# Patient Record
Sex: MALE | Race: WHITE | NOT HISPANIC OR LATINO | Employment: UNEMPLOYED | URBAN - NONMETROPOLITAN AREA
[De-identification: names, ages, dates, MRNs, and addresses within clinical notes are randomized per-mention and may not be internally consistent; named-entity substitution may affect disease eponyms.]

---

## 2018-01-07 ENCOUNTER — APPOINTMENT (OUTPATIENT)
Dept: GENERAL RADIOLOGY | Facility: HOSPITAL | Age: 53
End: 2018-01-07

## 2018-01-07 ENCOUNTER — APPOINTMENT (OUTPATIENT)
Dept: CT IMAGING | Facility: HOSPITAL | Age: 53
End: 2018-01-07

## 2018-01-07 ENCOUNTER — HOSPITAL ENCOUNTER (INPATIENT)
Facility: HOSPITAL | Age: 53
LOS: 7 days | Discharge: ANOTHER HOSPITAL | End: 2018-01-14
Attending: EMERGENCY MEDICINE | Admitting: INTERNAL MEDICINE

## 2018-01-07 DIAGNOSIS — A41.9 SEPSIS DUE TO PNEUMONIA (HCC): Primary | ICD-10-CM

## 2018-01-07 DIAGNOSIS — N17.9 ACUTE RENAL FAILURE, UNSPECIFIED ACUTE RENAL FAILURE TYPE (HCC): ICD-10-CM

## 2018-01-07 DIAGNOSIS — Z74.09 IMPAIRED FUNCTIONAL MOBILITY, BALANCE, GAIT, AND ENDURANCE: ICD-10-CM

## 2018-01-07 DIAGNOSIS — J18.9 SEPSIS DUE TO PNEUMONIA (HCC): Primary | ICD-10-CM

## 2018-01-07 DIAGNOSIS — Z74.09 IMPAIRED MOBILITY AND ADLS: ICD-10-CM

## 2018-01-07 DIAGNOSIS — Z78.9 IMPAIRED MOBILITY AND ADLS: ICD-10-CM

## 2018-01-07 LAB
ALBUMIN SERPL-MCNC: 3.7 G/DL (ref 3.5–5)
ALBUMIN/GLOB SERPL: 1 G/DL (ref 1–2)
ALP SERPL-CCNC: 123 U/L (ref 38–126)
ALT SERPL W P-5'-P-CCNC: 56 U/L (ref 13–69)
AMPHET+METHAMPHET UR QL: NEGATIVE
AMPHETAMINES UR QL: NEGATIVE
ANION GAP SERPL CALCULATED.3IONS-SCNC: 16 MMOL/L
ARTERIAL PATENCY WRIST A: POSITIVE
ARTERIAL PATENCY WRIST A: POSITIVE
AST SERPL-CCNC: 39 U/L (ref 15–46)
BACTERIA UR QL AUTO: ABNORMAL /HPF
BARBITURATES UR QL SCN: NEGATIVE
BASE EXCESS BLDA CALC-SCNC: 1.6 MMOL/L
BASE EXCESS BLDA CALC-SCNC: 2.4 MMOL/L
BDY SITE: ABNORMAL
BDY SITE: ABNORMAL
BENZODIAZ UR QL SCN: NEGATIVE
BILIRUB SERPL-MCNC: 1.1 MG/DL (ref 0.2–1.3)
BILIRUB UR QL STRIP: NEGATIVE
BUN BLD-MCNC: 21 MG/DL (ref 7–20)
BUN/CREAT SERPL: 14 (ref 6.3–21.9)
BUPRENORPHINE SERPL-MCNC: NEGATIVE NG/ML
CALCIUM SPEC-SCNC: 10 MG/DL (ref 8.4–10.2)
CANNABINOIDS SERPL QL: POSITIVE
CHLORIDE SERPL-SCNC: 89 MMOL/L (ref 98–107)
CLARITY UR: ABNORMAL
CO2 SERPL-SCNC: 33 MMOL/L (ref 26–30)
COCAINE UR QL: NEGATIVE
COHGB MFR BLD: 0.8 %
COHGB MFR BLD: 1.2 %
COLOR UR: YELLOW
CREAT BLD-MCNC: 1.5 MG/DL (ref 0.6–1.3)
D-LACTATE SERPL-SCNC: 2.9 MMOL/L (ref 0.5–2)
D-LACTATE SERPL-SCNC: 3.3 MMOL/L (ref 0.5–2)
D-LACTATE SERPL-SCNC: 4.8 MMOL/L (ref 0.5–2)
DEPRECATED RDW RBC AUTO: 42.6 FL (ref 37–54)
ERYTHROCYTE [DISTWIDTH] IN BLOOD BY AUTOMATED COUNT: 11.5 % (ref 11.5–14.5)
FLUAV AG NPH QL: NEGATIVE
FLUBV AG NPH QL IA: NEGATIVE
GFR SERPL CREATININE-BSD FRML MDRD: 49 ML/MIN/1.73
GLOBULIN UR ELPH-MCNC: 3.8 GM/DL
GLUCOSE BLD-MCNC: 125 MG/DL (ref 74–98)
GLUCOSE UR STRIP-MCNC: NEGATIVE MG/DL
HCO3 BLDA-SCNC: 25.8 MMOL/L (ref 22–28)
HCO3 BLDA-SCNC: 26.2 MMOL/L (ref 22–28)
HCT VFR BLD AUTO: 44.9 % (ref 42–52)
HGB BLD-MCNC: 15.8 G/DL (ref 14–18)
HGB BLDA-MCNC: 14.1 G/DL (ref 12–18)
HGB BLDA-MCNC: 15.5 G/DL (ref 12–18)
HGB UR QL STRIP.AUTO: ABNORMAL
HOLD SPECIMEN: NORMAL
HOROWITZ INDEX BLD+IHG-RTO: 32 %
HOROWITZ INDEX BLD+IHG-RTO: 40 %
HYALINE CASTS UR QL AUTO: ABNORMAL /LPF
KETONES UR QL STRIP: ABNORMAL
LEUKOCYTE ESTERASE UR QL STRIP.AUTO: NEGATIVE
LYMPHOCYTES # BLD MANUAL: 1.01 10*3/MM3 (ref 0.6–3.4)
LYMPHOCYTES NFR BLD MANUAL: 4 % (ref 10–50)
MCH RBC QN AUTO: 35 PG (ref 27–31)
MCHC RBC AUTO-ENTMCNC: 35.2 G/DL (ref 30–37)
MCV RBC AUTO: 99.6 FL (ref 80–94)
METAMYELOCYTES NFR BLD MANUAL: 1 % (ref 0–0)
METHADONE UR QL SCN: NEGATIVE
METHGB BLD QL: 0.5 %
METHGB BLD QL: 0.7 %
MODALITY: ABNORMAL
MODALITY: ABNORMAL
NEUTROPHILS # BLD AUTO: 23.99 10*3/MM3 (ref 2–6.9)
NEUTROPHILS NFR BLD MANUAL: 75 % (ref 37–80)
NEUTS BAND NFR BLD MANUAL: 20 % (ref 0–6)
NITRITE UR QL STRIP: NEGATIVE
NT-PROBNP SERPL-MCNC: 2540 PG/ML (ref 0–125)
OPIATES UR QL: NEGATIVE
OXYCODONE UR QL SCN: NEGATIVE
OXYHGB MFR BLDV: 90.5 % (ref 94–99)
OXYHGB MFR BLDV: 93.2 % (ref 94–99)
PCO2 BLDA: 37 MM HG (ref 35–45)
PCO2 BLDA: 38.4 MM HG (ref 35–45)
PCP UR QL SCN: NEGATIVE
PH BLDA: 7.44 PH UNITS (ref 7.3–7.5)
PH BLDA: 7.46 PH UNITS (ref 7.3–7.5)
PH UR STRIP.AUTO: 5.5 [PH] (ref 5–8)
PLAT MORPH BLD: NORMAL
PLATELET # BLD AUTO: 206 10*3/MM3 (ref 130–400)
PMV BLD AUTO: 11.4 FL (ref 6–12)
PO2 BLDA: 59.5 MM HG (ref 75–100)
PO2 BLDA: 68.2 MM HG (ref 75–100)
POTASSIUM BLD-SCNC: 4 MMOL/L (ref 3.5–5.1)
PROPOXYPH UR QL: NEGATIVE
PROT SERPL-MCNC: 7.5 G/DL (ref 6.3–8.2)
PROT UR QL STRIP: ABNORMAL
RBC # BLD AUTO: 4.51 10*6/MM3 (ref 4.7–6.1)
RBC # UR: ABNORMAL /HPF
RBC MORPH BLD: NORMAL
REF LAB TEST METHOD: ABNORMAL
SAO2 % BLDCOA: 92.1 %
SAO2 % BLDCOA: 94.6 %
SCAN SLIDE: NORMAL
SODIUM BLD-SCNC: 134 MMOL/L (ref 137–145)
SP GR UR STRIP: 1.02 (ref 1–1.03)
SQUAMOUS #/AREA URNS HPF: ABNORMAL /HPF
TOXIC GRANULATION: ABNORMAL
TRICYCLICS UR QL SCN: NEGATIVE
TROPONIN I SERPL-MCNC: <0.012 NG/ML (ref 0–0.03)
UROBILINOGEN UR QL STRIP: ABNORMAL
WBC MORPH BLD: NORMAL
WBC NRBC COR # BLD: 25.25 10*3/MM3 (ref 4.8–10.8)
WBC UR QL AUTO: ABNORMAL /HPF
WHOLE BLOOD HOLD SPECIMEN: NORMAL
WHOLE BLOOD HOLD SPECIMEN: NORMAL

## 2018-01-07 PROCEDURE — 71250 CT THORAX DX C-: CPT

## 2018-01-07 PROCEDURE — 25010000002 METHYLPREDNISOLONE PER 125 MG: Performed by: EMERGENCY MEDICINE

## 2018-01-07 PROCEDURE — 94640 AIRWAY INHALATION TREATMENT: CPT

## 2018-01-07 PROCEDURE — 36600 WITHDRAWAL OF ARTERIAL BLOOD: CPT

## 2018-01-07 PROCEDURE — 93005 ELECTROCARDIOGRAM TRACING: CPT | Performed by: EMERGENCY MEDICINE

## 2018-01-07 PROCEDURE — 83605 ASSAY OF LACTIC ACID: CPT | Performed by: INTERNAL MEDICINE

## 2018-01-07 PROCEDURE — 82805 BLOOD GASES W/O2 SATURATION: CPT

## 2018-01-07 PROCEDURE — 94799 UNLISTED PULMONARY SVC/PX: CPT

## 2018-01-07 PROCEDURE — 81001 URINALYSIS AUTO W/SCOPE: CPT | Performed by: INTERNAL MEDICINE

## 2018-01-07 PROCEDURE — 99223 1ST HOSP IP/OBS HIGH 75: CPT | Performed by: INTERNAL MEDICINE

## 2018-01-07 PROCEDURE — 87449 NOS EACH ORGANISM AG IA: CPT | Performed by: INTERNAL MEDICINE

## 2018-01-07 PROCEDURE — 25010000002 METHYLPREDNISOLONE PER 40 MG: Performed by: INTERNAL MEDICINE

## 2018-01-07 PROCEDURE — 71046 X-RAY EXAM CHEST 2 VIEWS: CPT

## 2018-01-07 PROCEDURE — 25010000002 PIPERACILLIN SOD-TAZOBACTAM PER 1 G: Performed by: EMERGENCY MEDICINE

## 2018-01-07 PROCEDURE — 80306 DRUG TEST PRSMV INSTRMNT: CPT | Performed by: INTERNAL MEDICINE

## 2018-01-07 PROCEDURE — 83880 ASSAY OF NATRIURETIC PEPTIDE: CPT | Performed by: EMERGENCY MEDICINE

## 2018-01-07 PROCEDURE — 83050 HGB METHEMOGLOBIN QUAN: CPT

## 2018-01-07 PROCEDURE — 25010000002 LEVOFLOXACIN PER 250 MG: Performed by: EMERGENCY MEDICINE

## 2018-01-07 PROCEDURE — 5A09357 ASSISTANCE WITH RESPIRATORY VENTILATION, LESS THAN 24 CONSECUTIVE HOURS, CONTINUOUS POSITIVE AIRWAY PRESSURE: ICD-10-PCS | Performed by: FAMILY MEDICINE

## 2018-01-07 PROCEDURE — 87804 INFLUENZA ASSAY W/OPTIC: CPT | Performed by: EMERGENCY MEDICINE

## 2018-01-07 PROCEDURE — 87899 AGENT NOS ASSAY W/OPTIC: CPT | Performed by: INTERNAL MEDICINE

## 2018-01-07 PROCEDURE — 85007 BL SMEAR W/DIFF WBC COUNT: CPT | Performed by: EMERGENCY MEDICINE

## 2018-01-07 PROCEDURE — 85025 COMPLETE CBC W/AUTO DIFF WBC: CPT | Performed by: EMERGENCY MEDICINE

## 2018-01-07 PROCEDURE — 80053 COMPREHEN METABOLIC PANEL: CPT | Performed by: EMERGENCY MEDICINE

## 2018-01-07 PROCEDURE — 25010000002 PIPERACILLIN SOD-TAZOBACTAM PER 1 G: Performed by: INTERNAL MEDICINE

## 2018-01-07 PROCEDURE — 99285 EMERGENCY DEPT VISIT HI MDM: CPT

## 2018-01-07 PROCEDURE — 82375 ASSAY CARBOXYHB QUANT: CPT

## 2018-01-07 PROCEDURE — 25010000002 FUROSEMIDE PER 20 MG: Performed by: EMERGENCY MEDICINE

## 2018-01-07 PROCEDURE — 99255 IP/OBS CONSLTJ NEW/EST HI 80: CPT | Performed by: INTERNAL MEDICINE

## 2018-01-07 PROCEDURE — 25010000002 ENOXAPARIN PER 10 MG: Performed by: INTERNAL MEDICINE

## 2018-01-07 PROCEDURE — 71045 X-RAY EXAM CHEST 1 VIEW: CPT

## 2018-01-07 PROCEDURE — 87040 BLOOD CULTURE FOR BACTERIA: CPT | Performed by: EMERGENCY MEDICINE

## 2018-01-07 PROCEDURE — 25010000002 VANCOMYCIN PER 500 MG: Performed by: INTERNAL MEDICINE

## 2018-01-07 PROCEDURE — 94660 CPAP INITIATION&MGMT: CPT

## 2018-01-07 PROCEDURE — 25010000002 MAGNESIUM SULFATE 2 GM/50ML SOLUTION: Performed by: EMERGENCY MEDICINE

## 2018-01-07 PROCEDURE — 84484 ASSAY OF TROPONIN QUANT: CPT | Performed by: EMERGENCY MEDICINE

## 2018-01-07 PROCEDURE — 83605 ASSAY OF LACTIC ACID: CPT | Performed by: EMERGENCY MEDICINE

## 2018-01-07 RX ORDER — BACLOFEN 10 MG/1
10 TABLET ORAL 3 TIMES DAILY
COMMUNITY
End: 2018-01-14 | Stop reason: HOSPADM

## 2018-01-07 RX ORDER — FUROSEMIDE 10 MG/ML
20 INJECTION INTRAMUSCULAR; INTRAVENOUS ONCE
Status: COMPLETED | OUTPATIENT
Start: 2018-01-07 | End: 2018-01-07

## 2018-01-07 RX ORDER — ONDANSETRON 2 MG/ML
4 INJECTION INTRAMUSCULAR; INTRAVENOUS EVERY 6 HOURS PRN
Status: DISCONTINUED | OUTPATIENT
Start: 2018-01-07 | End: 2018-01-14 | Stop reason: HOSPADM

## 2018-01-07 RX ORDER — LORAZEPAM 2 MG/ML
0.5 INJECTION INTRAMUSCULAR EVERY 6 HOURS PRN
Status: DISCONTINUED | OUTPATIENT
Start: 2018-01-07 | End: 2018-01-14 | Stop reason: HOSPADM

## 2018-01-07 RX ORDER — IPRATROPIUM BROMIDE AND ALBUTEROL SULFATE 2.5; .5 MG/3ML; MG/3ML
3 SOLUTION RESPIRATORY (INHALATION) ONCE
Status: COMPLETED | OUTPATIENT
Start: 2018-01-07 | End: 2018-01-07

## 2018-01-07 RX ORDER — CITALOPRAM 40 MG/1
40 TABLET ORAL DAILY
COMMUNITY
End: 2018-01-24 | Stop reason: HOSPADM

## 2018-01-07 RX ORDER — PREDNISONE 20 MG/1
20 TABLET ORAL 2 TIMES DAILY
COMMUNITY
End: 2018-01-14 | Stop reason: HOSPADM

## 2018-01-07 RX ORDER — GUAIFENESIN 600 MG/1
600 TABLET, EXTENDED RELEASE ORAL EVERY 12 HOURS SCHEDULED
Status: DISCONTINUED | OUTPATIENT
Start: 2018-01-07 | End: 2018-01-14 | Stop reason: HOSPADM

## 2018-01-07 RX ORDER — LEVOFLOXACIN 5 MG/ML
750 INJECTION, SOLUTION INTRAVENOUS ONCE
Status: COMPLETED | OUTPATIENT
Start: 2018-01-07 | End: 2018-01-07

## 2018-01-07 RX ORDER — NICOTINE 21 MG/24HR
1 PATCH, TRANSDERMAL 24 HOURS TRANSDERMAL EVERY 24 HOURS
Status: DISCONTINUED | OUTPATIENT
Start: 2018-01-07 | End: 2018-01-14 | Stop reason: HOSPADM

## 2018-01-07 RX ORDER — SODIUM CHLORIDE 0.9 % (FLUSH) 0.9 %
1-10 SYRINGE (ML) INJECTION AS NEEDED
Status: DISCONTINUED | OUTPATIENT
Start: 2018-01-07 | End: 2018-01-14 | Stop reason: HOSPADM

## 2018-01-07 RX ORDER — METHYLPREDNISOLONE SODIUM SUCCINATE 40 MG/ML
40 INJECTION, POWDER, LYOPHILIZED, FOR SOLUTION INTRAMUSCULAR; INTRAVENOUS EVERY 8 HOURS
Status: DISCONTINUED | OUTPATIENT
Start: 2018-01-07 | End: 2018-01-08

## 2018-01-07 RX ORDER — SODIUM CHLORIDE 9 MG/ML
125 INJECTION, SOLUTION INTRAVENOUS CONTINUOUS
Status: DISCONTINUED | OUTPATIENT
Start: 2018-01-07 | End: 2018-01-09

## 2018-01-07 RX ORDER — LEVOFLOXACIN 5 MG/ML
750 INJECTION, SOLUTION INTRAVENOUS EVERY 24 HOURS
Status: DISCONTINUED | OUTPATIENT
Start: 2018-01-08 | End: 2018-01-08

## 2018-01-07 RX ORDER — IPRATROPIUM BROMIDE AND ALBUTEROL SULFATE 2.5; .5 MG/3ML; MG/3ML
3 SOLUTION RESPIRATORY (INHALATION)
Status: DISCONTINUED | OUTPATIENT
Start: 2018-01-07 | End: 2018-01-14 | Stop reason: HOSPADM

## 2018-01-07 RX ORDER — MAGNESIUM SULFATE HEPTAHYDRATE 40 MG/ML
2 INJECTION, SOLUTION INTRAVENOUS ONCE
Status: COMPLETED | OUTPATIENT
Start: 2018-01-07 | End: 2018-01-07

## 2018-01-07 RX ORDER — CITALOPRAM 20 MG/1
40 TABLET ORAL DAILY
Status: DISCONTINUED | OUTPATIENT
Start: 2018-01-08 | End: 2018-01-14 | Stop reason: HOSPADM

## 2018-01-07 RX ORDER — ALBUTEROL SULFATE 2.5 MG/3ML
2.5 SOLUTION RESPIRATORY (INHALATION) EVERY 6 HOURS PRN
Status: DISCONTINUED | OUTPATIENT
Start: 2018-01-07 | End: 2018-01-14 | Stop reason: HOSPADM

## 2018-01-07 RX ORDER — METHYLPREDNISOLONE SODIUM SUCCINATE 125 MG/2ML
125 INJECTION, POWDER, LYOPHILIZED, FOR SOLUTION INTRAMUSCULAR; INTRAVENOUS ONCE
Status: COMPLETED | OUTPATIENT
Start: 2018-01-07 | End: 2018-01-07

## 2018-01-07 RX ORDER — ACETAMINOPHEN 325 MG/1
650 TABLET ORAL EVERY 4 HOURS PRN
Status: DISCONTINUED | OUTPATIENT
Start: 2018-01-07 | End: 2018-01-14 | Stop reason: HOSPADM

## 2018-01-07 RX ORDER — NITROGLYCERIN 0.4 MG/1
0.4 TABLET SUBLINGUAL
Status: DISCONTINUED | OUTPATIENT
Start: 2018-01-07 | End: 2018-01-07

## 2018-01-07 RX ORDER — SODIUM CHLORIDE 0.9 % (FLUSH) 0.9 %
10 SYRINGE (ML) INJECTION AS NEEDED
Status: DISCONTINUED | OUTPATIENT
Start: 2018-01-07 | End: 2018-01-14 | Stop reason: HOSPADM

## 2018-01-07 RX ORDER — ALBUTEROL SULFATE 90 UG/1
2 AEROSOL, METERED RESPIRATORY (INHALATION) EVERY 4 HOURS PRN
COMMUNITY
End: 2018-01-14 | Stop reason: HOSPADM

## 2018-01-07 RX ADMIN — METHYLPREDNISOLONE SODIUM SUCCINATE 40 MG: 40 INJECTION, POWDER, FOR SOLUTION INTRAMUSCULAR; INTRAVENOUS at 18:07

## 2018-01-07 RX ADMIN — SODIUM CHLORIDE 125 ML/HR: 9 INJECTION, SOLUTION INTRAVENOUS at 18:07

## 2018-01-07 RX ADMIN — SODIUM CHLORIDE 1000 ML: 9 INJECTION, SOLUTION INTRAVENOUS at 04:15

## 2018-01-07 RX ADMIN — GUAIFENESIN 600 MG: 600 TABLET, EXTENDED RELEASE ORAL at 21:54

## 2018-01-07 RX ADMIN — TAZOBACTAM SODIUM AND PIPERACILLIN SODIUM 4.5 G: 500; 4 INJECTION, SOLUTION INTRAVENOUS at 18:08

## 2018-01-07 RX ADMIN — IPRATROPIUM BROMIDE AND ALBUTEROL SULFATE 3 ML: .5; 3 SOLUTION RESPIRATORY (INHALATION) at 02:50

## 2018-01-07 RX ADMIN — TAZOBACTAM SODIUM AND PIPERACILLIN SODIUM 4.5 G: 500; 4 INJECTION, SOLUTION INTRAVENOUS at 03:57

## 2018-01-07 RX ADMIN — VANCOMYCIN HYDROCHLORIDE 1500 MG: 500 INJECTION, POWDER, LYOPHILIZED, FOR SOLUTION INTRAVENOUS at 18:14

## 2018-01-07 RX ADMIN — NICOTINE 1 PATCH: 21 PATCH TRANSDERMAL at 21:54

## 2018-01-07 RX ADMIN — METHYLPREDNISOLONE SODIUM SUCCINATE 125 MG: 125 INJECTION, POWDER, FOR SOLUTION INTRAMUSCULAR; INTRAVENOUS at 02:48

## 2018-01-07 RX ADMIN — ENOXAPARIN SODIUM 40 MG: 40 INJECTION SUBCUTANEOUS at 21:53

## 2018-01-07 RX ADMIN — LEVOFLOXACIN 750 MG: 5 INJECTION, SOLUTION INTRAVENOUS at 04:30

## 2018-01-07 RX ADMIN — NITROGLYCERIN 0.4 MG: 0.4 TABLET SUBLINGUAL at 07:22

## 2018-01-07 RX ADMIN — FUROSEMIDE 20 MG: 10 INJECTION, SOLUTION INTRAMUSCULAR; INTRAVENOUS at 06:15

## 2018-01-07 RX ADMIN — SODIUM CHLORIDE 1000 ML: 9 INJECTION, SOLUTION INTRAVENOUS at 18:13

## 2018-01-07 RX ADMIN — SODIUM CHLORIDE 1000 ML: 9 INJECTION, SOLUTION INTRAVENOUS at 02:48

## 2018-01-07 RX ADMIN — SODIUM CHLORIDE 500 ML: 9 INJECTION, SOLUTION INTRAVENOUS at 17:19

## 2018-01-07 RX ADMIN — IPRATROPIUM BROMIDE AND ALBUTEROL SULFATE 3 ML: .5; 3 SOLUTION RESPIRATORY (INHALATION) at 19:03

## 2018-01-07 RX ADMIN — NITROGLYCERIN 1 INCH: 20 OINTMENT TOPICAL at 06:16

## 2018-01-07 RX ADMIN — IPRATROPIUM BROMIDE AND ALBUTEROL SULFATE 3 ML: .5; 3 SOLUTION RESPIRATORY (INHALATION) at 03:45

## 2018-01-07 RX ADMIN — MAGNESIUM SULFATE HEPTAHYDRATE 2 G: 40 INJECTION, SOLUTION INTRAVENOUS at 02:48

## 2018-01-07 RX ADMIN — TAZOBACTAM SODIUM AND PIPERACILLIN SODIUM 4.5 G: 500; 4 INJECTION, SOLUTION INTRAVENOUS at 12:01

## 2018-01-07 NOTE — ED NOTES
Spoke w/ Alexi, Bed Coordinator @ [facility] [place], @ 0805. Still no beds @ this time. Will continue to call and keep us updated on bed placement.      La Quiroz  01/07/18 0806

## 2018-01-07 NOTE — H&P
[facility]   HISTORY AND PHYSICAL      Name:  Niall Murguia   Age:  52 y.o.  Sex:  male  :  1965  MRN:  [id number]   Visit Number:  [id number]  Admission Date:  2018  Date Of Service:  18  Primary Care Physician:  KEAGAN Rowley    History Obtained From:    patient and family    Chief Complaint:     Dyspnea     History Of Presenting Illness:      Patient is a 52-year-old  male with a history of COPD with bullous emphysema who has had shortness of breath and congestion for 1 week.  It started out as a viral infection that considerably worsened.  The past 3 days he has had cough with yellowish sputum which is turned to hemoptysis in the past 24 hours.  His dyspnea has been progressively worse to the point where he is severely short of breath.  Nothing is making it better.  Any movement makes it worse.  He also has had nausea and vomiting.  He was on antibiotics in the form of Augmentin and prednisone in the past 2 days prior to admission.  He came into the emergency room last night as he was not getting any better.  He has never had pneumonia as far as he knows before.  He has had COPD in which she had a spontaneous pneumothorax in  1014 had a chest tube.  He continues to smoke a half pack per day.  Advised him to quit.  He has been given 2 L of fluid in the emergency room.  He was also given Lasix, as they felt that he was fluid overloaded.  He had a cardiac surgery as a child and which a valve was repaired.  He has had no follow-up with cardiology recently.  He denies any chest pressure, or any other pain.  He has had fevers and chills.  He is to be admitted to the ICU with severe sepsis as his lactic acid is 4.8, his WBC count is 25.25, his creatinine is 1.5, and his CT of the chest shows extensive bullous changes and large right-sided pneumonia.    Review Of Systems:     General ROS: positive for  - chills and fever  Psychological ROS:  negative  Ophthalmic ROS: negative  ENT ROS: negative  Allergy and Immunology ROS: negative  Hematological and Lymphatic ROS: negative  Endocrine ROS: negative  Breast ROS: negative  Respiratory ROS: positive for - cough, hemoptysis, shortness of breath and sputum changes  Cardiovascular ROS: negative  Gastrointestinal ROS: positive for - nausea/vomiting  Genito-Urinary ROS: negative  Musculoskeletal ROS: positive for - muscular weakness  Neurological ROS: negative  Dermatological ROS: negative       Past Medical History:   COPD with bullous emphysema    Past Surgical history:   Cardiac surgery as a child.  Chest tube in       Social History:   Half pack per day smoker, smoked since he was a teenager.  Drinks a couple beers every 2 weeks.  Denies drugs.  He has 2 adult children.  He is .  His girlfriend is with him.  He is a full code.    Family History:   Father  at 47 of MI.  Mother is alive and well.    Allergies:      Review of patient's allergies indicates no known allergies.    Home Medications:    Prior to Admission Medications     Prescriptions Last Dose Informant Patient Reported? Taking?    albuterol (PROVENTIL HFA;VENTOLIN HFA) 108 (90 Base) MCG/ACT inhaler   Yes Yes    Inhale 2 puffs Every 4 (Four) Hours As Needed for Wheezing.    baclofen (LIORESAL) 10 MG tablet   Yes Yes    Take 10 mg by mouth 3 (Three) Times a Day.    citalopram (CeleXA) 40 MG tablet   Yes Yes    Take 40 mg by mouth Daily.    mometasone-formoterol (DULERA 200) 200-5 MCG/ACT inhaler   Yes Yes    Inhale 2 puffs 2 (Two) Times a Day.    predniSONE (DELTASONE) 20 MG tablet   Yes Yes    Take 20 mg by mouth 2 (Two) Times a Day.             Hospital Scheduled Meds:               Vital Signs:    Temp:  [98.2 °F (36.8 °C)] 98.2 °F (36.8 °C)  Heart Rate:  [104-133] 122  Resp:  [20-22] 20  BP: ()/(49-84) 122/76    Last 3 weights    18  0212   Weight: 68 kg (150 lb)       Body mass index is 22.81 kg/(m^2).    Physical  Exam:      General Appearance:    Alert, cooperative, in no Moderate distress   Head:    Normocephalic, without obvious abnormality, atraumatic   Eyes:            Lids and lashes normal, conjunctivae and sclerae normal, no   icterus, no pallor, corneas clear, PERRLA   Ears:    Ears appear intact with no abnormalities noted   Throat:   No oral lesions, no thrush, oral mucosa moist   Neck:   No adenopathy, supple, trachea midline, no thyromegaly, no   carotid bruit, no JVD   Back:     No kyphosis present, no scoliosis present, no skin lesions,      erythema or scars, no tenderness to percussion or                   palpation,   range of motion normal   Lungs:     Rhonchi bilaterally, worse on the right.  Some use of accessory muscles respiration.      Heart:    Regular rhythm and Increased rate, normal S1 and S2, no            murmur, no gallop, no rub, no click   Chest Wall:    No abnormalities observed   Abdomen:     Normal bowel sounds, no masses, no organomegaly, soft        non-tender, non-distended, no guarding, no rebound                tenderness   Rectal:     Deferred   Extremities:   4/5 strength in upper/lower extremity is bilaterally.     Pulses:   Pulses palpable and equal bilaterally   Skin:   No bleeding, bruising or rash   Lymph nodes:   No palpable adenopathy   Neurologic:   Cranial nerves 2 - 12 grossly intact, sensation intact, DTR       present and equal bilaterally       EKG:    Sinus tachycardia with no acute ST-T changes.    Telemetry:    Sinus tachycardia    I have personally looked at both the EKG and the telemetry strips.    Labs:      Results from last 7 days  Lab Units 01/07/18  0703 01/07/18  0241 01/07/18  0221   LACTATE mmol/L 4.8* 3.3*  --    WBC 10*3/mm3  --   --  25.25*   HEMOGLOBIN g/dL  --   --  15.8   HEMATOCRIT %  --   --  44.9   MCV fL  --   --  99.6*   MCHC g/dL  --   --  35.2   PLATELETS 10*3/mm3  --   --  206       Results from last 7 days  Lab Units 01/07/18  0349   PH,  ARTERIAL pH units 7.459   PO2 ART mm Hg 59.5*   PCO2, ARTERIAL mm Hg 37.0   HCO3 ART mmol/L 26.2       Results from last 7 days  Lab Units 01/07/18  0221   SODIUM mmol/L 134*   POTASSIUM mmol/L 4.0   CHLORIDE mmol/L 89*   CO2 mmol/L 33.0*   BUN mg/dL 21*   CREATININE mg/dL 1.50*   EGFR IF NONAFRICN AM mL/min/1.73 49*   CALCIUM mg/dL 10.0   GLUCOSE mg/dL 125*   ALBUMIN g/dL 3.70   BILIRUBIN mg/dL 1.1   ALK PHOS U/L 123   AST (SGOT) U/L 39   ALT (SGPT) U/L 56   Estimated Creatinine Clearance: 56 mL/min (by C-G formula based on Cr of 1.5).  No results found for: AMMONIA    Results from last 7 days  Lab Units 01/07/18 0221   TROPONIN I ng/mL <0.012       Results from last 7 days  Lab Units 01/07/18 0221   PROBNP pg/mL 2540.0*     No results found for: HGBA1C  No results found for: TSH, FREET4  No results found for: PREGTESTUR, PREGSERUM, HCG, HCGQUANT  Pain Management Panel     There is no flowsheet data to display.                          Radiology:    Imaging Results (last 7 days)     Procedure Component Value Units Date/Time    XR Chest 2 View [[id number]] Collected:  01/07/18 0813     Updated:  01/07/18 0817    Narrative:       PROCEDURE: XR CHEST 2 VW-     HISTORY: SOA  triage protocol     COMPARISON: None.     FINDINGS: Electrodes overlie the chest. The heart is normal in size. The  mediastinum is unremarkable. There are chronic interstitial changes.  Large right lower lobe pneumonia is noted. There is no pneumothorax.   There are no acute osseous abnormalities. Postoperative changes are seen  to the right upper lobe.       Impression:       Large right lower lobe pneumonia is noted.     Continued followup is recommended.     This report was finalized on 1/7/2018 8:14 AM by Adams Dillon DO.    XR Chest 1 View [[id number]] Collected:  01/07/18 0811     Updated:  01/07/18 0817    Narrative:       PROCEDURE: XR CHEST 1 VW-     HISTORY: SOA; J18.9-Pneumonia, unspecified organism     COMPARISON: January 7, 2018 at  2:03.     FINDINGS: Electrodes overlie the chest. The heart is normal in size. The  mediastinum is unremarkable. Large right lower lobe pneumonia, slightly  more pronounced compared to the prior study. Chronic interstitial  changes are noted. Postoperative changes are seen to the right upper  lobe. There is no pneumothorax.  There are no acute osseous  abnormalities.           Impression:       Large right lower lobe pneumonia, slightly more pronounced  compared to the prior study.     Continued followup is recommended.     This report was finalized on 1/7/2018 8:13 AM by Adams Dillon DO.    CT Chest Without Contrast [[id number]] Collected:  01/07/18 0815     Updated:  01/07/18 0820    Narrative:       PROCEDURE: CT CHEST WO CONTRAST-     HISTORY: soa, cough, hypoxia, tachycardia     COMPARISON: X-ray same date.     PROCEDURE:  Multiple axial CT images were obtained from the thoracic  inlet through the upper abdomen without the use of contrast.      FINDINGS: Lack of intravenous contrast limits evaluation of the solid  organs, the mediastinum, and the vasculature.        Soft tissue windows reveal no axillary, hilar or mediastinal adenopathy.  Heart size is normal. The aorta is normal in caliber. Atherosclerosis is  noted. No pleural or pericardial effusion. There are extensive bullous  changes. Large consolidate in the right lower lobe consistent with  pneumonia. The visualized upper abdomen is unremarkable. Bone windows  reveal no acute osseous abnormalities.       Impression:       There are extensive bullous changes. Large consolidate in  the right lower lobe consistent with pneumonia.     362.24 mGy.cm          This study was performed with techniques to keep radiation doses as low  as reasonably achievable (ALARA). Individualized dose reduction  techniques using automated exposure control or adjustment of mA and/or  kV according to the patient size were employed.      This report was finalized on 1/7/2018 8:17  AM by Adams Dillon DO.          Assessment:  1.  Severe sepsis  2.  Acute hypoxic respiratory failure  3.  Extensive right-sided pneumonia  4.  Bullous emphysema  5.  History of pneumothorax  6.  History of cardiac surgery as a child     Plan:   We will admit this patient.  Broaden antibiotic spectrum to include vancomycin, Zosyn and Levaquin.  Blood cultures have been drawn.  We'll check sputum culture.  Also check urinalysis, urine drug screen.  Give another 1 L of fluid.  Then continue with IV fluids.  Check lactic acid again in 6 hours.  Check echocardiogram.  Admit to the intensive care unit.  Continue with BiPAP.  Consult Dr. Baptiste, spoke to him.  If patient worsens, he will need to be intubated.  Spoke to the patient regarding this, he is agreeable.  Advised him to quit smoking, will give a nicotine patch.  Check lab work in the a.m. as well as ABG.  We will recheck ABG now.  Place on DuoNeb and albuterol as needed.  Check Legionella and strep urine antigen.  Lovenox for DVT prophylaxis.  SCDs.  Continue to monitor patient closely as he is critical.  Further recommendations will depend on clinical course.    Darvin Bay,   01/07/18  12:46 PM

## 2018-01-07 NOTE — ED NOTES
Dr. Leija, Hospitalist at [facility] [place] paged via the [facility] for Dr. Wilcox.     Shannan Oneal  01/07/18 6611

## 2018-01-07 NOTE — ED NOTES
Rajendra Mccauley, assigned ICU 7, but bed was occupied. ICU 7 has now officially been assigned @ 7275.     La Quiroz  01/07/18 1522

## 2018-01-07 NOTE — CONSULTS
"Date of consultation:   January 7, 2018    Requested by:   Hospitalist Service.   PCP: Rebecca Lucas, KEAGAN      Reason:  Acute hypoxic respiratory failure.  Pneumonia.    History of Present Illness:  52 y.o. male  with past medical history significant for right-sided pneumothorax,?  Status post VATS, and diagnosis of COPD who came to the emergency room with worsening shortness of breath for the past few days.  The patient is currently on BiPAP and history is extremely limited from him.  The patient's family member says that he had been sick with \"upper respiratory tract infection\" for at least 4-5 days and although the family members Urging Him to Go to the Emergency Room or to Visit His Primary Care Provider, He Refused to Do so.  About 2 days Ago he did finally go to his Primary Care Provider and was prescribed medications which she was taking regularly but his symptoms continued to worsen therefore he was brought to the emergency room where he was found to be hypoxic and imaging studies confirmed extensive right-sided pneumonia.    The patient smokes three quarters of a pack per day and has been smoking almost all his life.  His family members also mentions pneumothorax in 2010 and it seems that he apparently underwent some sort of surgery for it?.    The patient's family members have been sick with similar symptoms and feels that he may have caught it from one of them.    Review of System: Could not be obtained, as the patient is on BiPAP.     Past Medical History:  Past Medical History:   Diagnosis Date   • COPD (chronic obstructive pulmonary disease)          Past Surgical History:  Past Surgical History:   Procedure Laterality Date   • CARDIAC SURGERY           Family History:  History reviewed. No pertinent family history.      Social History:  Social History     Social History   • Marital status: Single     Spouse name: N/A   • Number of children: N/A   • Years of education: N/A     Social History " "Main Topics   • Smoking status: Current Every Day Smoker     Packs/day: 0.50     Types: Cigarettes   • Smokeless tobacco: Never Used   • Alcohol use Yes      Comment: occasionally   • Drug use: No   • Sexual activity: Not Asked     Other Topics Concern   • None     Social History Narrative   • None         Physical Exam:  /84  Pulse (!) 124  Temp 98.2 °F (36.8 °C) (Oral)   Resp 20  Ht 172.7 cm (68\")  Wt 68 kg (150 lb)  SpO2 95%  BMI 22.81 kg/m2    Constitutional:            Vital signs reviewed            Patient is currently on BiPAP    Head/Face/Eyes:            Pupils appeared equal and reactive to light     ENT:             Patient was on the BiPAP      Neck:             Supple. No JVD noted.     Cardiovascular:              S1 + S2. Tachycardic.     Respiratory:            Transmitted breath sounds bilaterally with somewhat decreased air entry             Percussion could not be performed at this time.            Right basal crackles noted. No wheezing.    Abdomen:            Soft.  Bowel sounds sluggishly positive. No obvious organomegaly noted.    Musculoskeletal/Extremities:             Gait could not be assessed at this time.              No clubbing in the upper extremities             No cyanosis noted in the upper extremities.             No edema noted in the lower extremities bilaterally.    Neurologic/Psychiatric:             Was able to follow simple commands              Exam was limited since the patient was on BiPAP.    Skin:             No obvious rash noted.             Warm and dry.            Labs:   Reviewed. Pertinent labs were noted.     Lab Results   Component Value Date    WBC 25.25 (C) 01/07/2018    HGB 15.8 01/07/2018    HCT 44.9 01/07/2018    MCV 99.6 (H) 01/07/2018     01/07/2018       Lab Results   Component Value Date    GLUCOSE 125 (H) 01/07/2018    CALCIUM 10.0 01/07/2018     (L) 01/07/2018    K 4.0 01/07/2018    CO2 33.0 (H) 01/07/2018    CL 89 (L) " 01/07/2018    BUN 21 (H) 01/07/2018    CREATININE 1.50 (H) 01/07/2018    EGFRIFNONA 49 (L) 01/07/2018    BCR 14.0 01/07/2018    ANIONGAP 16.0 01/07/2018         ABG:  Lab Results   Component Value Date    PHART 7.459 01/07/2018    HME6KTH 37.0 01/07/2018    PO2ART 59.5 (L) 01/07/2018    HGBBG 15.5 01/07/2018    L6MYYTNY 92.1 01/07/2018    CARBOXYHGB 1.2 01/07/2018           Imaging Study: Images reviewed personally and discussed with patient And family    Imaging Results (last 72 hours)     Procedure Component Value Units Date/Time    XR Chest 2 View [[id number]] Collected:  01/07/18 0813     Updated:  01/07/18 0817    Narrative:       PROCEDURE: XR CHEST 2 VW-     HISTORY: SOA  triage protocol     COMPARISON: None.     FINDINGS: Electrodes overlie the chest. The heart is normal in size. The  mediastinum is unremarkable. There are chronic interstitial changes.  Large right lower lobe pneumonia is noted. There is no pneumothorax.   There are no acute osseous abnormalities. Postoperative changes are seen  to the right upper lobe.       Impression:       Large right lower lobe pneumonia is noted.     Continued followup is recommended.     This report was finalized on 1/7/2018 8:14 AM by Adams Dillon DO.    XR Chest 1 View [[id number]] Collected:  01/07/18 0811     Updated:  01/07/18 0817    Narrative:       PROCEDURE: XR CHEST 1 VW-     HISTORY: SOA; J18.9-Pneumonia, unspecified organism     COMPARISON: January 7, 2018 at 2:03.     FINDINGS: Electrodes overlie the chest. The heart is normal in size. The  mediastinum is unremarkable. Large right lower lobe pneumonia, slightly  more pronounced compared to the prior study. Chronic interstitial  changes are noted. Postoperative changes are seen to the right upper  lobe. There is no pneumothorax.  There are no acute osseous  abnormalities.           Impression:       Large right lower lobe pneumonia, slightly more pronounced  compared to the prior study.     Continued  followup is recommended.     This report was finalized on 1/7/2018 8:13 AM by Adams Dillon DO.    CT Chest Without Contrast [[id number]] Collected:  01/07/18 0815     Updated:  01/07/18 0820    Narrative:       PROCEDURE: CT CHEST WO CONTRAST-     HISTORY: soa, cough, hypoxia, tachycardia     COMPARISON: X-ray same date.     PROCEDURE:  Multiple axial CT images were obtained from the thoracic  inlet through the upper abdomen without the use of contrast.      FINDINGS: Lack of intravenous contrast limits evaluation of the solid  organs, the mediastinum, and the vasculature.        Soft tissue windows reveal no axillary, hilar or mediastinal adenopathy.  Heart size is normal. The aorta is normal in caliber. Atherosclerosis is  noted. No pleural or pericardial effusion. There are extensive bullous  changes. Large consolidate in the right lower lobe consistent with  pneumonia. The visualized upper abdomen is unremarkable. Bone windows  reveal no acute osseous abnormalities.       Impression:       There are extensive bullous changes. Large consolidate in  the right lower lobe consistent with pneumonia.     362.24 mGy.cm          This study was performed with techniques to keep radiation doses as low  as reasonably achievable (ALARA). Individualized dose reduction  techniques using automated exposure control or adjustment of mA and/or  kV according to the patient size were employed.      This report was finalized on 1/7/2018 8:17 AM by Adams Dillon DO.              Assessment:  1.  Acute hypoxic respiratory failure  2.  Right lower lobe pneumonia  3.  COPD  4.  Severe sepsis  5.  History of pneumothorax  6.  Smoking    Discussion/Recommendations:   The patient's acute hypoxic respiratory failure is clearly due to extensive right-sided pneumonia.  I will change the BiPAP to 14/8 or 14/10 with 30-80% FiO2.    Due to anxiety, I would suggest using Ativan 0.5 mg IVP Q4 PRN.    Chest x-ray will be repeated in the  morning.    Mycoplasma, legionella and strep antigens will be sent.    He will definitely need to quit smoking.    He'll also benefit from outpatient follow-up as he will need PFTs and other tests.  He will also benefit from long-term medication use and this will be prescribed upon his discharge.    Due to continued tachycardia, I will give NS Bolus    The plan was discussed with the patient & family members.  I have also discussed the case with the nursing staff.    Recommendations were also discussed with the referring provider.     I would like to thank you for the opportunity to participate in the care of this patient.  We will communicate changes and recommendations, if and when necessary.      Chelsea Baptiste MD  01/07/18  3:06 PM    Dictated utilizing Dragon dictation.

## 2018-01-07 NOTE — ED NOTES
Dr Bay with pt. Pt to be admitted to ICU 7 when clean. [facility] notified that pt will be admitted here.     Quentin Gilman RN  01/07/18 6039

## 2018-01-07 NOTE — ED NOTES
Spoke w/ Alexi, Bed Coordinator @ [facility], @ 1006. Still no beds @ this time. Will continue to call and keep us updated.     [place] Qu[place]z  01/07/18 1004

## 2018-01-07 NOTE — ED NOTES
Spoke with Ruby from the [facility] who stated that they should have a bed assignment closer to 0700 for patient. That she would call us back with a update before she left at 0730.     Shannan Oneal  01/07/18 0628

## 2018-01-07 NOTE — ED NOTES
Informed Venessa Truong Coordinator @ [facility] [place], PT will be admitted here @ 1215.     [place] Qu[place]z  01/07/18 9610

## 2018-01-07 NOTE — ED PROVIDER NOTES
TRIAGE CHIEF COMPLAINT:     Nursing and triage notes reviewed    Chief Complaint   Patient presents with   • Shortness of Breath   • Nausea      HPI: Niall Murguia is a 52 y.o. male who presents to the emergency department complaining of Shortness of breath, cough, congestion.  Patient also with generalized body aches, chills, possible fever.  Patient states that symptoms have been going on for approximately the past week.  2 days ago patient went to his doctor where he received antibiotics and steroids.  He states he's been taking them for the past 2 days but is felt worse and worse.  Patient states he is coughing up some greenish colored sputum which today he coughed up a small amount of blood as well.  He states he had one episode of vomiting after a large amount of coughing.  Patient does currently smoke approximately one half pack of cigarettes per day.  He denies having chest pain but states it is feel tight when he coughs.  He denies abdominal discomfort, no diarrhea.     REVIEW OF SYSTEMS: All other systems reviewed and are negative     PAST MEDICAL HISTORY:   Past Medical History:   Diagnosis Date   • COPD (chronic obstructive pulmonary disease)         FAMILY HISTORY:   History reviewed. No pertinent family history.     SOCIAL HISTORY:   Social History     Social History   • Marital status: Single     Spouse name: N/A   • Number of children: N/A   • Years of education: N/A     Occupational History   • Not on file.     Social History Main Topics   • Smoking status: Current Every Day Smoker     Packs/day: 0.50     Types: Cigarettes   • Smokeless tobacco: Never Used   • Alcohol use Yes      Comment: occasionally   • Drug use: No   • Sexual activity: Not on file     Other Topics Concern   • Not on file     Social History Narrative   • No narrative on file        SURGICAL HISTORY:   Past Surgical History:   Procedure Laterality Date   • CARDIAC SURGERY          CURRENT MEDICATIONS:      Medication List       Notice     You have not been prescribed any medications.         ALLERGIES: Review of patient's allergies indicates no known allergies.     PHYSICAL EXAM:   VITAL SIGNS:   Vitals:    01/07/18 0357   BP: 109/75   Pulse: 109   Resp:    Temp:    SpO2: 94%      CONSTITUTIONAL: Awake, oriented, appears Uncomfortable   HENT: Atraumatic, normocephalic, oral mucosa pink and dry, airway patent. Nares patent without drainage. External ears normal.   EYES: Conjunctiva clear   NECK: Trachea midline, non-tender, supple   CARDIOVASCULAR: Tachycardic with a regular rhythm, No murmurs, rubs, gallops   PULMONARY/CHEST: Are some diffuse scattered wheezes, some crackles in the right lower lobe.  ABDOMINAL: Non-distended, soft, non-tender - no rebound or guarding.   NEUROLOGIC: Non-focal, moving all four extremities, no gross sensory or motor deficits.   EXTREMITIES: No clubbing, cyanosis, or edema   SKIN: Warm, Dry, No erythema, No rash     ED COURSE / MEDICAL DECISION MAKING:   Niall Murguia is a 52 y.o. male who presents to the emergency department for evaluation of cough, shortness of breath, chills.  Patient is tachycardic on arrival in the emergency department.  Patient also has an oxygen saturation in the low 90s while on several liters of oxygen.  Patient does not require oxygen at home.  Some crackles appreciated in the right lower lobe.  I have significant concern for influenza or pneumonia or possibly both.  Labs and imaging were obtained for further evaluation.  Patient did meet sepsis screening criteria.  EKG on arrival revealed sinus rhythm with a rate of 122 bpm.  There are some rate related changes but no obvious acute ischemic changes.  Chest x-ray revealed a significant right lower lobe pneumonia.  Patient improved somewhat with IV fluids, breathing treatments, IV steroids.  Influenza screen is negative.  Patient had a critically high white blood cell count of 25.  Lactic acid elevated at 3.3.  Patient given  multiple boluses of IV fluids and started on antibiotic's.  Patient's heart rate and oxygen saturation began to improve.  Patient will need to be admitted to the hospital for further treatment.  There were no telemetry beds at this facility and it was felt patient would require at least a telemetry bed and so will be transferred to [facility] for further treatment and observation.  Patient remained stable in the emergency department.    DECISION TO DISCHARGE/ADMIT: see ED care timeline     FINAL IMPRESSION:   1 -- pneumonia   2 --   3 --     Electronically signed by: Merced Wilcox MD, 1/7/2018 4:05 AM       Merced Wilcox MD  01/07/18 1510      Patient experienced some worsening in respiratory status was still in the emergency department.  Oxygen saturation remained appropriate with some increased oxygen.  Patient had some more crackles on examination.  There are some concerns for fluid overload with the amount of fluid patient received in the emergency department.  Repeat x-ray revealed probable small amount of vascular congestion.  Patient given a small dose of nitroglycerin with some Lasix.  Eventually placed on BiPAP for comfort.  I updated the hospitalist at this facility that should a bed opened up sooner than patient is able to be transferred he will stay at this facility.  Fluids were stopped at this time.  Will be continued should patient's symptoms improve or should his blood pressure began to drop withheld them at that time due to concerns for fluid overload.         Merced Wilcox MD  01/07/18 0893

## 2018-01-07 NOTE — ED NOTES
Rajendra Mccauley, states there will be an ICU bed for this PT so we will admit here. Will call back w/ bed assignment.      [place]  01/07/18 8655

## 2018-01-07 NOTE — PROGRESS NOTES
"Pharmacokinetic Initial Note - Vancomycin    Niall Murguia is a 52 y.o. male  172.7 cm (68\") 68 kg (150 lb)    Indication for use: Sepsis, Pneumonia      Results from last 7 days  Lab Units 01/07/18  0221   WBC 10*3/mm3 25.25*   CREATININE mg/dL 1.50*      Estimated Creatinine Clearance: 56 mL/min (by C-G formula based on Cr of 1.5).  Temp Readings from Last 1 Encounters:   01/07/18 98.2 °F (36.8 °C) (Oral)       Culture results  Microbiology Results (last 10 days)     Procedure Component Value - Date/Time    Blood Culture - Blood, [[id number]]  (Normal) Collected:  01/07/18 0245    Lab Status:  Preliminary result Specimen:  Blood from Hand, Right Updated:  01/07/18 1501     Blood Culture No growth at less than 24 hours    Blood Culture - Blood, [[id number]]  (Normal) Collected:  01/07/18 0241    Lab Status:  Preliminary result Specimen:  Blood from Arm, Right Updated:  01/07/18 1501     Blood Culture No growth at less than 24 hours    Influenza Antigen, Rapid - Swab, Nasopharynx [[id number]]  (Normal) Collected:  01/07/18 0224    Lab Status:  Final result Specimen:  Swab from Nasopharynx Updated:  01/07/18 0243     Influenza A Ag, EIA Negative     Influenza B Ag, EIA Negative          Other Antimicrobials  Zosyn 4.5 gm IV q 6 hr (conventional dosing)  Levofloxacin 750 mg IV q 24 hr    Assessment/Plan  Initiated Vancomycin 1500 mg IVPB once, followed by 1000 mg Q24H. Will order Vancomycin trough when pharmacokinetically appropriate.  Pharmacy will monitor renal function and adjust dose accordingly.    Ana Hu [facility]  01/07/18 6:00 PM  "

## 2018-01-07 NOTE — ED NOTES
Pt assisted to bedside commode. Dr Bose and HO made aware that [facility] still has no bed available. Dr Bay will admit pt here if a bed opens up at this hospital.     Quentin Gilman RN  01/07/18 0880

## 2018-01-08 ENCOUNTER — APPOINTMENT (OUTPATIENT)
Dept: CT IMAGING | Facility: HOSPITAL | Age: 53
End: 2018-01-08

## 2018-01-08 ENCOUNTER — APPOINTMENT (OUTPATIENT)
Dept: CARDIOLOGY | Facility: HOSPITAL | Age: 53
End: 2018-01-08
Attending: INTERNAL MEDICINE

## 2018-01-08 ENCOUNTER — APPOINTMENT (OUTPATIENT)
Dept: GENERAL RADIOLOGY | Facility: HOSPITAL | Age: 53
End: 2018-01-08

## 2018-01-08 LAB
ALBUMIN SERPL-MCNC: 2.6 G/DL (ref 3.5–5)
ALBUMIN/GLOB SERPL: 0.9 G/DL (ref 1–2)
ALP SERPL-CCNC: 39 U/L (ref 38–126)
ALT SERPL W P-5'-P-CCNC: 33 U/L (ref 13–69)
ANION GAP SERPL CALCULATED.3IONS-SCNC: 11.4 MMOL/L
ANISOCYTOSIS BLD QL: ABNORMAL
ARTERIAL PATENCY WRIST A: POSITIVE
AST SERPL-CCNC: 22 U/L (ref 15–46)
ATMOSPHERIC PRESS: 737 MMHG
BASE EXCESS BLDA CALC-SCNC: 2.5 MMOL/L
BDY SITE: ABNORMAL
BILIRUB SERPL-MCNC: 0.7 MG/DL (ref 0.2–1.3)
BUN BLD-MCNC: 29 MG/DL (ref 7–20)
BUN/CREAT SERPL: 36.3 (ref 6.3–21.9)
CALCIUM SPEC-SCNC: 8.3 MG/DL (ref 8.4–10.2)
CHLORIDE SERPL-SCNC: 100 MMOL/L (ref 98–107)
CO2 SERPL-SCNC: 25 MMOL/L (ref 26–30)
CREAT BLD-MCNC: 0.8 MG/DL (ref 0.6–1.3)
DEPRECATED RDW RBC AUTO: 45 FL (ref 37–54)
ERYTHROCYTE [DISTWIDTH] IN BLOOD BY AUTOMATED COUNT: 11.9 % (ref 11.5–14.5)
GFR SERPL CREATININE-BSD FRML MDRD: 102 ML/MIN/1.73
GLOBULIN UR ELPH-MCNC: 2.9 GM/DL
GLUCOSE BLD-MCNC: 126 MG/DL (ref 74–98)
HCO3 BLDA-SCNC: 26.7 MMOL/L (ref 22–28)
HCT VFR BLD AUTO: 33.6 % (ref 42–52)
HGB BLD-MCNC: 11.3 G/DL (ref 14–18)
HGB BLDA-MCNC: 11.8 G/DL (ref 12–18)
HOROWITZ INDEX BLD+IHG-RTO: 50 %
LYMPHOCYTES # BLD MANUAL: 0.91 10*3/MM3 (ref 0.6–3.4)
LYMPHOCYTES NFR BLD MANUAL: 14 % (ref 10–50)
LYMPHOCYTES NFR BLD MANUAL: 3 % (ref 0–12)
MACROCYTES BLD QL SMEAR: ABNORMAL
MAGNESIUM SERPL-MCNC: 2.1 MG/DL (ref 1.6–2.3)
MAXIMAL PREDICTED HEART RATE: 169 BPM
MCH RBC QN AUTO: 34.2 PG (ref 27–31)
MCHC RBC AUTO-ENTMCNC: 33.6 G/DL (ref 30–37)
MCV RBC AUTO: 101.8 FL (ref 80–94)
METAMYELOCYTES NFR BLD MANUAL: 10 % (ref 0–0)
MODALITY: ABNORMAL
MONOCYTES # BLD AUTO: 0.19 10*3/MM3 (ref 0–0.9)
MYELOCYTES NFR BLD MANUAL: 2 % (ref 0–0)
NEUTROPHILS # BLD AUTO: 4.61 10*3/MM3 (ref 2–6.9)
NEUTROPHILS NFR BLD MANUAL: 31 % (ref 37–80)
NEUTS BAND NFR BLD MANUAL: 40 % (ref 0–6)
NEUTS VAC BLD QL SMEAR: ABNORMAL
PCO2 BLDA: 39.7 MM HG (ref 35–45)
PH BLDA: 7.44 PH UNITS (ref 7.3–7.5)
PHOSPHATE SERPL-MCNC: 2.9 MG/DL (ref 2.5–4.5)
PLATELET # BLD AUTO: 128 10*3/MM3 (ref 130–400)
PMV BLD AUTO: 11.2 FL (ref 6–12)
PO2 BLDA: 61.8 MM HG (ref 75–100)
POIKILOCYTOSIS BLD QL SMEAR: ABNORMAL
POTASSIUM BLD-SCNC: 4.4 MMOL/L (ref 3.5–5.1)
PROT SERPL-MCNC: 5.5 G/DL (ref 6.3–8.2)
RBC # BLD AUTO: 3.3 10*6/MM3 (ref 4.7–6.1)
SAO2 % BLDCOA: 92.3 %
SCAN SLIDE: NORMAL
SMALL PLATELETS BLD QL SMEAR: ABNORMAL
SODIUM BLD-SCNC: 132 MMOL/L (ref 137–145)
STRESS TARGET HR: 144 BPM
TOXIC GRANULATION: ABNORMAL
WBC NRBC COR # BLD: 6.49 10*3/MM3 (ref 4.8–10.8)

## 2018-01-08 PROCEDURE — 25010000002 ENOXAPARIN PER 10 MG: Performed by: INTERNAL MEDICINE

## 2018-01-08 PROCEDURE — 0 IOPAMIDOL PER 1 ML: Performed by: INTERNAL MEDICINE

## 2018-01-08 PROCEDURE — 25010000002 LEVOFLOXACIN PER 250 MG: Performed by: INTERNAL MEDICINE

## 2018-01-08 PROCEDURE — 99233 SBSQ HOSP IP/OBS HIGH 50: CPT | Performed by: INTERNAL MEDICINE

## 2018-01-08 PROCEDURE — 25010000002 VANCOMYCIN PER 500 MG: Performed by: INTERNAL MEDICINE

## 2018-01-08 PROCEDURE — 94799 UNLISTED PULMONARY SVC/PX: CPT

## 2018-01-08 PROCEDURE — 71275 CT ANGIOGRAPHY CHEST: CPT

## 2018-01-08 PROCEDURE — 36600 WITHDRAWAL OF ARTERIAL BLOOD: CPT

## 2018-01-08 PROCEDURE — 83735 ASSAY OF MAGNESIUM: CPT | Performed by: INTERNAL MEDICINE

## 2018-01-08 PROCEDURE — 85025 COMPLETE CBC W/AUTO DIFF WBC: CPT | Performed by: INTERNAL MEDICINE

## 2018-01-08 PROCEDURE — 85007 BL SMEAR W/DIFF WBC COUNT: CPT | Performed by: INTERNAL MEDICINE

## 2018-01-08 PROCEDURE — 84100 ASSAY OF PHOSPHORUS: CPT | Performed by: INTERNAL MEDICINE

## 2018-01-08 PROCEDURE — 71045 X-RAY EXAM CHEST 1 VIEW: CPT

## 2018-01-08 PROCEDURE — 93306 TTE W/DOPPLER COMPLETE: CPT

## 2018-01-08 PROCEDURE — 82805 BLOOD GASES W/O2 SATURATION: CPT

## 2018-01-08 PROCEDURE — 25010000002 PIPERACILLIN SOD-TAZOBACTAM PER 1 G: Performed by: INTERNAL MEDICINE

## 2018-01-08 PROCEDURE — 80053 COMPREHEN METABOLIC PANEL: CPT | Performed by: INTERNAL MEDICINE

## 2018-01-08 PROCEDURE — 94660 CPAP INITIATION&MGMT: CPT

## 2018-01-08 PROCEDURE — 25010000002 METHYLPREDNISOLONE PER 40 MG: Performed by: INTERNAL MEDICINE

## 2018-01-08 RX ORDER — LEVOFLOXACIN 5 MG/ML
750 INJECTION, SOLUTION INTRAVENOUS EVERY 24 HOURS
Status: DISCONTINUED | OUTPATIENT
Start: 2018-01-09 | End: 2018-01-10

## 2018-01-08 RX ORDER — METHYLPREDNISOLONE SODIUM SUCCINATE 40 MG/ML
40 INJECTION, POWDER, LYOPHILIZED, FOR SOLUTION INTRAMUSCULAR; INTRAVENOUS EVERY 12 HOURS
Status: DISCONTINUED | OUTPATIENT
Start: 2018-01-09 | End: 2018-01-10

## 2018-01-08 RX ORDER — WHITE PETROLATUM 450 MG/G
1 STICK TOPICAL
Status: DISCONTINUED | OUTPATIENT
Start: 2018-01-08 | End: 2018-01-14 | Stop reason: HOSPADM

## 2018-01-08 RX ORDER — ACETYLCYSTEINE 200 MG/ML
600 SOLUTION ORAL; RESPIRATORY (INHALATION)
Status: DISCONTINUED | OUTPATIENT
Start: 2018-01-08 | End: 2018-01-08

## 2018-01-08 RX ADMIN — GUAIFENESIN 600 MG: 600 TABLET, EXTENDED RELEASE ORAL at 08:14

## 2018-01-08 RX ADMIN — GUAIFENESIN 600 MG: 600 TABLET, EXTENDED RELEASE ORAL at 20:47

## 2018-01-08 RX ADMIN — IPRATROPIUM BROMIDE AND ALBUTEROL SULFATE 3 ML: .5; 3 SOLUTION RESPIRATORY (INHALATION) at 07:50

## 2018-01-08 RX ADMIN — IPRATROPIUM BROMIDE AND ALBUTEROL SULFATE 3 ML: .5; 3 SOLUTION RESPIRATORY (INHALATION) at 20:03

## 2018-01-08 RX ADMIN — CITALOPRAM HYDROBROMIDE 40 MG: 20 TABLET, FILM COATED ORAL at 08:14

## 2018-01-08 RX ADMIN — METHYLPREDNISOLONE SODIUM SUCCINATE 40 MG: 40 INJECTION, POWDER, FOR SOLUTION INTRAMUSCULAR; INTRAVENOUS at 01:23

## 2018-01-08 RX ADMIN — ENOXAPARIN SODIUM 40 MG: 40 INJECTION SUBCUTANEOUS at 20:47

## 2018-01-08 RX ADMIN — LEVOFLOXACIN 750 MG: 5 INJECTION, SOLUTION INTRAVENOUS at 05:00

## 2018-01-08 RX ADMIN — IPRATROPIUM BROMIDE AND ALBUTEROL SULFATE 3 ML: .5; 3 SOLUTION RESPIRATORY (INHALATION) at 12:16

## 2018-01-08 RX ADMIN — TAZOBACTAM SODIUM AND PIPERACILLIN SODIUM 4.5 G: 500; 4 INJECTION, SOLUTION INTRAVENOUS at 06:19

## 2018-01-08 RX ADMIN — IPRATROPIUM BROMIDE AND ALBUTEROL SULFATE 3 ML: .5; 3 SOLUTION RESPIRATORY (INHALATION) at 00:26

## 2018-01-08 RX ADMIN — TAZOBACTAM SODIUM AND PIPERACILLIN SODIUM 4.5 G: 500; 4 INJECTION, SOLUTION INTRAVENOUS at 00:55

## 2018-01-08 RX ADMIN — VANCOMYCIN HYDROCHLORIDE 1000 MG: 1 INJECTION, POWDER, LYOPHILIZED, FOR SOLUTION INTRAVENOUS at 10:15

## 2018-01-08 RX ADMIN — IOPAMIDOL 200 ML: 510 INJECTION, SOLUTION INTRAVASCULAR at 12:30

## 2018-01-08 RX ADMIN — TAZOBACTAM SODIUM AND PIPERACILLIN SODIUM 4.5 G: 500; 4 INJECTION, SOLUTION INTRAVENOUS at 14:31

## 2018-01-08 RX ADMIN — METHYLPREDNISOLONE SODIUM SUCCINATE 40 MG: 40 INJECTION, POWDER, FOR SOLUTION INTRAMUSCULAR; INTRAVENOUS at 10:15

## 2018-01-08 RX ADMIN — NICOTINE 1 PATCH: 21 PATCH TRANSDERMAL at 20:52

## 2018-01-08 RX ADMIN — ACETAMINOPHEN 650 MG: 325 TABLET, FILM COATED ORAL at 08:47

## 2018-01-08 RX ADMIN — SODIUM CHLORIDE 125 ML/HR: 9 INJECTION, SOLUTION INTRAVENOUS at 20:47

## 2018-01-08 RX ADMIN — METHYLPREDNISOLONE SODIUM SUCCINATE 40 MG: 40 INJECTION, POWDER, FOR SOLUTION INTRAMUSCULAR; INTRAVENOUS at 17:40

## 2018-01-08 NOTE — PLAN OF CARE
Problem: Patient Care Overview (Adult)  Goal: Plan of Care Review   01/08/18 1238   Coping/Psychosocial Response Interventions   Plan Of Care Reviewed With patient   Patient Care Overview   Progress no change       Problem: Sepsis (Adult)  Goal: Signs and Symptoms of Listed Potential Problems Will be Absent or Manageable (Sepsis)  Outcome: Ongoing (interventions implemented as appropriate)   01/08/18 1238   Sepsis   Problems Assessed (Sepsis) all   Problems Present (Sepsis) hypoxia/hypoxemia;progression of infection  (pt still requiring high dose oxygen)

## 2018-01-08 NOTE — PROGRESS NOTES
"  CC: Hypoxic respiratory failure.  Pneumonia.  COPD    S: Currently off the BiPAP.  Continues to be short of breath although feels somewhat improved overall.    O:Vital signs reviewed. O2Sat: 95 % on 7-8 L/m.   /76  Pulse 80  Temp 99 °F (37.2 °C) (Oral)   Resp 23  Ht 172.7 cm (68\")  Wt 68 kg (150 lb)  SpO2 95%  BMI 22.81 kg/m2      I & Os reviewed.   Intake/Output       01/07/18 0700 - 01/08/18 0659    Intake (ml) 3034    Output (ml) 1350    Net (ml) 1684          General: Mild acute distress noted.  Eyes: PERRL. EOM intact  Neck: Supple without JVD  Cardiovascular: S1 + S2. Regular.   Respiratory: Mild respiratory distress noted. No wheezing heard. Right basal crackles noted  Abdomen: Soft. Bowel sounds positive   Extremities: No edema noted.  Neurologic: AAOx3. Was able to follow commands.   Skin: Appeared without any overt rashes    Labs: Reviewed.     Results from last 7 days  Lab Units 01/08/18 0531 01/07/18 0221   SODIUM mmol/L 132* 134*   POTASSIUM mmol/L 4.4 4.0   CHLORIDE mmol/L 100 89*   CO2 mmol/L 25.0* 33.0*   BUN mg/dL 29* 21*   CREATININE mg/dL 0.80 1.50*   CALCIUM mg/dL 8.3* 10.0   BILIRUBIN mg/dL 0.7 1.1   ALK PHOS U/L 39 123   ALT (SGPT) U/L 33 56   AST (SGOT) U/L 22 39   GLUCOSE mg/dL 126* 125*         Results from last 7 days  Lab Units 01/08/18  0531   MAGNESIUM mg/dL 2.1   PHOSPHORUS mg/dL 2.9           Results from last 7 days  Lab Units 01/08/18 0531 01/07/18 0221   WBC 10*3/mm3 6.49 25.25*   HEMOGLOBIN g/dL 11.3* 15.8   PLATELETS 10*3/mm3 128* 206                 Pharmacy to dose vancomycin     sodium chloride 125 mL/hr Last Rate: 125 mL/hr (01/07/18 1807)         ABG:   Lab Results   Component Value Date    PHART 7.436 01/08/2018    TRX3BQJ 39.7 01/08/2018    PO2ART 61.8 (L) 01/08/2018    HGBBG 11.8 (L) 01/08/2018    W1SFENKH 92.3 01/08/2018    CARBOXYHGB 0.8 01/07/2018         CXRay: Reviewed personally.   Imaging Results (last 24 hours)     Procedure Component Value Units " Date/Time    XR Chest 1 View [[id number]] Collected:  01/08/18 0806     Updated:  01/08/18 0810    Narrative:       PROCEDURE: XR CHEST 1 VW-     HISTORY: PNA.; J18.9-Pneumonia, unspecified organism; A41.9-Sepsis,  unspecified organism; N17.9-Acute kidney failure, unspecified     COMPARISON: January 7, 2018.     FINDINGS: The heart is normal in size. The mediastinum is unremarkable.  There are emphysematous changes. An opacity in the right mid and lower  lung field is unchanged. There is a new retrocardiac opacity in the  medial left lung base. No significant effusions are evident. There is no  pneumothorax.  There are no acute osseous abnormalities.           Impression:       No change in the patient's right lung airspace disease with  a new retrocardiac opacity in the left lung base consistent with a  worsening pneumonia.     Continued followup is recommended.     This report was finalized on 1/8/2018 8:08 AM by Larry Grewal M.D..    XR Chest 1 View [[id number]] Collected:  01/08/18 0808     Updated:  01/08/18 0811    Narrative:       PROCEDURE: XR CHEST 1 VW-     HISTORY: dyspnea; J18.9-Pneumonia, unspecified organism; A41.9-Sepsis,  unspecified organism; N17.9-Acute kidney failure, unspecified     COMPARISON: 1/7/2018.     FINDINGS: The heart is normal in size. The mediastinum is unremarkable.  There are emphysematous changes to the lungs. There is a persistent  right mid and lower lung field opacity with worsening airspace disease  in the left lung base. There is no pneumothorax.  There are no acute  osseous abnormalities.           Impression:       No significant change in the patients right lung airspace  disease with a worsening left basilar opacity consistent with a  pneumonia.     Continued followup is recommended.     This report was finalized on 1/8/2018 8:09 AM by Larry Grewal M.D..            Assessment & Recommendations/Plan:   1.  Acute hypoxic respiratory failure  Currently off  BiPAP.  Continue oxygen supplementation  Continue BiPAP when necessary    2.  Right lower lobe pneumonia  Continue antibiotics  Cultures and antigens pending    3.  COPD  Continue nebulized treatments.    4.  Severe sepsis  Seems to have responded well to fluids.  Continue IV fluids for now    5.  History of pneumothorax  No evidence of recurrence.    6.  Smoking  We'll definitely need to quit    Although his condition remains critical, he is definitely more stable than yesterday.  If he continues to be hemodynamically stable, he can be potentially transferred out of the ICU to telemetry unit later on.    We have reviewed patient's current orders and changes, if any, have been suggested to primary care team. Plan was also discussed with nursing staff, as necessary.         Chelsea Baptiste MD  01/08/18  9:13 AM    Dictated utilizing Dragon dictation.    Addendum:  The patient does not have any risk factors for MRSA pneumonia therefore I will discontinue vancomycin.  I will however continue Zosyn and Levaquin for now.    I also reviewed the CT of the chest with PE protocol which was ordered today?  Which did not show any pulmonary embolism.    I will discontinue Mucomyst as there is no benefit to adding Mucomyst in patients with pneumonia.    Chelsea Baptiste MD  01/08/18  6:12 PM

## 2018-01-08 NOTE — PAYOR COMM NOTE
"TO:[facility] SOURCE  FROM:HUBERT SMITH, RN PHONE [phone]  INPT NOTIFICATION AND CLINICALS    Alyssa Corea (52 y.o. Male)     Date of Birth Social Security Number Address Home Phone MRN    1965  [address] [facility] [address] [phone] [id number]    [employer] Marital Status          None Single       Admission Date Admission Type Admitting Provider Attending Provider Department, Room/Bed    18 Emergency Darvin Bay DO Hinsberg, Francis J, DO [facility] INTENSIVE CARE, I07    Discharge Date Discharge Disposition Discharge Destination                      Attending Provider: Darvin Bay DO     Allergies:  No Known Allergies    Isolation:  None   Infection:  None   Code Status:  FULL    Ht:  172.7 cm (68\")   Wt:  68 kg (150 lb)    Admission Cmt:  None   Principal Problem:  None                Active Insurance as of 2018     Primary Coverage     Payor Plan Insurance Group Employer/Plan Group    [place] [id number]     Payor Plan Address Payor Plan Phone Number Effective From Effective To    PO  [phone] 2018     [place] [address]       Subscriber Name Subscriber Birth Date Member ID       ALYSSA COREA 1965 [id number]                 Emergency Contacts      (Rel.) Home Phone Work Phone Mobile Phone    Lakeisha Rehman (Significant Other) [phone] -- --               History & Physical      Darvin Bay DO at 2018 12:45 PM              [facility] HOSPITALIST   HISTORY AND PHYSICAL      Name:  Alyssa Corea   Age:  52 y.o.  Sex:  male  :  1965  MRN:  [id number]   Visit Number:  [id number]  Admission Date:  2018  Date Of Service:  18  Primary Care Physician:  KEAGAN Rowley    History Obtained From:    patient and family    Chief Complaint:     Dyspnea     History Of Presenting Illness:      Patient is a 52-year-old "  male with a history of COPD with bullous emphysema who has had shortness of breath and congestion for 1 week.  It started out as a viral infection that considerably worsened.  The past 3 days he has had cough with yellowish sputum which is turned to hemoptysis in the past 24 hours.  His dyspnea has been progressively worse to the point where he is severely short of breath.  Nothing is making it better.  Any movement makes it worse.  He also has had nausea and vomiting.  He was on antibiotics in the form of Augmentin and prednisone in the past 2 days prior to admission.  He came into the emergency room last night as he was not getting any better.  He has never had pneumonia as far as he knows before.  He has had COPD in which she had a spontaneous pneumothorax in 2000 1014 had a chest tube.  He continues to smoke a half pack per day.  Advised him to quit.  He has been given 2 L of fluid in the emergency room.  He was also given Lasix, as they felt that he was fluid overloaded.  He had a cardiac surgery as a child and which a valve was repaired.  He has had no follow-up with cardiology recently.  He denies any chest pressure, or any other pain.  He has had fevers and chills.  He is to be admitted to the ICU with severe sepsis as his lactic acid is 4.8, his WBC count is 25.25, his creatinine is 1.5, and his CT of the chest shows extensive bullous changes and large right-sided pneumonia.    Review Of Systems:     General ROS: positive for  - chills and fever  Psychological ROS: negative  Ophthalmic ROS: negative  ENT ROS: negative  Allergy and Immunology ROS: negative  Hematological and Lymphatic ROS: negative  Endocrine ROS: negative  Breast ROS: negative  Respiratory ROS: positive for - cough, hemoptysis, shortness of breath and sputum changes  Cardiovascular ROS: negative  Gastrointestinal ROS: positive for - nausea/vomiting  Genito-Urinary ROS: negative  Musculoskeletal ROS: positive for - muscular  weakness  Neurological ROS: negative  Dermatological ROS: negative       Past Medical History:   COPD with bullous emphysema    Past Surgical history:   Cardiac surgery as a child.  Chest tube in 2010      Social History:   Half pack per day smoker, smoked since he was a teenager.  Drinks a couple beers every 2 weeks.  Denies drugs.  He has 2 adult children.  He is .  His girlfriend is with him.  He is a full code.    Family History:   Father  at 47 of MI.  Mother is alive and well.    Allergies:      Review of patient's allergies indicates no known allergies.    Home Medications:    Prior to Admission Medications     Prescriptions Last Dose Informant Patient Reported? Taking?    albuterol (PROVENTIL HFA;VENTOLIN HFA) 108 (90 Base) MCG/ACT inhaler   Yes Yes    Inhale 2 puffs Every 4 (Four) Hours As Needed for Wheezing.    baclofen (LIORESAL) 10 MG tablet   Yes Yes    Take 10 mg by mouth 3 (Three) Times a Day.    citalopram (CeleXA) 40 MG tablet   Yes Yes    Take 40 mg by mouth Daily.    mometasone-formoterol (DULERA 200) 200-5 MCG/ACT inhaler   Yes Yes    Inhale 2 puffs 2 (Two) Times a Day.    predniSONE (DELTASONE) 20 MG tablet   Yes Yes    Take 20 mg by mouth 2 (Two) Times a Day.             Hospital Scheduled Meds:               Vital Signs:    Temp:  [98.2 °F (36.8 °C)] 98.2 °F (36.8 °C)  Heart Rate:  [104-133] 122  Resp:  [20-22] 20  BP: ()/(49-84) 122/76    Last 3 weights    18  0212   Weight: 68 kg (150 lb)       Body mass index is 22.81 kg/(m^2).    Physical Exam:      General Appearance:    Alert, cooperative, in no Moderate distress   Head:    Normocephalic, without obvious abnormality, atraumatic   Eyes:            Lids and lashes normal, conjunctivae and sclerae normal, no   icterus, no pallor, corneas clear, PERRLA   Ears:    Ears appear intact with no abnormalities noted   Throat:   No oral lesions, no thrush, oral mucosa moist   Neck:   No adenopathy, supple, trachea midline,  no thyromegaly, no   carotid bruit, no JVD   Back:     No kyphosis present, no scoliosis present, no skin lesions,      erythema or scars, no tenderness to percussion or                   palpation,   range of motion normal   Lungs:     Rhonchi bilaterally, worse on the right.  Some use of accessory muscles respiration.      Heart:    Regular rhythm and Increased rate, normal S1 and S2, no            murmur, no gallop, no rub, no click   Chest Wall:    No abnormalities observed   Abdomen:     Normal bowel sounds, no masses, no organomegaly, soft        non-tender, non-distended, no guarding, no rebound                tenderness   Rectal:     Deferred   Extremities:   4/5 strength in upper/lower extremity is bilaterally.     Pulses:   Pulses palpable and equal bilaterally   Skin:   No bleeding, bruising or rash   Lymph nodes:   No palpable adenopathy   Neurologic:   Cranial nerves 2 - 12 grossly intact, sensation intact, DTR       present and equal bilaterally       EKG:    Sinus tachycardia with no acute ST-T changes.    Telemetry:    Sinus tachycardia    I have personally looked at both the EKG and the telemetry strips.    Labs:      Results from last 7 days  Lab Units 01/07/18  0703 01/07/18  0241 01/07/18  0221   LACTATE mmol/L 4.8* 3.3*  --    WBC 10*3/mm3  --   --  25.25*   HEMOGLOBIN g/dL  --   --  15.8   HEMATOCRIT %  --   --  44.9   MCV fL  --   --  99.6*   MCHC g/dL  --   --  35.2   PLATELETS 10*3/mm3  --   --  206       Results from last 7 days  Lab Units 01/07/18  0349   PH, ARTERIAL pH units 7.459   PO2 ART mm Hg 59.5*   PCO2, ARTERIAL mm Hg 37.0   HCO3 ART mmol/L 26.2       Results from last 7 days  Lab Units 01/07/18  0221   SODIUM mmol/L 134*   POTASSIUM mmol/L 4.0   CHLORIDE mmol/L 89*   CO2 mmol/L 33.0*   BUN mg/dL 21*   CREATININE mg/dL 1.50*   EGFR IF NONAFRICN AM mL/min/1.73 49*   CALCIUM mg/dL 10.0   GLUCOSE mg/dL 125*   ALBUMIN g/dL 3.70   BILIRUBIN mg/dL 1.1   ALK PHOS U/L 123   AST (SGOT) U/L  39   ALT (SGPT) U/L 56   Estimated Creatinine Clearance: 56 mL/min (by C-G formula based on Cr of 1.5).  No results found for: AMMONIA    Results from last 7 days  Lab Units 01/07/18 0221   TROPONIN I ng/mL <0.012       Results from last 7 days  Lab Units 01/07/18 0221   PROBNP pg/mL 2540.0*     No results found for: HGBA1C  No results found for: TSH, FREET4  No results found for: PREGTESTUR, PREGSERUM, HCG, HCGQUANT  Pain Management Panel     There is no flowsheet data to display.                          Radiology:    Imaging Results (last 7 days)     Procedure Component Value Units Date/Time    XR Chest 2 View [[id number]] Collected:  01/07/18 0813     Updated:  01/07/18 0817    Narrative:       PROCEDURE: XR CHEST 2 VW-     HISTORY: SOA  triage protocol     COMPARISON: None.     FINDINGS: Electrodes overlie the chest. The heart is normal in size. The  mediastinum is unremarkable. There are chronic interstitial changes.  Large right lower lobe pneumonia is noted. There is no pneumothorax.   There are no acute osseous abnormalities. Postoperative changes are seen  to the right upper lobe.       Impression:       Large right lower lobe pneumonia is noted.     Continued followup is recommended.     This report was finalized on 1/7/2018 8:14 AM by Adams Dillon DO.    XR Chest 1 View [[id number]] Collected:  01/07/18 0811     Updated:  01/07/18 0817    Narrative:       PROCEDURE: XR CHEST 1 VW-     HISTORY: SOA; J18.9-Pneumonia, unspecified organism     COMPARISON: January 7, 2018 at 2:03.     FINDINGS: Electrodes overlie the chest. The heart is normal in size. The  mediastinum is unremarkable. Large right lower lobe pneumonia, slightly  more pronounced compared to the prior study. Chronic interstitial  changes are noted. Postoperative changes are seen to the right upper  lobe. There is no pneumothorax.  There are no acute osseous  abnormalities.           Impression:       Large right lower lobe pneumonia, slightly  more pronounced  compared to the prior study.     Continued followup is recommended.     This report was finalized on 1/7/2018 8:13 AM by Adams Dillon DO.    CT Chest Without Contrast [[id number]] Collected:  01/07/18 0815     Updated:  01/07/18 0820    Narrative:       PROCEDURE: CT CHEST WO CONTRAST-     HISTORY: soa, cough, hypoxia, tachycardia     COMPARISON: X-ray same date.     PROCEDURE:  Multiple axial CT images were obtained from the thoracic  inlet through the upper abdomen without the use of contrast.      FINDINGS: Lack of intravenous contrast limits evaluation of the solid  organs, the mediastinum, and the vasculature.        Soft tissue windows reveal no axillary, hilar or mediastinal adenopathy.  Heart size is normal. The aorta is normal in caliber. Atherosclerosis is  noted. No pleural or pericardial effusion. There are extensive bullous  changes. Large consolidate in the right lower lobe consistent with  pneumonia. The visualized upper abdomen is unremarkable. Bone windows  reveal no acute osseous abnormalities.       Impression:       There are extensive bullous changes. Large consolidate in  the right lower lobe consistent with pneumonia.     362.24 mGy.cm          This study was performed with techniques to keep radiation doses as low  as reasonably achievable (ALARA). Individualized dose reduction  techniques using automated exposure control or adjustment of mA and/or  kV according to the patient size were employed.      This report was finalized on 1/7/2018 8:17 AM by Adams Dillon DO.          Assessment:  1.  Severe sepsis  2.  Acute hypoxic respiratory failure  3.  Extensive right-sided pneumonia  4.  Bullous emphysema  5.  History of pneumothorax  6.  History of cardiac surgery as a child     Plan:   We will admit this patient.  Broaden antibiotic spectrum to include vancomycin, Zosyn and Levaquin.  Blood cultures have been drawn.  We'll check sputum culture.  Also check urinalysis, urine  drug screen.  Give another 1 L of fluid.  Then continue with IV fluids.  Check lactic acid again in 6 hours.  Check echocardiogram.  Admit to the intensive care unit.  Continue with BiPAP.  Consult Dr. Baptiste, spoke to him.  If patient worsens, he will need to be intubated.  Spoke to the patient regarding this, he is agreeable.  Advised him to quit smoking, will give a nicotine patch.  Check lab work in the a.m. as well as ABG.  We will recheck ABG now.  Place on DuoNeb and albuterol as needed.  Check Legionella and strep urine antigen.  Lovenox for DVT prophylaxis.  SCDs.  Continue to monitor patient closely as he is critical.  Further recommendations will depend on clinical course.    Darvin Bay DO  01/07/18  12:46 PM     Electronically signed by Darvin Bay DO at 1/7/2018 12:56 PM           Emergency Department Notes      Merced Wilcox MD at 1/7/2018  4:04 AM          TRIAGE CHIEF COMPLAINT:     Nursing and triage notes reviewed    Chief Complaint   Patient presents with   • Shortness of Breath   • Nausea      HPI: Niall Murguia is a 52 y.o. male who presents to the emergency department complaining of Shortness of breath, cough, congestion.  Patient also with generalized body aches, chills, possible fever.  Patient states that symptoms have been going on for approximately the past week.  2 days ago patient went to his doctor where he received antibiotics and steroids.  He states he's been taking them for the past 2 days but is felt worse and worse.  Patient states he is coughing up some greenish colored sputum which today he coughed up a small amount of blood as well.  He states he had one episode of vomiting after a large amount of coughing.  Patient does currently smoke approximately one half pack of cigarettes per day.  He denies having chest pain but states it is feel tight when he coughs.  He denies abdominal discomfort, no diarrhea.     REVIEW OF SYSTEMS: All other systems  reviewed and are negative     PAST MEDICAL HISTORY:   Past Medical History:   Diagnosis Date   • COPD (chronic obstructive pulmonary disease)         FAMILY HISTORY:   History reviewed. No pertinent family history.     SOCIAL HISTORY:   Social History     Social History   • Marital status: Single     Spouse name: N/A   • Number of children: N/A   • Years of education: N/A     Occupational History   • Not on file.     Social History Main Topics   • Smoking status: Current Every Day Smoker     Packs/day: 0.50     Types: Cigarettes   • Smokeless tobacco: Never Used   • Alcohol use Yes      Comment: occasionally   • Drug use: No   • Sexual activity: Not on file     Other Topics Concern   • Not on file     Social History Narrative   • No narrative on file        SURGICAL HISTORY:   Past Surgical History:   Procedure Laterality Date   • CARDIAC SURGERY          CURRENT MEDICATIONS:      Medication List      Notice     You have not been prescribed any medications.         ALLERGIES: Review of patient's allergies indicates no known allergies.     PHYSICAL EXAM:   VITAL SIGNS:   Vitals:    01/07/18 0357   BP: 109/75   Pulse: 109   Resp:    Temp:    SpO2: 94%      CONSTITUTIONAL: Awake, oriented, appears Uncomfortable   HENT: Atraumatic, normocephalic, oral mucosa pink and dry, airway patent. Nares patent without drainage. External ears normal.   EYES: Conjunctiva clear   NECK: Trachea midline, non-tender, supple   CARDIOVASCULAR: Tachycardic with a regular rhythm, No murmurs, rubs, gallops   PULMONARY/CHEST: Are some diffuse scattered wheezes, some crackles in the right lower lobe.  ABDOMINAL: Non-distended, soft, non-tender - no rebound or guarding.   NEUROLOGIC: Non-focal, moving all four extremities, no gross sensory or motor deficits.   EXTREMITIES: No clubbing, cyanosis, or edema   SKIN: Warm, Dry, No erythema, No rash     ED COURSE / MEDICAL DECISION MAKING:   Niall Murguia is a 52 y.o. male who presents to the  emergency department for evaluation of cough, shortness of breath, chills.  Patient is tachycardic on arrival in the emergency department.  Patient also has an oxygen saturation in the low 90s while on several liters of oxygen.  Patient does not require oxygen at home.  Some crackles appreciated in the right lower lobe.  I have significant concern for influenza or pneumonia or possibly both.  Labs and imaging were obtained for further evaluation.  Patient did meet sepsis screening criteria.  EKG on arrival revealed sinus rhythm with a rate of 122 bpm.  There are some rate related changes but no obvious acute ischemic changes.  Chest x-ray revealed a significant right lower lobe pneumonia.  Patient improved somewhat with IV fluids, breathing treatments, IV steroids.  Influenza screen is negative.  Patient had a critically high white blood cell count of 25.  Lactic acid elevated at 3.3.  Patient given multiple boluses of IV fluids and started on antibiotic's.  Patient's heart rate and oxygen saturation began to improve.  Patient will need to be admitted to the hospital for further treatment.  There were no telemetry beds at this facility and it was felt patient would require at least a telemetry bed and so will be transferred to [facility] for further treatment and observation.  Patient remained stable in the emergency department.    DECISION TO DISCHARGE/ADMIT: see ED care timeline     FINAL IMPRESSION:   1 -- pneumonia   2 --   3 --     Electronically signed by: Merced Wilcox MD, 1/7/2018 4:05 AM       Merced Wilcox MD  01/07/18 0510      Patient experienced some worsening in respiratory status was still in the emergency department.  Oxygen saturation remained appropriate with some increased oxygen.  Patient had some more crackles on examination.  There are some concerns for fluid overload with the amount of fluid patient received in the emergency department.  Repeat x-ray revealed probable  small amount of vascular congestion.  Patient given a small dose of nitroglycerin with some Lasix.  Eventually placed on BiPAP for comfort.  I updated the hospitalist at this facility that should a bed opened up sooner than patient is able to be transferred he will stay at this facility.  Fluids were stopped at this time.  Will be continued should patient's symptoms improve or should his blood pressure began to drop withheld them at that time due to concerns for fluid overload.         Merced Wilcox MD  01/07/18 0802       Electronically signed by Merced Wilcox MD at 1/7/2018  8:02 AM      Shannan Oneal at 1/7/2018  4:30 AM          Dr. Leija, Hospitalist at [facility] [place]aged via the [facility] for Dr. Wilcox.     Shannan Oneal  01/07/18 0431       Electronically signed by Shannan Oneal at 1/7/2018  4:31 AM      Shannan Oneal at 1/7/2018  4:54 AM          Dr. Leija returned call.     Shannan Oneal  01/07/18 0455       Electronically signed by Shannan Oneal at 1/7/2018  4:55 AM      Shannan Oneal at 1/7/2018  6:25 AM          Spoke with Ruby from the [facility] who stated that they should have a bed assignment closer to 0700 for patient. That she would call us back with a update before she left at 0730.     Shannan Oneal  01/07/18 0628       Electronically signed by Shannan Oneal at 1/7/2018  6:28 AM      [place] at 1/7/2018  8:04 AM          Spoke karl/ Alexi, Bed Coordinator @ [facility], @ 0805. Still no beds @ this time. Will continue to call and keep us updated on bed placement.      [place]  01/07/18 0806       Electronically signed by [place] at 1/7/2018  8:06 AM      Quentin Gilman RN at 1/7/2018  8:15 AM          Pt assisted to bedside commode. Dr Bose and  made aware that [facility] still has no bed available. Dr Bay will admit pt here if a bed opens up at this hospital.     Quentin Gilman RN  01/07/18  0835       Electronically signed by Quentin Gilman RN at 1/7/2018  8:35 AM      [place] at 1/7/2018 10:05 AM          Spoke w/ Alexi Bed Coordinator @ [facility] @ 1006. Still no beds @ this time. Will continue to call and keep us updated.     [place]  01/07/18 1006       Electronically signed by aL Quiroz at 1/7/2018 10:06 AM      [place] at 1/7/2018 11:50 AM          Rajendra Mccauley, states there will be an ICU bed for this PT so we will admit here. Will call back w/ bed assignment.      [place]  01/07/18 1151       Electronically signed by [place] at 1/7/2018 11:51 AM      [place] at 1/7/2018 12:18 PM          Informed Alexi Bed Coordinator @ [facility], [place] will be admitted here @ 1219.     [place]  01/07/18 1219       Electronically signed by [place] at 1/7/2018 12:19 PM      Quentin Gilman RN at 1/7/2018 12:24 PM          Dr Bay with pt. Pt to be admitted to ICU 7 when clean. [facility] notified that pt will be admitted here.     Quentin Gilman RN  01/07/18 1226       Electronically signed by Quentin Gilman RN at 1/7/2018 12:26 PM      [place] at 1/7/2018  3:19 PM          Rajendra Mccauley, assigned ICU 7, but bed was occupied. ICU 7 has now officially been assigned @ 1515.     [place]  01/07/18 1521       Electronically signed by [place] at 1/7/2018  3:21 PM      Quentin Gilman RN at 1/7/2018  3:29 PM          Report to [facility] in ICU     Quentin Gilman RN  01/07/18 1530       Electronically signed by Quentin Gilman RN at 1/7/2018  3:30 PM        Vital Signs (last 24 hours)       01/07 0700  -  01/08 0659 01/08 0700  -  01/08 1343   Most Recent    Temp (°F) 98.2 -  99.9      98.3     98.3 (36.8)    Heart Rate 86 -  (!)133    69 -  92     73    Resp 22 -  (!)33    22 -  (!)29     22    /72 -  147/87    107/72 -  123/73     107/76    SpO2 (%) 91 -  95    93 -  97     94          Hospital Medications (active)       Dose Frequency Start End    acetaminophen (TYLENOL) tablet 650 mg  "650 mg Every 4 Hours PRN 1/7/2018     Sig - Route: Take 2 tablets by mouth Every 4 (Four) Hours As Needed for Mild Pain . - Oral    acetylcysteine (MUCOMYST) 20 % solution 600 mg 600 mg 3 Times Daily - RT 1/8/2018     Sig - Route: Inhale 3 mL 3 (Three) Times a Day. - Inhalation    albuterol (PROVENTIL) nebulizer solution 0.083% 2.5 mg/3mL 2.5 mg Every 6 Hours PRN 1/7/2018     Sig - Route: Take 2.5 mg by nebulization Every 6 (Six) Hours As Needed for Shortness of Air. - Nebulization    CHAPSTICK 1 each 1 each 5 Times Daily PRN 1/8/2018     Sig - Route: Apply 1 each topically 5 (Five) Times a Day As Needed (chapped lips). - Apply externally    citalopram (CeleXA) tablet 40 mg 40 mg Daily 1/8/2018     Sig - Route: Take 2 tablets by mouth Daily. - Oral    enoxaparin (LOVENOX) syringe 40 mg 40 mg Every 24 Hours 1/7/2018     Sig - Route: Inject 0.4 mL under the skin Daily. - Subcutaneous    guaiFENesin (MUCINEX) 12 hr tablet 600 mg 600 mg Every 12 Hours Scheduled 1/7/2018     Sig - Route: Take 1 tablet by mouth Every 12 (Twelve) Hours. - Oral    iopamidol (ISOVUE-250) 51 % injection 200 mL 200 mL Once in Imaging 1/8/2018 1/8/2018    Sig - Route: Infuse 200 mL into a venous catheter Once. - Intravenous    ipratropium-albuterol (DUO-NEB) nebulizer solution 3 mL 3 mL Every 6 Hours - RT 1/7/2018     Sig - Route: Take 3 mL by nebulization Every 6 (Six) Hours. - Nebulization    levoFLOXacin (LEVAQUIN) 750 mg/150 mL D5W (premix) (LEVAQUIN) 750 mg 750 mg Every 24 Hours 1/9/2018 1/15/2018    Sig - Route: Infuse 150 mL into a venous catheter Daily. - Intravenous    Linked Group 1:  \"And\" Linked Group Details        LORazepam (ATIVAN) injection 0.5 mg 0.5 mg Every 6 Hours PRN 1/7/2018 1/17/2018    Sig - Route: Infuse 0.25 mL into a venous catheter Every 6 (Six) Hours As Needed for Anxiety or Agitation. - Intravenous    methylPREDNISolone sodium succinate (SOLU-Medrol) injection 40 mg 40 mg Every 8 Hours 1/7/2018     Sig - Route: " "Infuse 1 mL into a venous catheter Every 8 (Eight) Hours. - Intravenous    nicotine (NICODERM CQ) 21 MG/24HR patch 1 patch 1 patch Every 24 Hours 1/7/2018     Sig - Route: Place 1 patch on the skin Daily. - Transdermal    ondansetron (ZOFRAN) injection 4 mg 4 mg Every 6 Hours PRN 1/7/2018     Sig - Route: Infuse 2 mL into a venous catheter Every 6 (Six) Hours As Needed for Nausea or Vomiting. - Intravenous    Pharmacy to dose vancomycin  Continuous PRN 1/7/2018 1/14/2018    Sig - Route: Continuous As Needed for Consult. - Does not apply    piperacillin-tazobactam (ZOSYN) 4.5 g in iso-osmotic dextrose 100 mL IVPB (premix) 4.5 g Every 8 Hours Scheduled 1/8/2018 1/14/2018    Sig - Route: Infuse 100 mL into a venous catheter Every 8 (Eight) Hours. - Intravenous    Notes to Pharmacy: NOT COMPATIBLE WITH LEVOFLOXACIN    Linked Group 1:  \"And\" Linked Group Details        pneumococcal polysaccharide 23-valent (PNEUMOVAX-23) vaccine 0.5 mL 0.5 mL During Hospitalization 1/7/2018     Sig - Route: Inject 0.5 mL into the shoulder, thigh, or buttocks During Hospitalization for Immunization. - Intramuscular    Cosign for Ordering: Accepted by Darvin Bay DO on 1/7/2018  5:32 PM    sodium chloride 0.9 % bolus 1,000 mL 1,000 mL Once 1/7/2018 1/7/2018    Sig - Route: Infuse 1,000 mL into a venous catheter 1 (One) Time. - Intravenous    sodium chloride 0.9 % bolus 500 mL 500 mL Once 1/7/2018 1/7/2018    Sig - Route: Infuse 500 mL into a venous catheter 1 (One) Time. - Intravenous    sodium chloride 0.9 % flush 1-10 mL 1-10 mL As Needed 1/7/2018     Sig - Route: Infuse 1-10 mL into a venous catheter As Needed for Line Care. - Intravenous    sodium chloride 0.9 % flush 10 mL 10 mL As Needed 1/7/2018     Sig - Route: Infuse 10 mL into a venous catheter As Needed for Line Care. - Intravenous    Cosign for Ordering: Accepted by Merced Wilcox MD on 1/7/2018  2:30 AM    sodium chloride 0.9 % infusion 125 mL/hr Continuous " "1/7/2018     Sig - Route: Infuse 125 mL/hr into a venous catheter Continuous. - Intravenous    vancomycin (VANCOCIN) 1,500 mg in sodium chloride 0.9 % 500 mL IVPB 1,500 mg Once 1/7/2018 1/7/2018    Sig - Route: Infuse 1,500 mg into a venous catheter 1 (One) Time. - Intravenous    vancomycin 1000 mg in sodium chloride 0.9% 250 mL IVPB 1,000 mg Every 12 Hours 1/8/2018 1/14/2018    Sig - Route: Infuse 1,000 mg into a venous catheter Every 12 (Twelve) Hours. - Intravenous    Vancomycin level scheduled for 1/10 at 0930. Hold next dose if level > 20.  Once 1/10/2018     Sig - Route: 1 (One) Time. - Does not apply    Linked Group 2:  \"And\" Linked Group Details        levoFLOXacin (LEVAQUIN) 750 mg/150 mL D5W (premix) (LEVAQUIN) 750 mg (Discontinued) 750 mg Every 24 Hours 1/8/2018 1/8/2018    Sig - Route: Infuse 150 mL into a venous catheter Daily. - Intravenous    Linked Group 1:  \"And\" Linked Group Details        piperacillin-tazobactam (ZOSYN) 4.5 g in iso-osmotic dextrose 100 mL IVPB (premix) (Discontinued) 4.5 g Every 6 Hours 1/7/2018 1/8/2018    Sig - Route: Infuse 100 mL into a venous catheter Every 6 (Six) Hours. - Intravenous    Notes to Pharmacy: NOT COMPATIBLE WITH LEVOFLOXACIN    Linked Group 1:  \"And\" Linked Group Details        vancomycin 1000 mg in sodium chloride 0.9% 250 mL IVPB (Discontinued) 1,000 mg Every 24 Hours 1/8/2018 1/8/2018    Sig - Route: Infuse 1,000 mg into a venous catheter Daily. - Intravenous            Lab Results (last 24 hours)     Procedure Component Value Units Date/Time    Mycoplasma Pneumoniae Antibody, IgM [[id number]] Collected:  01/07/18 0221    Specimen:  Blood Updated:  01/07/18 1612    Blood Gas, Arterial With Co-Ox [[id number]]  (Abnormal) Collected:  01/07/18 1756    Specimen:  Arterial Blood Updated:  01/07/18 1757     Site Arterial: right brachial     Wilmer's Test Positive     pH, Arterial 7.437 pH units      pCO2, Arterial 38.4 mm Hg      pO2, Arterial 68.2 (L) mm Hg      " HCO3, Arterial 25.8 mmol/L      Base Excess, Arterial 1.6 mmol/L      O2 Saturation, Arterial 94.6 %      Hemoglobin, Blood Gas 14.1 g/dL      Oxyhemoglobin 93.2 (L) %      Methemoglobin 0.70 %      Carboxyhemoglobin 0.8 %      Modality BiPap     FIO2 40 %     Legionella Antigen, Urine - Urine, Clean Catch [[id number]] Collected:  01/07/18 1742    Specimen:  Urine from Urine, Clean Catch Updated:  01/07/18 1911    S. Pneumo Ag Urine or CSF - Urine, Urine, Clean Catch [[id number]] Collected:  01/07/18 1742    Specimen:  Urine from Urine, Clean Catch Updated:  01/07/18 1911    Lactic Acid, Plasma [[id number]]  (Abnormal) Collected:  01/07/18 1858    Specimen:  Blood Updated:  01/07/18 1945     Lactate 2.9 (C) mmol/L     Urinalysis With / Culture If Indicated - Urine, Clean Catch [[id number]]  (Abnormal) Collected:  01/07/18 1748    Specimen:  Urine from Urine, Clean Catch Updated:  01/07/18 1959     Color, UA Yellow     Appearance, UA Slightly Cloudy (A)     pH, UA 5.5     Specific Gravity, UA 1.025     Glucose, UA Negative     Ketones, UA Trace (A)     Bilirubin, UA Negative     Blood, UA Small (1+) (A)     Protein,  mg/dL (2+) (A)     Leuk Esterase, UA Negative     Nitrite, UA Negative     Urobilinogen, UA 0.2 E.U./dL    Urinalysis, Microscopic Only - Urine, Clean Catch [[id number]]  (Abnormal) Collected:  01/07/18 1748    Specimen:  Urine from Urine, Clean Catch Updated:  01/07/18 1959     RBC, UA 0-2 (A) /HPF      WBC, UA 0-2 (A) /HPF      Bacteria, UA Trace (A) /HPF      Squamous Epithelial Cells, UA 3-6 (A) /HPF      Hyaline Casts, UA None Seen /LPF      Methodology Manual Light Microscopy    Urine Drug Screen - Urine, Clean Catch [[id number]]  (Abnormal) Collected:  01/07/18 1748    Specimen:  Urine from Urine, Clean Catch Updated:  01/07/18 2014     THC, Screen, Urine Positive (A)     Phencyclidine (PCP), Urine Negative     Cocaine Screen, Urine Negative     Methamphetamine, Urine Negative     Opiate Screen  Negative     Amphetamine Screen, Urine Negative     Benzodiazepine Screen, Urine Negative     Tricyclic Antidepressants Screen Negative     Methadone Screen, Urine Negative     Barbiturates Screen, Urine Negative     Oxycodone Screen, Urine Negative     Propoxyphene Screen Negative     Buprenorphine, Screen, Urine Negative    Narrative:       Limitations of this procedure include the possibility of false positives due to interfering substances in the urine sample. Clinical data should be correlated with any questionable result. Positive results should be considered Presumptive Positive until results are confirmed with another methodology such as HPLC or GCMS.    Blood Culture - Blood, [[id number]]  (Normal) Collected:  01/07/18 0245    Specimen:  Blood from Hand, Right Updated:  01/08/18 0303     Blood Culture No growth at 24 hours    Blood Culture - Blood, [[id number]]  (Normal) Collected:  01/07/18 0241    Specimen:  Blood from Arm, Right Updated:  01/08/18 0303     Blood Culture No growth at 24 hours    Magnesium [[id number]]  (Normal) Collected:  01/08/18 0531    Specimen:  Blood Updated:  01/08/18 0602     Magnesium 2.1 mg/dL     Phosphorus [[id number]]  (Normal) Collected:  01/08/18 0531    Specimen:  Blood Updated:  01/08/18 0602     Phosphorus 2.9 mg/dL     Comprehensive Metabolic Panel [[id number]]  (Abnormal) Collected:  01/08/18 0531    Specimen:  Blood Updated:  01/08/18 0615     Glucose 126 (H) mg/dL      BUN 29 (H) mg/dL      Creatinine 0.80 mg/dL      Sodium 132 (L) mmol/L      Potassium 4.4 mmol/L      Chloride 100 mmol/L      CO2 25.0 (L) mmol/L      Calcium 8.3 (L) mg/dL      Total Protein 5.5 (L) g/dL      Albumin 2.60 (L) g/dL      ALT (SGPT) 33 U/L      AST (SGOT) 22 U/L      Alkaline Phosphatase 39 U/L      Total Bilirubin 0.7 mg/dL      eGFR Non African Amer 102 mL/min/1.73      Globulin 2.9 gm/dL      A/G Ratio 0.9 (L) g/dL      BUN/Creatinine Ratio 36.3 (H)     Anion Gap 11.4 mmol/L      Narrative:       Abnormal estimated GFR should be followed by more specific studies to confirm end stage chronic renal disease. The equation used for calculation may not be accurate for patients less than 19 years old, greater than 70 years old, patients at extremes of weight, malnutrition, or with acute renal dysfunction.    CBC Auto Differential [[id number]]  (Abnormal) Collected:  01/08/18 0531    Specimen:  Blood Updated:  01/08/18 0650     WBC 6.49 10*3/mm3      RBC 3.30 (L) 10*6/mm3      Hemoglobin 11.3 (L) g/dL      Hematocrit 33.6 (L) %      .8 (H) fL      MCH 34.2 (H) pg      MCHC 33.6 g/dL      RDW 11.9 %      RDW-SD 45.0 fl      MPV 11.2 fL      Platelets 128 (L) 10*3/mm3     Scan Slide [[id number]] Collected:  01/08/18 0531    Specimen:  Blood Updated:  01/08/18 0650     Scan Slide --      See Manual Differential Results       Manual Differential [[id number]]  (Abnormal) Collected:  01/08/18 0531    Specimen:  Blood Updated:  01/08/18 0650     Neutrophil % 31.0 (L) %      Lymphocyte % 14.0 %      Monocyte % 3.0 %      Bands %  40.0 (H) %      Metamyelocyte % 10.0 (H) %      Myelocyte % 2.0 (H) %      Neutrophils Absolute 4.61 10*3/mm3      Lymphocytes Absolute 0.91 10*3/mm3      Monocytes Absolute 0.19 10*3/mm3      Anisocytosis Slight/1+     Macrocytes Slight/1+     Poikilocytes Slight/1+     Toxic Granulation Slight/1+     Vacuolated Neutrophils Slight/1+     Platelet Estimate Decreased    Blood Gas, Arterial [[id number]]  (Abnormal) Collected:  01/08/18 0837    Specimen:  Arterial Blood Updated:  01/08/18 0837     Site Arterial: right brachial     Wilmer's Test Positive     pH, Arterial 7.436 pH units      pCO2, Arterial 39.7 mm Hg      pO2, Arterial 61.8 (L) mm Hg      HCO3, Arterial 26.7 mmol/L      Base Excess, Arterial 2.5 mmol/L      O2 Saturation, Arterial 92.3 %      Hemoglobin, Blood Gas 11.8 (L) g/dL      Barometric Pressure for Blood Gas 737 mmHg      Modality Cannula - High Flow     FIO2  50 %         Imaging Results (last 24 hours)     Procedure Component Value Units Date/Time    XR Chest 1 View [[id number]] Collected:  01/08/18 0806     Updated:  01/08/18 0810    Narrative:       PROCEDURE: XR CHEST 1 VW-     HISTORY: PNA.; J18.9-Pneumonia, unspecified organism; A41.9-Sepsis,  unspecified organism; N17.9-Acute kidney failure, unspecified     COMPARISON: January 7, 2018.     FINDINGS: The heart is normal in size. The mediastinum is unremarkable.  There are emphysematous changes. An opacity in the right mid and lower  lung field is unchanged. There is a new retrocardiac opacity in the  medial left lung base. No significant effusions are evident. There is no  pneumothorax.  There are no acute osseous abnormalities.           Impression:       No change in the patient's right lung airspace disease with  a new retrocardiac opacity in the left lung base consistent with a  worsening pneumonia.     Continued followup is recommended.     This report was finalized on 1/8/2018 8:08 AM by Larry Grewal M.D..    XR Chest 1 View [[id number]] Collected:  01/08/18 0808     Updated:  01/08/18 0811    Narrative:       PROCEDURE: XR CHEST 1 VW-     HISTORY: dyspnea; J18.9-Pneumonia, unspecified organism; A41.9-Sepsis,  unspecified organism; N17.9-Acute kidney failure, unspecified     COMPARISON: 1/7/2018.     FINDINGS: The heart is normal in size. The mediastinum is unremarkable.  There are emphysematous changes to the lungs. There is a persistent  right mid and lower lung field opacity with worsening airspace disease  in the left lung base. There is no pneumothorax.  There are no acute  osseous abnormalities.           Impression:       No significant change in the patients right lung airspace  disease with a worsening left basilar opacity consistent with a  pneumonia.     Continued followup is recommended.     This report was finalized on 1/8/2018 8:09 AM by Larry Grewal M.D..    CT Chest Pulmonary  Embolism With Contrast [[id number]] Collected:  01/08/18 1209     Updated:  01/08/18 1213    Narrative:       PROCEDURE: CT CHEST PULMONARY EMBOLISM W CONTRAST-     HISTORY: dyspnea, enlarged RV; J18.9-Pneumonia, unspecified organism;  A41.9-Sepsis, unspecified organism; N17.9-Acute kidney failure,  unspecified     COMPARISON: None .     TECHNIQUE: Multiple axial CT images were obtained from the thoracic  inlet through the upper abdomen following the administration of Isovue  300 per the CT PE protocol. Coronal and oblique 3D MIP images were  reconstructed from the original axial data set.      FINDINGS: There are no filling defects within the pulmonary arteries to  suggest an embolus. The thoracic aorta is normal in caliber with no  evidence of dissection. Incidental note is made of direct origin of the  left vertebral artery from the aortic arch. The heart is normal in size.  There are no pleural or pericardial effusions. There is no adenopathy.  Lung windows reveal emphysematous changes. There is airspace disease in  the right lower lobe, right middle lobe and left lower lobe consistent  with a pneumonia. The visualized upper abdomen is unremarkable. Bone  windows reveal no acute osseous abnormalities.       Impression:       1. No evidence of pulmonary embolus or dissection.   2. Bilateral airspace disease consistent with a pneumonia.             417.27 mGy.cm          This study was performed with techniques to keep radiation doses as low  as reasonably achievable (ALARA). Individualized dose reduction  techniques using automated exposure control or adjustment of mA and/or  kV according to the patient size were employed.      This report was finalized on 1/8/2018 12:11 PM by Larry Grewal M.D..           Physician Progress Notes (last 24 hours) (Notes from 1/7/2018  1:43 PM through 1/8/2018  1:43 PM)      Darvin Bay, DO at 1/8/2018 12:07 PM  Version 1 of 1               [facility]  HOSPITALIST    PROGRESS NOTE    Name:  Niall Murguia   Age:  52 y.o.  Sex:  male  :  1965  MRN:  [id number]   Visit Number:  [id number]  Admission Date:  2018  Date Of Service:  18  Primary Care Physician:  KEAGAN Rowley     LOS: 1 day :  Patient Care Team:  KEAGAN Rowley as PCP - General (Family Medicine):    History taken from:     patient    Chief Complaint:      Dyspnea    Subjective     Interval History:     Patient seen today.  Patient is admitted with extensive right-sided pneumonia.  He failed outpatient treatment with Augmentin and prednisone.  He also has severe COPD.  He was admitted, placed on vancomycin, Zosyn and Levaquin.  He is also on Solu-Medrol.  He was on BiPAP all night long.  He feels mildly better.  He still is severely short of breath, and is very congested.  He denies any chest pressure, nausea, vomiting, or diarrhea.  He does have some chest pain with cough and deep inspiration.  Patient is also very weak.  Dr. Baptiste is following.  Echocardiogram was ordered, this shows acute on chronic cor pulmonale.  CTA of the chest will be ordered.    Review of Systems:     All systems were reviewed and negative except for:  Respiratory: positive for  cough, productive, hemoptysis and shortness of air    Objective     Vital Signs:    Temp:  [98.2 °F (36.8 °C)-99.9 °F (37.7 °C)] 98.3 °F (36.8 °C)  Heart Rate:  [] 69  Resp:  [22-33] 24  BP: (107-147)/(67-96) 111/73    Physical Exam:      General Appearance:    Alert, cooperative, in no acute distress   Head:    Normocephalic, without obvious abnormality, atraumatic   Eyes:            Lids and lashes normal, conjunctivae and sclerae normal, no   icterus, no pallor, corneas clear, PERRLA   Ears:    Ears appear intact with no abnormalities noted   Throat:   No oral lesions, no thrush, oral mucosa moist   Neck:   No adenopathy, supple, trachea midline, no thyromegaly, no   carotid bruit, no JVD   Back:      No kyphosis present, no scoliosis present, no skin lesions,      erythema or scars, no tenderness to percussion or                   palpation,   range of motion normal   Lungs:     Rhonchi on the right with some use of accessory muscles respiration.      Heart:    Regular rhythm and normal rate, normal S1 and S2, no            murmur, no gallop, no rub, no click   Chest Wall:    No abnormalities observed   Abdomen:     Normal bowel sounds, no masses, no organomegaly, soft        non-tender, non-distended, no guarding, no rebound                tenderness   Rectal:     Deferred   Extremities:   4/5 strength in upper/lower extremities bilaterally.     Pulses:   Pulses palpable and equal bilaterally   Skin:   No bleeding, bruising or rash   Lymph nodes:   No palpable adenopathy   Neurologic:   Cranial nerves 2 - 12 grossly intact, sensation intact, DTR       present and equal bilaterally        Results Review:      I reviewed the patient's new clinical results.    Labs:    Lab Results (last 24 hours)     Procedure Component Value Units Date/Time    Mycoplasma Pneumoniae Antibody, IgM [[id number]] Collected:  01/07/18 0221    Specimen:  Blood Updated:  01/07/18 1612    Blood Gas, Arterial With Co-Ox [[id number]]  (Abnormal) Collected:  01/07/18 1756    Specimen:  Arterial Blood Updated:  01/07/18 1757     Site Arterial: right brachial     Wilmer's Test Positive     pH, Arterial 7.437 pH units      pCO2, Arterial 38.4 mm Hg      pO2, Arterial 68.2 (L) mm Hg      HCO3, Arterial 25.8 mmol/L      Base Excess, Arterial 1.6 mmol/L      O2 Saturation, Arterial 94.6 %      Hemoglobin, Blood Gas 14.1 g/dL      Oxyhemoglobin 93.2 (L) %      Methemoglobin 0.70 %      Carboxyhemoglobin 0.8 %      Modality BiPap     FIO2 40 %     Legionella Antigen, Urine - Urine, Clean Catch [[id number]] Collected:  01/07/18 1742    Specimen:  Urine from Urine, Clean Catch Updated:  01/07/18 1911    S. Pneumo Ag Urine or CSF - Urine, Urine, Clean  Catch [[id number]] Collected:  01/07/18 1742    Specimen:  Urine from Urine, Clean Catch Updated:  01/07/18 1911    Lactic Acid, Plasma [[id number]]  (Abnormal) Collected:  01/07/18 1858    Specimen:  Blood Updated:  01/07/18 1945     Lactate 2.9 (C) mmol/L     Urinalysis With / Culture If Indicated - Urine, Clean Catch [[id number]]  (Abnormal) Collected:  01/07/18 1748    Specimen:  Urine from Urine, Clean Catch Updated:  01/07/18 1959     Color, UA Yellow     Appearance, UA Slightly Cloudy (A)     pH, UA 5.5     Specific Gravity, UA 1.025     Glucose, UA Negative     Ketones, UA Trace (A)     Bilirubin, UA Negative     Blood, UA Small (1+) (A)     Protein,  mg/dL (2+) (A)     Leuk Esterase, UA Negative     Nitrite, UA Negative     Urobilinogen, UA 0.2 E.U./dL    Urinalysis, Microscopic Only - Urine, Clean Catch [[id number]]  (Abnormal) Collected:  01/07/18 1748    Specimen:  Urine from Urine, Clean Catch Updated:  01/07/18 1959     RBC, UA 0-2 (A) /HPF      WBC, UA 0-2 (A) /HPF      Bacteria, UA Trace (A) /HPF      Squamous Epithelial Cells, UA 3-6 (A) /HPF      Hyaline Casts, UA None Seen /LPF      Methodology Manual Light Microscopy    Urine Drug Screen - Urine, Clean Catch [[id number]]  (Abnormal) Collected:  01/07/18 1748    Specimen:  Urine from Urine, Clean Catch Updated:  01/07/18 2014     THC, Screen, Urine Positive (A)     Phencyclidine (PCP), Urine Negative     Cocaine Screen, Urine Negative     Methamphetamine, Urine Negative     Opiate Screen Negative     Amphetamine Screen, Urine Negative     Benzodiazepine Screen, Urine Negative     Tricyclic Antidepressants Screen Negative     Methadone Screen, Urine Negative     Barbiturates Screen, Urine Negative     Oxycodone Screen, Urine Negative     Propoxyphene Screen Negative     Buprenorphine, Screen, Urine Negative    Narrative:       Limitations of this procedure include the possibility of false positives due to interfering substances in the urine  sample. Clinical data should be correlated with any questionable result. Positive results should be considered Presumptive Positive until results are confirmed with another methodology such as HPLC or GCMS.    Blood Culture - Blood, [[id number]]  (Normal) Collected:  01/07/18 0245    Specimen:  Blood from Hand, Right Updated:  01/08/18 0303     Blood Culture No growth at 24 hours    Blood Culture - Blood, [[id number]]  (Normal) Collected:  01/07/18 0241    Specimen:  Blood from Arm, Right Updated:  01/08/18 0303     Blood Culture No growth at 24 hours    Magnesium [[id number]]  (Normal) Collected:  01/08/18 0531    Specimen:  Blood Updated:  01/08/18 0602     Magnesium 2.1 mg/dL     Phosphorus [[id number]]  (Normal) Collected:  01/08/18 0531    Specimen:  Blood Updated:  01/08/18 0602     Phosphorus 2.9 mg/dL     Comprehensive Metabolic Panel [[id number]]  (Abnormal) Collected:  01/08/18 0531    Specimen:  Blood Updated:  01/08/18 0615     Glucose 126 (H) mg/dL      BUN 29 (H) mg/dL      Creatinine 0.80 mg/dL      Sodium 132 (L) mmol/L      Potassium 4.4 mmol/L      Chloride 100 mmol/L      CO2 25.0 (L) mmol/L      Calcium 8.3 (L) mg/dL      Total Protein 5.5 (L) g/dL      Albumin 2.60 (L) g/dL      ALT (SGPT) 33 U/L      AST (SGOT) 22 U/L      Alkaline Phosphatase 39 U/L      Total Bilirubin 0.7 mg/dL      eGFR Non African Amer 102 mL/min/1.73      Globulin 2.9 gm/dL      A/G Ratio 0.9 (L) g/dL      BUN/Creatinine Ratio 36.3 (H)     Anion Gap 11.4 mmol/L     Narrative:       Abnormal estimated GFR should be followed by more specific studies to confirm end stage chronic renal disease. The equation used for calculation may not be accurate for patients less than 19 years old, greater than 70 years old, patients at extremes of weight, malnutrition, or with acute renal dysfunction.    CBC Auto Differential [[id number]]  (Abnormal) Collected:  01/08/18 0531    Specimen:  Blood Updated:  01/08/18 0650     WBC 6.49 10*3/mm3       RBC 3.30 (L) 10*6/mm3      Hemoglobin 11.3 (L) g/dL      Hematocrit 33.6 (L) %      .8 (H) fL      MCH 34.2 (H) pg      MCHC 33.6 g/dL      RDW 11.9 %      RDW-SD 45.0 fl      MPV 11.2 fL      Platelets 128 (L) 10*3/mm3     Scan Slide [[id number]] Collected:  01/08/18 0531    Specimen:  Blood Updated:  01/08/18 0650     Scan Slide --      See Manual Differential Results       Manual Differential [[id number]]  (Abnormal) Collected:  01/08/18 0531    Specimen:  Blood Updated:  01/08/18 0650     Neutrophil % 31.0 (L) %      Lymphocyte % 14.0 %      Monocyte % 3.0 %      Bands %  40.0 (H) %      Metamyelocyte % 10.0 (H) %      Myelocyte % 2.0 (H) %      Neutrophils Absolute 4.61 10*3/mm3      Lymphocytes Absolute 0.91 10*3/mm3      Monocytes Absolute 0.19 10*3/mm3      Anisocytosis Slight/1+     Macrocytes Slight/1+     Poikilocytes Slight/1+     Toxic Granulation Slight/1+     Vacuolated Neutrophils Slight/1+     Platelet Estimate Decreased    Blood Gas, Arterial [[id number]]  (Abnormal) Collected:  01/08/18 0837    Specimen:  Arterial Blood Updated:  01/08/18 0837     Site Arterial: right brachial     Wilmer's Test Positive     pH, Arterial 7.436 pH units      pCO2, Arterial 39.7 mm Hg      pO2, Arterial 61.8 (L) mm Hg      HCO3, Arterial 26.7 mmol/L      Base Excess, Arterial 2.5 mmol/L      O2 Saturation, Arterial 92.3 %      Hemoglobin, Blood Gas 11.8 (L) g/dL      Barometric Pressure for Blood Gas 737 mmHg      Modality Cannula - High Flow     FIO2 50 %            Radiology:    Imaging Results (last 24 hours)     Procedure Component Value Units Date/Time    XR Chest 1 View [[id number]] Collected:  01/08/18 0806     Updated:  01/08/18 0810    Narrative:       PROCEDURE: XR CHEST 1 VW-     HISTORY: PNA.; J18.9-Pneumonia, unspecified organism; A41.9-Sepsis,  unspecified organism; N17.9-Acute kidney failure, unspecified     COMPARISON: January 7, 2018.     FINDINGS: The heart is normal in size. The mediastinum is  unremarkable.  There are emphysematous changes. An opacity in the right mid and lower  lung field is unchanged. There is a new retrocardiac opacity in the  medial left lung base. No significant effusions are evident. There is no  pneumothorax.  There are no acute osseous abnormalities.           Impression:       No change in the patient's right lung airspace disease with  a new retrocardiac opacity in the left lung base consistent with a  worsening pneumonia.     Continued followup is recommended.     This report was finalized on 1/8/2018 8:08 AM by Larry Grewal M.D..    XR Chest 1 View [[id number]] Collected:  01/08/18 0808     Updated:  01/08/18 0811    Narrative:       PROCEDURE: XR CHEST 1 VW-     HISTORY: dyspnea; J18.9-Pneumonia, unspecified organism; A41.9-Sepsis,  unspecified organism; N17.9-Acute kidney failure, unspecified     COMPARISON: 1/7/2018.     FINDINGS: The heart is normal in size. The mediastinum is unremarkable.  There are emphysematous changes to the lungs. There is a persistent  right mid and lower lung field opacity with worsening airspace disease  in the left lung base. There is no pneumothorax.  There are no acute  osseous abnormalities.           Impression:       No significant change in the patients right lung airspace  disease with a worsening left basilar opacity consistent with a  pneumonia.     Continued followup is recommended.     This report was finalized on 1/8/2018 8:09 AM by Larry Grewal M.D..    CT Chest Pulmonary Embolism With Contrast [[id number]] Collected:  01/08/18 1209     Updated:  01/08/18 1213    Narrative:       PROCEDURE: CT CHEST PULMONARY EMBOLISM W CONTRAST-     HISTORY: dyspnea, enlarged RV; J18.9-Pneumonia, unspecified organism;  A41.9-Sepsis, unspecified organism; N17.9-Acute kidney failure,  unspecified     COMPARISON: None .     TECHNIQUE: Multiple axial CT images were obtained from the thoracic  inlet through the upper abdomen following the  administration of Isovue  300 per the CT PE protocol. Coronal and oblique 3D MIP images were  reconstructed from the original axial data set.      FINDINGS: There are no filling defects within the pulmonary arteries to  suggest an embolus. The thoracic aorta is normal in caliber with no  evidence of dissection. Incidental note is made of direct origin of the  left vertebral artery from the aortic arch. The heart is normal in size.  There are no pleural or pericardial effusions. There is no adenopathy.  Lung windows reveal emphysematous changes. There is airspace disease in  the right lower lobe, right middle lobe and left lower lobe consistent  with a pneumonia. The visualized upper abdomen is unremarkable. Bone  windows reveal no acute osseous abnormalities.       Impression:       1. No evidence of pulmonary embolus or dissection.   2. Bilateral airspace disease consistent with a pneumonia.             417.27 mGy.cm          This study was performed with techniques to keep radiation doses as low  as reasonably achievable (ALARA). Individualized dose reduction  techniques using automated exposure control or adjustment of mA and/or  kV according to the patient size were employed.      This report was finalized on 1/8/2018 12:11 PM by Larry Grewal M.D..          Medication Review:       acetylcysteine 600 mg Inhalation TID - RT   citalopram 40 mg Oral Daily   enoxaparin 40 mg Subcutaneous Q24H   guaiFENesin 600 mg Oral Q12H   [COMPLETED] iopamidol 200 mL Intravenous Once in imaging   ipratropium-albuterol 3 mL Nebulization Q6H - RT   piperacillin-tazobactam 4.5 g Intravenous Q8H   And      [START ON 1/9/2018] levoFLOXacin 750 mg Intravenous Q24H   methylPREDNISolone sodium succinate 40 mg Intravenous Q8H   nicotine 1 patch Transdermal Q24H   vancomycin 1,000 mg Intravenous Q12H         Pharmacy to dose vancomycin     sodium chloride 125 mL/hr Last Rate: 125 mL/hr (01/07/18 1807)       Assessment/Plan      Problem List Items Addressed This Visit     Sepsis due to pneumonia - Primary    Relevant Medications    mometasone-formoterol (DULERA 200) 200-5 MCG/ACT inhaler    albuterol (PROVENTIL HFA;VENTOLIN HFA) 108 (90 Base) MCG/ACT inhaler      Other Visit Diagnoses     Acute renal failure, unspecified acute renal failure type              1.  Severe sepsis  2.  Acute hypoxic respiratory failure  3.  Extensive right-sided pneumonia  4.  Bullous emphysema  5.  History of pneumothorax  6.  History of cardiac surgery as a child   7.  Acute on chronic cor pulmonale    Plan:     We will check a CTA of the chest to rule out PE.  This was done, and does show no PE.  Continue with Lovenox subcutaneous for prophylaxis.  Continue with vancomycin, Zosyn, Levaquin.  Continue Solu-Medrol, and breathing treatments.  Continue with guaifenesin, add Mucomyst.  Continue with IV fluids.  Check lab work in the a.m.  Discussed the case with Dr. Baptiste.  Reviewed echocardiogram with Dr. Morrow.  We'll check lab work in the a.m.  Continue to monitor closely in the ICU.    Darvin Bay DO  01/08/18  12:16 PM             Electronically signed by Darvin Bay DO at 1/8/2018 12:16 PM        Consult Notes (last 24 hours) (Notes from 1/7/2018  1:43 PM through 1/8/2018  1:43 PM)     No notes of this type exist for this encounter.

## 2018-01-08 NOTE — PROGRESS NOTES
[facility]    Name:  Niall Murguia   Age:  52 y.o.  Sex:  male  :  1965  MRN:  [id number]   Visit Number:  [id number]  Admission Date:  2018  Date Of Service:  18  Primary Care Physician:  KEAGAN Rowley     LOS: 1 day :  Patient Care Team:  KEAGAN Rowley as PCP - General (Family Medicine):    History taken from:     patient    Chief Complaint:      Dyspnea    Subjective     Interval History:     Patient seen today.  Patient is admitted with extensive right-sided pneumonia.  He failed outpatient treatment with Augmentin and prednisone.  He also has severe COPD.  He was admitted, placed on vancomycin, Zosyn and Levaquin.  He is also on Solu-Medrol.  He was on BiPAP all night long.  He feels mildly better.  He still is severely short of breath, and is very congested.  He denies any chest pressure, nausea, vomiting, or diarrhea.  He does have some chest pain with cough and deep inspiration.  Patient is also very weak.  Dr. Baptiste is following.  Echocardiogram was ordered, this shows acute on chronic cor pulmonale.  CTA of the chest will be ordered.    Review of Systems:     All systems were reviewed and negative except for:  Respiratory: positive for  cough, productive, hemoptysis and shortness of air    Objective     Vital Signs:    Temp:  [98.2 °F (36.8 °C)-99.9 °F (37.7 °C)] 98.3 °F (36.8 °C)  Heart Rate:  [] 69  Resp:  [22-33] 24  BP: (107-147)/(67-96) 111/73    Physical Exam:      General Appearance:    Alert, cooperative, in no acute distress   Head:    Normocephalic, without obvious abnormality, atraumatic   Eyes:            Lids and lashes normal, conjunctivae and sclerae normal, no   icterus, no pallor, corneas clear, PERRLA   Ears:    Ears appear intact with no abnormalities noted   Throat:   No oral lesions, no thrush, oral mucosa moist   Neck:   No adenopathy, supple, trachea midline, no thyromegaly, no    carotid bruit, no JVD   Back:     No kyphosis present, no scoliosis present, no skin lesions,      erythema or scars, no tenderness to percussion or                   palpation,   range of motion normal   Lungs:     Rhonchi on the right with some use of accessory muscles respiration.      Heart:    Regular rhythm and normal rate, normal S1 and S2, no            murmur, no gallop, no rub, no click   Chest Wall:    No abnormalities observed   Abdomen:     Normal bowel sounds, no masses, no organomegaly, soft        non-tender, non-distended, no guarding, no rebound                tenderness   Rectal:     Deferred   Extremities:   4/5 strength in upper/lower extremities bilaterally.     Pulses:   Pulses palpable and equal bilaterally   Skin:   No bleeding, bruising or rash   Lymph nodes:   No palpable adenopathy   Neurologic:   Cranial nerves 2 - 12 grossly intact, sensation intact, DTR       present and equal bilaterally        Results Review:      I reviewed the patient's new clinical results.    Labs:    Lab Results (last 24 hours)     Procedure Component Value Units Date/Time    Mycoplasma Pneumoniae Antibody, IgM [[id number]] Collected:  01/07/18 0221    Specimen:  Blood Updated:  01/07/18 1612    Blood Gas, Arterial With Co-Ox [[id number]]  (Abnormal) Collected:  01/07/18 1756    Specimen:  Arterial Blood Updated:  01/07/18 1757     Site Arterial: right brachial     Wilmer's Test Positive     pH, Arterial 7.437 pH units      pCO2, Arterial 38.4 mm Hg      pO2, Arterial 68.2 (L) mm Hg      HCO3, Arterial 25.8 mmol/L      Base Excess, Arterial 1.6 mmol/L      O2 Saturation, Arterial 94.6 %      Hemoglobin, Blood Gas 14.1 g/dL      Oxyhemoglobin 93.2 (L) %      Methemoglobin 0.70 %      Carboxyhemoglobin 0.8 %      Modality BiPap     FIO2 40 %     Legionella Antigen, Urine - Urine, Clean Catch [[id number]] Collected:  01/07/18 1742    Specimen:  Urine from Urine, Clean Catch Updated:  01/07/18 1911    S. Pneumo  Urine  or CSF - Urine, Urine, Clean Catch [[id number]] Collected:  01/07/18 1742    Specimen:  Urine from Urine, Clean Catch Updated:  01/07/18 1911    Lactic Acid, Plasma [[id number]]  (Abnormal) Collected:  01/07/18 1858    Specimen:  Blood Updated:  01/07/18 1945     Lactate 2.9 (C) mmol/L     Urinalysis With / Culture If Indicated - Urine, Clean Catch [[id number]]  (Abnormal) Collected:  01/07/18 1748    Specimen:  Urine from Urine, Clean Catch Updated:  01/07/18 1959     Color, UA Yellow     Appearance, UA Slightly Cloudy (A)     pH, UA 5.5     Specific Gravity, UA 1.025     Glucose, UA Negative     Ketones, UA Trace (A)     Bilirubin, UA Negative     Blood, UA Small (1+) (A)     Protein,  mg/dL (2+) (A)     Leuk Esterase, UA Negative     Nitrite, UA Negative     Urobilinogen, UA 0.2 E.U./dL    Urinalysis, Microscopic Only - Urine, Clean Catch [[id number]]  (Abnormal) Collected:  01/07/18 1748    Specimen:  Urine from Urine, Clean Catch Updated:  01/07/18 1959     RBC, UA 0-2 (A) /HPF      WBC, UA 0-2 (A) /HPF      Bacteria, UA Trace (A) /HPF      Squamous Epithelial Cells, UA 3-6 (A) /HPF      Hyaline Casts, UA None Seen /LPF      Methodology Manual Light Microscopy    Urine Drug Screen - Urine, Clean Catch [[id number]]  (Abnormal) Collected:  01/07/18 1748    Specimen:  Urine from Urine, Clean Catch Updated:  01/07/18 2014     THC, Screen, Urine Positive (A)     Phencyclidine (PCP), Urine Negative     Cocaine Screen, Urine Negative     Methamphetamine, Urine Negative     Opiate Screen Negative     Amphetamine Screen, Urine Negative     Benzodiazepine Screen, Urine Negative     Tricyclic Antidepressants Screen Negative     Methadone Screen, Urine Negative     Barbiturates Screen, Urine Negative     Oxycodone Screen, Urine Negative     Propoxyphene Screen Negative     Buprenorphine, Screen, Urine Negative    Narrative:       Limitations of this procedure include the possibility of false positives due to interfering  substances in the urine sample. Clinical data should be correlated with any questionable result. Positive results should be considered Presumptive Positive until results are confirmed with another methodology such as HPLC or GCMS.    Blood Culture - Blood, [[id number]]  (Normal) Collected:  01/07/18 0245    Specimen:  Blood from Hand, Right Updated:  01/08/18 0303     Blood Culture No growth at 24 hours    Blood Culture - Blood, [[id number]]  (Normal) Collected:  01/07/18 0241    Specimen:  Blood from Arm, Right Updated:  01/08/18 0303     Blood Culture No growth at 24 hours    Magnesium [[id number]]  (Normal) Collected:  01/08/18 0531    Specimen:  Blood Updated:  01/08/18 0602     Magnesium 2.1 mg/dL     Phosphorus [[id number]]  (Normal) Collected:  01/08/18 0531    Specimen:  Blood Updated:  01/08/18 0602     Phosphorus 2.9 mg/dL     Comprehensive Metabolic Panel [[id number]]  (Abnormal) Collected:  01/08/18 0531    Specimen:  Blood Updated:  01/08/18 0615     Glucose 126 (H) mg/dL      BUN 29 (H) mg/dL      Creatinine 0.80 mg/dL      Sodium 132 (L) mmol/L      Potassium 4.4 mmol/L      Chloride 100 mmol/L      CO2 25.0 (L) mmol/L      Calcium 8.3 (L) mg/dL      Total Protein 5.5 (L) g/dL      Albumin 2.60 (L) g/dL      ALT (SGPT) 33 U/L      AST (SGOT) 22 U/L      Alkaline Phosphatase 39 U/L      Total Bilirubin 0.7 mg/dL      eGFR Non African Amer 102 mL/min/1.73      Globulin 2.9 gm/dL      A/G Ratio 0.9 (L) g/dL      BUN/Creatinine Ratio 36.3 (H)     Anion Gap 11.4 mmol/L     Narrative:       Abnormal estimated GFR should be followed by more specific studies to confirm end stage chronic renal disease. The equation used for calculation may not be accurate for patients less than 19 years old, greater than 70 years old, patients at extremes of weight, malnutrition, or with acute renal dysfunction.    CBC Auto Differential [[id number]]  (Abnormal) Collected:  01/08/18 0531    Specimen:  Blood Updated:  01/08/18 0650      WBC 6.49 10*3/mm3      RBC 3.30 (L) 10*6/mm3      Hemoglobin 11.3 (L) g/dL      Hematocrit 33.6 (L) %      .8 (H) fL      MCH 34.2 (H) pg      MCHC 33.6 g/dL      RDW 11.9 %      RDW-SD 45.0 fl      MPV 11.2 fL      Platelets 128 (L) 10*3/mm3     Scan Slide [[id number]] Collected:  01/08/18 0531    Specimen:  Blood Updated:  01/08/18 0650     Scan Slide --      See Manual Differential Results       Manual Differential [[id number]]  (Abnormal) Collected:  01/08/18 0531    Specimen:  Blood Updated:  01/08/18 0650     Neutrophil % 31.0 (L) %      Lymphocyte % 14.0 %      Monocyte % 3.0 %      Bands %  40.0 (H) %      Metamyelocyte % 10.0 (H) %      Myelocyte % 2.0 (H) %      Neutrophils Absolute 4.61 10*3/mm3      Lymphocytes Absolute 0.91 10*3/mm3      Monocytes Absolute 0.19 10*3/mm3      Anisocytosis Slight/1+     Macrocytes Slight/1+     Poikilocytes Slight/1+     Toxic Granulation Slight/1+     Vacuolated Neutrophils Slight/1+     Platelet Estimate Decreased    Blood Gas, Arterial [[id number]]  (Abnormal) Collected:  01/08/18 0837    Specimen:  Arterial Blood Updated:  01/08/18 0837     Site Arterial: right brachial     Wilmer's Test Positive     pH, Arterial 7.436 pH units      pCO2, Arterial 39.7 mm Hg      pO2, Arterial 61.8 (L) mm Hg      HCO3, Arterial 26.7 mmol/L      Base Excess, Arterial 2.5 mmol/L      O2 Saturation, Arterial 92.3 %      Hemoglobin, Blood Gas 11.8 (L) g/dL      Barometric Pressure for Blood Gas 737 mmHg      Modality Cannula - High Flow     FIO2 50 %            Radiology:    Imaging Results (last 24 hours)     Procedure Component Value Units Date/Time    XR Chest 1 View [[id number]] Collected:  01/08/18 0806     Updated:  01/08/18 0810    Narrative:       PROCEDURE: XR CHEST 1 VW-     HISTORY: PNA.; J18.9-Pneumonia, unspecified organism; A41.9-Sepsis,  unspecified organism; N17.9-Acute kidney failure, unspecified     COMPARISON: January 7, 2018.     FINDINGS: The heart is normal in  size. The mediastinum is unremarkable.  There are emphysematous changes. An opacity in the right mid and lower  lung field is unchanged. There is a new retrocardiac opacity in the  medial left lung base. No significant effusions are evident. There is no  pneumothorax.  There are no acute osseous abnormalities.           Impression:       No change in the patient's right lung airspace disease with  a new retrocardiac opacity in the left lung base consistent with a  worsening pneumonia.     Continued followup is recommended.     This report was finalized on 1/8/2018 8:08 AM by Larry Grewal M.D..    XR Chest 1 View [[id number]] Collected:  01/08/18 0808     Updated:  01/08/18 0811    Narrative:       PROCEDURE: XR CHEST 1 VW-     HISTORY: dyspnea; J18.9-Pneumonia, unspecified organism; A41.9-Sepsis,  unspecified organism; N17.9-Acute kidney failure, unspecified     COMPARISON: 1/7/2018.     FINDINGS: The heart is normal in size. The mediastinum is unremarkable.  There are emphysematous changes to the lungs. There is a persistent  right mid and lower lung field opacity with worsening airspace disease  in the left lung base. There is no pneumothorax.  There are no acute  osseous abnormalities.           Impression:       No significant change in the patients right lung airspace  disease with a worsening left basilar opacity consistent with a  pneumonia.     Continued followup is recommended.     This report was finalized on 1/8/2018 8:09 AM by Larry Grewal M.D..    CT Chest Pulmonary Embolism With Contrast [[id number]] Collected:  01/08/18 1209     Updated:  01/08/18 1213    Narrative:       PROCEDURE: CT CHEST PULMONARY EMBOLISM W CONTRAST-     HISTORY: dyspnea, enlarged RV; J18.9-Pneumonia, unspecified organism;  A41.9-Sepsis, unspecified organism; N17.9-Acute kidney failure,  unspecified     COMPARISON: None .     TECHNIQUE: Multiple axial CT images were obtained from the thoracic  inlet through the upper  abdomen following the administration of Isovue  300 per the CT PE protocol. Coronal and oblique 3D MIP images were  reconstructed from the original axial data set.      FINDINGS: There are no filling defects within the pulmonary arteries to  suggest an embolus. The thoracic aorta is normal in caliber with no  evidence of dissection. Incidental note is made of direct origin of the  left vertebral artery from the aortic arch. The heart is normal in size.  There are no pleural or pericardial effusions. There is no adenopathy.  Lung windows reveal emphysematous changes. There is airspace disease in  the right lower lobe, right middle lobe and left lower lobe consistent  with a pneumonia. The visualized upper abdomen is unremarkable. Bone  windows reveal no acute osseous abnormalities.       Impression:       1. No evidence of pulmonary embolus or dissection.   2. Bilateral airspace disease consistent with a pneumonia.             417.27 mGy.cm          This study was performed with techniques to keep radiation doses as low  as reasonably achievable (ALARA). Individualized dose reduction  techniques using automated exposure control or adjustment of mA and/or  kV according to the patient size were employed.      This report was finalized on 1/8/2018 12:11 PM by Larry Grewal M.D..          Medication Review:       acetylcysteine 600 mg Inhalation TID - RT   citalopram 40 mg Oral Daily   enoxaparin 40 mg Subcutaneous Q24H   guaiFENesin 600 mg Oral Q12H   [COMPLETED] iopamidol 200 mL Intravenous Once in imaging   ipratropium-albuterol 3 mL Nebulization Q6H - RT   piperacillin-tazobactam 4.5 g Intravenous Q8H   And      [START ON 1/9/2018] levoFLOXacin 750 mg Intravenous Q24H   methylPREDNISolone sodium succinate 40 mg Intravenous Q8H   nicotine 1 patch Transdermal Q24H   vancomycin 1,000 mg Intravenous Q12H         Pharmacy to dose vancomycin     sodium chloride 125 mL/hr Last Rate: 125 mL/hr (01/07/18 1807)        Assessment/Plan     Problem List Items Addressed This Visit     Sepsis due to pneumonia - Primary    Relevant Medications    mometasone-formoterol (DULERA 200) 200-5 MCG/ACT inhaler    albuterol (PROVENTIL HFA;VENTOLIN HFA) 108 (90 Base) MCG/ACT inhaler      Other Visit Diagnoses     Acute renal failure, unspecified acute renal failure type              1.  Severe sepsis  2.  Acute hypoxic respiratory failure  3.  Extensive right-sided pneumonia  4.  Bullous emphysema  5.  History of pneumothorax  6.  History of cardiac surgery as a child   7.  Acute on chronic cor pulmonale    Plan:     We will check a CTA of the chest to rule out PE.  This was done, and does show no PE.  Continue with Lovenox subcutaneous for prophylaxis.  Continue with vancomycin, Zosyn, Levaquin.  Continue Solu-Medrol, and breathing treatments.  Continue with guaifenesin, add Mucomyst.  Continue with IV fluids.  Check lab work in the a.m.  Discussed the case with Dr. Baptiste.  Reviewed echocardiogram with Dr. Morrow.  We'll check lab work in the a.m.  Continue to monitor closely in the ICU.    Darvin Bay,   01/08/18  12:16 PM

## 2018-01-08 NOTE — PLAN OF CARE
Problem: Patient Care Overview (Adult)  Goal: Plan of Care Review  Outcome: Ongoing (interventions implemented as appropriate)   01/08/18 0438   Coping/Psychosocial Response Interventions   Plan Of Care Reviewed With patient   Patient Care Overview   Progress no change       Problem: Sepsis (Adult)  Goal: Signs and Symptoms of Listed Potential Problems Will be Absent or Manageable (Sepsis)  Outcome: Ongoing (interventions implemented as appropriate)

## 2018-01-08 NOTE — PROGRESS NOTES
Discharge Planning Assessment   [place]     Patient Name: Niall Murguia  MRN: [id number]  Today's Date: 1/8/2018    Admit Date: 1/7/2018          Discharge Needs Assessment       01/08/18 1402    Living Environment    Lives With child(clemencia), adult    Living Arrangements mobile home    Home Accessibility no concerns    Stair Railings at Home none    Type of Financial/Environmental Concern none    Transportation Available car    Living Environment    Provides Primary Care For no one    Quality Of Family Relationships unable to assess    Able to Return to Prior Living Arrangements yes    Discharge Needs Assessment    Concerns To Be Addressed no discharge needs identified;denies needs/concerns at this time    Readmission Within The Last 30 Days no previous admission in last 30 days    Equipment Currently Used at Home none    Discharge Disposition still a patient            Discharge Plan       01/08/18 1403    Case Management/Social Work Plan    Plan Discharge Planning    Additional Comments SW met with pt in the ICU for discharge planning. Pt states he lives with his son in a double wide trailer and is independent with ADL's. Pt does not use any medical equipment, he has a PCP, does not have an advance directive and info was offered. Pt denies any needs at ths time. CM will continue to follow for dcp.         Discharge Placement     No information found                Demographic Summary     None            Functional Status       01/08/18 1401    Functional Status Prior    Ambulation 0-->independent    Transferring 0-->independent    Toileting 0-->independent    Bathing 0-->independent    Dressing 0-->independent    Eating 0-->independent    Communication 0-->understands/communicates without difficulty    Swallowing 0-->swallows foods/liquids without difficulty    IADL    Medications independent    Meal Preparation independent    Housekeeping independent    Laundry independent    Shopping independent    Oral Care  independent    Activity Tolerance    Usual Activity Tolerance good    Current Activity Tolerance fair    Cognitive/Perceptual/Developmental    Current Mental Status/Cognitive Functioning no deficits noted    Recent Changes in Mental Status/Cognitive Functioning no changes;unable to assess            Psychosocial     None            Abuse/Neglect     None            Legal     None            Substance Abuse     None            Patient Forms     None          CHAVEZ Estrella, MARTHAW  01/08/18  2:06 PM

## 2018-01-08 NOTE — NURSING NOTE
Pt admitted to floor from ED via stretcher.  Pt alert and oriented.  Pt oriented to nurse call light, tv remote, and pain scale.  BSR up x3.  VSS.

## 2018-01-09 LAB
ALBUMIN SERPL-MCNC: 2.6 G/DL (ref 3.5–5)
ANION GAP SERPL CALCULATED.3IONS-SCNC: 9 MMOL/L
ANISOCYTOSIS BLD QL: ABNORMAL
BUN BLD-MCNC: 33 MG/DL (ref 7–20)
BUN/CREAT SERPL: 41.3 (ref 6.3–21.9)
C DIFF GDH STL QL: NEGATIVE
CALCIUM SPEC-SCNC: 8.7 MG/DL (ref 8.4–10.2)
CHLORIDE SERPL-SCNC: 101 MMOL/L (ref 98–107)
CO2 SERPL-SCNC: 29 MMOL/L (ref 26–30)
CREAT BLD-MCNC: 0.8 MG/DL (ref 0.6–1.3)
DEPRECATED RDW RBC AUTO: 45.5 FL (ref 37–54)
ERYTHROCYTE [DISTWIDTH] IN BLOOD BY AUTOMATED COUNT: 12 % (ref 11.5–14.5)
GFR SERPL CREATININE-BSD FRML MDRD: 102 ML/MIN/1.73
GLUCOSE BLD-MCNC: 126 MG/DL (ref 74–98)
HCT VFR BLD AUTO: 30.8 % (ref 42–52)
HGB BLD-MCNC: 10.5 G/DL (ref 14–18)
LYMPHOCYTES # BLD MANUAL: 0.29 10*3/MM3 (ref 0.6–3.4)
LYMPHOCYTES NFR BLD MANUAL: 3 % (ref 10–50)
LYMPHOCYTES NFR BLD MANUAL: 6 % (ref 0–12)
M PNEUMONIAE IGM ABS: <770 U/ML (ref 0–769)
MACROCYTES BLD QL SMEAR: ABNORMAL
MAGNESIUM SERPL-MCNC: 2.3 MG/DL (ref 1.6–2.3)
MCH RBC QN AUTO: 34.5 PG (ref 27–31)
MCHC RBC AUTO-ENTMCNC: 34.1 G/DL (ref 30–37)
MCV RBC AUTO: 101.3 FL (ref 80–94)
MONOCYTES # BLD AUTO: 0.58 10*3/MM3 (ref 0–0.9)
NEUTROPHILS # BLD AUTO: 8.74 10*3/MM3 (ref 2–6.9)
NEUTROPHILS NFR BLD MANUAL: 86 % (ref 37–80)
NEUTS BAND NFR BLD MANUAL: 5 % (ref 0–6)
PHOSPHATE SERPL-MCNC: 2.1 MG/DL (ref 2.5–4.5)
PLATELET # BLD AUTO: 149 10*3/MM3 (ref 130–400)
PMV BLD AUTO: 10.9 FL (ref 6–12)
POIKILOCYTOSIS BLD QL SMEAR: ABNORMAL
POTASSIUM BLD-SCNC: 4 MMOL/L (ref 3.5–5.1)
RBC # BLD AUTO: 3.04 10*6/MM3 (ref 4.7–6.1)
SCAN SLIDE: NORMAL
SMALL PLATELETS BLD QL SMEAR: ADEQUATE
SODIUM BLD-SCNC: 135 MMOL/L (ref 137–145)
TOXIC GRANULATION: ABNORMAL
WBC NRBC COR # BLD: 9.6 10*3/MM3 (ref 4.8–10.8)

## 2018-01-09 PROCEDURE — 87070 CULTURE OTHR SPECIMN AEROBIC: CPT | Performed by: INTERNAL MEDICINE

## 2018-01-09 PROCEDURE — 25010000002 METHYLPREDNISOLONE PER 40 MG: Performed by: INTERNAL MEDICINE

## 2018-01-09 PROCEDURE — 87147 CULTURE TYPE IMMUNOLOGIC: CPT | Performed by: INTERNAL MEDICINE

## 2018-01-09 PROCEDURE — 25010000002 LEVOFLOXACIN PER 250 MG: Performed by: INTERNAL MEDICINE

## 2018-01-09 PROCEDURE — 94799 UNLISTED PULMONARY SVC/PX: CPT

## 2018-01-09 PROCEDURE — 85007 BL SMEAR W/DIFF WBC COUNT: CPT | Performed by: INTERNAL MEDICINE

## 2018-01-09 PROCEDURE — 99233 SBSQ HOSP IP/OBS HIGH 50: CPT | Performed by: INTERNAL MEDICINE

## 2018-01-09 PROCEDURE — 25010000002 PIPERACILLIN SOD-TAZOBACTAM PER 1 G: Performed by: INTERNAL MEDICINE

## 2018-01-09 PROCEDURE — 85025 COMPLETE CBC W/AUTO DIFF WBC: CPT | Performed by: INTERNAL MEDICINE

## 2018-01-09 PROCEDURE — 83735 ASSAY OF MAGNESIUM: CPT | Performed by: INTERNAL MEDICINE

## 2018-01-09 PROCEDURE — 87077 CULTURE AEROBIC IDENTIFY: CPT | Performed by: INTERNAL MEDICINE

## 2018-01-09 PROCEDURE — 87186 SC STD MICRODIL/AGAR DIL: CPT | Performed by: INTERNAL MEDICINE

## 2018-01-09 PROCEDURE — 87449 NOS EACH ORGANISM AG IA: CPT | Performed by: INTERNAL MEDICINE

## 2018-01-09 PROCEDURE — 94660 CPAP INITIATION&MGMT: CPT

## 2018-01-09 PROCEDURE — 87205 SMEAR GRAM STAIN: CPT | Performed by: INTERNAL MEDICINE

## 2018-01-09 PROCEDURE — 80069 RENAL FUNCTION PANEL: CPT | Performed by: INTERNAL MEDICINE

## 2018-01-09 PROCEDURE — 25010000002 ENOXAPARIN PER 10 MG: Performed by: INTERNAL MEDICINE

## 2018-01-09 PROCEDURE — 87324 CLOSTRIDIUM AG IA: CPT | Performed by: INTERNAL MEDICINE

## 2018-01-09 RX ORDER — BUDESONIDE AND FORMOTEROL FUMARATE DIHYDRATE 160; 4.5 UG/1; UG/1
2 AEROSOL RESPIRATORY (INHALATION)
Status: DISCONTINUED | OUTPATIENT
Start: 2018-01-09 | End: 2018-01-14 | Stop reason: HOSPADM

## 2018-01-09 RX ADMIN — TAZOBACTAM SODIUM AND PIPERACILLIN SODIUM 4.5 G: 500; 4 INJECTION, SOLUTION INTRAVENOUS at 06:26

## 2018-01-09 RX ADMIN — ENOXAPARIN SODIUM 40 MG: 40 INJECTION SUBCUTANEOUS at 20:22

## 2018-01-09 RX ADMIN — METHYLPREDNISOLONE SODIUM SUCCINATE 40 MG: 40 INJECTION, POWDER, FOR SOLUTION INTRAMUSCULAR; INTRAVENOUS at 06:26

## 2018-01-09 RX ADMIN — METHYLPREDNISOLONE SODIUM SUCCINATE 40 MG: 40 INJECTION, POWDER, FOR SOLUTION INTRAMUSCULAR; INTRAVENOUS at 17:59

## 2018-01-09 RX ADMIN — GUAIFENESIN 600 MG: 600 TABLET, EXTENDED RELEASE ORAL at 20:22

## 2018-01-09 RX ADMIN — IPRATROPIUM BROMIDE AND ALBUTEROL SULFATE 3 ML: .5; 3 SOLUTION RESPIRATORY (INHALATION) at 12:44

## 2018-01-09 RX ADMIN — PIPERACILLIN AND TAZOBACTAM 4.5 G: 4; .5 INJECTION, POWDER, LYOPHILIZED, FOR SOLUTION INTRAVENOUS; PARENTERAL at 15:10

## 2018-01-09 RX ADMIN — IPRATROPIUM BROMIDE AND ALBUTEROL SULFATE 3 ML: .5; 3 SOLUTION RESPIRATORY (INHALATION) at 00:29

## 2018-01-09 RX ADMIN — PIPERACILLIN AND TAZOBACTAM 4.5 G: 4; .5 INJECTION, POWDER, LYOPHILIZED, FOR SOLUTION INTRAVENOUS; PARENTERAL at 21:29

## 2018-01-09 RX ADMIN — ACETAMINOPHEN 650 MG: 325 TABLET, FILM COATED ORAL at 08:54

## 2018-01-09 RX ADMIN — GUAIFENESIN 600 MG: 600 TABLET, EXTENDED RELEASE ORAL at 08:54

## 2018-01-09 RX ADMIN — NICOTINE 1 PATCH: 21 PATCH TRANSDERMAL at 20:22

## 2018-01-09 RX ADMIN — CITALOPRAM HYDROBROMIDE 40 MG: 20 TABLET, FILM COATED ORAL at 08:54

## 2018-01-09 RX ADMIN — IPRATROPIUM BROMIDE AND ALBUTEROL SULFATE 3 ML: .5; 3 SOLUTION RESPIRATORY (INHALATION) at 18:57

## 2018-01-09 RX ADMIN — TAZOBACTAM SODIUM AND PIPERACILLIN SODIUM 4.5 G: 500; 4 INJECTION, SOLUTION INTRAVENOUS at 00:00

## 2018-01-09 RX ADMIN — LEVOFLOXACIN 750 MG: 5 INJECTION, SOLUTION INTRAVENOUS at 04:21

## 2018-01-09 RX ADMIN — IPRATROPIUM BROMIDE AND ALBUTEROL SULFATE 3 ML: .5; 3 SOLUTION RESPIRATORY (INHALATION) at 07:23

## 2018-01-09 NOTE — PROGRESS NOTES
"  CC: Hypoxic respiratory failure.  Pneumonia.  COPD    S: Currently on the BiPAP.  Continues to be short of breath although feels somewhat improved overall.    O:Vital signs reviewed. O2Sat: 95 % on 4-5 L/m.   /84 (BP Location: Right arm, Patient Position: Sitting)  Pulse 60  Temp 97.5 °F (36.4 °C) (Oral)   Resp 25  Ht 172.7 cm (68\")  Wt 68.5 kg (151 lb 1.6 oz)  SpO2 95%  BMI 22.97 kg/m2      I & Os reviewed.   Intake/Output       01/08/18 0700 - 01/09/18 0659    Intake (ml) 3144    Output (ml) 1000    Net (ml) 2144    Last Weight 68.5 kg (151 lb 1.6 oz)          General: Mild acute distress noted. On BiPAP.  Eyes: PERRL. EOM intact  Neck: Supple without JVD  Cardiovascular: S1 + S2. Regular.   Respiratory: Mild respiratory distress noted. No wheezing heard. Right basal crackles noted  Abdomen: Soft. Bowel sounds positive   Extremities: No edema noted.  Neurologic: AAOx3. Was able to follow commands.   Skin: Appeared without any overt rashes    Labs: Reviewed.     Results from last 7 days  Lab Units 01/09/18 0446 01/08/18 0531 01/07/18 0221   SODIUM mmol/L 135* 132* 134*   POTASSIUM mmol/L 4.0 4.4 4.0   CHLORIDE mmol/L 101 100 89*   CO2 mmol/L 29.0 25.0* 33.0*   BUN mg/dL 33* 29* 21*   CREATININE mg/dL 0.80 0.80 1.50*   CALCIUM mg/dL 8.7 8.3* 10.0   BILIRUBIN mg/dL  --  0.7 1.1   ALK PHOS U/L  --  39 123   ALT (SGPT) U/L  --  33 56   AST (SGOT) U/L  --  22 39   GLUCOSE mg/dL 126* 126* 125*         Results from last 7 days  Lab Units 01/09/18 0446 01/08/18 0531   MAGNESIUM mg/dL 2.3 2.1   PHOSPHORUS mg/dL 2.1* 2.9           Results from last 7 days  Lab Units 01/09/18 0446 01/08/18 0531 01/07/18 0221   WBC 10*3/mm3 9.60 6.49 25.25*   HEMOGLOBIN g/dL 10.5* 11.3* 15.8   PLATELETS 10*3/mm3 149 128* 206         Assessment & Recommendations/Plan:   1.  Acute hypoxic respiratory failure  Continue oxygen supplementation    2.  Right lower lobe pneumonia  Continue antibiotics  Cultures and antigens " pending    3.  COPD  Continue nebulized treatments.  Will start Symbicort.    4.  Severe sepsis  Resolved.     5.  History of pneumothorax  No evidence of recurrence.    6.  Smoking  We'll definitely need to quit    BiPAP 2 hours ON and 2 hours OFF and Overnight.     We have reviewed patient's current orders and changes, if any, have been suggested to primary care team. Plan was also discussed with nursing staff, as necessary.     Will also do CXRay in AM.       Chelsea Baptiste MD  01/09/18  4:24 PM

## 2018-01-09 NOTE — PROGRESS NOTES
[facility]    Name:  Niall Murguia   Age:  52 y.o.  Sex:  male  :  1965  MRN:  [id number]   Visit Number:  [id number]  Admission Date:  2018  Date Of Service:  18  Primary Care Physician:  KEAGAN Rowley     LOS: 2 days :  Patient Care Team:  KEAGAN Rowley as PCP - General (Family Medicine):    History taken from:     patient    Chief Complaint:      Dyspnea    Subjective     Interval History:     Patient seen today.  Patient was admitted with right-sided pneumonia for which he failed outpatient treatment.  He also has severe COPD with exacerbation.  He was initially placed on vancomycin, Zosyn and Levaquin.  Also he was on Solu-Medrol.  Has been discontinued due to negative cultures.  He has been requiring BiPAP while here.  Echocardiogram showed acute on chronic cor pulmonale.  CTA of the chest was noted to be negative for pulmonary embolism.  Patient is slowly getting better, however he is severely short of breath still and congested.  Dr. Baptiste from pulmonology is following as well.  He will continue to need care in the ICU.  He has productive sputum along with some hemoptysis.    Review of Systems:     All systems were reviewed and negative except for:  Respiratory: positive for  cough, productive, hemoptysis and shortness of air    Objective     Vital Signs:    Temp:  [97.5 °F (36.4 °C)-98.2 °F (36.8 °C)] 97.5 °F (36.4 °C)  Heart Rate:  [55-83] 60  Resp:  [19-30] 20  BP: (115-138)/(61-87) 135/84    Physical Exam:      General Appearance:    Alert, cooperative, in no acute distress   Head:    Normocephalic, without obvious abnormality, atraumatic   Eyes:            Lids and lashes normal, conjunctivae and sclerae normal, no   icterus, no pallor, corneas clear, PERRLA   Ears:    Ears appear intact with no abnormalities noted   Throat:   No oral lesions, no thrush, oral mucosa moist   Neck:   No adenopathy, supple,  trachea midline, no thyromegaly, no   carotid bruit, no JVD   Back:     No kyphosis present, no scoliosis present, no skin lesions,      erythema or scars, no tenderness to percussion or                   palpation,   range of motion normal   Lungs:     Rhonchi bilaterally without uses accessory muscles respiration.      Heart:    Regular rhythm and normal rate, normal S1 and S2, no            murmur, no gallop, no rub, no click   Chest Wall:    No abnormalities observed   Abdomen:     Normal bowel sounds, no masses, no organomegaly, soft        non-tender, non-distended, no guarding, no rebound                tenderness   Rectal:     Deferred   Extremities:   Moves all extremities well, no edema, no cyanosis, no             redness   Pulses:   Pulses palpable and equal bilaterally   Skin:   No bleeding, bruising or rash   Lymph nodes:   No palpable adenopathy   Neurologic:   Cranial nerves 2 - 12 grossly intact, sensation intact, DTR       present and equal bilaterally        Results Review:      I reviewed the patient's new clinical results.    Labs:    Lab Results (last 24 hours)     Procedure Component Value Units Date/Time    Respiratory Culture - Sputum, Cough [[id number]] Collected:  01/09/18 0010    Specimen:  Sputum from Cough Updated:  01/09/18 0152     Gram Stain Result Rare (1+) Gram positive cocci in pairs      Greater than 20 WBCs per low power field      Less than 10 Epithelial cells per low power field    Blood Culture - Blood, [[id number]]  (Normal) Collected:  01/07/18 0245    Specimen:  Blood from Hand, Right Updated:  01/09/18 0315     Blood Culture No growth at 2 days    Blood Culture - Blood, [[id number]]  (Normal) Collected:  01/07/18 0241    Specimen:  Blood from Arm, Right Updated:  01/09/18 0315     Blood Culture No growth at 2 days    Magnesium [[id number]]  (Normal) Collected:  01/09/18 0446    Specimen:  Blood Updated:  01/09/18 0542     Magnesium 2.3 mg/dL     Renal Function Panel  [[id number]]  (Abnormal) Collected:  01/09/18 0446    Specimen:  Blood Updated:  01/09/18 0542     Glucose 126 (H) mg/dL      BUN 33 (H) mg/dL      Creatinine 0.80 mg/dL      Sodium 135 (L) mmol/L      Potassium 4.0 mmol/L      Chloride 101 mmol/L      CO2 29.0 mmol/L      Calcium 8.7 mg/dL      Albumin 2.60 (L) g/dL      Phosphorus 2.1 (L) mg/dL      Anion Gap 9.0 mmol/L      BUN/Creatinine Ratio 41.3 (H)     eGFR Non African Amer 102 mL/min/1.73     Narrative:       Abnormal estimated GFR should be followed by more specific studies to confirm end stage chronic renal disease. The equation used for calculation may not be accurate for patients less than 19 years old, greater than 70 years old, patients at extremes of weight, malnutrition, or with acute renal dysfunction.    CBC Auto Differential [[id number]]  (Abnormal) Collected:  01/09/18 0446    Specimen:  Blood Updated:  01/09/18 0558     WBC 9.60 10*3/mm3      RBC 3.04 (L) 10*6/mm3      Hemoglobin 10.5 (L) g/dL      Hematocrit 30.8 (L) %      .3 (H) fL      MCH 34.5 (H) pg      MCHC 34.1 g/dL      RDW 12.0 %      RDW-SD 45.5 fl      MPV 10.9 fL      Platelets 149 10*3/mm3     Scan Slide [[id number]] Collected:  01/09/18 0446    Specimen:  Blood Updated:  01/09/18 0558     Scan Slide --      See Manual Differential Results       CBC & Differential [[id number]] Collected:  01/09/18 0446    Specimen:  Blood Updated:  01/09/18 0558    Narrative:       The following orders were created for panel order CBC & Differential.  Procedure                               Abnormality         Status                     ---------                               -----------         ------                     Manual Differential[[id number]]          Abnormal            Final result               Scan Slide[[id number]]                                       Final result               CBC Auto Differential[[id number]]        Abnormal            Final result                 Please view  results for these tests on the individual orders.    Manual Differential [[id number]]  (Abnormal) Collected:  01/09/18 0446    Specimen:  Blood Updated:  01/09/18 0558     Neutrophil % 86.0 (H) %      Lymphocyte % 3.0 (L) %      Monocyte % 6.0 %      Bands %  5.0 %      Neutrophils Absolute 8.74 (H) 10*3/mm3      Lymphocytes Absolute 0.29 (L) 10*3/mm3      Monocytes Absolute 0.58 10*3/mm3      Anisocytosis Slight/1+     Macrocytes Slight/1+     Poikilocytes Slight/1+     Toxic Granulation Slight/1+     Platelet Estimate Adequate           Radiology:    Imaging Results (last 24 hours)     ** No results found for the last 24 hours. **          Medication Review:       citalopram 40 mg Oral Daily   enoxaparin 40 mg Subcutaneous Q24H   guaiFENesin 600 mg Oral Q12H   ipratropium-albuterol 3 mL Nebulization Q6H - RT   levoFLOXacin 750 mg Intravenous Q24H   methylPREDNISolone sodium succinate 40 mg Intravenous Q12H   nicotine 1 patch Transdermal Q24H   piperacillin-tazobactam (ZOSYN) 4.5 g in sodium chloride 0.9 % 100 mL 4.5 g Intravenous Q8H            Assessment/Plan     Problem List Items Addressed This Visit     Sepsis due to pneumonia - Primary    Relevant Medications    mometasone-formoterol (DULERA 200) 200-5 MCG/ACT inhaler    albuterol (PROVENTIL HFA;VENTOLIN HFA) 108 (90 Base) MCG/ACT inhaler      Other Visit Diagnoses     Acute renal failure, unspecified acute renal failure type              1.  Severe sepsis  2.  Acute hypoxic respiratory failure  3.  Extensive right-sided pneumonia  4.  Bullous emphysema with exacerbation  5.  History of pneumothorax  6.  History of cardiac surgery as a child   7.  Acute on chronic cor pulmonale    Plan:    Continue with  Zosyn, Levaquin.  Cultures have been negative thus far.  Continue Solu-Medrol, DuoNeb, Mucinex.  Dr. Baptiste is following.  Neck lab work in the a.m.  Continue to try to wean off BiPAP.  Further recommendations will depend on course.    Darvin Bay,  DO  01/09/18  2:00 PM

## 2018-01-09 NOTE — PLAN OF CARE
Problem: Patient Care Overview (Adult)  Goal: Plan of Care Review  Outcome: Ongoing (interventions implemented as appropriate)   01/09/18 1643   Coping/Psychosocial Response Interventions   Plan Of Care Reviewed With patient   Patient Care Overview   Progress no change       Problem: NPPV/CPAP (Adult)  Intervention: Assess/Manage Patient Tolerance of Noninvasive Ventilation   01/09/18 1643   Respiratory Interventions   Airway/Ventilation Management airway patency maintained       Goal: Signs and Symptoms of Listed Potential Problems Will be Absent or Manageable (NPPV/CPAP)  Outcome: Ongoing (interventions implemented as appropriate)   01/09/18 1643   NPPV/CPAP   Problems Assessed (NPPV/CPAP) all   Problems Present (NPPV/CPAP) hypoxia/hypoxemia

## 2018-01-09 NOTE — PLAN OF CARE
Problem: NPPV/CPAP (Adult)  Intervention: Prevent Aspiration During Therapy   01/09/18 0100   Positioning   Head Of Bed (HOB) Position HOB elevated     Intervention: Assess/Manage Patient Tolerance of Noninvasive Ventilation   01/09/18 0326   Respiratory Interventions   Airway/Ventilation Management airway patency maintained;pulmonary hygiene promoted     Intervention: Prevent/Minimize Device Friction/Shearing and Pressure Points   01/09/18 0326   Respiratory Interventions   NPPV/CPAP Maintenance mask adjusted;other (see comments)  (comfort gel applied to nasal bridge)       Goal: Signs and Symptoms of Listed Potential Problems Will be Absent or Manageable (NPPV/CPAP)  Outcome: Ongoing (interventions implemented as appropriate)   01/09/18 0326   NPPV/CPAP   Problems Assessed (NPPV/CPAP) hypoxia/hypoxemia;situational response;skin breakdown;dry mucous membranes   Problems Present (NPPV/CPAP) hypoxia/hypoxemia

## 2018-01-09 NOTE — PLAN OF CARE
Problem: Patient Care Overview (Adult)  Goal: Plan of Care Review  Outcome: Ongoing (interventions implemented as appropriate)   01/09/18 1717   Coping/Psychosocial Response Interventions   Plan Of Care Reviewed With patient   Patient Care Overview   Progress no change       Problem: Sepsis (Adult)  Goal: Signs and Symptoms of Listed Potential Problems Will be Absent or Manageable (Sepsis)  Outcome: Ongoing (interventions implemented as appropriate)   01/09/18 1717   Sepsis   Problems Assessed (Sepsis) all   Problems Present (Sepsis) hypoxia/hypoxemia;progression of infection  (showing some improvement)       Problem: NPPV/CPAP (Adult)  Goal: Signs and Symptoms of Listed Potential Problems Will be Absent or Manageable (NPPV/CPAP)  Outcome: Ongoing (interventions implemented as appropriate)   01/09/18 1717   NPPV/CPAP   Problems Assessed (NPPV/CPAP) all   Problems Present (NPPV/CPAP) dry mucous membranes  (breaks given, developes increase work of breathing s bipap r)

## 2018-01-09 NOTE — PLAN OF CARE
Problem: Patient Care Overview (Adult)  Goal: Plan of Care Review  Outcome: Ongoing (interventions implemented as appropriate)   01/09/18 0528   Coping/Psychosocial Response Interventions   Plan Of Care Reviewed With patient   Patient Care Overview   Progress no change       Problem: Sepsis (Adult)  Goal: Signs and Symptoms of Listed Potential Problems Will be Absent or Manageable (Sepsis)  Outcome: Ongoing (interventions implemented as appropriate)

## 2018-01-10 ENCOUNTER — APPOINTMENT (OUTPATIENT)
Dept: GENERAL RADIOLOGY | Facility: HOSPITAL | Age: 53
End: 2018-01-10

## 2018-01-10 PROBLEM — J96.01 ACUTE RESPIRATORY FAILURE WITH HYPOXIA: Status: ACTIVE | Noted: 2018-01-10

## 2018-01-10 PROBLEM — J43.9 CHRONIC BULLOUS EMPHYSEMA: Chronic | Status: ACTIVE | Noted: 2018-01-10

## 2018-01-10 PROBLEM — J18.9 PNEUMONIA OF BOTH LOWER LOBES: Status: ACTIVE | Noted: 2018-01-10

## 2018-01-10 LAB
ALBUMIN SERPL-MCNC: 2.8 G/DL (ref 3.5–5)
ANION GAP SERPL CALCULATED.3IONS-SCNC: 8.4 MMOL/L
BACTERIA FLD CULT: NORMAL
BASOPHILS # BLD AUTO: 0.05 10*3/MM3 (ref 0–0.2)
BASOPHILS NFR BLD AUTO: 0.4 % (ref 0–2.5)
BUN BLD-MCNC: 38 MG/DL (ref 7–20)
BUN/CREAT SERPL: 47.5 (ref 6.3–21.9)
CALCIUM SPEC-SCNC: 8.6 MG/DL (ref 8.4–10.2)
CHLORIDE SERPL-SCNC: 103 MMOL/L (ref 98–107)
CO2 SERPL-SCNC: 29 MMOL/L (ref 26–30)
CREAT BLD-MCNC: 0.8 MG/DL (ref 0.6–1.3)
DEPRECATED RDW RBC AUTO: 46.5 FL (ref 37–54)
EOSINOPHIL # BLD AUTO: 0 10*3/MM3 (ref 0–0.7)
EOSINOPHIL NFR BLD AUTO: 0 % (ref 0–7)
ERYTHROCYTE [DISTWIDTH] IN BLOOD BY AUTOMATED COUNT: 12.3 % (ref 11.5–14.5)
GFR SERPL CREATININE-BSD FRML MDRD: 102 ML/MIN/1.73
GLUCOSE BLD-MCNC: 113 MG/DL (ref 74–98)
HCT VFR BLD AUTO: 32.7 % (ref 42–52)
HGB BLD-MCNC: 10.9 G/DL (ref 14–18)
IMM GRANULOCYTES # BLD: 0.14 10*3/MM3 (ref 0–0.06)
IMM GRANULOCYTES NFR BLD: 1.1 % (ref 0–0.6)
L PNEUMO1 AG UR QL IA: NEGATIVE
LYMPHOCYTES # BLD AUTO: 0.54 10*3/MM3 (ref 0.6–3.4)
LYMPHOCYTES NFR BLD AUTO: 4.3 % (ref 10–50)
Lab: NORMAL
MAGNESIUM SERPL-MCNC: 2.5 MG/DL (ref 1.6–2.3)
MCH RBC QN AUTO: 34.5 PG (ref 27–31)
MCHC RBC AUTO-ENTMCNC: 33.3 G/DL (ref 30–37)
MCV RBC AUTO: 103.5 FL (ref 80–94)
MONOCYTES # BLD AUTO: 0.53 10*3/MM3 (ref 0–0.9)
MONOCYTES NFR BLD AUTO: 4.2 % (ref 0–12)
NEUTROPHILS # BLD AUTO: 11.44 10*3/MM3 (ref 2–6.9)
NEUTROPHILS NFR BLD AUTO: 90 % (ref 37–80)
NRBC BLD MANUAL-RTO: 0 /100 WBC (ref 0–0)
ORGANISM ID: NORMAL
PHOSPHATE SERPL-MCNC: 3 MG/DL (ref 2.5–4.5)
PLATELET # BLD AUTO: 154 10*3/MM3 (ref 130–400)
PMV BLD AUTO: 10.8 FL (ref 6–12)
POTASSIUM BLD-SCNC: 4.4 MMOL/L (ref 3.5–5.1)
RBC # BLD AUTO: 3.16 10*6/MM3 (ref 4.7–6.1)
S PNEUM AG SPEC QL LA: NEGATIVE
SODIUM BLD-SCNC: 136 MMOL/L (ref 137–145)
SPECIMEN SOURCE: NORMAL
WBC NRBC COR # BLD: 12.7 10*3/MM3 (ref 4.8–10.8)

## 2018-01-10 PROCEDURE — 25010000002 ACETAZOLAMIDE PER 500 MG: Performed by: INTERNAL MEDICINE

## 2018-01-10 PROCEDURE — 25010000002 FUROSEMIDE PER 20 MG: Performed by: INTERNAL MEDICINE

## 2018-01-10 PROCEDURE — 71045 X-RAY EXAM CHEST 1 VIEW: CPT

## 2018-01-10 PROCEDURE — 94660 CPAP INITIATION&MGMT: CPT

## 2018-01-10 PROCEDURE — 99233 SBSQ HOSP IP/OBS HIGH 50: CPT | Performed by: FAMILY MEDICINE

## 2018-01-10 PROCEDURE — 25010000002 ENOXAPARIN PER 10 MG: Performed by: INTERNAL MEDICINE

## 2018-01-10 PROCEDURE — 85025 COMPLETE CBC W/AUTO DIFF WBC: CPT | Performed by: INTERNAL MEDICINE

## 2018-01-10 PROCEDURE — 94799 UNLISTED PULMONARY SVC/PX: CPT

## 2018-01-10 PROCEDURE — 25010000002 METHYLPREDNISOLONE PER 40 MG: Performed by: FAMILY MEDICINE

## 2018-01-10 PROCEDURE — 25010000002 LEVOFLOXACIN PER 250 MG: Performed by: INTERNAL MEDICINE

## 2018-01-10 PROCEDURE — 80069 RENAL FUNCTION PANEL: CPT | Performed by: INTERNAL MEDICINE

## 2018-01-10 PROCEDURE — 25010000002 PIPERACILLIN SOD-TAZOBACTAM PER 1 G: Performed by: INTERNAL MEDICINE

## 2018-01-10 PROCEDURE — 25010000002 METHYLPREDNISOLONE PER 40 MG: Performed by: INTERNAL MEDICINE

## 2018-01-10 PROCEDURE — 83735 ASSAY OF MAGNESIUM: CPT | Performed by: INTERNAL MEDICINE

## 2018-01-10 PROCEDURE — 99233 SBSQ HOSP IP/OBS HIGH 50: CPT | Performed by: INTERNAL MEDICINE

## 2018-01-10 RX ORDER — SACCHAROMYCES BOULARDII 250 MG
250 CAPSULE ORAL 2 TIMES DAILY
Status: DISCONTINUED | OUTPATIENT
Start: 2018-01-10 | End: 2018-01-14 | Stop reason: HOSPADM

## 2018-01-10 RX ORDER — ACETAZOLAMIDE 500 MG/5ML
250 INJECTION, POWDER, LYOPHILIZED, FOR SOLUTION INTRAVENOUS ONCE
Status: COMPLETED | OUTPATIENT
Start: 2018-01-10 | End: 2018-01-10

## 2018-01-10 RX ORDER — FUROSEMIDE 10 MG/ML
20 INJECTION INTRAMUSCULAR; INTRAVENOUS DAILY
Status: DISCONTINUED | OUTPATIENT
Start: 2018-01-10 | End: 2018-01-14 | Stop reason: HOSPADM

## 2018-01-10 RX ORDER — LEVOFLOXACIN 500 MG/1
500 TABLET, FILM COATED ORAL EVERY 24 HOURS
Status: DISCONTINUED | OUTPATIENT
Start: 2018-01-11 | End: 2018-01-11

## 2018-01-10 RX ORDER — METHYLPREDNISOLONE SODIUM SUCCINATE 40 MG/ML
40 INJECTION, POWDER, LYOPHILIZED, FOR SOLUTION INTRAMUSCULAR; INTRAVENOUS EVERY 8 HOURS
Status: DISCONTINUED | OUTPATIENT
Start: 2018-01-10 | End: 2018-01-14 | Stop reason: HOSPADM

## 2018-01-10 RX ADMIN — GUAIFENESIN 600 MG: 600 TABLET, EXTENDED RELEASE ORAL at 20:00

## 2018-01-10 RX ADMIN — CITALOPRAM HYDROBROMIDE 40 MG: 20 TABLET, FILM COATED ORAL at 08:37

## 2018-01-10 RX ADMIN — IPRATROPIUM BROMIDE AND ALBUTEROL SULFATE 3 ML: .5; 3 SOLUTION RESPIRATORY (INHALATION) at 20:06

## 2018-01-10 RX ADMIN — PIPERACILLIN AND TAZOBACTAM 4.5 G: 4; .5 INJECTION, POWDER, LYOPHILIZED, FOR SOLUTION INTRAVENOUS; PARENTERAL at 06:22

## 2018-01-10 RX ADMIN — BUDESONIDE AND FORMOTEROL FUMARATE DIHYDRATE 2 PUFF: 160; 4.5 AEROSOL RESPIRATORY (INHALATION) at 20:19

## 2018-01-10 RX ADMIN — METHYLPREDNISOLONE SODIUM SUCCINATE 40 MG: 40 INJECTION, POWDER, FOR SOLUTION INTRAMUSCULAR; INTRAVENOUS at 04:56

## 2018-01-10 RX ADMIN — NICOTINE 1 PATCH: 21 PATCH TRANSDERMAL at 20:00

## 2018-01-10 RX ADMIN — IPRATROPIUM BROMIDE AND ALBUTEROL SULFATE 3 ML: .5; 3 SOLUTION RESPIRATORY (INHALATION) at 14:03

## 2018-01-10 RX ADMIN — METHYLPREDNISOLONE SODIUM SUCCINATE 40 MG: 40 INJECTION, POWDER, FOR SOLUTION INTRAMUSCULAR; INTRAVENOUS at 18:01

## 2018-01-10 RX ADMIN — BUDESONIDE AND FORMOTEROL FUMARATE DIHYDRATE 2 PUFF: 160; 4.5 AEROSOL RESPIRATORY (INHALATION) at 08:26

## 2018-01-10 RX ADMIN — IPRATROPIUM BROMIDE AND ALBUTEROL SULFATE 3 ML: .5; 3 SOLUTION RESPIRATORY (INHALATION) at 08:26

## 2018-01-10 RX ADMIN — LEVOFLOXACIN 750 MG: 5 INJECTION, SOLUTION INTRAVENOUS at 04:56

## 2018-01-10 RX ADMIN — FUROSEMIDE 20 MG: 10 INJECTION, SOLUTION INTRAMUSCULAR; INTRAVENOUS at 18:01

## 2018-01-10 RX ADMIN — GUAIFENESIN 600 MG: 600 TABLET, EXTENDED RELEASE ORAL at 08:37

## 2018-01-10 RX ADMIN — Medication 250 MG: at 20:01

## 2018-01-10 RX ADMIN — IPRATROPIUM BROMIDE AND ALBUTEROL SULFATE 3 ML: .5; 3 SOLUTION RESPIRATORY (INHALATION) at 00:21

## 2018-01-10 RX ADMIN — ACETAMINOPHEN 650 MG: 325 TABLET, FILM COATED ORAL at 19:54

## 2018-01-10 RX ADMIN — ENOXAPARIN SODIUM 40 MG: 40 INJECTION SUBCUTANEOUS at 20:00

## 2018-01-10 RX ADMIN — PIPERACILLIN AND TAZOBACTAM 4.5 G: 4; .5 INJECTION, POWDER, LYOPHILIZED, FOR SOLUTION INTRAVENOUS; PARENTERAL at 13:58

## 2018-01-10 RX ADMIN — ACETAZOLAMIDE 250 MG: 500 INJECTION, POWDER, LYOPHILIZED, FOR SOLUTION INTRAVENOUS at 18:01

## 2018-01-10 NOTE — PROGRESS NOTES
"  CC: Hypoxic respiratory failure.  Pneumonia.  COPD    S: Currently on the BiPAP.  Continues to feel somewhat improved overall although feels that his breathing is slightly worse than yesterday.    O:Vital signs reviewed. O2Sat: 93 % on BiPAP.   /66  Pulse 76  Temp 98.4 °F (36.9 °C) (Oral)   Resp 24  Ht 172.7 cm (68\")  Wt 71 kg (156 lb 8 oz)  SpO2 91%  BMI 23.8 kg/m2      I & Os reviewed.   Intake/Output       01/09/18 0700 - 01/10/18 0659 01/10/18 0700 - 01/11/18 0659    Intake (ml) 2068 400    Output (ml) 800 525    Net (ml) 1268 -125    Last Weight 71 kg (156 lb 8 oz) --          General: Mild acute distress noted. On BiPAP.  Eyes: PERRL. EOM intact  Neck: Supple without JVD  Cardiovascular: S1 + S2. Regular.   Respiratory: Mild respiratory distress noted. No wheezing heard. Right basal crackles noted  Abdomen: Soft. Bowel sounds positive   Extremities: No edema noted.  Neurologic: AAOx3. Was able to follow commands.   Skin: Appeared without any overt rashes    Labs: Reviewed.     Results from last 7 days  Lab Units 01/10/18  0428 01/09/18  0446 01/08/18  0531 01/07/18  0221   SODIUM mmol/L 136* 135* 132* 134*   POTASSIUM mmol/L 4.4 4.0 4.4 4.0   CHLORIDE mmol/L 103 101 100 89*   CO2 mmol/L 29.0 29.0 25.0* 33.0*   BUN mg/dL 38* 33* 29* 21*   CREATININE mg/dL 0.80 0.80 0.80 1.50*   CALCIUM mg/dL 8.6 8.7 8.3* 10.0   BILIRUBIN mg/dL  --   --  0.7 1.1   ALK PHOS U/L  --   --  39 123   ALT (SGPT) U/L  --   --  33 56   AST (SGOT) U/L  --   --  22 39   GLUCOSE mg/dL 113* 126* 126* 125*       Results from last 7 days  Lab Units 01/10/18  0428 01/09/18  0446 01/08/18  0531   MAGNESIUM mg/dL 2.5* 2.3 2.1   PHOSPHORUS mg/dL 3.0 2.1* 2.9       Results from last 7 days  Lab Units 01/10/18  0428 01/09/18  0446 01/08/18  0531 01/07/18  0221   WBC 10*3/mm3 12.70* 9.60 6.49 25.25*   HEMOGLOBIN g/dL 10.9* 10.5* 11.3* 15.8   PLATELETS 10*3/mm3 154 149 128* 206     CXR:   Imaging Results (last 24 hours)     Procedure " Component Value Units Date/Time    XR Chest 1 View [[id number]] Collected:  01/10/18 0849     Updated:  01/10/18 0851    Narrative:       PROCEDURE: XR CHEST 1 VW-     HISTORY: PNA; J18.9-Pneumonia, unspecified organism; A41.9-Sepsis,  unspecified organism; N17.9-Acute kidney failure, unspecified     COMPARISON: January 8, 2018.     FINDINGS: The heart is normal in size. The mediastinum is unremarkable.  There are bilateral pulmonary opacities consistent with a pneumonia  which is unchanged. There is no pneumothorax.  There are no acute  osseous abnormalities.           Impression:       No change in the patients bilateral pulmonary opacities.     Continued followup is recommended.     This report was finalized on 1/10/2018 8:49 AM by Larry Grewal M.D..            Assessment & Recommendations/Plan:   1.  Acute hypoxic respiratory failure  Continue oxygen supplementation    2.  Right lower lobe pneumonia  Continue antibiotics  Cultures and antigens pending  Staph Aureus identified in the sputum. Full panel pending.  Will continue Zosyn till Staph is completely identified.    3.  COPD  Continue nebulized treatments.  On Symbicort.    4.  Severe sepsis  Resolved.     5.  History of pneumothorax  No evidence of recurrence.    6.  Smoking  Will definitely need to quit    BiPAP 2 hours ON and 2 hours OFF and Overnight for today. May be able to use less BiPAP tomorrow.     OOBTC.    Will give 1 dose of Diamox and Lasix due to possible fluid overload, after he was given significant IV Fluids for severe sepsis.    We have reviewed patient's current orders and changes, if any, have been suggested to primary care team. Plan was also discussed with nursing staff, as necessary.       Chelsea Baptiste MD  01/10/18  1:54 PM

## 2018-01-10 NOTE — PLAN OF CARE
Problem: NPPV/CPAP (Adult)  Intervention: Assess/Manage Patient Tolerance of Noninvasive Ventilation   01/09/18 7418   Respiratory Interventions   Airway/Ventilation Management airway patency maintained       Goal: Signs and Symptoms of Listed Potential Problems Will be Absent or Manageable (NPPV/CPAP)  Outcome: Ongoing (interventions implemented as appropriate)

## 2018-01-10 NOTE — PROGRESS NOTES
[facility]    Name:  Niall Murguia   Age:  52 y.o.  Sex:  male  :  1965  MRN:  [id number]   Visit Number:  [id number]  Admission Date:  2018  Date Of Service:  01/10/18  Primary Care Physician:  KEAGAN Rowley     LOS: 3 days :  Patient Care Team:  KEAGAN Rowley as PCP - General (Family Medicine):    Chief Complaint:          Subjective / Interval History:     I have reviewed labs/imaging/records from this hospitalization, including ER staff and admitting/attending physicians H/P's and progress notes to establish a comprehensive understanding of this patient's clinical hospital course, as well as to establish a transition of care appropriately.    Patient is a 52-year-old male who was admitted secondary to acute respiratory failure with hypoxia as a result of extensive right-sided pneumonia with severe sepsis.  He has responded well to aggressive fluid hydration as well as continued antibiotic coverage.  Pulmonology is continue to see patient and his helped participate in management of his need for noninvasive positive pressure ventilatory support as well.  He did demonstrate findings of cor pulmonale on echocardiogram, likely acute on chronic.    Patient states he has had mild improvement from the previous day with respect to overall strength and decreasing respiratory symptoms.  No reports of fever or chills to the course of the evening.  No hemoptysis reported.  Tolerating by mouth intake.  No reports of hematuria or dysuria, without bright red blood or black tarry stools.    Review of Systems:     General ROS: Patient denies any fevers, chills or loss of consciousness.  Respiratory ROS: Phlegm producing cough, without continuous shortness of breath.  Cardiovascular ROS: Denies chest pain or palpitations. No history of exertional chest pain.  Gastrointestinal ROS: Denies nausea and vomiting. Denies any abdominal pain. No  diarrhea.  Neurological ROS: Generalized weakness. No loss of consciousness. Denies any numbness. Denies neck pain.  Dermatological ROS: Denies any redness or pruritis.    Vital Signs:    Temp:  [97.6 °F (36.4 °C)-98.4 °F (36.9 °C)] 97.6 °F (36.4 °C)  Heart Rate:  [46-76] 61  Resp:  [16-28] 28  BP: (107-141)/(64-89) 123/74    Intake and output:    I/O last 3 completed shifts:  In: 3113 [P.O.:950; I.V.:2163]  Out: 1300 [Urine:1300]  I/O this shift:  In: 400 [P.O.:400]  Out: 525 [Urine:525]    Physical Examination:    General Appearance:  Alert and cooperative, not in any acute distress.  Chronically ill-appearing male.     Head:  Atraumatic and normocephalic, without obvious abnormality.   Eyes:          PERRLA, conjunctivae and sclerae normal, no Icterus. No pallor. Extra-occular movements are within normal limits.   Neck: Supple, trachea midline, no thyromegaly, no carotid bruit.   Lungs:   Chest shape is normal.  Audible air exchange noted all lung fields, mildly reduced to bilateral bases with rhonchus referred upper airway congestion.     Heart:  Normal S1 and S2, no murmur, no gallop, no rub. No JVD   Abdomen:   Normal bowel sounds, no masses, no organomegaly. Soft       non-tender, non-distended, no guarding, no rebound tenderness.   Extremities: Moves all extremities well, no edema, no cyanosis, no            clubbing.   Skin: No bleeding, bruising or rash.   Neurologic: Awake, alert and oriented times 3. Moves all 4 extremities equally.   Laboratory results:      Results from last 7 days  Lab Units 01/10/18  0428 01/09/18  0446 01/08/18  0531 01/07/18  0221   SODIUM mmol/L 136* 135* 132* 134*   POTASSIUM mmol/L 4.4 4.0 4.4 4.0   CHLORIDE mmol/L 103 101 100 89*   CO2 mmol/L 29.0 29.0 25.0* 33.0*   BUN mg/dL 38* 33* 29* 21*   CREATININE mg/dL 0.80 0.80 0.80 1.50*   CALCIUM mg/dL 8.6 8.7 8.3* 10.0   BILIRUBIN mg/dL  --   --  0.7 1.1   ALK PHOS U/L  --   --  39 123   ALT (SGPT) U/L  --   --  33 56   AST (SGOT)  U/L  --   --  22 39   GLUCOSE mg/dL 113* 126* 126* 125*       Results from last 7 days  Lab Units 01/10/18  0428 01/09/18  0446 01/08/18  0531   WBC 10*3/mm3 12.70* 9.60 6.49   HEMOGLOBIN g/dL 10.9* 10.5* 11.3*   HEMATOCRIT % 32.7* 30.8* 33.6*   PLATELETS 10*3/mm3 154 149 128*           Results from last 7 days  Lab Units 01/07/18  0221   TROPONIN I ng/mL <0.012       Results from last 7 days  Lab Units 01/07/18  0245 01/07/18  0241   BLOODCX  No growth at 3 days No growth at 3 days           I have reviewed the patient's laboratory results.    Radiology results:    Imaging Results (last 24 hours)     Procedure Component Value Units Date/Time    XR Chest 1 View [[id number]] Collected:  01/10/18 0849     Updated:  01/10/18 0851    Narrative:       PROCEDURE: XR CHEST 1 VW-     HISTORY: PNA; J18.9-Pneumonia, unspecified organism; A41.9-Sepsis,  unspecified organism; N17.9-Acute kidney failure, unspecified     COMPARISON: January 8, 2018.     FINDINGS: The heart is normal in size. The mediastinum is unremarkable.  There are bilateral pulmonary opacities consistent with a pneumonia  which is unchanged. There is no pneumothorax.  There are no acute  osseous abnormalities.           Impression:       No change in the patients bilateral pulmonary opacities.     Continued followup is recommended.     This report was finalized on 1/10/2018 8:49 AM by Larry Grewal M.D..          I have reviewed the patient's radiology reports.    Medication Review:     I have reviewed the patients active and prn medications.         Assessment:  Principal Problem:    Acute respiratory failure with hypoxia  Active Problems:    Sepsis due to pneumonia    Pneumonia of both lower lobes    Chronic bullous emphysema        Plan:  Imaging of chest does demonstrate a right middle lobe/right lower lobe and left lower lobe pneumonia.  Normal white blood cell count.  I will discontinue Zosyn at this time.  Will follow clinically, as well as  increased frequency of Solu-Medrol to every 8 hours.  Will extend antibiotic coverage if needed based on cultures and clinical response to de-escalation.  Continue with aggressive respiratory care including Mucomyst and guaifenesin.  I've discussed the case in detail with his pulmonologist Dr. Baptiste.  Patient verbalized understanding of the plan of care and agrees.  Adding Florastor tonight.  Hopeful for improved ambulation, we'll consult physical therapy and occupational therapy worked with patient.  I've encouraged patient to spend some increased time out of bed to the bedside chair as able today.    I spent a total of 35 minutes in direct critical care time today.    Gilberto Mahmood,   01/10/18  5:30 PM

## 2018-01-11 LAB
ALBUMIN SERPL-MCNC: 2.7 G/DL (ref 3.5–5)
ALBUMIN/GLOB SERPL: 0.8 G/DL (ref 1–2)
ALP SERPL-CCNC: 62 U/L (ref 38–126)
ALT SERPL W P-5'-P-CCNC: 114 U/L (ref 13–69)
ANION GAP SERPL CALCULATED.3IONS-SCNC: 9 MMOL/L
AST SERPL-CCNC: 49 U/L (ref 15–46)
BACTERIA SPEC RESP CULT: ABNORMAL
BACTERIA SPEC RESP CULT: ABNORMAL
BASOPHILS # BLD AUTO: 0.03 10*3/MM3 (ref 0–0.2)
BASOPHILS NFR BLD AUTO: 0.2 % (ref 0–2.5)
BILIRUB SERPL-MCNC: 0.5 MG/DL (ref 0.2–1.3)
BUN BLD-MCNC: 25 MG/DL (ref 7–20)
BUN/CREAT SERPL: 41.7 (ref 6.3–21.9)
CALCIUM SPEC-SCNC: 8.3 MG/DL (ref 8.4–10.2)
CHLORIDE SERPL-SCNC: 102 MMOL/L (ref 98–107)
CO2 SERPL-SCNC: 28 MMOL/L (ref 26–30)
CREAT BLD-MCNC: 0.6 MG/DL (ref 0.6–1.3)
DEPRECATED RDW RBC AUTO: 47.8 FL (ref 37–54)
EOSINOPHIL # BLD AUTO: 0 10*3/MM3 (ref 0–0.7)
EOSINOPHIL NFR BLD AUTO: 0 % (ref 0–7)
ERYTHROCYTE [DISTWIDTH] IN BLOOD BY AUTOMATED COUNT: 12.3 % (ref 11.5–14.5)
GFR SERPL CREATININE-BSD FRML MDRD: 142 ML/MIN/1.73
GLOBULIN UR ELPH-MCNC: 3.3 GM/DL
GLUCOSE BLD-MCNC: 139 MG/DL (ref 74–98)
GRAM STN SPEC: ABNORMAL
HCT VFR BLD AUTO: 34.5 % (ref 42–52)
HGB BLD-MCNC: 11.6 G/DL (ref 14–18)
IMM GRANULOCYTES # BLD: 0.27 10*3/MM3 (ref 0–0.06)
IMM GRANULOCYTES NFR BLD: 2.1 % (ref 0–0.6)
LYMPHOCYTES # BLD AUTO: 0.44 10*3/MM3 (ref 0.6–3.4)
LYMPHOCYTES NFR BLD AUTO: 3.4 % (ref 10–50)
MCH RBC QN AUTO: 35.3 PG (ref 27–31)
MCHC RBC AUTO-ENTMCNC: 33.6 G/DL (ref 30–37)
MCV RBC AUTO: 104.9 FL (ref 80–94)
MONOCYTES # BLD AUTO: 0.64 10*3/MM3 (ref 0–0.9)
MONOCYTES NFR BLD AUTO: 4.9 % (ref 0–12)
NEUTROPHILS # BLD AUTO: 11.71 10*3/MM3 (ref 2–6.9)
NEUTROPHILS NFR BLD AUTO: 89.4 % (ref 37–80)
NRBC BLD MANUAL-RTO: 0 /100 WBC (ref 0–0)
PLATELET # BLD AUTO: 136 10*3/MM3 (ref 130–400)
PMV BLD AUTO: 10.4 FL (ref 6–12)
POTASSIUM BLD-SCNC: 4 MMOL/L (ref 3.5–5.1)
PROT SERPL-MCNC: 6 G/DL (ref 6.3–8.2)
RBC # BLD AUTO: 3.29 10*6/MM3 (ref 4.7–6.1)
SODIUM BLD-SCNC: 135 MMOL/L (ref 137–145)
WBC NRBC COR # BLD: 13.09 10*3/MM3 (ref 4.8–10.8)

## 2018-01-11 PROCEDURE — 99233 SBSQ HOSP IP/OBS HIGH 50: CPT | Performed by: FAMILY MEDICINE

## 2018-01-11 PROCEDURE — 25010000002 FUROSEMIDE PER 20 MG: Performed by: INTERNAL MEDICINE

## 2018-01-11 PROCEDURE — 25010000002 ENOXAPARIN PER 10 MG: Performed by: INTERNAL MEDICINE

## 2018-01-11 PROCEDURE — 94799 UNLISTED PULMONARY SVC/PX: CPT

## 2018-01-11 PROCEDURE — 80053 COMPREHEN METABOLIC PANEL: CPT | Performed by: FAMILY MEDICINE

## 2018-01-11 PROCEDURE — 97161 PT EVAL LOW COMPLEX 20 MIN: CPT

## 2018-01-11 PROCEDURE — 25010000002 METHYLPREDNISOLONE PER 40 MG: Performed by: FAMILY MEDICINE

## 2018-01-11 PROCEDURE — 99232 SBSQ HOSP IP/OBS MODERATE 35: CPT | Performed by: INTERNAL MEDICINE

## 2018-01-11 PROCEDURE — 94660 CPAP INITIATION&MGMT: CPT

## 2018-01-11 PROCEDURE — 97165 OT EVAL LOW COMPLEX 30 MIN: CPT

## 2018-01-11 PROCEDURE — 85025 COMPLETE CBC W/AUTO DIFF WBC: CPT | Performed by: FAMILY MEDICINE

## 2018-01-11 RX ORDER — LEVOFLOXACIN 500 MG/1
500 TABLET, FILM COATED ORAL EVERY 24 HOURS
Status: DISCONTINUED | OUTPATIENT
Start: 2018-01-12 | End: 2018-01-14 | Stop reason: HOSPADM

## 2018-01-11 RX ADMIN — IPRATROPIUM BROMIDE AND ALBUTEROL SULFATE 3 ML: .5; 3 SOLUTION RESPIRATORY (INHALATION) at 07:15

## 2018-01-11 RX ADMIN — GUAIFENESIN 600 MG: 600 TABLET, EXTENDED RELEASE ORAL at 20:08

## 2018-01-11 RX ADMIN — METHYLPREDNISOLONE SODIUM SUCCINATE 40 MG: 40 INJECTION, POWDER, FOR SOLUTION INTRAMUSCULAR; INTRAVENOUS at 18:05

## 2018-01-11 RX ADMIN — IPRATROPIUM BROMIDE AND ALBUTEROL SULFATE 3 ML: .5; 3 SOLUTION RESPIRATORY (INHALATION) at 01:01

## 2018-01-11 RX ADMIN — BUDESONIDE AND FORMOTEROL FUMARATE DIHYDRATE 2 PUFF: 160; 4.5 AEROSOL RESPIRATORY (INHALATION) at 08:06

## 2018-01-11 RX ADMIN — ENOXAPARIN SODIUM 40 MG: 40 INJECTION SUBCUTANEOUS at 20:08

## 2018-01-11 RX ADMIN — Medication 250 MG: at 20:08

## 2018-01-11 RX ADMIN — FUROSEMIDE 20 MG: 10 INJECTION, SOLUTION INTRAMUSCULAR; INTRAVENOUS at 11:34

## 2018-01-11 RX ADMIN — METHYLPREDNISOLONE SODIUM SUCCINATE 40 MG: 40 INJECTION, POWDER, FOR SOLUTION INTRAMUSCULAR; INTRAVENOUS at 02:07

## 2018-01-11 RX ADMIN — Medication 250 MG: at 11:33

## 2018-01-11 RX ADMIN — IPRATROPIUM BROMIDE AND ALBUTEROL SULFATE 3 ML: .5; 3 SOLUTION RESPIRATORY (INHALATION) at 12:59

## 2018-01-11 RX ADMIN — CITALOPRAM HYDROBROMIDE 40 MG: 20 TABLET, FILM COATED ORAL at 11:33

## 2018-01-11 RX ADMIN — NICOTINE 1 PATCH: 21 PATCH TRANSDERMAL at 20:09

## 2018-01-11 RX ADMIN — ACETAMINOPHEN 650 MG: 325 TABLET, FILM COATED ORAL at 11:33

## 2018-01-11 RX ADMIN — LEVOFLOXACIN 500 MG: 500 TABLET, FILM COATED ORAL at 06:39

## 2018-01-11 RX ADMIN — IPRATROPIUM BROMIDE AND ALBUTEROL SULFATE 3 ML: .5; 3 SOLUTION RESPIRATORY (INHALATION) at 19:31

## 2018-01-11 RX ADMIN — BUDESONIDE AND FORMOTEROL FUMARATE DIHYDRATE 2 PUFF: 160; 4.5 AEROSOL RESPIRATORY (INHALATION) at 19:37

## 2018-01-11 RX ADMIN — METHYLPREDNISOLONE SODIUM SUCCINATE 40 MG: 40 INJECTION, POWDER, FOR SOLUTION INTRAMUSCULAR; INTRAVENOUS at 11:34

## 2018-01-11 RX ADMIN — GUAIFENESIN 600 MG: 600 TABLET, EXTENDED RELEASE ORAL at 11:33

## 2018-01-11 NOTE — NURSING NOTE
Pt moved out of ICU via wheel chair. Alert and oriented and stable at this time.  Family at bedside.  Educated patient on call light and room.  Will continue to monitor.

## 2018-01-11 NOTE — PLAN OF CARE
Problem: NPPV/CPAP (Adult)  Intervention: Prevent Aspiration During Therapy   01/11/18 0200   Positioning   Head Of Bed (HOB) Position HOB elevated     Intervention: Assess/Manage Patient Tolerance of Noninvasive Ventilation   01/11/18 0239   Respiratory Interventions   Airway/Ventilation Management airway patency maintained;pulmonary hygiene promoted     Intervention: Prevent/Minimize Device Friction/Shearing and Pressure Points   01/09/18 0326   Respiratory Interventions   NPPV/CPAP Maintenance mask adjusted;other (see comments)  (comfort gel applied to nasal bridge)       Goal: Signs and Symptoms of Listed Potential Problems Will be Absent or Manageable (NPPV/CPAP)  Outcome: Ongoing (interventions implemented as appropriate)   01/11/18 0239   NPPV/CPAP   Problems Assessed (NPPV/CPAP) hypoxia/hypoxemia;situational response;skin breakdown;dry mucous membranes   Problems Present (NPPV/CPAP) hypoxia/hypoxemia

## 2018-01-11 NOTE — PLAN OF CARE
Problem: Patient Care Overview (Adult)  Goal: Plan of Care Review  Outcome: Ongoing (interventions implemented as appropriate)   01/11/18 1321   Coping/Psychosocial Response Interventions   Plan Of Care Reviewed With patient   Patient Care Overview   Progress no change   Outcome Evaluation   Outcome Summary/Follow up Plan Pt seen for OT evaluation today. Pt presents with weakness and declined ADL independence. Pt is expected to benefit from skilled OT to improve his strength and independence with self care and functional mobility tasks.       Problem: Inpatient Occupational Therapy  Goal: Strength Goal LTG- OT  Outcome: Ongoing (interventions implemented as appropriate)   01/11/18 1321   Strength OT LTG   Strength Goal OT LTG, Date Established 01/11/18   Strength Goal OT LTG, Time to Achieve by discharge   Strength Goal OT LTG, Functional Goal Pt will perform 15 reps BUE strengthening ex using theraband for resistance.   Strength Goal OT LTG, Outcome goal ongoing     Goal: LB Dressing Goal LTG- OT  Outcome: Ongoing (interventions implemented as appropriate)   01/11/18 1321   LB Dressing OT LTG   LB Dressing Goal OT LTG, Date Established 01/11/18   LB Dressing Goal OT LTG, Time to Achieve by discharge   LB Dressing Goal OT LTG, [place] Level set up required   LB Dressing Goal OT LTG, Outcome goal ongoing     Goal: Functional Mobility Goal LTG- OT  Outcome: Ongoing (interventions implemented as appropriate)   01/11/18 1321   Functional Mobility OT LTG   Functional Mobility Goal OT LTG, Date Established 01/11/18   Functional Mobility Goal OT LTG, Time to Achieve by discharge   Functional Mobility Goal OT LTG, [place] contact guard   Functional Mobility Goal OT LTG, Assist Device rolling walker   Functional Mobility Goal OT LTG, Distance to Achieve in hallway   Functional Mobility Goal OT LTG, Additional Goal Pt will walk 250' with cga using RW   Functional Mobility Goal OT LTG, Outcome goal ongoing

## 2018-01-11 NOTE — PROGRESS NOTES
"  CC: Hypoxic respiratory failure.  Pneumonia.  COPD    S: Currently off the BiPAP.  Continues to feel improved overall.    O:Vital signs reviewed. O2Sat: 90% on [place].   /75  Pulse 56  Temp 98.4 °F (36.9 °C) (Oral)   Resp 18  Ht 172.7 cm (68\")  Wt 68.9 kg (152 lb)  SpO2 96%  BMI 23.11 kg/m2      I & Os reviewed.   Intake/Output       01/10/18 0700 - 01/11/18 0659    Intake (ml) 1255    Output (ml) 2125    Net (ml) -870    Last Weight 68.9 kg (152 lb)          General: Mild acute distress noted. On BiPAP.  Eyes: PERRL. EOM intact  Neck: Supple without JVD  Cardiovascular: S1 + S2. Regular.   Respiratory: Mild respiratory distress noted. No wheezing heard. Right basal crackles noted  Abdomen: Soft. Bowel sounds positive   Extremities: No edema noted.  Neurologic: AAOx3. Was able to follow commands.   Skin: Appeared without any overt rashes    Labs: Reviewed.     Results from last 7 days  Lab Units 01/10/18  0428 01/09/18  0446 01/08/18  0531 01/07/18  0221   SODIUM mmol/L 136* 135* 132* 134*   POTASSIUM mmol/L 4.4 4.0 4.4 4.0   CHLORIDE mmol/L 103 101 100 89*   CO2 mmol/L 29.0 29.0 25.0* 33.0*   BUN mg/dL 38* 33* 29* 21*   CREATININE mg/dL 0.80 0.80 0.80 1.50*   CALCIUM mg/dL 8.6 8.7 8.3* 10.0   BILIRUBIN mg/dL  --   --  0.7 1.1   ALK PHOS U/L  --   --  39 123   ALT (SGPT) U/L  --   --  33 56   AST (SGOT) U/L  --   --  22 39   GLUCOSE mg/dL 113* 126* 126* 125*       Results from last 7 days  Lab Units 01/10/18  0428 01/09/18  0446 01/08/18  0531   MAGNESIUM mg/dL 2.5* 2.3 2.1   PHOSPHORUS mg/dL 3.0 2.1* 2.9       Results from last 7 days  Lab Units 01/10/18  0428 01/09/18  0446 01/08/18  0531 01/07/18  0221   WBC 10*3/mm3 12.70* 9.60 6.49 25.25*   HEMOGLOBIN g/dL 10.9* 10.5* 11.3* 15.8   PLATELETS 10*3/mm3 154 149 128* 206       Assessment & Recommendations/Plan:   1.  Acute hypoxic respiratory failure  Continue oxygen supplementation    2.  Right lower lobe pneumonia  Continue antibiotics  Cultures and " antigens pending  Staph Aureus identified in the sputum.   Will continue Zosyn till Staph is completely identified.    3.  COPD  Continue nebulized treatments.  On Symbicort.    4.  Severe sepsis  Resolved.     5.  History of pneumothorax  No evidence of recurrence.    6.  Smoking  Will definitely need to quit    BiPAP PRN and Overnight for today.     I recommended the patient to attempt to get out of bed to chair.    He had significant diuresis after the diuretics yesterday    Chest x-ray will be repeated in the morning.    We have reviewed patient's current orders and changes, if any, have been suggested to primary care team. Plan was also discussed with nursing staff, as necessary.       Chelsea Baptiste MD  01/11/18  8:55 AM    Addendum.  His sputum cultures were finalized and the laboratory called to inform us that it is indeed MRSA.    I will discontinue Levaquin and Zosyn and start the patient on Vancomycin. He is on Celexa and Zyvox may not be appropriate to use.    Chelsea Baptiste MD  01/11/18  9:24 AM

## 2018-01-11 NOTE — PROGRESS NOTES
[facility]    Name:  Niall Murguia   Age:  52 y.o.  Sex:  male  :  1965  MRN:  [id number]   Visit Number:  [id number]  Admission Date:  2018  Date Of Service:  18  Primary Care Physician:  KEAGAN Rowley     LOS: 4 days :  Patient Care Team:  KEAGAN Rowley as PCP - General (Family Medicine):    Chief Complaint:          Subjective / Interval History:     I have reviewed labs/imaging/records from this hospitalization, including ER staff and admitting/attending physicians H/P's and progress notes to establish a comprehensive understanding of this patient's clinical hospital course, as well as to establish a transition of care appropriately.    Patient is a 52-year-old male who was admitted secondary to acute respiratory failure with hypoxia as a result of extensive right-sided pneumonia with severe sepsis.  He has responded well to aggressive fluid hydration as well as continued antibiotic coverage.  Pulmonology is continue to see patient and his helped participate in management of his need for noninvasive positive pressure ventilatory support as well.  He did demonstrate findings of cor pulmonale on echocardiogram, likely acute on chronic.    Patient reports mild improvement from the previous day with respect to overall strength and decreasing respiratory symptoms.  No reports of fever or chills to the course of the evening.  No hemoptysis reported.  Tolerating by mouth intake.  No reports of hematuria or dysuria, without bright red blood or black tarry stools.    Sputum cultures resulted MRSA, with noted sensitivities and resistance patterns.  Patient has done well with BiPAP use, requiring no increased length of time for utilization and has maintained good oxygen saturations with approximately 9 L nasal cannula support.    Review of Systems:     General ROS: Patient denies any fevers, chills or loss of  consciousness.  Respiratory ROS: Phlegm producing cough, without continuous shortness of breath.  Cardiovascular ROS: Denies chest pain or palpitations. No history of exertional chest pain.  Gastrointestinal ROS: Denies nausea and vomiting. Denies any abdominal pain. No diarrhea.  Neurological ROS: Generalized weakness. No loss of consciousness. Denies any numbness. Denies neck pain.  Dermatological ROS: Denies any redness or pruritis.    Vital Signs:    Temp:  [97.6 °F (36.4 °C)-98.4 °F (36.9 °C)] 98.4 °F (36.9 °C)  Heart Rate:  [48-76] 56  Resp:  [18-28] 18  BP: (114-141)/(64-90) 127/75    Intake and output:    I/O last 3 completed shifts:  In: 1926 [P.O.:1040; I.V.:886]  Out: 2675 [Urine:2675]       Physical Examination:    General Appearance:  Alert and cooperative, not in any acute distress.  Chronically ill-appearing male.     Head:  Atraumatic and normocephalic, without obvious abnormality.   Eyes:          PERRLA, conjunctivae and sclerae normal, no Icterus. No pallor. Extra-occular movements are within normal limits.   Neck: Supple, trachea midline, no thyromegaly, no carotid bruit.   Lungs:   Chest shape is normal.  Audible air exchange noted all lung fields, mildly reduced to bilateral bases with rhonchus referred upper airway congestion.     Heart:  Normal S1 and S2, no murmur, no gallop, no rub. No JVD   Abdomen:   Normal bowel sounds, no masses, no organomegaly. Soft       non-tender, non-distended, no guarding, no rebound tenderness.   Extremities: Moves all extremities well, no edema, no cyanosis, no            clubbing.   Skin: No bleeding, bruising or rash.   Neurologic: Awake, alert and oriented times 3. Moves all 4 extremities equally.   Laboratory results:      Results from last 7 days  Lab Units 01/10/18  0428 01/09/18  0446 01/08/18  0531 01/07/18  0221   SODIUM mmol/L 136* 135* 132* 134*   POTASSIUM mmol/L 4.4 4.0 4.4 4.0   CHLORIDE mmol/L 103 101 100 89*   CO2 mmol/L 29.0 29.0 25.0* 33.0*    BUN mg/dL 38* 33* 29* 21*   CREATININE mg/dL 0.80 0.80 0.80 1.50*   CALCIUM mg/dL 8.6 8.7 8.3* 10.0   BILIRUBIN mg/dL  --   --  0.7 1.1   ALK PHOS U/L  --   --  39 123   ALT (SGPT) U/L  --   --  33 56   AST (SGOT) U/L  --   --  22 39   GLUCOSE mg/dL 113* 126* 126* 125*       Results from last 7 days  Lab Units 01/10/18  0428 01/09/18  0446 01/08/18  0531   WBC 10*3/mm3 12.70* 9.60 6.49   HEMOGLOBIN g/dL 10.9* 10.5* 11.3*   HEMATOCRIT % 32.7* 30.8* 33.6*   PLATELETS 10*3/mm3 154 149 128*           Results from last 7 days  Lab Units 01/07/18  0221   TROPONIN I ng/mL <0.012       Results from last 7 days  Lab Units 01/07/18  0245 01/07/18  0241   BLOODCX  No growth at 4 days No growth at 4 days           I have reviewed the patient's laboratory results.    Radiology results:    Imaging Results (last 24 hours)     ** No results found for the last 24 hours. **          I have reviewed the patient's radiology reports.    Medication Review:     I have reviewed the patients active and prn medications.         Assessment:  Principal Problem:    Acute respiratory failure with hypoxia  Active Problems:    Sepsis due to pneumonia    Pneumonia of both lower lobes    Chronic bullous emphysema        Plan:  CBC and CMP are currently pending.  I will discontinue vancomycin due to MRSA sputum demonstrating appropriate sensitivity to his current Levaquin antibiotic.  Continue Solu-Medrol at every 8 hour dosing.  Continue with aggressive respiratory care including Mucomyst and guaifenesin.  I've discussed the case in detail with his pulmonologist Dr. Baptiste again this morning, planned repeat chest x-ray tomorrow morning.  Patient verbalized understanding of the plan of care and agrees.  Adding Florastor tonight.  Hopeful for improved ambulation, physical therapy and occupational therapy are working with patient today.  I've encouraged patient to spend some increased time out of bed to the bedside chair as able today.  Transition to  a telemetry bed today as well.    I spent a total of 35 minutes in direct care time today.    Gilberto Mahmood DO  01/11/18  10:04 AM

## 2018-01-11 NOTE — PROGRESS NOTES
"Pharmacokinetic Initial Note - Vancomycin    Niall Murguia is a 52 y.o. male  172.7 cm (68\") 68.9 kg (152 lb)    Indication for use: Pneumonia      Results from last 7 days     Lab Units 01/10/18  0428 01/09/18  0446 01/08/18  0531   WBC 10*3/mm3 12.70* 9.60 6.49   CREATININE mg/dL 0.80 0.80 0.80      Estimated Creatinine Clearance: 106.5 mL/min (by C-G formula based on Cr of 0.8).  Temp Readings from Last 1 Encounters:   01/11/18 98.4 °F (36.9 °C) (Oral)       Culture results  Microbiology Results (last 10 days)       Procedure Component Value - Date/Time      Clostridium Difficile Toxin - Stool, Per Rectum [[id number]] Collected:  01/09/18 1535    Lab Status:  Final result Specimen:  Stool from Per Rectum Updated:  01/09/18 2056    Narrative:       The following orders were created for panel order Clostridium Difficile Toxin - Stool, Per Rectum.  Procedure                               Abnormality         Status                     ---------                               -----------         ------                     Clostridium Difficile EI...[[id number]]  Normal              Final result                 Please view results for these tests on the individual orders.    Clostridium Difficile EIA - Stool, Per Rectum [[id number]]  (Normal) Collected:  01/09/18 1535    Lab Status:  Final result Specimen:  Stool from Per Rectum Updated:  01/09/18 2056     C Diff GDH / Toxin Negative    Respiratory Culture - Sputum, Cough [[id number]]  (Abnormal)  (Susceptibility) Collected:  01/09/18 0010    Lab Status:  Final result Specimen:  Sputum from Cough Updated:  01/11/18 0924     Respiratory Culture --      Scant growth (1+) Staphylococcus aureus, MRSA (A)      Sensitivity to follow.     Methicillin resistant Staphylococcus aureus, Patient may be an isolation risk.        Gram Stain Result Rare (1+) Gram positive cocci in pairs      Greater than 20 WBCs per low power field      Less than 10 Epithelial cells per low power field "    Susceptibility        Staphylococcus aureus, MRSA     JASON     Amoxicillin + Clavulanate >4/2 ug/ml Resistant     Ampicillin >8 ug/ml Resistant     Ampicillin + Sulbactam 16/8 ug/ml Resistant     Cefazolin 8 ug/ml Resistant     Ciprofloxacin <=1 ug/ml Susceptible     Clindamycin <=0.5 ug/ml Susceptible     Erythromycin >4 ug/ml Resistant     Gentamicin <=4 ug/ml Susceptible     Levofloxacin <=1 ug/ml Susceptible  [1]      Linezolid 2 ug/ml Susceptible     Moxifloxacin <=0.5 ug/ml Susceptible     Oxacillin >2 ug/ml Resistant     Penicillin G >8 ug/ml Resistant     Quinupristin + Dalfopristin <=1 ug/ml Susceptible     Rifampin <=1 ug/ml Susceptible     Tetracycline <=4 ug/ml Susceptible     Trimethoprim + Sulfamethoxazole <=0.5/9.5 ug/ml Susceptible     Vancomycin 2 ug/ml Susceptible              [1]   Staphylococcus species may develop resistance during prolonged therapy with quinolones.  Isolates that are initially susceptible may become resistant within three to four days after initiation of therapy. Testing of repeat isolates may be warranted.                   S. Pneumo Ag Urine or CSF - Urine, Urine, Clean Catch [[id number]] Collected:  01/07/18 9561    Lab Status:  Final result Specimen:  Urine from Urine, Clean Catch Updated:  01/10/18 1517     Specimen Source Urine     STREP PNEUMONIAE ANTIGEN Negative     Body Fluid Culture, Sterile Not Indicated     Organism ID Not indicated.     Please note Comment      [employer] standards require a culture to be  performed on CSF specimens submitted for bacterial antigen testing.  (CAP [id number]) Urine specimens will not be cultured.       Narrative:       Performed at:  [address]  [id number]  : Aristides Win MD, Phone:  [phone]    Blood Culture - Blood, [[id number]]  (Normal) Collected:  01/07/18 0875    Lab Status:  Preliminary result Specimen:  Blood from Hand, Right Updated:   01/11/18 0315     Blood Culture No growth at 4 days    Blood Culture - Blood, [[id number]]  (Normal) Collected:  01/07/18 0241    Lab Status:  Preliminary result Specimen:  Blood from Arm, Right Updated:  01/11/18 0315     Blood Culture No growth at 4 days    Influenza Antigen, Rapid - Swab, Nasopharynx [[id number]]  (Normal) Collected:  01/07/18 0224    Lab Status:  Final result Specimen:  Swab from Nasopharynx Updated:  01/07/18 0243     Influenza A Ag, EIA Negative     Influenza B Ag, EIA Negative            Other Antimicrobials      Assessment/Plan  Initiated Vancomycin 1500 mg IVPB once, followed by 1250 mg Q12H. Will order Vancomycin trough prior to 4th dose on 01/12/2017 before 4th dose.  Pharmacy will monitor renal function and adjust dose accordingly.    Deanna Olivas RPH  01/11/18 9:40 AM

## 2018-01-11 NOTE — PLAN OF CARE
Problem: Patient Care Overview (Adult)  Goal: Plan of Care Review  Outcome: Ongoing (interventions implemented as appropriate)      Problem: Sepsis (Adult)  Goal: Signs and Symptoms of Listed Potential Problems Will be Absent or Manageable (Sepsis)  Outcome: Ongoing (interventions implemented as appropriate)   01/11/18 0630   Sepsis   Problems Assessed (Sepsis) all   Problems Present (Sepsis) progression of infection  (slow improvement noted.)       Problem: NPPV/CPAP (Adult)  Goal: Signs and Symptoms of Listed Potential Problems Will be Absent or Manageable (NPPV/CPAP)  Outcome: Ongoing (interventions implemented as appropriate)   01/11/18 0630   NPPV/CPAP   Problems Assessed (NPPV/CPAP) all   Problems Present (NPPV/CPAP) dry mucous membranes  (wears bipap well.)

## 2018-01-11 NOTE — THERAPY EVALUATION
Acute Care - Physical Therapy Initial Evaluation   [facility]     Patient Name: Niall Murguia  : 1965  MRN: [id number]  Today's Date: 2018   Onset of Illness/Injury or Date of Surgery Date: 18  Date of Referral to PT: 01/10/18  Referring Physician: Timoteo      Admit Date: 2018     Visit Dx:    ICD-10-CM ICD-9-CM   1. Sepsis due to pneumonia J18.9 486    A41.9 995.91   2. Acute renal failure, unspecified acute renal failure type N17.9 584.9   3. Impaired functional mobility, balance, gait, and endurance Z74.09 V49.89     Patient Active Problem List   Diagnosis   • Sepsis due to pneumonia   • Acute respiratory failure with hypoxia   • Pneumonia of both lower lobes   • Chronic bullous emphysema     Past Medical History:   Diagnosis Date   • COPD (chronic obstructive pulmonary disease)      Past Surgical History:   Procedure Laterality Date   • CARDIAC SURGERY            PT ASSESSMENT (last 72 hours)      PT Evaluation       18 0950 18 1402    Rehab Evaluation    Document Type evaluation  -LM     Subjective Information agree to therapy;complains of;weakness  -LM     Patient Effort, Rehab Treatment good  -LM     Symptoms Noted During/After Treatment fatigue  -LM     General Information    Patient Profile Review yes  -LM     Onset of Illness/Injury or Date of Surgery Date 18  -LM     Referring Physician Timoteo  -LM     General Observations Pt received supine in bed. 7 LPM O2 per n/c.  -LM     Pertinent History Of Current Problem sepsis d/t extensive R lung pneumonia;COPD,hypoxic resp failure  -LM     Precautions/Limitations fall precautions;oxygen therapy device and L/min  -LM     Prior Level of Function independent:;community mobility  -LM     Equipment Currently Used at Home none  -LM none  -AS    Plans/Goals Discussed With patient;agreed upon  -LM     Risks Reviewed patient:;increased discomfort  -LM     Benefits Reviewed patient:;improve function;increase independence  -LM      Living Environment    Lives With child(clemencia), adult  -LM child(clemencia), adult  -AS    Living Arrangements mobile home  -LM mobile home  -AS    Home Accessibility bed and bath on same level;stairs to enter home  -LM no concerns  -AS    Number of Stairs to Enter Home 2  -LM     Stair Railings at Home  none  -AS    Type of Financial/Environmental Concern  none  -AS    Transportation Available  car  -AS    Clinical Impression    Date of Referral to PT 01/10/18  -LM     PT Diagnosis Generalized weakness  -LM     Patient/Family Goals Statement Return home.  -LM     Criteria for Skilled Therapeutic Interventions Met yes;treatment indicated  -LM     Pathology/Pathophysiology Noted (Describe Specifically for Each System) musculoskeletal;pulmonary  -LM     Impairments Found (describe specific impairments) aerobic capacity/endurance;gait, locomotion, and balance  -LM     Rehab Potential good, to achieve stated therapy goals  -LM     Vital Signs    Pre SpO2 (%) 94  -LM     O2 Delivery Pre Treatment supplemental O2   8 LPM  -LM     Intra SpO2 (%) 96  -LM     O2 Delivery Intra Treatment supplemental O2   10 LPM  -LM     Post SpO2 (%) 94  -LM     O2 Delivery Post Treatment supplemental O2   8 LPM  -LM     Pain Assessment    Pain Assessment 0-10  -LM     Pain Score 6  -LM     Post Pain Score 6  -LM     Pain Type Acute pain  -LM     Pain Location Chest  -LM     Pain Intervention(s) Repositioned;Ambulation/increased activity  -LM     Response to Interventions Tolerated  -LM     Cognitive Assessment/Intervention    Current Cognitive/Communication Assessment functional  -LM     Orientation Status oriented x 4  -LM     Follows Commands/Answers Questions 100% of the time;needs cueing  -LM     Personal Safety decreased awareness, need for assist;decreased awareness, need for safety  -LM     Personal Safety Interventions fall prevention program maintained;gait belt;nonskid shoes/slippers when out of bed  -LM     ROM (Range of Motion)     General ROM no range of motion deficits identified  -LM     MMT (Manual Muscle Testing)    General MMT Assessment upper extremity strength deficits identified;lower extremity strength deficits identified  -LM     General MMT Assessment Detail Grossly 4/5.  -LM     Bed Mobility, Assessment/Treatment    Bed Mobility, Assistive Device bed rails;head of bed elevated  -LM     Bed Mob, Supine to Sit, Wallace conditional independence  -LM     Transfer Assessment/Treatment    Transfers, Bed-Chair [place] contact guard assist  -LM     Transfers, Bed-Chair-Bed, Assist Device rolling walker  -LM     Transfers, Sit-Stand [place] contact guard assist  -LM     Transfers, Stand-Sit [place] contact guard assist  -LM     Transfers, Sit-Stand-Sit, Assist Device rolling walker  -LM     Transfer, Safety Issues balance decreased during turns;step length decreased  -LM     Transfer, Impairments strength decreased;impaired balance  -LM     Gait Assessment/Treatment    Gait, [place] Level contact guard assist  -LM     Gait, Assistive Device rolling walker  -LM     Gait, Distance (Feet) 111  -LM     Gait, Gait Pattern Analysis swing-through gait  -LM     Gait, Gait Deviations johnny decreased;step length decreased  -LM     Gait, Safety Issues balance decreased during turns;step length decreased;supplemental O2  -LM     Gait, Impairments strength decreased;impaired balance  -LM     Positioning and Restraints    Pre-Treatment Position in bed  -LM     Post Treatment Position chair  -LM     In Chair sitting;call light within reach;encouraged to call for assist  -LM       User Key  (r) = Recorded By, (t) = Taken By, (c) = Cosigned By    Initials Name Provider Type    LM Destini Padgett, PT Physical Therapist    AS Ann Marie Rose           Physical Therapy Education     Title: PT OT SLP Therapies (Done)     Topic: Physical Therapy (Done)     Point: Mobility training (Done)    Learning Progress Summary     Learner Readiness Method Response Comment Documented by Status   Patient Acceptance E VU Purpose of PT/POC; PLB/pacing to alleviate SOA.  01/11/18 1144 Done               Point: Home exercise program (Done)    Learning Progress Summary    Learner Readiness Method Response Comment Documented by Status   Patient Acceptance E VU Purpose of PT/POC; PLB/pacing to alleviate SOA.  01/11/18 1144 Done               Point: Precautions (Done)    Learning Progress Summary    Learner Readiness Method Response Comment Documented by Status   Patient Acceptance E VU Purpose of PT/POC; PLB/pacing to alleviate SOA.  01/11/18 1144 Done                      User Key     Initials Effective Dates Name Provider Type Discipline     10/26/16 -  Destini Padgett, PT Physical Therapist PT                PT Recommendation and Plan  Anticipated Discharge Disposition: home with assist, home with home health  Planned Therapy Interventions: balance training, gait training, home exercise program, patient/family education, strengthening, transfer training  PT Frequency: daily  Plan of Care Review  Plan Of Care Reviewed With: patient  Outcome Summary/Follow up Plan: PT eval completed.  Patient presents with decreased strength, balance, aerobic capacity and independence with mobility.  He is expected to benefit from continued skilled PT intervention to improve his overall functional mobiltiy status prior to D/C.          IP PT Goals       01/11/18 1145          Transfer Training PT LTG    Transfer Training PT LTG, Date Established 01/11/18  -LM      Transfer Training PT LTG, Time to Achieve 2 wks  -LM      Transfer Training PT LTG, Activity Type sit to stand/stand to sit;bed to chair /chair to bed  -LM      Transfer Training PT LTG, [place] Level supervision required  -LM      Transfer Training PT LTG, Outcome goal ongoing  -LM      Gait Training PT LTG    Gait Training Goal PT LTG, Date Established 01/11/18  -LM      Gait Training Goal PT  LTG, Time to Achieve 2 wks  -LM      Gait Training Goal PT LTG, [place] Level supervision required  -LM      Gait Training Goal PT LTG, Distance to Achieve 400  -LM      Gait Training Goal PT LTG, Additional Goal Maintain O2 sats above 90%.  -LM      Gait Training Goal PT LTG, Outcome goal ongoing  -LM      Strength Goal PT LTG    Strength Goal PT LTG, Date Established 01/11/18  -LM      Strength Goal PT LTG, Time to Achieve 2 wks  -LM      Strength Goal PT LTG, Measure to Achieve Patient will perform B LE ther ex x 15 reps to improve functional strength for mobility.  -LM      Strength Goal PT LTG, Outcome goal ongoing  -LM        User Key  (r) = Recorded By, (t) = Taken By, (c) = Cosigned By    Initials Name Provider Type    FIDELIA Padgett PT Physical Therapist                Outcome Measures       01/11/18 0950          How much help from another person do you currently need...    Turning from your back to your side while in flat bed without using bedrails? 4  -LM      Moving from lying on back to sitting on the side of a flat bed without bedrails? 4  -LM      Moving to and from a bed to a chair (including a wheelchair)? 3  -LM      Standing up from a chair using your arms (e.g., wheelchair, bedside chair)? 3  -LM      Climbing 3-5 steps with a railing? 2  -LM      To walk in hospital room? 3  -LM      AM-PAC 6 Clicks Score 19  -LM      Functional Assessment    Outcome Measure Options AM-PAC 6 Clicks Basic Mobility (PT)  -LM        User Key  (r) = Recorded By, (t) = Taken By, (c) = Cosigned By    Initials Name Provider Type    FIDELIA Padgett PT Physical Therapist           Time Calculation:         PT Charges       01/11/18 1148          Time Calculation    Start Time 0950  -LM      PT Received On 01/11/18  -LM      PT Goal Re-Cert Due Date 01/21/18  -LM        User Key  (r) = Recorded By, (t) = Taken By, (c) = Cosigned By    Initials Name Provider Type    FIDELIA Padgett PT Physical Therapist           Therapy Charges for Today     Code Description Service Date Service Provider Modifiers Qty    39274870633 HC PT EVAL LOW COMPLEXITY 4 1/11/2018 Destini Padgett, PT GP 1          PT G-Codes  Outcome Measure Options: AM-PAC 6 Clicks Basic Mobility (PT)      Destini Padgett, PT  1/11/2018

## 2018-01-11 NOTE — PROGRESS NOTES
Continued Stay Note  [facility]     Patient Name: Niall Murguia  MRN: [id number]  Today's Date: 1/11/2018    Admit Date: 1/7/2018          Discharge Plan       01/11/18 1414    Case Management/Social Work Plan    Plan Followed up with pt today in ICU for dcp. Pt still states that he is unsure if he will need anything at this time and or at discharge. Advised that CM will f/u for discharge planning.              Discharge Codes     None            April  CHAVEZ Rose, YULIYA  01/11/18  2:16 PM

## 2018-01-11 NOTE — PLAN OF CARE
Problem: Patient Care Overview (Adult)  Goal: Plan of Care Review  Outcome: Ongoing (interventions implemented as appropriate)   01/11/18 1145   Coping/Psychosocial Response Interventions   Plan Of Care Reviewed With patient   Outcome Evaluation   Outcome Summary/Follow up Plan PT shreyasal completed. Patient presents with decreased strength, balance, aerobic capacity and independence with mobility. He is expected to benefit from continued skilled PT intervention to improve his overall functional mobiltiy status prior to D/C.       Problem: Inpatient Physical Therapy  Goal: Transfer Training Goal 1 LTG- PT  Outcome: Ongoing (interventions implemented as appropriate)   01/11/18 1145   Transfer Training PT LTG   Transfer Training PT LTG, Date Established 01/11/18   Transfer Training PT LTG, Time to Achieve 2 wks   Transfer Training PT LTG, Activity Type sit to stand/stand to sit;bed to chair /chair to bed   Transfer Training PT LTG, [place] Level supervision required   Transfer Training PT LTG, Outcome goal ongoing     Goal: Gait Training Goal LTG- PT  Outcome: Ongoing (interventions implemented as appropriate)   01/11/18 1145   Gait Training PT LTG   Gait Training Goal PT LTG, Date Established 01/11/18   Gait Training Goal PT LTG, Time to Achieve 2 wks   Gait Training Goal PT LTG, [place] Level supervision required   Gait Training Goal PT LTG, Distance to Achieve 400   Gait Training Goal PT LTG, Additional Goal Maintain O2 sats above 90%.   Gait Training Goal PT LTG, Outcome goal ongoing     Goal: Strength Goal LTG- PT  Outcome: Ongoing (interventions implemented as appropriate)   01/11/18 1145   Strength Goal PT LTG   Strength Goal PT LTG, Date Established 01/11/18   Strength Goal PT LTG, Time to Achieve 2 wks   Strength Goal PT LTG, Measure to Achieve Patient will perform B LE ther ex x 15 reps to improve functional strength for mobility.   Strength Goal PT LTG, Outcome goal ongoing

## 2018-01-11 NOTE — THERAPY EVALUATION
Acute Care - Occupational Therapy Initial Evaluation  [facility]     Patient Name: Niall Murguia  : 1965  MRN: [id number]  Today's Date: 2018  Onset of Illness/Injury or Date of Surgery Date: 18  Date of Referral to OT: 01/10/18  Referring Physician: Dr. Mahmood    Admit Date: 2018       ICD-10-CM ICD-9-CM   1. Sepsis due to pneumonia J18.9 486    A41.9 995.91   2. Acute renal failure, unspecified acute renal failure type N17.9 584.9   3. Impaired functional mobility, balance, gait, and endurance Z74.09 V49.89   4. Impaired mobility and ADLs Z74.09 799.89     Patient Active Problem List   Diagnosis   • Sepsis due to pneumonia   • Acute respiratory failure with hypoxia   • Pneumonia of both lower lobes   • Chronic bullous emphysema     Past Medical History:   Diagnosis Date   • COPD (chronic obstructive pulmonary disease)      Past Surgical History:   Procedure Laterality Date   • CARDIAC SURGERY            OT ASSESSMENT FLOWSHEET (last 72 hours)      OT Evaluation       18 0952 18 0950 18 1402 18 1401       Rehab Evaluation    Document Type evaluation  - evaluation  -       Subjective Information agree to therapy;complains of;weakness;pain  - agree to therapy;complains of;weakness  -       Patient Effort, Rehab Treatment good  - good  -       Symptoms Noted During/After Treatment fatigue  - fatigue  -       General Information    Patient Profile Review yes  - yes  -       Onset of Illness/Injury or Date of Surgery Date 18  - 18  -       Referring Physician Dr. Mahmood  [facility] [place]  -       General Observations Pt received supine in bed on 7L O2 via nc.  - Pt received supine in bed. 7 LPM O2 per n/c.  -       Pertinent History Of Current Problem sepsis d/t extensive RLL pneumonia, COPD with bullous emphysema  - sepsis d/t extensive R lung pneumonia;COPD,hypoxic resp failure  -       Precautions/Limitations fall  precautions;oxygen therapy device and L/min  -AH fall precautions;oxygen therapy device and L/min  -LM       Prior Level of Function independent:;community mobility;ADL's  - independent:;community mobility  -       Equipment Currently Used at Home none  - none  -LM none  -AS      Plans/Goals Discussed With patient;agreed upon  - patient;agreed upon  -       Risks Reviewed patient:;increased discomfort  - patient:;increased discomfort  -       Benefits Reviewed patient:;improve function;increase independence;increase strength  - patient:;improve function;increase independence  -       Living Environment    Lives With child(clemencia), adult  -AH child(clemencia), adult  -LM child(clemencia), adult  -AS      Living Arrangements mobile home  - mobile home  - mobile home  -AS      Home Accessibility bed and bath on same level;stairs to enter home  - bed and bath on same level;stairs to enter home  - no concerns  -AS      Number of Stairs to Enter Home 2  - 2  -LM       Stair Railings at Home   none  -AS      Type of Financial/Environmental Concern   none  -AS      Transportation Available   car  -AS      Clinical Impression    Date of Referral to OT 01/10/18  -        OT Diagnosis ADL decline  -        Patient/Family Goals Statement Pt wants to return home  -        Criteria for Skilled Therapeutic Interventions Met yes;treatment indicated  -        Rehab Potential good, to achieve stated therapy goals  -        Therapy Frequency 3-5 times/wk  [facility]        Anticipated Discharge Disposition home  -        Functional Level Prior    Ambulation 0-->independent  -AH   0-->independent  -AS     Transferring 0-->independent  -AH   0-->independent  -AS     Toileting 0-->independent  -AH   0-->independent  -AS     Bathing 0-->independent  -AH   0-->independent  -AS     Dressing 0-->independent  -AH   0-->independent  -AS     Eating 0-->independent  -AH   0-->independent  -AS     Communication  0-->understands/communicates without difficulty  -   0-->understands/communicates without difficulty  -AS     Swallowing 0-->swallows foods/liquids without difficulty  -   0-->swallows foods/liquids without difficulty  -AS     Vital Signs    Pre SpO2 (%) 94  -AH 94  -LM       O2 Delivery Pre Treatment supplemental O2   7L  -AH supplemental O2   8 LPM  -LM       Intra SpO2 (%) 90  -AH 96  -LM       O2 Delivery Intra Treatment supplemental O2   10L  -AH supplemental O2   10 LPM  -LM       Post SpO2 (%) 94  -AH 94  -LM       O2 Delivery Post Treatment supplemental O2   7L  -AH supplemental O2   8 LPM  -LM       Pre Patient Position Supine  -AH        Intra Patient Position Standing  -AH        Post Patient Position Sitting  -AH        Pain Assessment    Pain Assessment 0-10  -AH 0-10  -LM       Pain Score 6  -AH 6  -LM       Post Pain Score 6  -AH 6  -LM       Pain Type Acute pain  - Acute pain  -LM       Pain Location Chest   from coughing  - Chest  -LM       Pain Intervention(s) Repositioned;Ambulation/increased activity  - Repositioned;Ambulation/increased activity  -       Response to Interventions tolerated  - Tolerated  -LM       Vision Assessment/Intervention    Visual Impairment WNL  -        Cognitive Assessment/Intervention    Current Cognitive/Communication Assessment functional  - functional  -LM       Orientation Status oriented x 4  -AH oriented x 4  -LM       Follows Commands/Answers Questions able to follow multi-step instructions  - 100% of the time;needs cueing  -       Personal Safety decreased awareness, need for assist  - decreased awareness, need for assist;decreased awareness, need for safety  -       Personal Safety Interventions fall prevention program maintained;gait belt;nonskid shoes/slippers when out of bed  - fall prevention program maintained;gait belt;nonskid shoes/slippers when out of bed  -       ROM (Range of Motion)    General ROM no range of motion  deficits identified  - no range of motion deficits identified  -LM       MMT (Manual Muscle Testing)    General MMT Assessment upper extremity strength deficits identified  - upper extremity strength deficits identified;lower extremity strength deficits identified  -       General MMT Assessment Detail grossly 4/5  - Grossly 4/5.  -LM       Bed Mobility, Assessment/Treatment    Bed Mobility, Assistive Device bed rails;head of bed elevated  - bed rails;head of bed elevated  -LM       Bed Mob, Supine to Sit, [place] conditional independence  - conditional independence  -LM       Transfer Assessment/Treatment    Transfers, Bed-Chair [place]  contact guard assist  -LM       Transfers, Bed-Chair-Bed, Assist Device  rolling walker  -LM       Transfers, Sit-Stand [place] contact guard assist  - contact guard assist  -LM       Transfers, Stand-Sit [place] contact guard assist  - contact guard assist  -LM       Transfers, Sit-Stand-Sit, Assist Device rolling walker  - rolling walker  -LM       Transfer, Safety Issues  balance decreased during turns;step length decreased  -LM       Transfer, Impairments  strength decreased;impaired balance  -LM       Functional Mobility    Functional Mobility- Ind. Level contact guard assist  -        Functional Mobility- Device rolling walker  -        Functional Mobility-Distance (Feet) 111  -        Functional Mobility- Safety Issues supplemental O2  -        Upper Body Bathing Assessment/Training    UB Bathing Assess/Train, [place] Level set up required  -        Lower Body Bathing Assessment/Training    LB Bathing Assess/Train, [place] Level set up required  -        Upper Body Dressing Assessment/Training    UB Dressing Assess/Train, [place] independent  -        Lower Body Dressing Assessment/Training    LB Dressing Assess/Train, [place] contact guard assist  -        Toileting Assessment/Training    Toileting  Assess/Train, [place] Level conditional independence  -        Grooming Assessment/Training    Grooming Assess/Train, [place] Level set up required  -        General Therapy Interventions    Planned Therapy Interventions ADL retraining;strengthening;transfer training  -        Positioning and Restraints    Pre-Treatment Position in bed  -AH in bed  -LM       Post Treatment Position chair  - chair  -LM       In Chair sitting;call light within reach;encouraged to call for assist  -AH sitting;call light within reach;encouraged to call for assist  -LM         User Key  (r) = Recorded By, (t) = Taken By, (c) = Cosigned By    Initials Name Effective Dates     [place] 10/26/16 -     LM Destinilyric Padgett, PT 10/26/16 -     AS Ann Marie Rose 10/26/16 -            Occupational Therapy Education     Title: PT OT SLP Therapies (Active)     Topic: Occupational Therapy (Active)     Point: ADL training (Done)    Description: Instruct learner(s) on proper safety adaptation and remediation techniques during self care or transfers.   Instruct in proper use of assistive devices.    Learning Progress Summary    Learner Readiness Method Response Comment Documented by Status   Patient Acceptance E VU Role of OT/POC  01/11/18 1317 Done                      User Key     Initials Effective Dates Name Provider Type Discipline     10/26/16 -  Marli Recioss Occupational Therapist OT                  OT Recommendation and Plan  Anticipated Discharge Disposition: home  Planned Therapy Interventions: ADL retraining, strengthening, transfer training  Therapy Frequency: 3-5 times/wk  Plan of Care Review  Plan Of Care Reviewed With: patient  Progress: no change  Outcome Summary/Follow up Plan: Pt seen for OT evaluation today.  Pt presents with weakness and declined ADL independence.  Pt is expected to benefit from skilled OT to improve his strength and independence with self care and functional mobility tasks.          OT Goals        01/11/18 1321          Strength OT LTG    Strength Goal OT LTG, Date Established 01/11/18  -      Strength Goal OT LTG, Time to Achieve by discharge  -      Strength Goal OT LTG, Functional Goal Pt will perform 15 reps BUE strengthening ex using theraband for resistance.  -      Strength Goal OT LTG, Outcome goal ongoing  -      LB Dressing OT LTG    LB Dressing Goal OT LTG, Date Established 01/11/18  -      LB Dressing Goal OT LTG, Time to Achieve by discharge  -      LB Dressing Goal OT LTG, [place] Level set up required  -      LB Dressing Goal OT LTG, Outcome goal ongoing  -      Functional Mobility OT LTG    Functional Mobility Goal OT LTG, Date Established 01/11/18  -      Functional Mobility Goal OT LTG, Time to Achieve by discharge  -      Functional Mobility Goal OT LTG, [place] Level contact guard  -      Functional Mobility Goal OT LTG, Assist Device rolling walker  -      Functional Mobility Goal OT LTG, Distance to Achieve in hallway  -      Functional Mobility Goal OT LTG, Additional Goal Pt will walk 250' with cga using RW  -      Functional Mobility Goal OT LTG, Outcome goal ongoing  -        User Key  (r) = Recorded By, (t) = Taken By, (c) = Cosigned By    Initials Name Provider Type     Marli Galdamez Occupational Therapist                Outcome Measures       01/11/18 0952 01/11/18 0950       How much help from another person do you currently need...    Turning from your back to your side while in flat bed without using bedrails?  4  -LM     Moving from lying on back to sitting on the side of a flat bed without bedrails?  4  -LM     Moving to and from a bed to a chair (including a wheelchair)?  3  -LM     Standing up from a chair using your arms (e.g., wheelchair, bedside chair)?  3  -LM     Climbing 3-5 steps with a railing?  2  -LM     To walk in hospital room?  3  -LM     AM-PAC 6 Clicks Score  19  -LM     How much help from another is currently  needed...    Putting on and taking off regular lower body clothing? 3  -AH      Bathing (including washing, rinsing, and drying) 3  -AH      Toileting (which includes using toilet bed pan or urinal) 4  -AH      Putting on and taking off regular upper body clothing 4  -AH      Taking care of personal grooming (such as brushing teeth) 4  -AH      Eating meals 4  -      Score 22  -AH      Functional Assessment    Outcome Measure Options AM-PAC 6 Clicks Daily Activity (OT)  - AM-PAC 6 Clicks Basic Mobility (PT)  -       User Key  (r) = Recorded By, (t) = Taken By, (c) = Cosigned By    Initials Name Provider Type     Marli Galdamez Occupational Therapist    FIDELIA Padgett, PT Physical Therapist          Time Calculation:   OT Start Time: 0952    Therapy Charges for Today     Code Description Service Date Service Provider Modifiers Qty    [id number]  OT EVAL LOW COMPLEXITY 4 1/11/2018 Marli Galdamez GO 1               Marli Galdamez  1/11/2018

## 2018-01-12 ENCOUNTER — APPOINTMENT (OUTPATIENT)
Dept: GENERAL RADIOLOGY | Facility: HOSPITAL | Age: 53
End: 2018-01-12

## 2018-01-12 LAB
BACTERIA SPEC AEROBE CULT: NORMAL
BACTERIA SPEC AEROBE CULT: NORMAL

## 2018-01-12 PROCEDURE — 94799 UNLISTED PULMONARY SVC/PX: CPT

## 2018-01-12 PROCEDURE — 25010000002 ENOXAPARIN PER 10 MG: Performed by: INTERNAL MEDICINE

## 2018-01-12 PROCEDURE — 71046 X-RAY EXAM CHEST 2 VIEWS: CPT

## 2018-01-12 PROCEDURE — 99232 SBSQ HOSP IP/OBS MODERATE 35: CPT | Performed by: INTERNAL MEDICINE

## 2018-01-12 PROCEDURE — 94660 CPAP INITIATION&MGMT: CPT

## 2018-01-12 PROCEDURE — 25010000002 FUROSEMIDE PER 20 MG: Performed by: INTERNAL MEDICINE

## 2018-01-12 PROCEDURE — 25010000002 METHYLPREDNISOLONE PER 40 MG: Performed by: FAMILY MEDICINE

## 2018-01-12 PROCEDURE — 99233 SBSQ HOSP IP/OBS HIGH 50: CPT | Performed by: FAMILY MEDICINE

## 2018-01-12 PROCEDURE — 97530 THERAPEUTIC ACTIVITIES: CPT

## 2018-01-12 PROCEDURE — 97110 THERAPEUTIC EXERCISES: CPT

## 2018-01-12 RX ADMIN — METHYLPREDNISOLONE SODIUM SUCCINATE 40 MG: 40 INJECTION, POWDER, FOR SOLUTION INTRAMUSCULAR; INTRAVENOUS at 01:43

## 2018-01-12 RX ADMIN — METHYLPREDNISOLONE SODIUM SUCCINATE 40 MG: 40 INJECTION, POWDER, FOR SOLUTION INTRAMUSCULAR; INTRAVENOUS at 09:46

## 2018-01-12 RX ADMIN — ENOXAPARIN SODIUM 40 MG: 40 INJECTION SUBCUTANEOUS at 21:31

## 2018-01-12 RX ADMIN — NICOTINE 1 PATCH: 21 PATCH TRANSDERMAL at 21:31

## 2018-01-12 RX ADMIN — IPRATROPIUM BROMIDE AND ALBUTEROL SULFATE 3 ML: .5; 3 SOLUTION RESPIRATORY (INHALATION) at 12:50

## 2018-01-12 RX ADMIN — IPRATROPIUM BROMIDE AND ALBUTEROL SULFATE 3 ML: .5; 3 SOLUTION RESPIRATORY (INHALATION) at 00:42

## 2018-01-12 RX ADMIN — Medication 250 MG: at 21:31

## 2018-01-12 RX ADMIN — METHYLPREDNISOLONE SODIUM SUCCINATE 40 MG: 40 INJECTION, POWDER, FOR SOLUTION INTRAMUSCULAR; INTRAVENOUS at 17:59

## 2018-01-12 RX ADMIN — IPRATROPIUM BROMIDE AND ALBUTEROL SULFATE 3 ML: .5; 3 SOLUTION RESPIRATORY (INHALATION) at 07:17

## 2018-01-12 RX ADMIN — CITALOPRAM HYDROBROMIDE 40 MG: 20 TABLET, FILM COATED ORAL at 08:34

## 2018-01-12 RX ADMIN — GUAIFENESIN 600 MG: 600 TABLET, EXTENDED RELEASE ORAL at 21:31

## 2018-01-12 RX ADMIN — FUROSEMIDE 20 MG: 10 INJECTION, SOLUTION INTRAMUSCULAR; INTRAVENOUS at 08:33

## 2018-01-12 RX ADMIN — Medication 250 MG: at 08:34

## 2018-01-12 RX ADMIN — BUDESONIDE AND FORMOTEROL FUMARATE DIHYDRATE 2 PUFF: 160; 4.5 AEROSOL RESPIRATORY (INHALATION) at 07:18

## 2018-01-12 RX ADMIN — GUAIFENESIN 600 MG: 600 TABLET, EXTENDED RELEASE ORAL at 08:34

## 2018-01-12 RX ADMIN — LEVOFLOXACIN 500 MG: 500 TABLET, FILM COATED ORAL at 08:33

## 2018-01-12 RX ADMIN — BUDESONIDE AND FORMOTEROL FUMARATE DIHYDRATE 2 PUFF: 160; 4.5 AEROSOL RESPIRATORY (INHALATION) at 18:51

## 2018-01-12 RX ADMIN — IPRATROPIUM BROMIDE AND ALBUTEROL SULFATE 3 ML: .5; 3 SOLUTION RESPIRATORY (INHALATION) at 18:51

## 2018-01-12 NOTE — PROGRESS NOTES
[facility]    Name:  Niall Murguia   Age:  52 y.o.  Sex:  male  :  1965  MRN:  [id number]   Visit Number:  [id number]  Admission Date:  2018  Date Of Service:  18  Primary Care Physician:  KEAGAN Rowley     LOS: 5 days :  Patient Care Team:  KEAGAN Rowley as PCP - General (Family Medicine):    Chief Complaint:          Subjective / Interval History:     I have reviewed labs/imaging/records from this hospitalization, including ER staff and admitting/attending physicians H/P's and progress notes to establish a comprehensive understanding of this patient's clinical hospital course, as well as to establish a transition of care appropriately.    Patient is a 52-year-old male who was admitted secondary to acute respiratory failure with hypoxia as a result of extensive right-sided pneumonia with severe sepsis.  He has responded well to aggressive fluid hydration as well as continued antibiotic coverage.  Pulmonology is continue to see patient and his helped participate in management of his need for noninvasive positive pressure ventilatory support as well.  He did demonstrate findings of cor pulmonale on echocardiogram, likely acute on chronic.    Patient reports mild improvement from the previous day with respect to overall strength and continued decreasing respiratory symptoms.  No reports of fever or chills to the course of the evening.  No hemoptysis reported.  Tolerating by mouth intake.  No reports of hematuria or dysuria, without bright red blood or black tarry stools.    Patient has done well with BiPAP use, requiring no increased length of time for utilization and has maintained good oxygen saturations with approximately now 5 L nasal cannula support.    Patient is going down for repeat chest x-ray this morning.    Review of Systems:     General ROS: Patient denies any fevers, chills or loss of  consciousness.  Respiratory ROS: Phlegm producing cough, without continuous shortness of breath.  Cardiovascular ROS: Denies chest pain or palpitations. No history of exertional chest pain.  Gastrointestinal ROS: Denies nausea and vomiting. Denies any abdominal pain. No diarrhea.  Neurological ROS: Generalized weakness. No loss of consciousness. Denies any numbness. Denies neck pain.  Dermatological ROS: Denies any redness or pruritis.    Vital Signs:    Temp:  [97.5 °F (36.4 °C)-98.3 °F (36.8 °C)] 98.1 °F (36.7 °C)  Heart Rate:  [54-90] 90  Resp:  [16-26] 17  BP: (117-131)/(69-82) 117/69    Intake and output:    I/O last 3 completed shifts:  In: 809 [P.O.:760; I.V.:49]  Out: 3450 [Urine:3450]  I/O this shift:  In: -   Out: 500 [Urine:500]    Physical Examination:    General Appearance:  Alert and cooperative, not in any acute distress.  Chronically ill-appearing male.     Head:  Atraumatic and normocephalic, without obvious abnormality.   Eyes:          PERRLA, conjunctivae and sclerae normal, no Icterus. No pallor. Extra-occular movements are within normal limits.   Neck: Supple, trachea midline, no thyromegaly, no carotid bruit.   Lungs:   Chest shape is normal.  Audible air exchange noted all lung fields, mildly reduced to bilateral bases with rhonchus referred upper airway congestion.     Heart:  Normal S1 and S2, no murmur, no gallop, no rub. No JVD   Abdomen:   Normal bowel sounds, no masses, no organomegaly. Soft       non-tender, non-distended, no guarding, no rebound tenderness.   Extremities: Moves all extremities well, no edema, no cyanosis, no            clubbing.   Skin: No bleeding, bruising or rash.   Neurologic: Awake, alert and oriented times 3. Moves all 4 extremities equally.   Laboratory results:      Results from last 7 days  Lab Units 01/11/18  1000 01/10/18  0428 01/09/18  0446 01/08/18  0531 01/07/18  0221   SODIUM mmol/L 135* 136* 135* 132* 134*   POTASSIUM mmol/L 4.0 4.4 4.0 4.4 4.0    CHLORIDE mmol/L 102 103 101 100 89*   CO2 mmol/L 28.0 29.0 29.0 25.0* 33.0*   BUN mg/dL 25* 38* 33* 29* 21*   CREATININE mg/dL 0.60 0.80 0.80 0.80 1.50*   CALCIUM mg/dL 8.3* 8.6 8.7 8.3* 10.0   BILIRUBIN mg/dL 0.5  --   --  0.7 1.1   ALK PHOS U/L 62  --   --  39 123   ALT (SGPT) U/L 114*  --   --  33 56   AST (SGOT) U/L 49*  --   --  22 39   GLUCOSE mg/dL 139* 113* 126* 126* 125*       Results from last 7 days  Lab Units 01/11/18  1000 01/10/18  0428 01/09/18  0446   WBC 10*3/mm3 13.09* 12.70* 9.60   HEMOGLOBIN g/dL 11.6* 10.9* 10.5*   HEMATOCRIT % 34.5* 32.7* 30.8*   PLATELETS 10*3/mm3 136 154 149           Results from last 7 days  Lab Units 01/07/18  0221   TROPONIN I ng/mL <0.012       Results from last 7 days  Lab Units 01/07/18  0245 01/07/18  0241   BLOODCX  No growth at 5 days No growth at 5 days           I have reviewed the patient's laboratory results.    Radiology results:    Imaging Results (last 24 hours)     Procedure Component Value Units Date/Time    XR Chest 2 View [[id number]] Collected:  01/12/18 1058     Updated:  01/12/18 1104    Narrative:       PROCEDURE: XR CHEST 2 VW-        HISTORY: PNA; J18.9-Pneumonia, unspecified organism; A41.9-Sepsis,  unspecified organism; N17.9-Acute kidney failure, unspecified;  Z74.09-Other reduced mobility; Z74.09-Other reduced mobility     COMPARISON: January 10, 2018.     FINDINGS: The heart is normal in size. The mediastinum is unremarkable.  There has been interval development of a small right hydropneumothorax.  There is diffuse right lung airspace disease and patchy left basilar  airspace disease. There is a suture line noted within the right lung  apex. There are no acute osseous abnormalities.           Impression:       1. Development of a small right hydropneumothorax.  2. Airspace disease fairly diffusely in the right lung and in the left  lung base.     Communication: Dr. Mahmood was notified of these findings at 11:00 AM on  1/12/2018.           This  report was finalized on 1/12/2018 11:02 AM by Larry Grewal M.D..          I have reviewed the patient's radiology reports.    Medication Review:     I have reviewed the patients active and prn medications.         Assessment:  Principal Problem:    Acute respiratory failure with hypoxia  Active Problems:    Sepsis due to pneumonia    Pneumonia of both lower lobes    Chronic bullous emphysema        Plan:  Plan to restart patient on IV vancomycin at this time.  Continue Solu-Medrol at every 8 hour dosing.  I've discussed the case in detail with his pulmonologist Dr. Baptiste again this morning, planned repeat chest x-ray is afternoon at 2 PM.  Patient verbalized understanding of the plan of care and agrees. Hopeful for improved ambulation, physical therapy and occupational therapy are working with patient.  Findings of small right pneumothorax and possibly one to the apex as well.  We'll hold BiPAP therapy at this time.  We'll follow the result of the chest x-ray along with pulmonology as well.    I spent a total of 35 minutes in direct care time today.    Gilberto Mahmood DO  01/12/18  11:22 AM

## 2018-01-12 NOTE — PLAN OF CARE
Problem: Patient Care Overview (Adult)  Goal: Plan of Care Review  Outcome: Ongoing (interventions implemented as appropriate)   01/12/18 7980   Coping/Psychosocial Response Interventions   Plan Of Care Reviewed With patient   Patient Care Overview   Progress progress toward functional goals as expected   Outcome Evaluation   Outcome Summary/Follow up Plan vital signs stable; patient worked with OT today; remains on 8 L high flow nasal cannula

## 2018-01-12 NOTE — PLAN OF CARE
Problem: Patient Care Overview (Adult)  Goal: Plan of Care Review  Outcome: Ongoing (interventions implemented as appropriate)   01/12/18 0358   Coping/Psychosocial Response Interventions   Plan Of Care Reviewed With patient   Patient Care Overview   Progress improving   Outcome Evaluation   Outcome Summary/Follow up Plan Patient transferred from ICU today. The patient is on 7L hi flow NC with oxygen staying above 90%. The patient has worn the bipap on and off throughout the night. Will conitnue to monitor.

## 2018-01-12 NOTE — SIGNIFICANT NOTE
01/12/18 1305   Rehab Treatment   Discipline physical therapist   Treatment Not Performed patient/family declined treatment  (Pt declines PT stating he is too tired and having increased dyspnea at this time.  )

## 2018-01-12 NOTE — PLAN OF CARE
Problem: Patient Care Overview (Adult)  Goal: Plan of Care Review  Outcome: Ongoing (interventions implemented as appropriate)   01/12/18 1043   Coping/Psychosocial Response Interventions   Plan Of Care Reviewed With patient   Patient Care Overview   Progress improving   Outcome Evaluation   Outcome Summary/Follow up Plan Pt received supine in bed on 8L O2 via nc. Pt able to sit eob independently and donned his socks independently. Pt supervision to stand and cga to walk 152' using RW and 10L O2 via nc. Pt completed ther ex 15 reps each using red theraband for resistance. Pt verbalized and demonstrated independent understanding of HEP. Pt did c/o localized pain to the right of his sternum with horiz ab/ad ex. Pt did not rate pain. Pt educated if pain occurs with ex to decrease reps and/or resistance until pain subsides or to completely stop doing ex if pain persists or worsens. Pt was left sitting up in his chair with RN present in his room. Pts vital signs were all WNL during and after activity.        Problem: Inpatient Occupational Therapy  Goal: Strength Goal LTG- OT  Outcome: Outcome(s) achieved Date Met: 01/12/18 01/11/18 1321 01/12/18 1043   Strength OT LTG   Strength Goal OT LTG, Date Established 01/11/18 --    Strength Goal OT LTG, Time to Achieve by discharge --    Strength Goal OT LTG, Functional Goal Pt will perform 15 reps BUE strengthening ex using theraband for resistance. --    Strength Goal OT LTG, Date Goal Reviewed --  01/12/18   Strength Goal OT LTG, Outcome --  goal ongoing     Goal: LB Dressing Goal LTG- OT  Outcome: Ongoing (interventions implemented as appropriate)   01/11/18 1321 01/12/18 1043   LB Dressing OT LTG   LB Dressing Goal OT LTG, Date Established 01/11/18 --    LB Dressing Goal OT LTG, Time to Achieve by discharge --    LB Dressing Goal OT LTG, [place] Level set up required --    LB Dressing Goal OT LTG, Date Goal Reviewed --  01/12/18   LB Dressing Goal OT LTG, Outcome --   goal ongoing     Goal: Functional Mobility Goal LTG- OT  Outcome: Ongoing (interventions implemented as appropriate)   01/11/18 1321 01/12/18 1043   Functional Mobility OT LTG   Functional Mobility Goal OT LTG, Date Established 01/11/18 --    Functional Mobility Goal OT LTG, Time to Achieve by discharge --    Functional Mobility Goal OT LTG, [place] Level contact guard --    Functional Mobility Goal OT LTG, Assist Device rolling walker --    Functional Mobility Goal OT LTG, Distance to Achieve in hallway --    Functional Mobility Goal OT LTG, Additional Goal Pt will walk 250' with cga using RW --    Functional Mobility Goal OT LTG, Date Goal Reviewed --  01/12/18   Functional Mobility Goal OT LTG, Outcome --  goal ongoing

## 2018-01-12 NOTE — THERAPY TREATMENT NOTE
Acute Care - Occupational Therapy Treatment Note  [facility]     Patient Name: Niall Murguia  : 1965  MRN: [id number]  Today's Date: 2018  Onset of Illness/Injury or Date of Surgery Date: 18  Date of Referral to OT: 01/10/18  Referring Physician: Dr. Mahmood      Admit Date: 2018    Visit Dx:     ICD-10-CM ICD-9-CM   1. Sepsis due to pneumonia J18.9 486    A41.9 995.91   2. Acute renal failure, unspecified acute renal failure type N17.9 584.9   3. Impaired functional mobility, balance, gait, and endurance Z74.09 V49.89   4. Impaired mobility and ADLs Z74.09 799.89     Patient Active Problem List   Diagnosis   • Sepsis due to pneumonia   • Acute respiratory failure with hypoxia   • Pneumonia of both lower lobes   • Chronic bullous emphysema             Adult Rehabilitation Note       18 0923          Rehab Assessment/Intervention    Discipline occupational therapist  -      Document Type therapy note (daily note)  -      Subjective Information agree to therapy;no complaints  -      Patient Effort, Rehab Treatment good  -      Symptoms Noted During/After Treatment fatigue  -      Precautions/Limitations fall precautions;oxygen therapy device and L/min  -AH      Recorded by [] Marli Galdamez      Vital Signs    Pre SpO2 (%) 92  -AH      O2 Delivery Pre Treatment supplemental O2   8L  -AH      Intra SpO2 (%) 91  -AH      O2 Delivery Intra Treatment supplemental O2   10L  -AH      Post SpO2 (%) 92  -AH      O2 Delivery Post Treatment supplemental O2   8L  -AH      Pre Patient Position Supine  -AH      Intra Patient Position Standing  -      Post Patient Position Sitting  -AH      Recorded by [] Marli Galdamez      Pain Assessment    Pain Assessment No/denies pain  -      Recorded by [] Marli Galdamez      Bed Mobility, Assessment/Treatment    Bed Mobility, Assistive Device bed rails;head of bed elevated  -      Bed Mob, Supine to Sit, Blanch conditional  independence  -      Recorded by [] Marli Galdamez      Transfer Assessment/Treatment    Transfers, Sit-Stand [place] supervision required  -      Transfers, Stand-Sit [place] supervision required  -      Transfers, Sit-Stand-Sit, Assist Device rolling walker  -      Recorded by [] Marli Galdamez      Functional Mobility    Functional Mobility- Ind. Level contact guard assist  -      Functional Mobility- Device rolling walker  -      Functional Mobility-Distance (Feet) 152  -      Functional Mobility- Safety Issues supplemental O2  -      Recorded by [] Marli Galdamez      Lower Body Dressing Assessment/Training    LB Dressing Assess/Train, Clothing Type donning:;socks  -      LB Dressing Assess/Train, Position sitting;edge of bed  -      LB Dressing Assess/Train, [place] independent  -      Recorded by [] Marli Galdamez      Therapy Exercises    Bilateral Upper Extremity AROM:;15 reps;sitting;elbow flexion/extension;shoulder extension/flexion;shoulder ER/IR;shoulder horizontal abd/add  -      BUE Resistance theraband   red  -      Recorded by [] Marli Galdamez      Positioning and Restraints    Pre-Treatment Position in bed  -      Post Treatment Position chair  -      In Chair sitting;call light within reach;encouraged to call for assist;with nsg  -      Recorded by [] Marli Galdamez        User Key  (r) = Recorded By, (t) = Taken By, (c) = Cosigned By    Initials Name Effective Dates     Marli Galdamez 10/26/16 -                 OT Goals       01/12/18 1043 01/11/18 1321       Strength OT LTG    Strength Goal OT LTG, Date Established  01/11/18  -     Strength Goal OT LTG, Time to Achieve  by discharge  -     Strength Goal OT LTG, Functional Goal  Pt will perform 15 reps BUE strengthening ex using theraband for resistance.  -     Strength Goal OT LTG, Date Goal Reviewed 01/12/18  -      Strength Goal OT LTG, Outcome goal ongoing  - goal ongoing  -      LB Dressing OT LTG    LB Dressing Goal OT LTG, Date Established  01/11/18  -     LB Dressing Goal OT LTG, Time to Achieve  by discharge  -     LB Dressing Goal OT LTG, Hurt Level  set up required  -     LB Dressing Goal OT LTG, Date Goal Reviewed 01/12/18  -      LB Dressing Goal OT LTG, Outcome goal ongoing  - goal ongoing  -     Functional Mobility OT LTG    Functional Mobility Goal OT LTG, Date Established  01/11/18  -     Functional Mobility Goal OT LTG, Time to Achieve  by discharge  -     Functional Mobility Goal OT LTG, [place]urt Level  contact guard  -     Functional Mobility Goal OT LTG, Assist Device  rolling walker  -     Functional Mobility Goal OT LTG, Distance to Achieve  in hallway  -     Functional Mobility Goal OT LTG, Additional Goal  Pt will walk 250' with cga using RW  -     Functional Mobility Goal OT LTG, Date Goal Reviewed 01/12/18  -      Functional Mobility Goal OT LTG, Outcome goal ongoing  - goal ongoing  -       User Key  (r) = Recorded By, (t) = Taken By, (c) = Cosigned By    Initials Name Provider Type     Marli Galdamez Occupational Therapist          Occupational Therapy Education     Title: PT OT SLP Therapies (Active)     Topic: Occupational Therapy (Active)     Point: ADL training (Done)    Description: Instruct learner(s) on proper safety adaptation and remediation techniques during self care or transfers.   Instruct in proper use of assistive devices.    Learning Progress Summary    Learner Readiness Method Response Comment Documented by Status   Patient Acceptance E,D,H SUSAN,RACHEL Pt educated on pursed lip breathing during functional activity.  Also educated on UB ex using theraband for resistance.  Pt given handout of ex.  01/12/18 1043 Done    Acceptance E SUSAN Role of OT/POC  01/11/18 1317 Done               Point: Home exercise program (Done)    Description: Instruct learner(s) on appropriate technique for monitoring, assisting  and/or progressing therapeutic exercises/activities.    Learning Progress Summary    Learner Readiness Method Response Comment Documented by Status   Patient Acceptance E,MARTHA,RACHEL LEON Pt educated on pursed lip breathing during functional activity.  Also educated on UB ex using theraband for resistance.  Pt given handout of ex.  01/12/18 1043 Done               Point: Precautions (Done)    Description: Instruct learner(s) on prescribed precautions during self-care and functional transfers.    Learning Progress Summary    Learner Readiness Method Response Comment Documented by Status   Patient Acceptance E,MARTHA,RACHEL LEON Pt educated on pursed lip breathing during functional activity.  Also educated on UB ex using theraband for resistance.  Pt given handout of ex.  01/12/18 1043 Done                      User Key     Initials Effective Dates Name Provider Type Discipline     10/26/16 -  Marli Galdamez Occupational Therapist OT                  OT Recommendation and Plan  Anticipated Discharge Disposition: home  Planned Therapy Interventions: ADL retraining, strengthening, transfer training  Therapy Frequency: 3-5 times/wk  Plan of Care Review  Plan Of Care Reviewed With: patient  Progress: improving  Outcome Summary/Follow up Plan: Pt received supine in bed on 8L O2 via nc.  Pt able to sit eob independently and donned his socks independently.  Pt supervision to stand and cga to walk 152' using RW and 10L O2 via nc.  Pt completed ther ex 15 reps each using red theraband for resistance.  Pt verbalized and demonstrated independent understanding of HEP.  Pt did c/o localized pain to the right of his sternum with horiz ab/ad ex.  Pt did not rate pain.  Pt educated if pain occurs with ex to decrease reps and/or resistance until pain subsides or to completely stop doing ex if pain persists or worsens.  Pt was left sitting up in his chair with RN present in his room.  Pts vital signs were all WNL during and after activity.             Outcome Measures       01/12/18 0923 01/11/18 0952 01/11/18 0950    How much help from another person do you currently need...    Turning from your back to your side while in flat bed without using bedrails?   4  -LM    Moving from lying on back to sitting on the side of a flat bed without bedrails?   4  -LM    Moving to and from a bed to a chair (including a wheelchair)?   3  -LM    Standing up from a chair using your arms (e.g., wheelchair, bedside chair)?   3  -LM    Climbing 3-5 steps with a railing?   2  -LM    To walk in hospital room?   3  -LM    AM-PAC 6 Clicks Score   19  -LM    How much help from another is currently needed...    Putting on and taking off regular lower body clothing? 3  -AH 3  -AH     Bathing (including washing, rinsing, and drying) 3  -AH 3  -AH     Toileting (which includes using toilet bed pan or urinal) 4  -AH 4  -AH     Putting on and taking off regular upper body clothing 4  - 4  -AH     Taking care of personal grooming (such as brushing teeth) 4  -AH 4  -AH     Eating meals 4  -AH 4  -AH     Score 22  -AH 22  -AH     Functional Assessment    Outcome Measure Options AM-PAC 6 Clicks Daily Activity (OT)  - AM-PAC 6 Clicks Daily Activity (OT)  - AM-PAC 6 Clicks Basic Mobility (PT)  -      User Key  (r) = Recorded By, (t) = Taken By, (c) = Cosigned By    Initials Name Provider Type     Marli Galdamez Occupational Therapist    LM Destini Padgett, PT Physical Therapist           Time Calculation:         Time Calculation- OT       01/12/18 1050          Time Calculation- OT    OT Start Time 0923  -      Total Timed Code Minutes- OT 23 minute(s)  -      OT Received On 01/12/18  -      OT Goal Re-Cert Due Date 01/21/18  -        User Key  (r) = Recorded By, (t) = Taken By, (c) = Cosigned By    Initials Name Provider Type     Marli Galdamez Occupational Therapist           Therapy Charges for Today     Code Description Service Date Service Provider Modifiers Qty     01212345110 HC OT EVAL LOW COMPLEXITY 4 1/11/2018 Marli Galdamez GO 1    95410022887 HC OT THER PROC EA 15 MIN 1/12/2018 Marli Galdamez GO 1    55605351806 HC OT THERAPEUTIC ACT EA 15 MIN 1/12/2018 Marli Galdamez GO 1               Marli Galdamez  1/12/2018

## 2018-01-12 NOTE — PROGRESS NOTES
Continued Stay Note  [facility]     Patient Name: Niall Murguia  MRN: [id number]  Today's Date: 1/12/2018    Admit Date: 1/7/2018          Discharge Plan       01/12/18 1602    Case Management/Social Work Plan    Plan Home    Patient/Family In Agreement With Plan yes    Additional Comments Followed up with pt regarding DCP; he is alone.  Pt reports that he is not sure what he will need and it will depend on how he feels at discharge.  Prior to admission pt was independent.  His adult son lives with him.  He hopes he will be able to go and not require any additional services.  CM informed they will continue to follow and assist with discharge as needed.              Discharge Codes     None            Irlanda Logan

## 2018-01-12 NOTE — SIGNIFICANT NOTE
01/12/18 1040   Rehab Treatment   Discipline physical therapist   Treatment Not Performed patient/family declined treatment  (Pt states he just returned from x -ray and also ambulated with OT a short while ago.  He reports that he is too fatigued to work with PT at this time.  PT will follow up with patient later and progress activity as tolerated and appropriate.)

## 2018-01-12 NOTE — PLAN OF CARE
Problem: NPPV/CPAP (Adult)  Intervention: Monitor/Manage Anxiety Related to NPPV/CPAP   01/12/18 0130   Coping Strategies   Trust Relationship/Rapport care explained;choices provided     Intervention: Prevent Aspiration During Therapy   01/12/18 0130   Positioning   Head Of Bed (HOB) Position HOB elevated       Goal: Signs and Symptoms of Listed Potential Problems Will be Absent or Manageable (NPPV/CPAP)  Outcome: Ongoing (interventions implemented as appropriate)  Patient would not wear duration of night   01/12/18 0130   NPPV/CPAP   Problems Assessed (NPPV/CPAP) hypoxia/hypoxemia;situational response;dry mucous membranes;skin breakdown   Problems Present (NPPV/CPAP) situational response

## 2018-01-12 NOTE — PROGRESS NOTES
"  CC: Hypoxic respiratory failure.  Pneumonia.  COPD    S: Currently off the BiPAP.  Continues to feel improved overall.  Still short of breath on minimal exertion.    O:Vital signs reviewed. O2Sat: 93% on 6-7 LPM [place].   /69 (BP Location: Right arm, Patient Position: Lying)  Pulse 90  Temp 98.1 °F (36.7 °C) (Oral)   Resp 17  Ht 172.7 cm (68\")  Wt 66.5 kg (146 lb 9.6 oz)  SpO2 90%  BMI 22.29 kg/m2      I & Os reviewed.   Intake/Output       01/11/18 0700 - 01/12/18 0659 01/12/18 0700 - 01/13/18 0659    Intake (ml) 350 --    Output (ml) 2150 500    Net (ml) -1800 -500    Last Weight 66.5 kg (146 lb 9.6 oz) --          General: Mild acute distress noted. On BiPAP.  Eyes: PERRL. EOM intact  Neck: Supple without JVD  Cardiovascular: S1 + S2. Regular.   Respiratory: Mild respiratory distress noted. No wheezing heard. Right basal crackles noted  Extremities: No edema noted.  Neurologic: AAOx3. Was able to follow commands.   Skin: Appeared without any overt rashes    Labs: Reviewed.     Results from last 7 days  Lab Units 01/11/18  1000 01/10/18  0428 01/09/18  0446 01/08/18  0531 01/07/18  0221   SODIUM mmol/L 135* 136* 135* 132* 134*   POTASSIUM mmol/L 4.0 4.4 4.0 4.4 4.0   CHLORIDE mmol/L 102 103 101 100 89*   CO2 mmol/L 28.0 29.0 29.0 25.0* 33.0*   BUN mg/dL 25* 38* 33* 29* 21*   CREATININE mg/dL 0.60 0.80 0.80 0.80 1.50*   CALCIUM mg/dL 8.3* 8.6 8.7 8.3* 10.0   BILIRUBIN mg/dL 0.5  --   --  0.7 1.1   ALK PHOS U/L 62  --   --  39 123   ALT (SGPT) U/L 114*  --   --  33 56   AST (SGOT) U/L 49*  --   --  22 39   GLUCOSE mg/dL 139* 113* 126* 126* 125*       Results from last 7 days  Lab Units 01/10/18  0428 01/09/18  0446 01/08/18  0531   MAGNESIUM mg/dL 2.5* 2.3 2.1   PHOSPHORUS mg/dL 3.0 2.1* 2.9       Results from last 7 days  Lab Units 01/11/18  1000 01/10/18  0428 01/09/18  0446 01/08/18  0531 01/07/18  0221   WBC 10*3/mm3 13.09* 12.70* 9.60 6.49 25.25*   HEMOGLOBIN g/dL 11.6* 10.9* 10.5* 11.3* 15.8 "   PLATELETS 10*3/mm3 136 154 149 128* 206       Assessment & Recommendations/Plan:   1.  Acute hypoxic respiratory failure  Continue oxygen supplementation    2.  Right lower lobe pneumonia  Continue Antibiotics. Currently on Levaquin.   MRSA identified in the sputum.  Sensitive to Levaquin.    3.  COPD  Continue nebulized treatments.  On Symbicort.    4.  Severe sepsis  Resolved.     5.  History of pneumothorax  No evidence of recurrence.    6.  Smoking  Will definitely need to quit    Continue BiPAP PRN and Overnight.     Chest x-ray pending from morning.    We have reviewed patient's current orders and changes, if any, have been suggested to primary care team. Plan was also discussed with nursing staff, as necessary.     Chelsea Baptiste MD  01/12/18  10:32 AM    Addendum:  Reviewed the chest x-ray with Dr. Mahmood.  This seems to be a small right hydropneumothorax likely secondary to his pneumonia.  We will advise discontinuation of BiPAP for now.  The patient is not symptomatic and actually is requiring less oxygen at this time than yesterday.    I discussed the need for repeat chest x-ray in about 4-5 hours.  If there is no further worsening, then we can stay conservative.    We have updated the admitting attending and nursing staff, as appropriate, on the patient's current status and plan. I will be going off shift tonight and will be unavailable.     Chelsea Baptiste MD  01/12/18  11:34 AM

## 2018-01-13 ENCOUNTER — APPOINTMENT (OUTPATIENT)
Dept: GENERAL RADIOLOGY | Facility: HOSPITAL | Age: 53
End: 2018-01-13

## 2018-01-13 PROBLEM — A41.9 SEPSIS DUE TO PNEUMONIA: Status: RESOLVED | Noted: 2018-01-07 | Resolved: 2018-01-13

## 2018-01-13 PROBLEM — J94.2 HEMOPNEUMOTHORAX ON RIGHT: Status: ACTIVE | Noted: 2018-01-13

## 2018-01-13 PROBLEM — J18.9 SEPSIS DUE TO PNEUMONIA (HCC): Status: RESOLVED | Noted: 2018-01-07 | Resolved: 2018-01-13

## 2018-01-13 PROCEDURE — 94799 UNLISTED PULMONARY SVC/PX: CPT

## 2018-01-13 PROCEDURE — 25010000002 ENOXAPARIN PER 10 MG: Performed by: INTERNAL MEDICINE

## 2018-01-13 PROCEDURE — 94660 CPAP INITIATION&MGMT: CPT

## 2018-01-13 PROCEDURE — 25010000002 FUROSEMIDE PER 20 MG: Performed by: INTERNAL MEDICINE

## 2018-01-13 PROCEDURE — 99233 SBSQ HOSP IP/OBS HIGH 50: CPT | Performed by: FAMILY MEDICINE

## 2018-01-13 PROCEDURE — 25010000002 METHYLPREDNISOLONE PER 40 MG: Performed by: FAMILY MEDICINE

## 2018-01-13 PROCEDURE — 71046 X-RAY EXAM CHEST 2 VIEWS: CPT

## 2018-01-13 RX ADMIN — IPRATROPIUM BROMIDE AND ALBUTEROL SULFATE 3 ML: .5; 3 SOLUTION RESPIRATORY (INHALATION) at 00:50

## 2018-01-13 RX ADMIN — BUDESONIDE AND FORMOTEROL FUMARATE DIHYDRATE 2 PUFF: 160; 4.5 AEROSOL RESPIRATORY (INHALATION) at 07:17

## 2018-01-13 RX ADMIN — Medication 250 MG: at 20:59

## 2018-01-13 RX ADMIN — LEVOFLOXACIN 500 MG: 500 TABLET, FILM COATED ORAL at 08:23

## 2018-01-13 RX ADMIN — CITALOPRAM HYDROBROMIDE 40 MG: 20 TABLET, FILM COATED ORAL at 08:23

## 2018-01-13 RX ADMIN — NICOTINE 1 PATCH: 21 PATCH TRANSDERMAL at 21:00

## 2018-01-13 RX ADMIN — FUROSEMIDE 20 MG: 10 INJECTION, SOLUTION INTRAMUSCULAR; INTRAVENOUS at 08:25

## 2018-01-13 RX ADMIN — ENOXAPARIN SODIUM 40 MG: 40 INJECTION SUBCUTANEOUS at 20:59

## 2018-01-13 RX ADMIN — BUDESONIDE AND FORMOTEROL FUMARATE DIHYDRATE 2 PUFF: 160; 4.5 AEROSOL RESPIRATORY (INHALATION) at 18:27

## 2018-01-13 RX ADMIN — Medication 250 MG: at 08:22

## 2018-01-13 RX ADMIN — METHYLPREDNISOLONE SODIUM SUCCINATE 40 MG: 40 INJECTION, POWDER, FOR SOLUTION INTRAMUSCULAR; INTRAVENOUS at 10:23

## 2018-01-13 RX ADMIN — METHYLPREDNISOLONE SODIUM SUCCINATE 40 MG: 40 INJECTION, POWDER, FOR SOLUTION INTRAMUSCULAR; INTRAVENOUS at 02:45

## 2018-01-13 RX ADMIN — IPRATROPIUM BROMIDE AND ALBUTEROL SULFATE 3 ML: .5; 3 SOLUTION RESPIRATORY (INHALATION) at 18:27

## 2018-01-13 RX ADMIN — GUAIFENESIN 600 MG: 600 TABLET, EXTENDED RELEASE ORAL at 20:59

## 2018-01-13 RX ADMIN — GUAIFENESIN 600 MG: 600 TABLET, EXTENDED RELEASE ORAL at 08:23

## 2018-01-13 RX ADMIN — METHYLPREDNISOLONE SODIUM SUCCINATE 40 MG: 40 INJECTION, POWDER, FOR SOLUTION INTRAMUSCULAR; INTRAVENOUS at 17:38

## 2018-01-13 RX ADMIN — IPRATROPIUM BROMIDE AND ALBUTEROL SULFATE 3 ML: .5; 3 SOLUTION RESPIRATORY (INHALATION) at 07:17

## 2018-01-13 NOTE — PROGRESS NOTES
[facility]    [facility]    Name:  Niall Murguia   Age:  52 y.o.  Sex:  male  :  1965  MRN:  [id number]   Visit Number:  [id number]  Admission Date:  2018  Date Of Service:  18  Primary Care Physician:  KEAGAN Rowley     LOS: 6 days :  Patient Care Team:  KEAGAN Rowley as PCP - General (Family Medicine):    Chief Complaint:          Subjective / Interval History:     I have reviewed labs/imaging/records from this hospitalization, including ER staff and admitting/attending physicians H/P's and progress notes to establish a comprehensive understanding of this patient's clinical hospital course, as well as to establish a transition of care appropriately.    Patient is a 52-year-old male who was admitted secondary to acute respiratory failure with hypoxia as a result of extensive right-sided pneumonia with severe sepsis.  He has responded well to aggressive fluid hydration as well as continued antibiotic coverage.  Pulmonology will be seeing patient again tomorrow.    Patient reports mild improvement from the previous day with respect to overall strength and continued decreasing respiratory symptoms.  No reports of fever or chills to the course of the evening.  No hemoptysis reported.  Tolerating by mouth intake.  No reports of hematuria or dysuria, without bright red blood or black tarry stools.    Patient has maintained good oxygen saturations with approximately now 5 L nasal cannula support.  Has not used BiPAP any further since development of hydropneumothorax.  His repeat chest x-ray yesterday afternoon demonstrated stability.  Repeat chest x-ray again this morning was consistent with previous day's size.      Review of Systems:     General ROS: Patient denies any fevers, chills or loss of consciousness.  Respiratory ROS: Phlegm producing cough, without continuous shortness of breath.  Cardiovascular ROS: Denies chest pain or  palpitations. No history of exertional chest pain.  Gastrointestinal ROS: Denies nausea and vomiting. Denies any abdominal pain. No diarrhea.  Neurological ROS: Generalized weakness. No loss of consciousness. Denies any numbness. Denies neck pain.  Dermatological ROS: Denies any redness or pruritis.    Vital Signs:    Temp:  [97.9 °F (36.6 °C)-98.4 °F (36.9 °C)] 97.9 °F (36.6 °C)  Heart Rate:  [66-94] 94  Resp:  [16-20] 18  BP: (118-137)/(68-84) 118/75    Intake and output:    I/O last 3 completed shifts:  In: -   Out: 3100 [Urine:3100]  I/O this shift:  In: -   Out: 900 [Urine:900]    Physical Examination:    General Appearance:  Alert and cooperative, not in any acute distress.  Chronically ill-appearing male.     Head:  Atraumatic and normocephalic, without obvious abnormality.   Eyes:          PERRLA, conjunctivae and sclerae normal, no Icterus. No pallor. Extra-occular movements are within normal limits.   Neck: Supple, trachea midline, no thyromegaly, no carotid bruit.   Lungs:   Chest shape is normal.  Audible air exchange noted all lung fields, mildly reduced to bilateral bases with rhonchus referred upper airway congestion.     Heart:  Normal S1 and S2, no murmur, no gallop, no rub. No JVD   Abdomen:   Normal bowel sounds, no masses, no organomegaly. Soft       non-tender, non-distended, no guarding, no rebound tenderness.   Extremities: Moves all extremities well, no edema, no cyanosis, no            clubbing.   Skin: No bleeding, bruising or rash.   Neurologic: Awake, alert and oriented times 3. Moves all 4 extremities equally.   Laboratory results:      Results from last 7 days  Lab Units 01/11/18  1000 01/10/18  0428 01/09/18  0446 01/08/18  0531 01/07/18  0221   SODIUM mmol/L 135* 136* 135* 132* 134*   POTASSIUM mmol/L 4.0 4.4 4.0 4.4 4.0   CHLORIDE mmol/L 102 103 101 100 89*   CO2 mmol/L 28.0 29.0 29.0 25.0* 33.0*   BUN mg/dL 25* 38* 33* 29* 21*   CREATININE mg/dL 0.60 0.80 0.80 0.80 1.50*   CALCIUM  mg/dL 8.3* 8.6 8.7 8.3* 10.0   BILIRUBIN mg/dL 0.5  --   --  0.7 1.1   ALK PHOS U/L 62  --   --  39 123   ALT (SGPT) U/L 114*  --   --  33 56   AST (SGOT) U/L 49*  --   --  22 39   GLUCOSE mg/dL 139* 113* 126* 126* 125*       Results from last 7 days  Lab Units 01/11/18  1000 01/10/18  0428 01/09/18  0446   WBC 10*3/mm3 13.09* 12.70* 9.60   HEMOGLOBIN g/dL 11.6* 10.9* 10.5*   HEMATOCRIT % 34.5* 32.7* 30.8*   PLATELETS 10*3/mm3 136 154 149           Results from last 7 days  Lab Units 01/07/18  0221   TROPONIN I ng/mL <0.012       Results from last 7 days  Lab Units 01/07/18  0245 01/07/18  0241   BLOODCX  No growth at 5 days No growth at 5 days           I have reviewed the patient's laboratory results.    Radiology results:    Imaging Results (last 24 hours)     Procedure Component Value Units Date/Time    XR Chest PA & Lateral [[id number]] Collected:  01/12/18 1415     Updated:  01/12/18 1419    Narrative:       PROCEDURE: XR CHEST PA AND LATERAL-        HISTORY: R pneumothorax/RLL pneumonia/bullous emphysema;  J18.9-Pneumonia, unspecified organism; A41.9-Sepsis, unspecified  organism; N17.9-Acute kidney failure, unspecified; Z74.09-Other reduced  mobility; Z74.09-Other reduced mobility     COMPARISON: 1/12/2018.     FINDINGS: The heart is normal in size. The mediastinum is unremarkable.  There has been no significant change in the patient's small right  hydropneumothorax. There are emphysematous changes. Airspace disease is  noted in the right middle and lower lobes and there is patchy airspace  disease in the left lung base which is unchanged. There are no acute  osseous abnormalities.           Impression:       No significant change in the patient's small right  hydropneumothorax.           This report was finalized on 1/12/2018 2:17 PM by Larry Grewal M.D..    XR Chest PA & Lateral [[id number]] Collected:  01/13/18 0901     Updated:  01/13/18 0902    Narrative:       FINAL REPORT    CLINICAL  HISTORY:  Bullous emphysema/RLL pneumonia, R hydropneumothorax    FINDINGS:  Two views of the chest were obtained and compared to prior exam  from January 7, 2018.  The heart size and mediastinum are  normal.  There is a moderate sized (10%) right  hydropneumothorax.  Right perihilar opacity is stable.      Impression:       10% right hydropneumothorax.    Stable right perihilar opacity.    Authenticated by Alan Ramos MD on 01/13/2018 09:01:18 AM          I have reviewed the patient's radiology reports.    Medication Review:     I have reviewed the patients active and prn medications.         Assessment:  Principal Problem:    Acute respiratory failure with hypoxia  Active Problems:    Hemopneumothorax on right    Pneumonia of both lower lobes    Chronic bullous emphysema        Plan:  Continue IV antibiotics at this time.  Plan to continue Solu-Medrol at every 8 hour dosing.  I've discussed the case in detail with his pulmonologist Dr. Baptiste again this morning by phone and updated him on his stable hydropneumothorax, planned repeat chest x-ray tomorrow morning.  Patient verbalized understanding of the plan of care and agrees. Hopeful for improved ambulation, physical therapy and occupational therapy are working with patient.  We'll continue to hold BiPAP therapy at this time.      I spent a total of 35 minutes in direct care time today.    Gilberto Mahmood,   01/13/18  11:17 AM

## 2018-01-13 NOTE — PLAN OF CARE
Problem: Sepsis (Adult)  Goal: Signs and Symptoms of Listed Potential Problems Will be Absent or Manageable (Sepsis)  Outcome: Ongoing (interventions implemented as appropriate)      Problem: NPPV/CPAP (Adult)  Goal: Signs and Symptoms of Listed Potential Problems Will be Absent or Manageable (NPPV/CPAP)  Outcome: Ongoing (interventions implemented as appropriate)

## 2018-01-13 NOTE — PLAN OF CARE
Problem: Respiratory Insufficiency (Adult)  Goal: Identify Related Risk Factors and Signs and Symptoms  Outcome: Ongoing (interventions implemented as appropriate)

## 2018-01-13 NOTE — PLAN OF CARE
Problem: Respiratory Insufficiency (Adult)  Goal: Effective Ventilation  Outcome: Ongoing (interventions implemented as appropriate)

## 2018-01-14 ENCOUNTER — APPOINTMENT (OUTPATIENT)
Dept: GENERAL RADIOLOGY | Facility: HOSPITAL | Age: 53
End: 2018-01-14

## 2018-01-14 ENCOUNTER — HOSPITAL ENCOUNTER (INPATIENT)
Facility: HOSPITAL | Age: 53
LOS: 10 days | Discharge: HOME | End: 2018-01-24
Attending: INTERNAL MEDICINE | Admitting: INTERNAL MEDICINE

## 2018-01-14 VITALS
WEIGHT: 137.5 LBS | OXYGEN SATURATION: 94 % | SYSTOLIC BLOOD PRESSURE: 129 MMHG | TEMPERATURE: 98.5 F | HEART RATE: 97 BPM | DIASTOLIC BLOOD PRESSURE: 79 MMHG | RESPIRATION RATE: 18 BRPM | HEIGHT: 68 IN | BODY MASS INDEX: 20.84 KG/M2

## 2018-01-14 DIAGNOSIS — Z74.09 IMPAIRED FUNCTIONAL MOBILITY, BALANCE, GAIT, AND ENDURANCE: Primary | ICD-10-CM

## 2018-01-14 DIAGNOSIS — J96.21 ACUTE ON CHRONIC RESPIRATORY FAILURE WITH HYPOXIA (HCC): ICD-10-CM

## 2018-01-14 DIAGNOSIS — J43.9 CHRONIC BULLOUS EMPHYSEMA (HCC): Chronic | ICD-10-CM

## 2018-01-14 PROBLEM — I10 HYPERTENSION: Status: ACTIVE | Noted: 2018-01-14

## 2018-01-14 PROBLEM — Z72.0 TOBACCO USE: Status: ACTIVE | Noted: 2018-01-14

## 2018-01-14 PROBLEM — J94.8 HYDROPNEUMOTHORAX: Status: ACTIVE | Noted: 2018-01-13

## 2018-01-14 LAB
ALBUMIN SERPL-MCNC: 3 G/DL (ref 3.5–5)
ALBUMIN/GLOB SERPL: 0.9 G/DL (ref 1–2)
ALP SERPL-CCNC: 72 U/L (ref 38–126)
ALT SERPL W P-5'-P-CCNC: 222 U/L (ref 13–69)
ANION GAP SERPL CALCULATED.3IONS-SCNC: 7.9 MMOL/L
APTT PPP: 27.8 SECONDS (ref 24–31)
AST SERPL-CCNC: 70 U/L (ref 15–46)
BASOPHILS # BLD AUTO: 0.05 10*3/MM3 (ref 0–0.2)
BASOPHILS NFR BLD AUTO: 0.3 % (ref 0–2.5)
BILIRUB SERPL-MCNC: 0.7 MG/DL (ref 0.2–1.3)
BUN BLD-MCNC: 15 MG/DL (ref 7–20)
BUN/CREAT SERPL: 25 (ref 6.3–21.9)
CALCIUM SPEC-SCNC: 8.6 MG/DL (ref 8.4–10.2)
CHLORIDE SERPL-SCNC: 98 MMOL/L (ref 98–107)
CO2 SERPL-SCNC: 35 MMOL/L (ref 26–30)
CREAT BLD-MCNC: 0.6 MG/DL (ref 0.6–1.3)
DEPRECATED RDW RBC AUTO: 46.2 FL (ref 37–54)
EOSINOPHIL # BLD AUTO: 0 10*3/MM3 (ref 0–0.7)
EOSINOPHIL NFR BLD AUTO: 0 % (ref 0–7)
ERYTHROCYTE [DISTWIDTH] IN BLOOD BY AUTOMATED COUNT: 12.3 % (ref 11.5–14.5)
GFR SERPL CREATININE-BSD FRML MDRD: 142 ML/MIN/1.73
GLOBULIN UR ELPH-MCNC: 3.5 GM/DL
GLUCOSE BLD-MCNC: 142 MG/DL (ref 74–98)
HCT VFR BLD AUTO: 38.1 % (ref 42–52)
HGB BLD-MCNC: 12.9 G/DL (ref 14–18)
IMM GRANULOCYTES # BLD: 1.01 10*3/MM3 (ref 0–0.06)
IMM GRANULOCYTES NFR BLD: 5.6 % (ref 0–0.6)
LYMPHOCYTES # BLD AUTO: 0.48 10*3/MM3 (ref 0.6–3.4)
LYMPHOCYTES NFR BLD AUTO: 2.6 % (ref 10–50)
MCH RBC QN AUTO: 35 PG (ref 27–31)
MCHC RBC AUTO-ENTMCNC: 33.9 G/DL (ref 30–37)
MCV RBC AUTO: 103.3 FL (ref 80–94)
MONOCYTES # BLD AUTO: 0.56 10*3/MM3 (ref 0–0.9)
MONOCYTES NFR BLD AUTO: 3.1 % (ref 0–12)
NEUTROPHILS # BLD AUTO: 16.02 10*3/MM3 (ref 2–6.9)
NEUTROPHILS NFR BLD AUTO: 88.4 % (ref 37–80)
NRBC BLD MANUAL-RTO: 0 /100 WBC (ref 0–0)
PLAT MORPH BLD: NORMAL
PLATELET # BLD AUTO: 208 10*3/MM3 (ref 130–400)
PMV BLD AUTO: 9.6 FL (ref 6–12)
POTASSIUM BLD-SCNC: 3.9 MMOL/L (ref 3.5–5.1)
PROT SERPL-MCNC: 6.5 G/DL (ref 6.3–8.2)
RBC # BLD AUTO: 3.69 10*6/MM3 (ref 4.7–6.1)
RBC MORPH BLD: NORMAL
SODIUM BLD-SCNC: 137 MMOL/L (ref 137–145)
WBC MORPH BLD: NORMAL
WBC NRBC COR # BLD: 18.12 10*3/MM3 (ref 4.8–10.8)

## 2018-01-14 PROCEDURE — 94640 AIRWAY INHALATION TREATMENT: CPT

## 2018-01-14 PROCEDURE — 94660 CPAP INITIATION&MGMT: CPT

## 2018-01-14 PROCEDURE — 85007 BL SMEAR W/DIFF WBC COUNT: CPT | Performed by: FAMILY MEDICINE

## 2018-01-14 PROCEDURE — 94799 UNLISTED PULMONARY SVC/PX: CPT

## 2018-01-14 PROCEDURE — 99223 1ST HOSP IP/OBS HIGH 75: CPT | Performed by: INTERNAL MEDICINE

## 2018-01-14 PROCEDURE — 25010000002 VANCOMYCIN: Performed by: INTERNAL MEDICINE

## 2018-01-14 PROCEDURE — 97110 THERAPEUTIC EXERCISES: CPT

## 2018-01-14 PROCEDURE — 85730 THROMBOPLASTIN TIME PARTIAL: CPT | Performed by: NURSE PRACTITIONER

## 2018-01-14 PROCEDURE — 99406 BEHAV CHNG SMOKING 3-10 MIN: CPT | Performed by: NURSE PRACTITIONER

## 2018-01-14 PROCEDURE — 99239 HOSP IP/OBS DSCHRG MGMT >30: CPT | Performed by: FAMILY MEDICINE

## 2018-01-14 PROCEDURE — 85025 COMPLETE CBC W/AUTO DIFF WBC: CPT | Performed by: FAMILY MEDICINE

## 2018-01-14 PROCEDURE — 99232 SBSQ HOSP IP/OBS MODERATE 35: CPT | Performed by: INTERNAL MEDICINE

## 2018-01-14 PROCEDURE — 25010000002 METHYLPREDNISOLONE PER 40 MG: Performed by: FAMILY MEDICINE

## 2018-01-14 PROCEDURE — 25010000002 CEFTRIAXONE PER 250 MG: Performed by: NURSE PRACTITIONER

## 2018-01-14 PROCEDURE — 71046 X-RAY EXAM CHEST 2 VIEWS: CPT

## 2018-01-14 PROCEDURE — 80053 COMPREHEN METABOLIC PANEL: CPT | Performed by: FAMILY MEDICINE

## 2018-01-14 PROCEDURE — 25010000002 FUROSEMIDE PER 20 MG: Performed by: INTERNAL MEDICINE

## 2018-01-14 PROCEDURE — 97116 GAIT TRAINING THERAPY: CPT

## 2018-01-14 RX ORDER — NICOTINE 21 MG/24HR
1 PATCH, TRANSDERMAL 24 HOURS TRANSDERMAL EVERY 24 HOURS
Status: DISCONTINUED | OUTPATIENT
Start: 2018-01-14 | End: 2018-01-24 | Stop reason: HOSPADM

## 2018-01-14 RX ORDER — NICOTINE 21 MG/24HR
1 PATCH, TRANSDERMAL 24 HOURS TRANSDERMAL EVERY 24 HOURS
Start: 2018-01-14 | End: 2018-04-05

## 2018-01-14 RX ORDER — ONDANSETRON 2 MG/ML
4 INJECTION INTRAMUSCULAR; INTRAVENOUS EVERY 6 HOURS PRN
Status: DISCONTINUED | OUTPATIENT
Start: 2018-01-14 | End: 2018-01-24 | Stop reason: HOSPADM

## 2018-01-14 RX ORDER — PREDNISONE 10 MG/1
10 TABLET ORAL DAILY
Status: DISCONTINUED | OUTPATIENT
Start: 2018-01-21 | End: 2018-01-14

## 2018-01-14 RX ORDER — PREDNISONE 10 MG/1
5 TABLET ORAL DAILY
Status: DISCONTINUED | OUTPATIENT
Start: 2018-01-24 | End: 2018-01-14

## 2018-01-14 RX ORDER — SACCHAROMYCES BOULARDII 250 MG
250 CAPSULE ORAL 2 TIMES DAILY
Status: DISCONTINUED | OUTPATIENT
Start: 2018-01-14 | End: 2018-01-24 | Stop reason: HOSPADM

## 2018-01-14 RX ORDER — CEFTRIAXONE SODIUM 1 G/50ML
1 INJECTION, SOLUTION INTRAVENOUS EVERY 24 HOURS
Status: DISCONTINUED | OUTPATIENT
Start: 2018-01-14 | End: 2018-01-15

## 2018-01-14 RX ORDER — BUDESONIDE AND FORMOTEROL FUMARATE DIHYDRATE 160; 4.5 UG/1; UG/1
2 AEROSOL RESPIRATORY (INHALATION)
Status: DISCONTINUED | OUTPATIENT
Start: 2018-01-14 | End: 2018-01-24 | Stop reason: HOSPADM

## 2018-01-14 RX ORDER — SACCHAROMYCES BOULARDII 250 MG
250 CAPSULE ORAL 2 TIMES DAILY
Start: 2018-01-14 | End: 2018-04-05

## 2018-01-14 RX ORDER — BUDESONIDE AND FORMOTEROL FUMARATE DIHYDRATE 160; 4.5 UG/1; UG/1
2 AEROSOL RESPIRATORY (INHALATION)
Refills: 12
Start: 2018-01-14 | End: 2018-04-05

## 2018-01-14 RX ORDER — PREDNISONE 20 MG/1
20 TABLET ORAL DAILY
Status: DISCONTINUED | OUTPATIENT
Start: 2018-01-15 | End: 2018-01-15

## 2018-01-14 RX ORDER — ACETAMINOPHEN 325 MG/1
650 TABLET ORAL EVERY 4 HOURS PRN
Status: DISCONTINUED | OUTPATIENT
Start: 2018-01-14 | End: 2018-01-24 | Stop reason: HOSPADM

## 2018-01-14 RX ORDER — ACETAMINOPHEN 325 MG/1
650 TABLET ORAL EVERY 4 HOURS PRN
Status: ON HOLD
Start: 2018-01-14 | End: 2020-06-26

## 2018-01-14 RX ORDER — SODIUM CHLORIDE 0.9 % (FLUSH) 0.9 %
1-10 SYRINGE (ML) INJECTION AS NEEDED
Start: 2018-01-14 | End: 2018-01-24 | Stop reason: HOSPADM

## 2018-01-14 RX ORDER — PREDNISONE 10 MG/1
15 TABLET ORAL DAILY
Status: DISCONTINUED | OUTPATIENT
Start: 2018-01-18 | End: 2018-01-14

## 2018-01-14 RX ORDER — CITALOPRAM 20 MG/1
40 TABLET ORAL DAILY
Status: DISCONTINUED | OUTPATIENT
Start: 2018-01-15 | End: 2018-01-18

## 2018-01-14 RX ORDER — VANCOMYCIN HYDROCHLORIDE 1 G/200ML
15 INJECTION, SOLUTION INTRAVENOUS EVERY 12 HOURS
Status: DISCONTINUED | OUTPATIENT
Start: 2018-01-15 | End: 2018-01-16

## 2018-01-14 RX ORDER — SODIUM CHLORIDE 0.9 % (FLUSH) 0.9 %
1-10 SYRINGE (ML) INJECTION AS NEEDED
Status: DISCONTINUED | OUTPATIENT
Start: 2018-01-14 | End: 2018-01-24 | Stop reason: HOSPADM

## 2018-01-14 RX ORDER — ONDANSETRON 4 MG/1
4 TABLET, FILM COATED ORAL EVERY 6 HOURS PRN
Status: DISCONTINUED | OUTPATIENT
Start: 2018-01-14 | End: 2018-01-24 | Stop reason: HOSPADM

## 2018-01-14 RX ORDER — LEVOFLOXACIN 500 MG/1
500 TABLET, FILM COATED ORAL EVERY 24 HOURS
Qty: 3 TABLET | Refills: 0
Start: 2018-01-15 | End: 2018-01-24 | Stop reason: HOSPADM

## 2018-01-14 RX ORDER — SODIUM CHLORIDE 0.9 % (FLUSH) 0.9 %
10 SYRINGE (ML) INJECTION AS NEEDED
Start: 2018-01-14 | End: 2018-01-24 | Stop reason: HOSPADM

## 2018-01-14 RX ORDER — ONDANSETRON 2 MG/ML
4 INJECTION INTRAMUSCULAR; INTRAVENOUS EVERY 6 HOURS PRN
Start: 2018-01-14 | End: 2018-04-05

## 2018-01-14 RX ORDER — METHYLPREDNISOLONE SODIUM SUCCINATE 40 MG/ML
40 INJECTION, POWDER, LYOPHILIZED, FOR SOLUTION INTRAMUSCULAR; INTRAVENOUS EVERY 8 HOURS
Start: 2018-01-14 | End: 2018-01-24 | Stop reason: HOSPADM

## 2018-01-14 RX ORDER — WHITE PETROLATUM 450 MG/G
1 STICK TOPICAL
Start: 2018-01-14 | End: 2018-01-24 | Stop reason: HOSPADM

## 2018-01-14 RX ORDER — IPRATROPIUM BROMIDE AND ALBUTEROL SULFATE 2.5; .5 MG/3ML; MG/3ML
3 SOLUTION RESPIRATORY (INHALATION) EVERY 4 HOURS PRN
Status: DISCONTINUED | OUTPATIENT
Start: 2018-01-14 | End: 2018-01-24 | Stop reason: HOSPADM

## 2018-01-14 RX ORDER — DOXYCYCLINE HYCLATE 100 MG/1
100 CAPSULE ORAL EVERY 12 HOURS SCHEDULED
Status: DISCONTINUED | OUTPATIENT
Start: 2018-01-14 | End: 2018-01-15

## 2018-01-14 RX ORDER — IPRATROPIUM BROMIDE AND ALBUTEROL SULFATE 2.5; .5 MG/3ML; MG/3ML
3 SOLUTION RESPIRATORY (INHALATION) EVERY 4 HOURS PRN
Qty: 360 ML
Start: 2018-01-14 | End: 2018-04-05

## 2018-01-14 RX ADMIN — CEFTRIAXONE SODIUM 1 G: 1 INJECTION, SOLUTION INTRAVENOUS at 21:53

## 2018-01-14 RX ADMIN — LEVOFLOXACIN 500 MG: 500 TABLET, FILM COATED ORAL at 08:44

## 2018-01-14 RX ADMIN — Medication 250 MG: at 08:44

## 2018-01-14 RX ADMIN — GUAIFENESIN 600 MG: 600 TABLET, EXTENDED RELEASE ORAL at 08:44

## 2018-01-14 RX ADMIN — METHYLPREDNISOLONE SODIUM SUCCINATE 40 MG: 40 INJECTION, POWDER, FOR SOLUTION INTRAMUSCULAR; INTRAVENOUS at 01:26

## 2018-01-14 RX ADMIN — Medication 250 MG: at 21:53

## 2018-01-14 RX ADMIN — METHYLPREDNISOLONE SODIUM SUCCINATE 40 MG: 40 INJECTION, POWDER, FOR SOLUTION INTRAMUSCULAR; INTRAVENOUS at 09:00

## 2018-01-14 RX ADMIN — METHYLPREDNISOLONE SODIUM SUCCINATE 40 MG: 40 INJECTION, POWDER, FOR SOLUTION INTRAMUSCULAR; INTRAVENOUS at 17:07

## 2018-01-14 RX ADMIN — FUROSEMIDE 20 MG: 10 INJECTION, SOLUTION INTRAMUSCULAR; INTRAVENOUS at 08:45

## 2018-01-14 RX ADMIN — VANCOMYCIN HYDROCHLORIDE 1250 MG: 10 INJECTION, POWDER, LYOPHILIZED, FOR SOLUTION INTRAVENOUS at 23:11

## 2018-01-14 RX ADMIN — IPRATROPIUM BROMIDE AND ALBUTEROL SULFATE 3 ML: .5; 3 SOLUTION RESPIRATORY (INHALATION) at 08:27

## 2018-01-14 RX ADMIN — BUDESONIDE AND FORMOTEROL FUMARATE DIHYDRATE 2 PUFF: 160; 4.5 AEROSOL RESPIRATORY (INHALATION) at 21:24

## 2018-01-14 RX ADMIN — NICOTINE 1 PATCH: 21 PATCH, EXTENDED RELEASE TRANSDERMAL at 21:52

## 2018-01-14 RX ADMIN — CITALOPRAM HYDROBROMIDE 40 MG: 20 TABLET, FILM COATED ORAL at 08:44

## 2018-01-14 RX ADMIN — IPRATROPIUM BROMIDE AND ALBUTEROL SULFATE 3 ML: .5; 3 SOLUTION RESPIRATORY (INHALATION) at 01:08

## 2018-01-14 RX ADMIN — DOXYCYCLINE HYCLATE 100 MG: 100 CAPSULE ORAL at 21:53

## 2018-01-14 NOTE — PLAN OF CARE
Problem: Patient Care Overview (Adult)  Goal: Plan of Care Review  Outcome: Ongoing (interventions implemented as appropriate)   01/14/18 1989   Coping/Psychosocial Response Interventions   Plan Of Care Reviewed With patient   Patient Care Overview   Progress improving   Outcome Evaluation   Outcome Summary/Follow up Plan Patient's O2 was decreased from 7L to 5L on dayshift wth his oxygen staying above 90%. Will continue to monitor.

## 2018-01-14 NOTE — PLAN OF CARE
Problem: Patient Care Overview (Adult)  Goal: Plan of Care Review  Outcome: Ongoing (interventions implemented as appropriate)   01/14/18 1109   Coping/Psychosocial Response Interventions   Plan Of Care Reviewed With patient   Patient Care Overview   Progress improving   Outcome Evaluation   Outcome Summary/Follow up Plan Pt increased distance amb with slight dcrease in O2 SATS with cont O2 portable at 6 L/min. Con't with PT POC       Problem: Inpatient Physical Therapy  Goal: Transfer Training Goal 1 LTG- PT  Outcome: Ongoing (interventions implemented as appropriate)   01/11/18 1145 01/14/18 1109   Transfer Training PT LTG   Transfer Training PT LTG, Date Established 01/11/18 --    Transfer Training PT LTG, Time to Achieve 2 wks --    Transfer Training PT LTG, Activity Type sit to stand/stand to sit;bed to chair /chair to bed --    Transfer Training PT LTG, [place] Level supervision required --    Transfer Training PT LTG, Outcome --  goal ongoing     Goal: Gait Training Goal LTG- PT  Outcome: Ongoing (interventions implemented as appropriate)   01/11/18 1145 01/14/18 1109   Gait Training PT LTG   Gait Training Goal PT LTG, Date Established 01/11/18 --    Gait Training Goal PT LTG, Time to Achieve 2 wks --    Gait Training Goal PT LTG, [place] Level supervision required --    Gait Training Goal PT LTG, Distance to Achieve 400 --    Gait Training Goal PT LTG, Additional Goal Maintain O2 sats above 90%. --    Gait Training Goal PT LTG, Outcome --  goal ongoing     Goal: Strength Goal LTG- PT  Outcome: Ongoing (interventions implemented as appropriate)   01/11/18 1145 01/14/18 1109   Strength Goal PT LTG   Strength Goal PT LTG, Date Established 01/11/18 --    Strength Goal PT LTG, Time to Achieve 2 wks --    Strength Goal PT LTG, Measure to Achieve Patient will perform B LE ther ex x 15 reps to improve functional strength for mobility. --    Strength Goal PT LTG, Outcome --  goal partially met

## 2018-01-14 NOTE — NURSING NOTE
ACC REVIEW REPORT: [facility]        PATIENT NAME: Niall Murguia    PATIENT ID: [id number]    BED:  4H / s466    BED TYPE:  TELEMETRY    BED GIVEN TO:   CHRISTIAN RANDHAWA RN    TIME BED GIVEN:  16:51    YOB: 1965    AGE:   52    GENDER:  MALE    PREVIOUS ADMIT TO [facility]:  NO    PREVIOUS ADMISSION DATE:  NONE    PATIENT CLASS:  INPATIENT    TODAY'S DATE: 1/14/2018    TRANSFER DATE:   01/14/2018    ETA:  18:00 - 18:30    TRANSFERRING FACILITY:  [facility]        TRANSFERRING FACILITY PHONE # :  [facility] # [phone]    TRANSFERRING MD:  DR. GORDON SIBLEY, DO    DATE/TIME REQUEST RECEIVED:  01/14/2018 @ 14:45    [facility] RN:   TRUE MACK RN    REPORT FROM:  CHRISTIAN RANDHAWA RN    TIME REPORT TAKEN:  16:51    DIAGNOSIS:  PNEUMONIA    REASON FOR TRANSFER TO [facility]:   There is no CT Surgeon available at [facility], Patient has had an association with Dr. Roland Ge in the past, specifics of date, time, and purpose are unknown at the time of report.     TRANSPORTATION:  [facility]    CLINICAL REASON FOR TRANSFER TO [facility]:  Pt clinical history is Bullous emphysema/RLL Pneumonia, Right Hydropneumothorax.       CLINICAL INFORMATION    HEIGHT:  137 lb    WEIGHT:  68 inches    ALLERGIES:  PATRICIA GODDARD:  None    INFECTIOUS DISEASE:  MRSA IN SPUTUM 1/09/2018    ISOLATION:  CONTACT    1ST VITAL SIGNS:   TIME:  16:00  TEMP:  98.5  PULSE: 102   B/P:  129/79  RESP:  20, O2 Sat 96% on 5L/NC    LAB INFORMATION:  01/14/2018 @ 13:00   WBC 18.12, H/H 12.9/38.1,  PLTS.  208,  Glucose 142, AST 49,        ,  K+ 3.9, BUN 15, Creatinine 0.60,       CULTURE INFORMATION:  SPUTUM CULTURE POSITIVE FOR MRSA ON 01/09/18    MEDS/IV FLUIDS:  IV ACCESS # 22 GA LEFT FOREARM PLACED 01/14/18 TO SALINE LOCK.  MEDICATIONS;  PO LEVAQUIN FOR MRSA.       CARDIAC SYSTEM:    RHYTHM:   NORMAL SINUS RHYTHM, SINUS TACHYCARDIA    Is patient taking or has patient been given any drugs that could increase bleeding?   NONE OF  THESE.  (Plavix, Brilinta, Effient, Eliquis, Xarelto, Warfarin, Integrilin, Angiomax)      CARDIAC NOTES:  The patient has had no cardiac procedures at [facility].  He has been in Sinus Rhythm, Sinus Tachycardia without ectopy since admission.       RESPIRATORY SYSTEM:    LUNG SOUNDS:    DIMINISHED:  YES    ABG DATE:  01/08/2018        ABG TIME:   UNKNOWN    ABG RESULTS:    PH:  7.4  PO2:  61.8  PCO2: 39.7   HCO3:  26.7  O2 SAT:  92-94% ON 5L/NC, Pt was on BI PAP x 2 nights and then changed to nasal cannula per patients desire.    RADIOLOGY RESULTS:  CHEST X-RAY 01/14/2018 @ 14:03                                               Two views of the chest were performed.  The heart is normal in size. The mediastinum is unremarkable.  The lung fields demonstrate NO CHANGE IN THE RIGHT HYDROPNEUMOTHORAX and PERIHILAR OPACITIES.  There is no pneumothorax.  The osseous structures are unremarkable.     RESPIRATORY STATUS:  Pt is ambulatory but desats and becomes short of air if off his oxygen.  On his oxygen he is short of air but is able to stand at bedside without difficulty and with stand by assist.       CNS/MUSCULOSKELETAL    ALERT AND ORIENTED:    PERSON:  YES  PLACE:  YES  TIME:  YES    DERICK COMA SCALE:    E:   4  M:  6  V:  5      CNS/MUSCULOSKELETAL NOTES:   MR COREA IS ALERT AND ORIENTED.  HE HAS HAD SOMEONE AT BEDSIDE TODAY, RELATIONSHIP IS UNKNOWN.  HE IS UP WITH STAND BY ASSIST.  HE HAS BEEN ABLE TO AMBULATE WITH MINIMAL DIFFICULTY.     GI//GY    ABDOMINAL PAIN:  NO    VOMITING:  NO    DIARRHEA:  NO    NAUSEA:   NO    BOWEL SOUNDS:  ACTIVE    TESTICULAR PAIN:  NO    HEMATURIA:  NONE    GI//GY NOTES:  Pt takes diet without difficulty.     ADDITIONAL NOTES:  Patient's nurse will call when EMS is leaving with patient.     SURGICAL HISTORY;  THE PT HAD A DECORTICATION AND PEEL BY DR. LEXIS GARCIA IN 2010.           Ambika Bhagat RN  1/14/2018  3:18 PM

## 2018-01-14 NOTE — PROGRESS NOTES
"  CC: Hypoxic respiratory failure.  Pneumonia.  COPD    S: BiPAP was discontinued because of right-sided pneumothorax which was found incidentally on the chest x-ray.  Continues to feel improved overall.  Was able to ambulate in the hallway with oxygen saturation remaining more than 90% throughout.    O:Vital signs reviewed. O2Sat: 91% on 5-6 LPM [place].   /84 (BP Location: Right arm, Patient Position: Lying)  Pulse 72  Temp 98.5 °F (36.9 °C) (Oral)   Resp 18  Ht 172.7 cm (68\")  Wt 62.4 kg (137 lb 8 oz)  SpO2 93%  BMI 20.91 kg/m2      I & Os reviewed.   Intake/Output       01/13/18 0700 - 01/14/18 0659 01/14/18 0700 - 01/15/18 0659    Intake (ml) 360 240    Output (ml) 2575 1100    Net (ml) -2215 -860          General: Mild acute distress noted. On BiPAP.  Eyes: PERRL. EOM intact  Neck: Supple without JVD  Cardiovascular: S1 + S2. Regular.   Respiratory: Mild respiratory distress noted. No wheezing heard. Right basal crackles noted  Extremities: No edema noted.  Neurologic: AAOx3. Was able to follow commands.   Skin: Appeared without any overt rashes    Labs: Reviewed.     Results from last 7 days  Lab Units 01/11/18  1000 01/10/18  0428 01/09/18 0446 01/08/18  0531   SODIUM mmol/L 135* 136* 135* 132*   POTASSIUM mmol/L 4.0 4.4 4.0 4.4   CHLORIDE mmol/L 102 103 101 100   CO2 mmol/L 28.0 29.0 29.0 25.0*   BUN mg/dL 25* 38* 33* 29*   CREATININE mg/dL 0.60 0.80 0.80 0.80   CALCIUM mg/dL 8.3* 8.6 8.7 8.3*   BILIRUBIN mg/dL 0.5  --   --  0.7   ALK PHOS U/L 62  --   --  39   ALT (SGPT) U/L 114*  --   --  33   AST (SGOT) U/L 49*  --   --  22   GLUCOSE mg/dL 139* 113* 126* 126*       Results from last 7 days  Lab Units 01/10/18  0428 01/09/18  0446 01/08/18  0531   MAGNESIUM mg/dL 2.5* 2.3 2.1   PHOSPHORUS mg/dL 3.0 2.1* 2.9       Results from last 7 days  Lab Units 01/11/18  1000 01/10/18  0428 01/09/18  0446 01/08/18  0531   WBC 10*3/mm3 13.09* 12.70* 9.60 6.49   HEMOGLOBIN g/dL 11.6* 10.9* 10.5* 11.3* "   PLATELETS 10*3/mm3 136 154 149 128*       Assessment & Recommendations/Plan:   1.  Acute hypoxic respiratory failure  Continue oxygen supplementation    2.  Right lower lobe pneumonia  Continue Antibiotics. Currently on Levaquin.   MRSA identified in the sputum.  Sensitive to Levaquin.    3.  COPD  Continue nebulized treatments.  On Symbicort.    4.  Right-sided hydropneumothorax  Chest x-ray reviewed from the past 2 days.  No significant worsening.  The hydropneumothorax remains 10% or less.  I informed the patient that he may need chest tube decompression if the pneumothorax worsens or if he has any clinical deterioration.  He already had surgery on the right side which may make it tricky to perform a laterally placed chest tube     5.  Smoking  Will definitely need to quit      We have reviewed patient's current orders and changes, if any, have been suggested to primary care team. Plan was also discussed with nursing staff, as necessary.       Chelsea Baptiste MD  01/14/18  12:21 PM

## 2018-01-14 NOTE — DISCHARGE SUMMARY
[facility]      Name:  Niall Murguia   Age:  52 y.o.  Sex:  male  :  1965  MRN:  [id number]   Visit Number:  [id number]  Primary Care Physician:  KEAGAN Rowley  Date of Discharge:  2018  Admission Date:  2018      Discharge Diagnosis:         Principal Problem:    Acute on chronic respiratory failure with hypoxia  Active Problems:    Hydropneumothorax, right    Pneumonia of both lower lobes    Chronic bullous emphysema      Presenting Problem/History of Present Illness:    Sepsis due to pneumonia [J18.9, A41.9]     Consults:     Consults     Date and Time Order Name Status Description    2018 1737 Inpatient Consult to Pulmonology            Procedures Performed:             History of presenting illness:    Patient is a 52-year-old  male with a history of COPD with bullous emphysema who has had shortness of breath and congestion for 1 week.  It started out as a viral infection that considerably worsened.  The past 3 days he has had cough with yellowish sputum which is turned to hemoptysis in the past 24 hours.  His dyspnea has been progressively worse to the point where he is severely short of breath.  Nothing is making it better.  Any movement makes it worse.  He also has had nausea and vomiting.  He was on antibiotics in the form of Augmentin and prednisone in the past 2 days prior to admission.  He came into the emergency room last night as he was not getting any better.  He has never had pneumonia as far as he knows before.  He has had COPD in which she had a spontaneous pneumothorax in  1014 had a chest tube.  He continues to smoke a half pack per day.  Advised him to quit.  He has been given 2 L of fluid in the emergency room.  He was also given Lasix, as they felt that he was fluid overloaded.  He had a cardiac surgery as a child and which a valve was repaired.  He has had no follow-up with cardiology recently.   He denies any chest pressure, or any other pain.  He has had fevers and chills.  He is to be admitted to the ICU with severe sepsis as his lactic acid is 4.8, his WBC count is 25.25, his creatinine is 1.5, and his CT of the chest shows extensive bullous changes and large right-sided pneumonia.    Hospital Course:    I have reviewed labs/imaging/records from this hospitalization, including ER staff and admitting/attending physicians H/P's and progress notes to establish a comprehensive understanding of this patient's clinical hospital course, as well as to establish a transition of care appropriately.    Patient is a 52-year-old male who was admitted secondary to acute respiratory failure with hypoxia as a result of extensive right-sided pneumonia with severe sepsis.  He responded well to aggressive fluid hydration as well as continued antibiotic coverage.  Pulmonology assisted in the care of the patient, and she required BiPAP therapy to improve respiratory output throughout his stay in the ICU.  Periods off BiPAP required high flow oxygen supplementation, as high as 12 L via nasal cannula.  Upon titration down of oxygen demand to approximately 5-6 L, he was transferred to the telemetry floor for continued care.  He did unfortunately not show any sustained progressive improvement, prompting repeat chest x-ray for further follow-up of his extensive pneumonia and lung processes.  Repeat chest x-ray revealed a right hydropneumothorax, it is subsequently not changed significantly through the course of serial progressive x-rays since that time 2 days ago, remaining in approximately 10%.  Review of history demonstrates a past surgical procedure to his right lung consistent with need for decortication/pleural peel, and this procedure was performed by cardiothoracic surgery at [facility] in [place], Dr. Roland Ge.  This was done approximately 2010.    I discussed the case with both pulmonology and  surgery today concerning probability of further surgical intervention versus acute decompensation requiring additional positive pressure ventilatory support versus spontaneous worsening of hydropneumothorax, and both agree that chest tube placement at this point would be high risk and with subsequent be better served with cardiothoracic surgery service at [facility] in [place].  Both fluid patient may benefit and require further surgical intervention even if not done emergently.  I discussed the case with on-call hospitalist at [facility] in [place] and she has agreed to take patient after full discussion of clinical course, they do have a telemetry bed available.  It is my understanding that Dr. Ge will be consulted to see patient as well.  I have discussed the case in detail with patient, he verbalizes understanding and agrees to transfer and feels this is his best choice at this time as well.     Patient has maintained stable oxygen saturations with approximately 5 L nasal cannula support, approximately 92%.  Has not used BiPAP any further since development of hydropneumothorax.  His repeat chest x-ray yesterday morning was consistent with previous day's size.  Patient states she slept well last night, no orthopnea.  Nursing reports no fever or chills through the course of the evening.  No hemoptysis reported.  Tolerating by mouth intake.  No reports of hematuria or dysuria, without bright red blood or black tarry stools.  Patient will be transferred via ambulance.    Vital Signs:    Temp:  [98.1 °F (36.7 °C)-98.5 °F (36.9 °C)] 98.5 °F (36.9 °C)  Heart Rate:  [60-97] 97  Resp:  [18] 18  BP: (129-136)/(78-84) 129/79    Physical Exam:    General Appearance:  Alert and cooperative, not in any acute distress.  Chronically ill-appearing male.     Head:  Atraumatic and normocephalic, without obvious abnormality.   Eyes:          PERRLA, conjunctivae and sclerae normal, no Icterus. No pallor.  Extra-occular movements are within normal limits.   Ears:  Ears appear intact with no abnormalities noted.   Throat: No oral lesions, no thrush, oral mucosa moist.   Neck: Supple, trachea midline, no thyromegaly, no carotid bruit.   Back:   No kyphoscoliosis present. No tenderness to palpation,   range of motion normal.   Lungs:   Chest shape is normal.  Audible air exchange noted to all lung fields, mildly reduced to bilateral bases, slightly worse to the right, with rhonchus referred upper airway congestion. inspiratory and expiratory wheezes bilaterally.     Heart:  Normal S1 and S2, no murmur, no gallop, no rub. No JVD.   Abdomen:   Normal bowel sounds, no masses, no organomegaly. Soft     non-tender, non-distended, no guarding, no rebound tenderness.   Extremities: Moves all extremities well, no edema, no cyanosis, no clubbing.   Pulses: Pulses palpable and equal bilaterally.   Skin: No bleeding, bruising or rash.   Lymph nodes: No palpable adenopathy.   Neurologic: Alert and oriented x 3. Moves all four limbs equally. No tremors. No facial asymetry.       Pertinent Lab Results:       Results from last 7 days  Lab Units 01/14/18  1300 01/11/18  1000 01/10/18  0428  01/08/18  0531   SODIUM mmol/L 137 135* 136*  < > 132*   POTASSIUM mmol/L 3.9 4.0 4.4  < > 4.4   CHLORIDE mmol/L 98 102 103  < > 100   CO2 mmol/L 35.0* 28.0 29.0  < > 25.0*   BUN mg/dL 15 25* 38*  < > 29*   CREATININE mg/dL 0.60 0.60 0.80  < > 0.80   CALCIUM mg/dL 8.6 8.3* 8.6  < > 8.3*   BILIRUBIN mg/dL 0.7 0.5  --   --  0.7   ALK PHOS U/L 72 62  --   --  39   ALT (SGPT) U/L 222* 114*  --   --  33   AST (SGOT) U/L 70* 49*  --   --  22   GLUCOSE mg/dL 142* 139* 113*  < > 126*   < > = values in this interval not displayed.    Results from last 7 days  Lab Units 01/14/18 1300 01/11/18  1000 01/10/18  0428   WBC 10*3/mm3 18.12* 13.09* 12.70*   HEMOGLOBIN g/dL 12.9* 11.6* 10.9*   HEMATOCRIT % 38.1* 34.5* 32.7*   PLATELETS 10*3/mm3 208 136 154                            Results from last 7 days  Lab Units 01/08/18  0837   PH, ARTERIAL pH units 7.436   PO2 ART mm Hg 61.8*   PCO2, ARTERIAL mm Hg 39.7   HCO3 ART mmol/L 26.7       Results from last 7 days  Lab Units 01/07/18  1748   COLOR UA  Yellow   GLUCOSE UA  Negative   KETONES UA  Trace*   LEUKOCYTES UA  Negative   PH, URINE  5.5   BILIRUBIN UA  Negative   UROBILINOGEN UA  0.2 E.U./dL     Pain Management Panel     Pain Management Panel Latest Ref Rng & Units 1/7/2018    AMPHETAMINES SCREEN, URINE Negative Negative    BARBITURATES SCREEN Negative Negative    BENZODIAZEPINE SCREEN, URINE Negative Negative    BUPRENORPHINE Negative Negative    COCAINE SCREEN, URINE Negative Negative    METHADONE SCREEN, URINE Negative Negative    METHAMPHETAMINE UR Negative Negative              Pertinent Radiology Results:    Imaging Results (all)     Procedure Component Value Units Date/Time    XR Chest 2 View [[id number]] Collected:  01/07/18 0813     Updated:  01/07/18 0817    Narrative:       PROCEDURE: XR CHEST 2 VW-     HISTORY: SOA  triage protocol     COMPARISON: None.     FINDINGS: Electrodes overlie the chest. The heart is normal in size. The  mediastinum is unremarkable. There are chronic interstitial changes.  Large right lower lobe pneumonia is noted. There is no pneumothorax.   There are no acute osseous abnormalities. Postoperative changes are seen  to the right upper lobe.       Impression:       Large right lower lobe pneumonia is noted.     Continued followup is recommended.     This report was finalized on 1/7/2018 8:14 AM by Adams Dillon DO.    XR Chest 1 View [[id number]] Collected:  01/07/18 0811     Updated:  01/07/18 0817    Narrative:       PROCEDURE: XR CHEST 1 VW-     HISTORY: SOA; J18.9-Pneumonia, unspecified organism     COMPARISON: January 7, 2018 at 2:03.     FINDINGS: Electrodes overlie the chest. The heart is normal in size. The  mediastinum is unremarkable. Large right lower lobe pneumonia,  slightly  more pronounced compared to the prior study. Chronic interstitial  changes are noted. Postoperative changes are seen to the right upper  lobe. There is no pneumothorax.  There are no acute osseous  abnormalities.           Impression:       Large right lower lobe pneumonia, slightly more pronounced  compared to the prior study.     Continued followup is recommended.     This report was finalized on 1/7/2018 8:13 AM by Adams Dillon DO.    CT Chest Without Contrast [[id number]] Collected:  01/07/18 0815     Updated:  01/07/18 0820    Narrative:       PROCEDURE: CT CHEST WO CONTRAST-     HISTORY: soa, cough, hypoxia, tachycardia     COMPARISON: X-ray same date.     PROCEDURE:  Multiple axial CT images were obtained from the thoracic  inlet through the upper abdomen without the use of contrast.      FINDINGS: Lack of intravenous contrast limits evaluation of the solid  organs, the mediastinum, and the vasculature.        Soft tissue windows reveal no axillary, hilar or mediastinal adenopathy.  Heart size is normal. The aorta is normal in caliber. Atherosclerosis is  noted. No pleural or pericardial effusion. There are extensive bullous  changes. Large consolidate in the right lower lobe consistent with  pneumonia. The visualized upper abdomen is unremarkable. Bone windows  reveal no acute osseous abnormalities.       Impression:       There are extensive bullous changes. Large consolidate in  the right lower lobe consistent with pneumonia.     362.24 mGy.cm          This study was performed with techniques to keep radiation doses as low  as reasonably achievable (ALARA). Individualized dose reduction  techniques using automated exposure control or adjustment of mA and/or  kV according to the patient size were employed.      This report was finalized on 1/7/2018 8:17 AM by Adams Dillon DO.    XR Chest 1 View [[id number]] Collected:  01/08/18 0806     Updated:  01/08/18 0810    Narrative:       PROCEDURE: XR CHEST  1 VW-     HISTORY: PNA.; J18.9-Pneumonia, unspecified organism; A41.9-Sepsis,  unspecified organism; N17.9-Acute kidney failure, unspecified     COMPARISON: January 7, 2018.     FINDINGS: The heart is normal in size. The mediastinum is unremarkable.  There are emphysematous changes. An opacity in the right mid and lower  lung field is unchanged. There is a new retrocardiac opacity in the  medial left lung base. No significant effusions are evident. There is no  pneumothorax.  There are no acute osseous abnormalities.           Impression:       No change in the patient's right lung airspace disease with  a new retrocardiac opacity in the left lung base consistent with a  worsening pneumonia.     Continued followup is recommended.     This report was finalized on 1/8/2018 8:08 AM by Larry Grewal M.D..    XR Chest 1 View [[id number]] Collected:  01/08/18 0808     Updated:  01/08/18 0811    Narrative:       PROCEDURE: XR CHEST 1 VW-     HISTORY: dyspnea; J18.9-Pneumonia, unspecified organism; A41.9-Sepsis,  unspecified organism; N17.9-Acute kidney failure, unspecified     COMPARISON: 1/7/2018.     FINDINGS: The heart is normal in size. The mediastinum is unremarkable.  There are emphysematous changes to the lungs. There is a persistent  right mid and lower lung field opacity with worsening airspace disease  in the left lung base. There is no pneumothorax.  There are no acute  osseous abnormalities.           Impression:       No significant change in the patients right lung airspace  disease with a worsening left basilar opacity consistent with a  pneumonia.     Continued followup is recommended.     This report was finalized on 1/8/2018 8:09 AM by Larry Grewal M.D..    CT Chest Pulmonary Embolism With Contrast [[id number]] Collected:  01/08/18 1209     Updated:  01/08/18 1213    Narrative:       PROCEDURE: CT CHEST PULMONARY EMBOLISM W CONTRAST-     HISTORY: dyspnea, enlarged RV; J18.9-Pneumonia,  unspecified organism;  A41.9-Sepsis, unspecified organism; N17.9-Acute kidney failure,  unspecified     COMPARISON: None .     TECHNIQUE: Multiple axial CT images were obtained from the thoracic  inlet through the upper abdomen following the administration of Isovue  300 per the CT PE protocol. Coronal and oblique 3D MIP images were  reconstructed from the original axial data set.      FINDINGS: There are no filling defects within the pulmonary arteries to  suggest an embolus. The thoracic aorta is normal in caliber with no  evidence of dissection. Incidental note is made of direct origin of the  left vertebral artery from the aortic arch. The heart is normal in size.  There are no pleural or pericardial effusions. There is no adenopathy.  Lung windows reveal emphysematous changes. There is airspace disease in  the right lower lobe, right middle lobe and left lower lobe consistent  with a pneumonia. The visualized upper abdomen is unremarkable. Bone  windows reveal no acute osseous abnormalities.       Impression:       1. No evidence of pulmonary embolus or dissection.   2. Bilateral airspace disease consistent with a pneumonia.             417.27 mGy.cm          This study was performed with techniques to keep radiation doses as low  as reasonably achievable (ALARA). Individualized dose reduction  techniques using automated exposure control or adjustment of mA and/or  kV according to the patient size were employed.      This report was finalized on 1/8/2018 12:11 PM by Larry Grewal M.D..    XR Chest 1 View [[id number]] Collected:  01/10/18 0849     Updated:  01/10/18 0851    Narrative:       PROCEDURE: XR CHEST 1 VW-     HISTORY: PNA; J18.9-Pneumonia, unspecified organism; A41.9-Sepsis,  unspecified organism; N17.9-Acute kidney failure, unspecified     COMPARISON: January 8, 2018.     FINDINGS: The heart is normal in size. The mediastinum is unremarkable.  There are bilateral pulmonary opacities consistent  with a pneumonia  which is unchanged. There is no pneumothorax.  There are no acute  osseous abnormalities.           Impression:       No change in the patients bilateral pulmonary opacities.     Continued followup is recommended.     This report was finalized on 1/10/2018 8:49 AM by Larry Grewal M.D..    XR Chest 2 View [[id number]] Collected:  01/12/18 1058     Updated:  01/12/18 1104    Narrative:       PROCEDURE: XR CHEST 2 VW-        HISTORY: PNA; J18.9-Pneumonia, unspecified organism; A41.9-Sepsis,  unspecified organism; N17.9-Acute kidney failure, unspecified;  Z74.09-Other reduced mobility; Z74.09-Other reduced mobility     COMPARISON: January 10, 2018.     FINDINGS: The heart is normal in size. The mediastinum is unremarkable.  There has been interval development of a small right hydropneumothorax.  There is diffuse right lung airspace disease and patchy left basilar  airspace disease. There is a suture line noted within the right lung  apex. There are no acute osseous abnormalities.           Impression:       1. Development of a small right hydropneumothorax.  2. Airspace disease fairly diffusely in the right lung and in the left  lung base.     Communication: Dr. Mahmood was notified of these findings at 11:00 AM on  1/12/2018.           This report was finalized on 1/12/2018 11:02 AM by Larry Grewal M.D..    XR Chest PA & Lateral [[id number]] Collected:  01/12/18 1415     Updated:  01/12/18 1419    Narrative:       PROCEDURE: XR CHEST PA AND LATERAL-        HISTORY: R pneumothorax/RLL pneumonia/bullous emphysema;  J18.9-Pneumonia, unspecified organism; A41.9-Sepsis, unspecified  organism; N17.9-Acute kidney failure, unspecified; Z74.09-Other reduced  mobility; Z74.09-Other reduced mobility     COMPARISON: 1/12/2018.     FINDINGS: The heart is normal in size. The mediastinum is unremarkable.  There has been no significant change in the patient's small right  hydropneumothorax. There are  emphysematous changes. Airspace disease is  noted in the right middle and lower lobes and there is patchy airspace  disease in the left lung base which is unchanged. There are no acute  osseous abnormalities.           Impression:       No significant change in the patient's small right  hydropneumothorax.           This report was finalized on 1/12/2018 2:17 PM by Larry Grewal M.D..    XR Chest PA & Lateral [[id number]] Collected:  01/13/18 0901     Updated:  01/13/18 0902    Narrative:       FINAL REPORT    CLINICAL HISTORY:  Bullous emphysema/RLL pneumonia, R hydropneumothorax    FINDINGS:  Two views of the chest were obtained and compared to prior exam  from January 7, 2018.  The heart size and mediastinum are  normal.  There is a moderate sized (10%) right  hydropneumothorax.  Right perihilar opacity is stable.      Impression:       10% right hydropneumothorax.    Stable right perihilar opacity.    Authenticated by lAan Ramos MD on 01/13/2018 09:01:18 AM    XR Chest PA & Lateral [[id number]] Collected:  01/14/18 1403     Updated:  01/14/18 1404    Narrative:       FINAL REPORT    CLINICAL HISTORY:  right hydropneumothorax    COMPARISON:  On day prior    FINDINGS:  Two views of the chest were performed. The heart is normal in  size. The mediastinum is unremarkable.  The lung fields  demonstrate no change in the right hydropneumothorax and  perihilar opacities.  There is no pneumothorax. The osseous  structures are unremarkable.      Impression:       No significant interval change in the right hydropneumothorax and perihilar opacities.    Continued followup is recommended.    Authenticated by Alan Ramos MD on 01/14/2018 02:03:51 PM          Condition on Discharge:      Improved/Stable    Code status during the hospital stay:    Full Code    Discharge Disposition:    Short Term Hospital (DC - External)    Discharge Medication:     Niall Murguia   Home Medication Instructions  STEPHANIE:[id number]    Printed on:01/14/18 9583   Medication Information                      acetaminophen (TYLENOL) 325 MG tablet  Take 2 tablets by mouth Every 4 (Four) Hours As Needed for Mild Pain .             budesonide-formoterol (SYMBICORT) 160-4.5 MCG/ACT inhaler  Inhale 2 puffs 2 (Two) Times a Day.             citalopram (CeleXA) 40 MG tablet  Take 40 mg by mouth Daily.             enoxaparin (LOVENOX) 40 MG/0.4ML solution syringe  Inject 0.4 mL under the skin Daily.             ipratropium-albuterol (DUO-NEB) 0.5-2.5 mg/mL nebulizer  Take 3 mL by nebulization Every 4 (Four) Hours As Needed for Wheezing or Shortness of Air.             levoFLOXacin (LEVAQUIN) 500 MG tablet  Take 1 tablet by mouth Daily for 3 doses. Indications: Pneumonia             methylPREDNISolone sodium succinate (SOLU-Medrol) 40 MG injection  Infuse 1 mL into a venous catheter Every 8 (Eight) Hours.             nicotine (NICODERM CQ) 21 MG/24HR patch  Place 1 patch on the skin Daily.             ondansetron (ZOFRAN) 4 MG/2ML injection  Infuse 2 mL into a venous catheter Every 6 (Six) Hours As Needed for Nausea or Vomiting.             saccharomyces boulardii (FLORASTOR) 250 MG capsule  Take 1 capsule by mouth 2 (Two) Times a Day.             sodium chloride 0.9 % flush  Infuse 10 mL into a venous catheter As Needed for Line Care.             sodium chloride 0.9 % flush  Infuse 1-10 mL into a venous catheter As Needed for Line Care.             Sunscreens (CHAPSTICK) stick  Apply 1 each topically 5 (Five) Times a Day As Needed (chapped lips).                 Discharge Diet:     Regular/Thin    Activity at Discharge:     As tolerated    Follow-up Appointments:    Follow-up Information     Follow up with KEAGAN Rowley .    Specialty:  Family Medicine    Contact information:    [address] [facility] [address] [phone]                Test Results Pending at Discharge:           Gilberto Mahmood DO  01/14/18  2:53  PM    Time: Discharge 45 min

## 2018-01-14 NOTE — PROGRESS NOTES
[facility]    Name:  Niall Murguia   Age:  52 y.o.  Sex:  male  :  1965  MRN:  [id number]   Visit Number:  [id number]  Admission Date:  2018  Date Of Service:  18  Primary Care Physician:  KEAGAN Rowley     LOS: 7 days :  Patient Care Team:  KEAGAN Rowley as PCP - General (Family Medicine):    Chief Complaint:          Subjective / Interval History:     I have reviewed labs/imaging/records from this hospitalization, including ER staff and admitting/attending physicians H/P's and progress notes to establish a comprehensive understanding of this patient's clinical hospital course, as well as to establish a transition of care appropriately.    Patient is a 52-year-old male who was admitted secondary to acute respiratory failure with hypoxia as a result of extensive right-sided pneumonia with severe sepsis.  He has responded well to aggressive fluid hydration as well as continued antibiotic coverage.  Pulmonology will be seeing patient again tomorrow.    Patient states she slept well last night, no orthopnea.  Patient reports noticeable improvement from previous day with respect to overall strength and continued decreasing respiratory symptoms.  Nursing reports no fever or chills through the course of the evening.  No hemoptysis reported.  Tolerating by mouth intake.  No reports of hematuria or dysuria, without bright red blood or black tarry stools.    Patient has maintained good oxygen saturations with approximately 5 L nasal cannula support.  Has not used BiPAP any further since development of hydropneumothorax.  His repeat chest x-ray yesterday morning was consistent with previous day's size.      Review of Systems:     General ROS: Patient denies any fevers, chills or loss of consciousness.  Respiratory ROS: Phlegm producing cough, without continuous shortness of breath.  Cardiovascular ROS: Denies chest pain or  palpitations. No history of exertional chest pain.  Gastrointestinal ROS: Denies nausea and vomiting. Denies any abdominal pain. No diarrhea.  Neurological ROS: Generalized weakness. No loss of consciousness. Denies any numbness. Denies neck pain.  Dermatological ROS: Denies any redness or pruritis.    Vital Signs:    Temp:  [98 °F (36.7 °C)-98.5 °F (36.9 °C)] 98.5 °F (36.9 °C)  Heart Rate:  [60-86] 72  Resp:  [18] 18  BP: (122-136)/(78-84) 136/84    Intake and output:    I/O last 3 completed shifts:  In: 360 [P.O.:360]  Out: 3925 [Urine:3925]  I/O this shift:  In: 240 [P.O.:240]  Out: 1100 [Urine:1100]    Physical Examination:    General Appearance:  Alert and cooperative, not in any acute distress.  Chronically ill-appearing male.     Head:  Atraumatic and normocephalic, without obvious abnormality.   Eyes:          PERRLA, conjunctivae and sclerae normal, no Icterus. No pallor. Extra-occular movements are within normal limits.   Neck: Supple, trachea midline, no thyromegaly, no carotid bruit.   Lungs:   Chest shape is normal.  Audible air exchange noted all lung fields, mildly reduced to bilateral bases with rhonchus referred upper airway congestion.     Heart:  Normal S1 and S2, no murmur, no gallop, no rub. No JVD   Abdomen:   Normal bowel sounds, no masses, no organomegaly. Soft       non-tender, non-distended, no guarding, no rebound tenderness.   Extremities: Moves all extremities well, no edema, no cyanosis, no            clubbing.   Skin: No bleeding, bruising or rash.   Neurologic: Awake, alert and oriented times 3. Moves all 4 extremities equally.   Laboratory results:      Results from last 7 days  Lab Units 01/11/18  1000 01/10/18  0428 01/09/18  0446 01/08/18  0531   SODIUM mmol/L 135* 136* 135* 132*   POTASSIUM mmol/L 4.0 4.4 4.0 4.4   CHLORIDE mmol/L 102 103 101 100   CO2 mmol/L 28.0 29.0 29.0 25.0*   BUN mg/dL 25* 38* 33* 29*   CREATININE mg/dL 0.60 0.80 0.80 0.80   CALCIUM mg/dL 8.3* 8.6 8.7 8.3*    BILIRUBIN mg/dL 0.5  --   --  0.7   ALK PHOS U/L 62  --   --  39   ALT (SGPT) U/L 114*  --   --  33   AST (SGOT) U/L 49*  --   --  22   GLUCOSE mg/dL 139* 113* 126* 126*       Results from last 7 days  Lab Units 01/11/18  1000 01/10/18  0428 01/09/18  0446   WBC 10*3/mm3 13.09* 12.70* 9.60   HEMOGLOBIN g/dL 11.6* 10.9* 10.5*   HEMATOCRIT % 34.5* 32.7* 30.8*   PLATELETS 10*3/mm3 136 154 149                       I have reviewed the patient's laboratory results.    Radiology results:    Imaging Results (last 24 hours)     ** No results found for the last 24 hours. **          I have reviewed the patient's radiology reports.    Medication Review:     I have reviewed the patients active and prn medications.         Assessment:  Principal Problem:    Acute respiratory failure with hypoxia  Active Problems:    Hydropneumothorax, right    Pneumonia of both lower lobes    Chronic bullous emphysema        Plan:  Continue IV antibiotics at this time.  Plan to continue Solu-Medrol at every 8 hour dosing.  Plan to repeat chest x-ray again today, with serial x-rays daily for progression and monitoring of hydropneumothorax.  I've encouraged patient to spend some additional time out of bed today, with progressive increase as tolerated.  Patient verbalized understanding of the plan of care and agrees. Hopeful for improved ambulation, physical therapy and occupational therapy are working with patient.  We'll continue to hold BiPAP therapy at this time due to hydropneumothorax     I spent a total of 25 minutes in direct care time today.    Gilberto Mahmood DO  01/14/18  11:33 AM

## 2018-01-14 NOTE — THERAPY TREATMENT NOTE
Acute Care - Physical Therapy Treatment Note  [facility]     Patient Name: Niall Murguia  : 1965  MRN: [id number]  Today's Date: 2018  Onset of Illness/Injury or Date of Surgery Date: 18  Date of Referral to PT: 01/10/18  Referring Physician: Dr. Mahmood    Admit Date: 2018    Visit Dx:    ICD-10-CM ICD-9-CM   1. Sepsis due to pneumonia J18.9 486    A41.9 995.91   2. Acute renal failure, unspecified acute renal failure type N17.9 584.9   3. Impaired functional mobility, balance, gait, and endurance Z74.09 V49.89   4. Impaired mobility and ADLs Z74.09 799.89     Patient Active Problem List   Diagnosis   • Acute on chronic respiratory failure with hypoxia   • Pneumonia of both lower lobes   • Chronic bullous emphysema   • Hydropneumothorax, right               Adult Rehabilitation Note       18 1109 18 0923       Rehab Assessment/Intervention    Discipline physical therapy assistant  -CC occupational therapist  -     Document Type therapy note (daily note)  -CC therapy note (daily note)  -     Subjective Information agree to therapy;no complaints  -CC agree to therapy;no complaints  -     Patient Effort, Rehab Treatment good  -CC good  -     Symptoms Noted During/After Treatment fatigue  -CC fatigue  -     Precautions/Limitations fall precautions  -CC fall precautions;oxygen therapy device and L/min  -AH     Specific Treatment Considerations 5 L/min  -CC      Recorded by [CC] Sosa Franco, PTA [AH] Marli Galdamez     Vital Signs    Pre SpO2 (%) 91  -CC 92  -AH     O2 Delivery Pre Treatment supplemental O2  -CC supplemental O2   8L  -AH     Intra SpO2 (%) 87  -CC 91  -AH     O2 Delivery Intra Treatment supplemental O2  -CC supplemental O2   10L  -AH     Post SpO2 (%) 89  -CC 92  -AH     O2 Delivery Post Treatment supplemental O2  -CC supplemental O2   8L  -AH     Pre Patient Position Supine  -CC Supine  -AH     Intra Patient Position Standing  -CC Standing  -AH      Post Patient Position Sitting  -CC Sitting  -     Recorded by [CC] Sosa Franco PTA [] Marli Galdamez     Pain Assessment    Pain Assessment No/denies pain  -CC No/denies pain  -AH     Recorded by [CC] Sosa Franco PTA [] Marli Galdamez     Bed Mobility, Assessment/Treatment    Bed Mobility, Assistive Device bed rails;head of bed elevated  - bed rails;head of bed elevated  -     Bed Mob, Supine to Sit, [place] conditional independence;independent  - conditional independence  -AH     Recorded by [CC] Sosa Franco PTA [] Marli Galdamez     Transfer Assessment/Treatment    Transfers, Bed-Chair [place] stand by assist;verbal cues required  -      Transfers, Bed-Chair-Bed, Assist Device rolling walker  -      Transfers, Sit-Stand [place] stand by assist;verbal cues required  - supervision required  -     Transfers, Stand-Sit [place] stand by assist;verbal cues required  - supervision required  -     Transfers, Sit-Stand-Sit, Assist Device rolling walker  - rolling walker  -     Transfer, Safety Issues balance decreased during turns;step length decreased  -      Transfer, Impairments strength decreased;impaired balance  -CC      Recorded by [CC] Sosa Franco PTA [] Marli Galdamez     Gait Assessment/Treatment    Gait, [place] Level contact guard assist;stand by assist;verbal cues required  -      Gait, Assistive Device rolling walker  -      Gait, Distance (Feet) 165  -      Gait, Gait Pattern Analysis swing-through gait  -      Gait, Gait Deviations johnny decreased;step length decreased  -      Gait, Safety Issues balance decreased during turns;step length decreased;supplemental O2  -      Gait, Impairments strength decreased;impaired balance  -CC      Recorded by [CC] Sosa Franco PTA      Functional Mobility    Functional Mobility- Ind. Level  contact guard assist  -     Functional Mobility- Device  rolling walker  -      Functional Mobility-Distance (Feet)  152  -     Functional Mobility- Safety Issues  supplemental O2  -     Recorded by  [] Marli Galdamez     Lower Body Dressing Assessment/Training    LB Dressing Assess/Train, Clothing Type  donning:;socks  -     LB Dressing Assess/Train, Position  sitting;edge of bed  -     LB Dressing Assess/Train, [place]  independent  -     Recorded by  [] Marli Galdamez     Therapy Exercises    Bilateral Lower Extremities AROM:;10 reps;supine;ankle pumps/circles;quad sets;heel slides;hip abduction/adduction;sitting;LAQ  -CC      Bilateral Upper Extremity  AROM:;15 reps;sitting;elbow flexion/extension;shoulder extension/flexion;shoulder ER/IR;shoulder horizontal abd/add  -     BUE Resistance  theraband   red  -     Recorded by [] Sosa Franco PTA [] Marli Galdamez     Positioning and Restraints    Pre-Treatment Position in bed  - in bed  -     Post Treatment Position chair  - chair  -     In Chair sitting;call light within reach;encouraged to call for assist;with family/caregiver  - sitting;call light within reach;encouraged to call for assist;with nsg  -     Recorded by [] Sosa Franco PTA [] Marli Galdamez       User Key  (r) = Recorded By, (t) = Taken By, (c) = Cosigned By    Initials Name Effective Dates     Marli Galdamez 10/26/16 -     CC Sosa Franco PTA 10/26/16 -                 IP PT Goals       01/14/18 1109 01/14/18 1104 01/11/18 1145    Transfer Training PT LTG    Transfer Training PT LTG, Date Established   01/11/18  -LM    Transfer Training PT LTG, Time to Achieve   2 wks  -LM    Transfer Training PT LTG, Activity Type   sit to stand/stand to sit;bed to chair /chair to bed  -LM    Transfer Training PT LTG, [place] Level   supervision required  -LM    Transfer Training PT LTG, Outcome goal ongoing  -CC  goal ongoing  -LM    Gait Training PT LTG    Gait Training Goal PT LTG, Date Established   01/11/18  -LM    Gait  Training Goal PT LTG, Time to Achieve   2 wks  -LM    Gait Training Goal PT LTG, [place] Level   supervision required  -LM    Gait Training Goal PT LTG, Distance to Achieve   400  -LM    Gait Training Goal PT LTG, Additional Goal   Maintain O2 sats above 90%.  -LM    Gait Training Goal PT LTG, Outcome goal ongoing  -CC  goal ongoing  -LM    Strength Goal PT LTG    Strength Goal PT LTG, Date Established   01/11/18  -LM    Strength Goal PT LTG, Time to Achieve   2 wks  -LM    Strength Goal PT LTG, Measure to Achieve   Patient will perform B LE ther ex x 15 reps to improve functional strength for mobility.  -LM    Strength Goal PT LTG, Outcome  goal partially met  -CC goal ongoing  -LM      User Key  (r) = Recorded By, (t) = Taken By, (c) = Cosigned By    Initials Name Provider Type    LM Destini Padgett, PT Physical Therapist    CC Sosa Franco, PTA Physical Therapy Assistant          Physical Therapy Education     Title: PT OT SLP Therapies (Active)     Topic: Physical Therapy (Done)     Point: Mobility training (Done)    Learning Progress Summary    Learner Readiness Method Response Comment Documented by Status   Patient Acceptance E VU LACE  01/12/18 1357 Done    Acceptance E VU Purpose of PT/POC; PLB/pacing to alleviate SOA.  01/11/18 1144 Done               Point: Home exercise program (Done)    Learning Progress Summary    Learner Readiness Method Response Comment Documented by Status   Patient Acceptance E VU,NR Perform ex daily to enhance activity tolerance  01/14/18 1443 Done    Acceptance E VU LACE  01/12/18 1357 Done    Acceptance E VU Purpose of PT/POC; PLB/pacing to alleviate SOA. LM 01/11/18 1144 Done               Point: Precautions (Done)    Learning Progress Summary    Learner Readiness Method Response Comment Documented by Status   Patient Acceptance E VU LACE  01/12/18 1357 Done    Acceptance E VU Purpose of PT/POC; PLB/pacing to alleviate SOA. LM 01/11/18 1144 Done                       User Key     Initials Effective Dates Name Provider Type Discipline    LM 10/26/16 -  Destini Padgett, PT Physical Therapist PT     10/26/16 -  Sosa Franco PTA Physical Therapy Assistant PT     11/22/17 -  Marlin Rockwell, RN Registered Nurse Nurse                    PT Recommendation and Plan  Anticipated Discharge Disposition: home with assist, home with home health  Planned Therapy Interventions: balance training, gait training, home exercise program, patient/family education, strengthening, transfer training  PT Frequency: daily  Plan of Care Review  Plan Of Care Reviewed With: patient  Progress: improving  Outcome Summary/Follow up Plan: Pt increased distance amb with slight dcrease in O2 SATS with cont O2 portable at 6 L/min. Con't with PT POC          Outcome Measures       01/12/18 0923          How much help from another is currently needed...    Putting on and taking off regular lower body clothing? 3  -AH      Bathing (including washing, rinsing, and drying) 3  -AH      Toileting (which includes using toilet bed pan or urinal) 4  -AH      Putting on and taking off regular upper body clothing 4  -AH      Taking care of personal grooming (such as brushing teeth) 4  -AH      Eating meals 4  -      Score 22  -      Functional Assessment    Outcome Measure Options AM-PAC 6 Clicks Daily Activity (OT)  -        User Key  (r) = Recorded By, (t) = Taken By, (c) = Cosigned By    Initials Name Provider Type     Marli Galdamez Occupational Therapist           Time Calculation:         PT Charges       01/14/18 1109          Time Calculation    Start Time 1109  -CC      PT Received On 01/14/18  -      PT Goal Re-Cert Due Date 01/21/18  -      Time Calculation- PT    Total Timed Code Minutes- PT 31 minute(s)  -        User Key  (r) = Recorded By, (t) = Taken By, (c) = Cosigned By    Initials Name Provider Type    CC Sosa Franco, NITISH Physical Therapy Assistant          Therapy Charges for  Today     Code Description Service Date Service Provider Modifiers Qty    41362396077 HC GAIT TRAINING EA 15 MIN 1/14/2018 Sosa Franco, PTA GP 1    98990084735 HC PT THER PROC EA 15 MIN 1/14/2018 Sosa Franco, NITISH GP 1          PT G-Codes  Outcome Measure Options: AM-PAC 6 Clicks Daily Activity (OT)    Sosa Franco PTA  1/14/2018

## 2018-01-15 ENCOUNTER — APPOINTMENT (OUTPATIENT)
Dept: GENERAL RADIOLOGY | Facility: HOSPITAL | Age: 53
End: 2018-01-15

## 2018-01-15 ENCOUNTER — APPOINTMENT (OUTPATIENT)
Dept: CT IMAGING | Facility: HOSPITAL | Age: 53
End: 2018-01-15

## 2018-01-15 LAB
ALBUMIN SERPL-MCNC: 2.7 G/DL (ref 3.2–4.8)
ALP SERPL-CCNC: 55 U/L (ref 25–100)
ALT SERPL W P-5'-P-CCNC: 175 U/L (ref 7–40)
ANION GAP SERPL CALCULATED.3IONS-SCNC: 0 MMOL/L (ref 3–11)
APPEARANCE FLD: ABNORMAL
AST SERPL-CCNC: 57 U/L (ref 0–33)
BILIRUB CONJ SERPL-MCNC: 0.2 MG/DL (ref 0–0.2)
BILIRUB INDIRECT SERPL-MCNC: 0.3 MG/DL (ref 0.1–1.1)
BILIRUB SERPL-MCNC: 0.5 MG/DL (ref 0.3–1.2)
BUN BLD-MCNC: 20 MG/DL (ref 9–23)
BUN/CREAT SERPL: 33.3 (ref 7–25)
CALCIUM SPEC-SCNC: 8.1 MG/DL (ref 8.7–10.4)
CHLORIDE SERPL-SCNC: 100 MMOL/L (ref 99–109)
CO2 SERPL-SCNC: 34 MMOL/L (ref 20–31)
COLOR FLD: ABNORMAL
CREAT BLD-MCNC: 0.6 MG/DL (ref 0.6–1.3)
DEPRECATED RDW RBC AUTO: 49 FL (ref 37–54)
ERYTHROCYTE [DISTWIDTH] IN BLOOD BY AUTOMATED COUNT: 12.8 % (ref 11.3–14.5)
GFR SERPL CREATININE-BSD FRML MDRD: 142 ML/MIN/1.73
GLUCOSE BLD-MCNC: 90 MG/DL (ref 70–100)
GLUCOSE FLD-MCNC: <4 MG/DL
HCT VFR BLD AUTO: 37.2 % (ref 38.9–50.9)
HGB BLD-MCNC: 12.3 G/DL (ref 13.1–17.5)
INR PPP: 1.1
LDH FLD-CCNC: >4200 U/L
LDH SERPL-CCNC: 264 U/L (ref 120–246)
MCH RBC QN AUTO: 34.9 PG (ref 27–31)
MCHC RBC AUTO-ENTMCNC: 33.1 G/DL (ref 32–36)
MCV RBC AUTO: 105.7 FL (ref 80–99)
NEUTROPHILS NFR FLD MANUAL: 100 %
PH FLD: 7.4 [PH]
PLATELET # BLD AUTO: 226 10*3/MM3 (ref 150–450)
PMV BLD AUTO: 10.1 FL (ref 6–12)
POTASSIUM BLD-SCNC: 4.2 MMOL/L (ref 3.5–5.5)
PROT FLD-MCNC: 3.4 G/DL
PROT SERPL-MCNC: 5.4 G/DL (ref 5.7–8.2)
PROTHROMBIN TIME: 12 SECONDS (ref 9.6–11.5)
RBC # BLD AUTO: 3.52 10*6/MM3 (ref 4.2–5.76)
RBC # FLD AUTO: ABNORMAL /MM3
SODIUM BLD-SCNC: 134 MMOL/L (ref 132–146)
TSH SERPL DL<=0.05 MIU/L-ACNC: 2.2 MIU/ML (ref 0.35–5.35)
WBC # FLD: ABNORMAL /MM3
WBC NRBC COR # BLD: 18.53 10*3/MM3 (ref 3.5–10.8)

## 2018-01-15 PROCEDURE — 82945 GLUCOSE OTHER FLUID: CPT | Performed by: PHYSICIAN ASSISTANT

## 2018-01-15 PROCEDURE — 94640 AIRWAY INHALATION TREATMENT: CPT

## 2018-01-15 PROCEDURE — 84157 ASSAY OF PROTEIN OTHER: CPT | Performed by: PHYSICIAN ASSISTANT

## 2018-01-15 PROCEDURE — C1729 CATH, DRAINAGE: HCPCS

## 2018-01-15 PROCEDURE — 80076 HEPATIC FUNCTION PANEL: CPT | Performed by: HOSPITALIST

## 2018-01-15 PROCEDURE — 63710000001 PREDNISONE PER 1 MG: Performed by: NURSE PRACTITIONER

## 2018-01-15 PROCEDURE — 99255 IP/OBS CONSLTJ NEW/EST HI 80: CPT | Performed by: THORACIC SURGERY (CARDIOTHORACIC VASCULAR SURGERY)

## 2018-01-15 PROCEDURE — 25010000002 HEPARIN (PORCINE) PER 1000 UNITS: Performed by: HOSPITALIST

## 2018-01-15 PROCEDURE — 94760 N-INVAS EAR/PLS OXIMETRY 1: CPT

## 2018-01-15 PROCEDURE — 88112 CYTOPATH CELL ENHANCE TECH: CPT | Performed by: PHYSICIAN ASSISTANT

## 2018-01-15 PROCEDURE — 82042 OTHER SOURCE ALBUMIN QUAN EA: CPT | Performed by: PHYSICIAN ASSISTANT

## 2018-01-15 PROCEDURE — 77012 CT SCAN FOR NEEDLE BIOPSY: CPT

## 2018-01-15 PROCEDURE — 80048 BASIC METABOLIC PNL TOTAL CA: CPT | Performed by: NURSE PRACTITIONER

## 2018-01-15 PROCEDURE — 87116 MYCOBACTERIA CULTURE: CPT | Performed by: PHYSICIAN ASSISTANT

## 2018-01-15 PROCEDURE — 85027 COMPLETE CBC AUTOMATED: CPT | Performed by: NURSE PRACTITIONER

## 2018-01-15 PROCEDURE — 87077 CULTURE AEROBIC IDENTIFY: CPT | Performed by: PHYSICIAN ASSISTANT

## 2018-01-15 PROCEDURE — 87205 SMEAR GRAM STAIN: CPT | Performed by: PHYSICIAN ASSISTANT

## 2018-01-15 PROCEDURE — 87070 CULTURE OTHR SPECIMN AEROBIC: CPT | Performed by: PHYSICIAN ASSISTANT

## 2018-01-15 PROCEDURE — 87075 CULTR BACTERIA EXCEPT BLOOD: CPT | Performed by: PHYSICIAN ASSISTANT

## 2018-01-15 PROCEDURE — 89051 BODY FLUID CELL COUNT: CPT | Performed by: PHYSICIAN ASSISTANT

## 2018-01-15 PROCEDURE — 94799 UNLISTED PULMONARY SVC/PX: CPT

## 2018-01-15 PROCEDURE — 83986 ASSAY PH BODY FLUID NOS: CPT | Performed by: PHYSICIAN ASSISTANT

## 2018-01-15 PROCEDURE — 87206 SMEAR FLUORESCENT/ACID STAI: CPT | Performed by: PHYSICIAN ASSISTANT

## 2018-01-15 PROCEDURE — 85610 PROTHROMBIN TIME: CPT | Performed by: NURSE PRACTITIONER

## 2018-01-15 PROCEDURE — 0W9930Z DRAINAGE OF RIGHT PLEURAL CAVITY WITH DRAINAGE DEVICE, PERCUTANEOUS APPROACH: ICD-10-PCS | Performed by: RADIOLOGY

## 2018-01-15 PROCEDURE — 83615 LACTATE (LD) (LDH) ENZYME: CPT | Performed by: HOSPITALIST

## 2018-01-15 PROCEDURE — 88305 TISSUE EXAM BY PATHOLOGIST: CPT | Performed by: PHYSICIAN ASSISTANT

## 2018-01-15 PROCEDURE — 25010000002 VANCOMYCIN PER 500 MG: Performed by: INTERNAL MEDICINE

## 2018-01-15 PROCEDURE — 87147 CULTURE TYPE IMMUNOLOGIC: CPT | Performed by: PHYSICIAN ASSISTANT

## 2018-01-15 PROCEDURE — 83615 LACTATE (LD) (LDH) ENZYME: CPT | Performed by: PHYSICIAN ASSISTANT

## 2018-01-15 PROCEDURE — 87102 FUNGUS ISOLATION CULTURE: CPT | Performed by: PHYSICIAN ASSISTANT

## 2018-01-15 PROCEDURE — 87186 SC STD MICRODIL/AGAR DIL: CPT | Performed by: PHYSICIAN ASSISTANT

## 2018-01-15 PROCEDURE — 84443 ASSAY THYROID STIM HORMONE: CPT | Performed by: HOSPITALIST

## 2018-01-15 PROCEDURE — 87015 SPECIMEN INFECT AGNT CONCNTJ: CPT | Performed by: PHYSICIAN ASSISTANT

## 2018-01-15 PROCEDURE — 99233 SBSQ HOSP IP/OBS HIGH 50: CPT | Performed by: HOSPITALIST

## 2018-01-15 PROCEDURE — 71045 X-RAY EXAM CHEST 1 VIEW: CPT

## 2018-01-15 RX ORDER — FOLIC ACID 1 MG/1
1 TABLET ORAL DAILY
Status: DISCONTINUED | OUTPATIENT
Start: 2018-01-15 | End: 2018-01-24 | Stop reason: HOSPADM

## 2018-01-15 RX ORDER — LIDOCAINE HYDROCHLORIDE 10 MG/ML
10 INJECTION, SOLUTION EPIDURAL; INFILTRATION; INTRACAUDAL; PERINEURAL ONCE
Status: COMPLETED | OUTPATIENT
Start: 2018-01-15 | End: 2018-01-15

## 2018-01-15 RX ORDER — HEPARIN SODIUM 5000 [USP'U]/ML
5000 INJECTION, SOLUTION INTRAVENOUS; SUBCUTANEOUS EVERY 8 HOURS SCHEDULED
Status: DISCONTINUED | OUTPATIENT
Start: 2018-01-15 | End: 2018-01-24 | Stop reason: HOSPADM

## 2018-01-15 RX ADMIN — FOLIC ACID 1 MG: 1 TABLET ORAL at 16:46

## 2018-01-15 RX ADMIN — ACETAMINOPHEN 650 MG: 325 TABLET, FILM COATED ORAL at 20:52

## 2018-01-15 RX ADMIN — IPRATROPIUM BROMIDE AND ALBUTEROL SULFATE 3 ML: .5; 3 SOLUTION RESPIRATORY (INHALATION) at 11:09

## 2018-01-15 RX ADMIN — NICOTINE 1 PATCH: 21 PATCH, EXTENDED RELEASE TRANSDERMAL at 20:54

## 2018-01-15 RX ADMIN — HEPARIN SODIUM 5000 UNITS: 5000 INJECTION, SOLUTION INTRAVENOUS; SUBCUTANEOUS at 20:52

## 2018-01-15 RX ADMIN — VANCOMYCIN HYDROCHLORIDE 1000 MG: 1 INJECTION, SOLUTION INTRAVENOUS at 08:44

## 2018-01-15 RX ADMIN — VANCOMYCIN HYDROCHLORIDE 1000 MG: 1 INJECTION, SOLUTION INTRAVENOUS at 20:52

## 2018-01-15 RX ADMIN — Medication 250 MG: at 08:34

## 2018-01-15 RX ADMIN — PREDNISONE 20 MG: 20 TABLET ORAL at 08:33

## 2018-01-15 RX ADMIN — CITALOPRAM HYDROBROMIDE 40 MG: 20 TABLET ORAL at 08:34

## 2018-01-15 RX ADMIN — LIDOCAINE HYDROCHLORIDE 10 ML: 10 INJECTION, SOLUTION INFILTRATION; PERINEURAL at 15:40

## 2018-01-15 RX ADMIN — BUDESONIDE AND FORMOTEROL FUMARATE DIHYDRATE 2 PUFF: 160; 4.5 AEROSOL RESPIRATORY (INHALATION) at 08:04

## 2018-01-15 RX ADMIN — DOXYCYCLINE HYCLATE 100 MG: 100 CAPSULE ORAL at 08:34

## 2018-01-15 RX ADMIN — IPRATROPIUM BROMIDE AND ALBUTEROL SULFATE 3 ML: .5; 3 SOLUTION RESPIRATORY (INHALATION) at 07:52

## 2018-01-15 RX ADMIN — Medication 250 MG: at 20:51

## 2018-01-15 RX ADMIN — HEPARIN SODIUM 5000 UNITS: 5000 INJECTION, SOLUTION INTRAVENOUS; SUBCUTANEOUS at 16:46

## 2018-01-15 NOTE — H&P
[facility] Medicine Services  HISTORY AND PHYSICAL    Patient Name: Niall Murguia  : 1965  MRN: [id number]  Primary Care Physician: KEAGAN Rowley    Subjective   Subjective     Chief Complaint: Difficultly Breathing/Right hydropneumothorax    HPI:  Niall Murguia is a 52 y.o. male who presents to [facility] from [facility] for a higher level of care. Patient presented to [facility] on 18 with difficulty breathing. He complained of intermittent shortness of breath and cough for one week. He states that the day prior to his evaluation in the ED at [facility] his sputum turned from a yellowish color to red-brown in color. Patient also reports that he became severely short of breath, nothing improved it and movement made it worse. He had been placed on prednisone and augmentin.     Patient reports that he has a history of tobacco use, and a spontaneous pneumothorax several years ago which required a chest tube and eventually decortication. He has recently been treated for hypertension. Otherwise patient has no other significant medical history.     While in the emergency department patient was given 2L IV fluid, lasix, and diagnosed with sepsis. Patient has responded well to antibiotics and IV hydration. Chest imaging revealed a right hydropneumothorax, and bilateral pneumonia. Patient was transferred for a higher level of care due to [facility] not having CTS. Patient will be admitted to the hospital service for further evaluation and treatment.     Review of Systems   Constitutional: Positive for fever. Negative for chills, diaphoresis and fatigue.   HENT: Positive for congestion.    Respiratory: Positive for cough and shortness of breath. Negative for wheezing.    Cardiovascular: Negative for chest pain, palpitations and leg swelling.   Gastrointestinal: Negative for abdominal pain, nausea and vomiting.   Genitourinary: Negative for dysuria, frequency and  urgency.   Musculoskeletal: Negative for arthralgias and myalgias.   Skin: Negative for color change, pallor, rash and wound.   Neurological: Negative for dizziness, syncope, weakness and light-headedness.   Psychiatric/Behavioral: Negative for agitation and confusion.   All other systems reviewed and are negative.     Otherwise 10-system ROS reviewed and is negative except as mentioned in the HPI.    Personal History     Past Medical History:   Diagnosis Date   • COPD (chronic obstructive pulmonary disease)        Past Surgical History:   Procedure Laterality Date   • CARDIAC SURGERY         Family History: family history includes Heart disease in his father; No Known Problems in his brother, daughter, mother, sister, and son.     Social History:  reports that he has been smoking Cigarettes.  He has a 40.00 pack-year smoking history. He has never used smokeless tobacco. He reports that he drinks about 1.2 oz of alcohol per week  He reports that he does not use illicit drugs.  Social History     Social History Narrative   • No narrative on file       Medications:  Prescriptions Prior to Admission   Medication Sig Dispense Refill Last Dose   • acetaminophen (TYLENOL) 325 MG tablet Take 2 tablets by mouth Every 4 (Four) Hours As Needed for Mild Pain .      • budesonide-formoterol (SYMBICORT) 160-4.5 MCG/ACT inhaler Inhale 2 puffs 2 (Two) Times a Day.  12    • citalopram (CeleXA) 40 MG tablet Take 40 mg by mouth Daily.   1/6/2018 at 0800   • enoxaparin (LOVENOX) 40 MG/0.4ML solution syringe Inject 0.4 mL under the skin Daily. 11.2 mL     • ipratropium-albuterol (DUO-NEB) 0.5-2.5 mg/mL nebulizer Take 3 mL by nebulization Every 4 (Four) Hours As Needed for Wheezing or Shortness of Air. 360 mL     • [START ON 1/15/2018] levoFLOXacin (LEVAQUIN) 500 MG tablet Take 1 tablet by mouth Daily for 3 doses. Indications: Pneumonia 3 tablet 0    • methylPREDNISolone sodium succinate (SOLU-Medrol) 40 MG injection Infuse 1 mL into a  venous catheter Every 8 (Eight) Hours.      • nicotine (NICODERM CQ) 21 MG/24HR patch Place 1 patch on the skin Daily.      • ondansetron (ZOFRAN) 4 MG/2ML injection Infuse 2 mL into a venous catheter Every 6 (Six) Hours As Needed for Nausea or Vomiting.      • saccharomyces boulardii (FLORASTOR) 250 MG capsule Take 1 capsule by mouth 2 (Two) Times a Day.      • sodium chloride 0.9 % flush Infuse 10 mL into a venous catheter As Needed for Line Care.      • sodium chloride 0.9 % flush Infuse 1-10 mL into a venous catheter As Needed for Line Care.      • Sunscreens (CHAPSTICK) stick Apply 1 each topically 5 (Five) Times a Day As Needed (chapped lips).          No Known Allergies    Objective   Objective     Vital Signs:   Temp:  [98.3 °F (36.8 °C)-98.5 °F (36.9 °C)] 98.5 °F (36.9 °C)  Heart Rate:  [60-97] 97  Resp:  [18] 18  BP: (129-136)/(78-84) 129/79        Physical Exam   Constitutional: He is oriented to person, place, and time. He appears well-developed and well-nourished.   HENT:   Head: Normocephalic and atraumatic.   Eyes: EOM are normal. Pupils are equal, round, and reactive to light. No scleral icterus.   Neck: Normal range of motion. Neck supple. No JVD present.   Cardiovascular: Normal rate, regular rhythm, normal heart sounds and intact distal pulses.  Exam reveals no gallop and no friction rub.    No murmur heard.  Pulmonary/Chest: Effort normal. No respiratory distress. He has decreased breath sounds in the right middle field and the right lower field. He has wheezes. He has no rales. He exhibits no tenderness.   Abdominal: Soft. Bowel sounds are normal. He exhibits no distension and no mass. There is no tenderness. There is no rebound and no guarding. No hernia.   Musculoskeletal: Normal range of motion. He exhibits no edema, tenderness or deformity.   Neurological: He is alert and oriented to person, place, and time.   Skin: Skin is warm and dry. No rash noted. No erythema. No pallor.   Psychiatric:  He has a normal mood and affect. His behavior is normal. Judgment and thought content normal.   Nursing note and vitals reviewed.     Results Reviewed:  I have personally reviewed current lab, radiology, and data and agree.      Results from last 7 days  Lab Units 01/14/18  1300   WBC 10*3/mm3 18.12*   HEMOGLOBIN g/dL 12.9*   HEMATOCRIT % 38.1*   PLATELETS 10*3/mm3 208       Results from last 7 days  Lab Units 01/14/18  1300   SODIUM mmol/L 137   POTASSIUM mmol/L 3.9   CHLORIDE mmol/L 98   CO2 mmol/L 35.0*   BUN mg/dL 15   CREATININE mg/dL 0.60   GLUCOSE mg/dL 142*   CALCIUM mg/dL 8.6   ALT (SGPT) U/L 222*   AST (SGOT) U/L 70*     Brief Urine Lab Results  (Last result in the past 365 days)      Color   Clarity   Blood   Leuk Est   Nitrite   Protein   CREAT   Urine HCG        01/07/18 1748 Yellow Slightly Cloudy(A) Small (1+)(A) Negative Negative 100 mg/dL (2+)(A)             No results found for: BNP  No results found for: PHART  Imaging Results (last 24 hours)     ** No results found for the last 24 hours. **        Results for orders placed during the hospital encounter of 01/07/18   Adult Transthoracic Echo Complete W/ Cont if Necessary Per Protocol    Narrative Technically very limited study   1) Normal LV systolic function ( EF is about 55%)  2) Normal left atrial size with normal LVedp   3) Trace MR   4) Mild TR with normal PA pressures ( within limitations of study)  5) Severe RV dilation with wall motion abnormality ? Acute on chronic   corpulmonale   6) Severe reduction in RV function        Assessment/Plan   Assessment / Plan     Hospital Problem List     * (Principal)Hydropneumothorax, right    Acute on chronic respiratory failure with hypoxia    Pneumonia of both lower lobes    Chronic bullous emphysema (Chronic)    Hypertension    Tobacco use        Assessment & Plan:  - Admit to telemetry  - VS q4h  - Consult to CT Surgery   - Right hydropneumothorax  - Sputum + MRSA at [facility]  - Continue inhalers/nebs  -  Transition Solumedrol to Oral prednisone   - Will taper   - Follow results from [facility]  - Will broaden abx coverage from Levaquin to vancomycin/rocephin/doxy  - AM Labs  - AM EKG  - AM CXR  - Nicotine patch  - Tobacco cessation education    Tobacco Cessation Counselin minutes of tobacco cessation counseling provided, including but not limited to, risks of ongoing tobacco use, pertinence to current or future health status and availability/examples of cessation resources.  Patient expresses desire to quit.        DVT prophylaxis: TEDs/SCDs/HOLD PHARMACOLOGIC PENDING CT SURGERY EVAL    CODE STATUS: FULL      Admission Status:  I believe this patient meets INPATIENT status due to the need for care which can only be reasonably provided in an hospital setting such as aggressive/expedited ancillary services and/or consultation services, the necessity for IV medications, close physician monitoring and/or the possible need for procedures.  In such, I feel patient’s risk for adverse outcomes and need for care warrant INPATIENT evaluation and predict the patient’s care encounter to likely last beyond 2 midnights.      Maddy Hernandez, APRN   18   7:48 PM      Patient seen and examined at the bedside.  Patient is a 52-year-old  male with past medical history significant for COPD, hypertension, ongoing tobacco abuse.  It seems that patient was in his usual state of health until about 2 weeks ago.  At that time patient started having episodes of fever and cough and felt some respiratory difficulties which waxed and waned.  About a week ago patient started having significant shortness of breath and went to the local hospital and was admitted there.  Patient was treated for pneumonia however now patient is noted to have significant right-sided pleural effusion.  Sputum culture also was positive for MRSA.  Patient was on Levaquin for that he tells me that since admission to the other hospital his symptoms improved  significantly.  On physical exam patient is resting in bed in no acute distress.  Pupils are equally reactive to light and accommodation.  Neck is supple and there is no palpable lymphadenopathy present.  There are no wheezing or crackles on lung exam but breath sounds are decreased on the right side compared to the left.  Cough on deep inspiration.  Regular rate and rhythm and normal murmur gallop or rub was audible.  Abdomen was soft, nontender, not distended and bowel sounds were present.  No edema lower extremities were present.    Spent an plan:  *Pneumonia with MRSA and also pleural effusion.  Patient has history of decortication on the same side some years ago done by Dr. Ge.  We will admit the patient and continue antibiotic with IV Rocephin, doxycycline, and vancomycin.  We will also ask thoracic surgery to see the patient in a.m.  Please see the above for more details.  Other long conversation with the patient and explained the findings and plan of care to him at the bedside.  This patient needs in-hospital further evaluation, workup, and treatment.  Patient will be admitted as inpatient.

## 2018-01-15 NOTE — PLAN OF CARE
Problem: Patient Care Overview (Adult)  Goal: Plan of Care Review  Outcome: Ongoing (interventions implemented as appropriate)   01/15/18 0309   Coping/Psychosocial Response Interventions   Plan Of Care Reviewed With patient   Patient Care Overview   Progress no change   Outcome Evaluation   Outcome Summary/Follow up Plan VSS. pt on 5L nc. rested well overnight. will continue to monitor.       Problem: Respiratory Insufficiency (Adult)  Goal: Identify Related Risk Factors and Signs and Symptoms  Outcome: Outcome(s) achieved Date Met: 01/15/18    Goal: Acid/Base Balance  Outcome: Ongoing (interventions implemented as appropriate)    Goal: Effective Ventilation  Outcome: Ongoing (interventions implemented as appropriate)      Problem: Pneumonia (Adult)  Goal: Signs and Symptoms of Listed Potential Problems Will be Absent or Manageable (Pneumonia)  Outcome: Ongoing (interventions implemented as appropriate)

## 2018-01-15 NOTE — THERAPY DISCHARGE NOTE
Acute Care - Occupational Therapy Discharge Summary   [place]     Patient Name: Niall Murguia  : 1965  MRN: [id number]    Today's Date: 1/15/2018  Onset of Illness/Injury or Date of Surgery Date: 18    Date of Referral to OT: 01/10/18  Referring Physician: Dr. Mahmood      Admit Date: 2018        OT Recommendation and Plan    Visit Dx:    ICD-10-CM ICD-9-CM   1. Sepsis due to pneumonia J18.9 486    A41.9 995.91   2. Acute renal failure, unspecified acute renal failure type N17.9 584.9   3. Impaired functional mobility, balance, gait, and endurance Z74.09 V49.89   4. Impaired mobility and ADLs Z74.09 799.89                     OT Goals       18 1043 18 1321       Strength OT LTG    Strength Goal OT LTG, Date Established  18  -     Strength Goal OT LTG, Time to Achieve  by discharge  -     Strength Goal OT LTG, Functional Goal  Pt will perform 15 reps BUE strengthening ex using theraband for resistance.  -     Strength Goal OT LTG, Date Goal Reviewed 18  -      Strength Goal OT LTG, Outcome goal ongoing  - goal ongoing  -     LB Dressing OT LTG    LB Dressing Goal OT LTG, Date Established  18  -     LB Dressing Goal OT LTG, Time to Achieve  by discharge  -     LB Dressing Goal OT LTG, [place] Level  set up required  -     LB Dressing Goal OT LTG, Date Goal Reviewed 18  -      LB Dressing Goal OT LTG, Outcome goal ongoing  - goal ongoing  -     Functional Mobility OT LTG    Functional Mobility Goal OT LTG, Date Established  18  -     Functional Mobility Goal OT LTG, Time to Achieve  by discharge  -     Functional Mobility Goal OT LTG, [place] Level  contact guard  -     Functional Mobility Goal OT LTG, Assist Device  rolling walker  -     Functional Mobility Goal OT LTG, Distance to Achieve  in hallway  -     Functional Mobility Goal OT LTG, Additional Goal  Pt will walk 250' with cga using [facility]      Functional Mobility Goal OT LTG, Date Goal Reviewed 01/12/18  -      Functional Mobility Goal OT LTG, Outcome goal ongoing  - goal ongoing  -       User Key  (r) = Recorded By, (t) = Taken By, (c) = Cosigned By    Initials Name Provider Type     Marli Galdamez Occupational Therapist                  OT Discharge Summary  Anticipated Discharge Disposition: home  Reason for Discharge: Discharge from facility  Outcomes Achieved: Patient able to partially acheive established goals  Discharge Destination: other (comment) (Pt transferred to [facility])      Marli Galdamez  1/15/2018

## 2018-01-15 NOTE — CONSULTS
Consult Note  Niall Murguia  [id number]  1965    Referring Provider:  Reason for Consultation: Right pneumothorax    Patient Care Team:  KEAGAN Rowley as PCP - General (Family Medicine)    Chief complaint: Shortness of breath      History of present illness: I have been asked see this patient in regards to shortness of breath.  The patient on chest x-ray was found to have evidence of a pneumothorax.  He has undergone previous surgery by me approximate 8 years ago for this.  He is continued to smoke during this period of time.  He presented with approximately 2 week history of fever and chills.  He was diagnosed with pneumonia is been admitted.  He's developed a pneumothorax since that time.  Abdomen acid patient guards to this since he is appears to be symptomatic.    Review of Systems    The following systems were reviewed and negative;  constitution, eyes, ENT, cardiovascular, gastrointestinal, genitourinary, integument, breast, hematologic / lymphatic, musculoskeletal, neurological, behavioral/psych, endocrine and allergies / immunologic    History  Past Medical History:   Diagnosis Date   • COPD (chronic obstructive pulmonary disease)    , Past Surgical History:   Procedure Laterality Date   • CARDIAC SURGERY     , Family History   Problem Relation Age of Onset   • No Known Problems Mother    • Heart disease Father    • No Known Problems Sister    • No Known Problems Brother    • No Known Problems Daughter    • No Known Problems Son    , Social History   Substance Use Topics   • Smoking status: Current Every Day Smoker     Packs/day: 1.00     Years: 40.00     Types: Cigarettes   • Smokeless tobacco: Never Used   • Alcohol use 1.2 oz/week     2 Cans of beer per week      Comment: occasionally   , Prescriptions Prior to Admission   Medication Sig Dispense Refill Last Dose   • acetaminophen (TYLENOL) 325 MG tablet Take 2 tablets by mouth Every 4 (Four) Hours As Needed for Mild Pain .      •  budesonide-formoterol (SYMBICORT) 160-4.5 MCG/ACT inhaler Inhale 2 puffs 2 (Two) Times a Day.  12    • citalopram (CeleXA) 40 MG tablet Take 40 mg by mouth Daily.   1/6/2018 at 0800   • enoxaparin (LOVENOX) 40 MG/0.4ML solution syringe Inject 0.4 mL under the skin Daily. 11.2 mL     • ipratropium-albuterol (DUO-NEB) 0.5-2.5 mg/mL nebulizer Take 3 mL by nebulization Every 4 (Four) Hours As Needed for Wheezing or Shortness of Air. 360 mL     • levoFLOXacin (LEVAQUIN) 500 MG tablet Take 1 tablet by mouth Daily for 3 doses. Indications: Pneumonia 3 tablet 0    • methylPREDNISolone sodium succinate (SOLU-Medrol) 40 MG injection Infuse 1 mL into a venous catheter Every 8 (Eight) Hours.      • nicotine (NICODERM CQ) 21 MG/24HR patch Place 1 patch on the skin Daily.      • ondansetron (ZOFRAN) 4 MG/2ML injection Infuse 2 mL into a venous catheter Every 6 (Six) Hours As Needed for Nausea or Vomiting.      • saccharomyces boulardii (FLORASTOR) 250 MG capsule Take 1 capsule by mouth 2 (Two) Times a Day.      • sodium chloride 0.9 % flush Infuse 10 mL into a venous catheter As Needed for Line Care.      • sodium chloride 0.9 % flush Infuse 1-10 mL into a venous catheter As Needed for Line Care.      • Sunscreens (CHAPSTICK) stick Apply 1 each topically 5 (Five) Times a Day As Needed (chapped lips).         Scheduled Meds:    [START ON 1/16/2018] Pharmacy Consult  Does not apply Once   budesonide-formoterol 2 puff Inhalation BID - RT   ceftriaxone 1 g Intravenous Q24H   citalopram 40 mg Oral Daily   doxycycline 100 mg Oral Q12H   nicotine 1 patch Transdermal Q24H   predniSONE 20 mg Oral Daily   saccharomyces boulardii 250 mg Oral BID   vancomycin 15 mg/kg Intravenous Q12H      Continuous Infusions:    Pharmacy to dose vancomycin       PRN Meds:  acetaminophen  •  ipratropium-albuterol  •  ondansetron **OR** ondansetron  •  Pharmacy to dose vancomycin  •  sodium chloride and Allergies:  Review of patient's allergies indicates no  "known allergies.    Objective     Vital Sign Min/Max for last 24 hours  Temp  Min: 98.1 °F (36.7 °C)  Max: 98.6 °F (37 °C)   BP  Min: 120/89  Max: 134/82   Pulse  Min: 49  Max: 101   Resp  Min: 17  Max: 32   SpO2  Min: 90 %  Max: 96 %   Flow (L/min)  Min: 5  Max: 5   Weight  Min: 66.6 kg (146 lb 14.4 oz)  Max: 66.6 kg (146 lb 14.4 oz)     Flowsheet Rows         First Filed Value    Admission Height  172.7 cm (68\") Documented at 01/14/2018 1853    Admission Weight  66.6 kg (146 lb 14.4 oz) Documented at 01/14/2018 1853          Physical Exam:     General Appearance:    Alert, cooperative, in no acute distress   Head:    Normocephalic, without obvious abnormality, atraumatic   Eyes:            Lids and lashes normal, conjunctivae and sclerae normal, no   icterus, no pallor, corneas clear, PERRLA   Ears:    Ears appear intact with no abnormalities noted   Throat:   No oral lesions, no thrush, oral mucosa moist   Neck:   No adenopathy, supple, trachea midline, no thyromegaly, no   carotid bruit, no JVD   Back:     No kyphosis present, no scoliosis present, no skin lesions,      erythema or scars, no tenderness to percussion or                   palpation,   range of motion normal   Lungs:     Decreased on the right,respirations regular, even and                  unlabored,  oxygen     Heart:    Regular rhythm and normal rate, normal S1 and S2, no            murmur, no gallop, no rub, no click   Chest Wall:    No abnormalities observed   Abdomen:     Normal bowel sounds, no masses, no organomegaly, soft        non-tender, non-distended, no guarding, no rebound                tenderness   Rectal:     Deferred   Extremities:   Moves all extremities well, no edema, no cyanosis, no             redness   Pulses:   Pulses palpable and equal bilaterally   Skin:   No bleeding, bruising or rash   Lymph nodes:   No palpable adenopathy   Neurologic:   Cranial nerves 2 - 12 grossly intact, sensation intact, DTR       present and " equal bilaterally             Assessment/Plan     Principal Problem:    Hydropneumothorax, right  Active Problems:    Acute on chronic respiratory failure with hypoxia    Pneumonia of both lower lobes    Chronic bullous emphysema    Hypertension    Tobacco use      I have been asked see this patient guards to a pneumothorax on the right.  He's had previous surgery for this approximate years ago.  He is continued to smoke during this appears.  A time.  He presents with a two-week history of pneumonia.  He is now developed a pneumothorax and appears to be short of breath with this.  He is on oxygen.  I will obtain reviewed his x-rays in detail.  At this point I think a CT directed chest tube would be indicated.  Of gone over the importance of not smoking with him again as well as family members.  He appears to understand all the above and will proceed with this.    I discussed the patients findings and my recommendations with patient and family      Please note that portions of this note were completed with a voice recognition program. Efforts were made to edit the dictations, but words may be mistranscribed    Roland Ge MD  01/15/18  11:36 AM

## 2018-01-15 NOTE — PROGRESS NOTES
Discharge Planning Assessment  [facility]     Patient Name: Niall Murguia  MRN: [id number]  Today's Date: 1/15/2018    Admit Date: 1/14/2018          Discharge Needs Assessment       01/15/18 1522    Living Environment    Lives With child(clemencia), adult    Living Arrangements mobile home    Home Accessibility no concerns    Type of Financial/Environmental Concern none    Transportation Available car;family or friend will provide    Living Environment    Provides Primary Care For no one    Quality Of Family Relationships supportive;helpful;involved    Able to Return to Prior Living Arrangements other (see comments)   Plans to stay with significant other, Lakeisha Rehman, in [place] for a few weeks after discharge: [address] [place] [address]    Discharge Needs Assessment    Concerns To Be Addressed discharge planning concerns    Readmission Within The Last 30 Days no previous admission in last 30 days    Outpatient/Agency/Support Group Needs homecare agency (specify level of care)    Anticipated Changes Related to Illness inability to care for self    Equipment Currently Used at Home none    Discharge Facility/Level Of Care Needs home with home health    Discharge Disposition still a patient            Discharge Plan       01/15/18 1524    Case Management/Social Work Plan    Plan Home with significant other    Patient/Family In Agreement With Plan yes    Additional Comments Met with patient and significant other, Lakeisha Rehman, at bedside. He lives in a mobile home with his adult son in [place]; however he plans to stay with Ms. Rehman in her home in [place] when he is discharged while he recovers. Her address is [address] [place] [address]. He has never had home health, home oxygen, or DME and has no preference which companies these are set up with if indicated. Ms. Rehman will transport him home by car at discharge. CM will continue to follow. Arcelia Rockwell, x [phone]        Discharge  Placement     No information found        Expected Discharge Date and Time     Expected Discharge Date Expected Discharge Time    Jan 17, 2018               Demographic Summary       01/15/18 1520    Referral Information    Arrived From [facility]    Referral Source admission list    Record Reviewed medical record    Primary Care Physician Information    Name Rebecca BOOGIE            Functional Status       01/15/18 1521    Functional Status Prior    Ambulation 0-->independent    Transferring 0-->independent    Toileting 0-->independent    Bathing 0-->independent    Dressing 0-->independent    Eating 0-->independent    Communication 0-->understands/communicates without difficulty    Swallowing 0-->swallows foods/liquids without difficulty    IADL    Medications independent    Meal Preparation independent    Housekeeping independent    Laundry independent    Shopping independent    Oral Care independent    Activity Tolerance    Usual Activity Tolerance good    Current Activity Tolerance fair    Employment/Financial    Financial Concerns none    Employment/Finance Comments [employer] with Rx coverage            Psychosocial     None            Abuse/Neglect     None            Legal     None            Substance Abuse     None            Patient Forms     None          Arcelia Rockwell RN

## 2018-01-15 NOTE — PROGRESS NOTES
Case Management Discharge Note         Discharge Placement     No information found        Ambulance: Gwendolyn Andersen    Discharge Codes: 02  Discharged/transferred to short-term acute care [facility] ([facility])

## 2018-01-15 NOTE — NURSING NOTE
Procedure complete.  160ml turbid fluid removed  Pt tolerated well.  Report called to Beth on 4H.  Pt returning to room via hospital transport

## 2018-01-15 NOTE — PROGRESS NOTES
[facility] Medicine Services  PROGRESS NOTE    Patient Name: Niall Murguia  : 1965  MRN: [id number]    Date of Admission: 2018  Length of Stay: 1  Primary Care Physician: KEAGAN Rowley    Subjective   Subjective     CC:  FU SOB    HPI:  Reports feeling well. Girlfriend at bedside. Coughing some but improved    Review of Systems  Gen- No fevers, chills  CV- No chest pain, palpitations  Resp- +Cough, +dyspnea  GI- No N/V/D, abd pain    Otherwise ROS is negative except as mentioned in the HPI.    Objective   Objective     Vital Signs:   Temp:  [98.1 °F (36.7 °C)-98.6 °F (37 °C)] 98.1 °F (36.7 °C)  Heart Rate:  [] 82  Resp:  [17-32] 24  BP: (120-134)/(72-89) 128/72        Physical Exam:  Constitutional: No acute distress, awake, alert on 5LNC  Eyes: PERRLA, sclerae anicteric, no conjunctival injection  HENT: NCAT, mucous membranes moist  Neck: Supple, no thyromegaly, no lymphadenopathy, trachea midline  Respiratory: Coarse BS R>L, nonlabored respirations   Cardiovascular: RRR, no murmurs, rubs, or gallops, palpable pedal pulses bilaterally  Gastrointestinal: Positive bowel sounds, soft, nontender, nondistended  Musculoskeletal: No bilateral ankle edema, no clubbing or cyanosis to extremities  Psychiatric: Appropriate affect, cooperative  Neurologic: Oriented x 3, strength symmetric in all extremities, Cranial Nerves grossly intact to confrontation, speech clear  Skin: No rashes    Results Reviewed:  I have personally reviewed current lab, radiology, and data and agree.      Results from last 7 days  Lab Units 01/15/18  0526 18  1300 18  1000   WBC 10*3/mm3 18.53* 18.12* 13.09*   HEMOGLOBIN g/dL 12.3* 12.9* 11.6*   HEMATOCRIT % 37.2* 38.1* 34.5*   PLATELETS 10*3/mm3 226 208 136   INR  1.10  --   --        Results from last 7 days  Lab Units 01/15/18  0526 18  1300 18  1000   SODIUM mmol/L 134 137 135*   POTASSIUM mmol/L 4.2 3.9 4.0    CHLORIDE mmol/L 100 98 102   CO2 mmol/L 34.0* 35.0* 28.0   BUN mg/dL 20 15 25*   CREATININE mg/dL 0.60 0.60 0.60   GLUCOSE mg/dL 90 142* 139*   CALCIUM mg/dL 8.1* 8.6 8.3*   ALT (SGPT) U/L  --  222* 114*   AST (SGOT) U/L  --  70* 49*     No results found for: BNP  No results found for: PHART    Microbiology Results Abnormal     None          Imaging Results (last 24 hours)     Procedure Component Value Units Date/Time    XR Chest 1 View [[id number]] Collected:  01/15/18 0837     Updated:  01/15/18 1348    Narrative:       EXAMINATION: XR CHEST 1 VW-01/15/2018:      INDICATION: Repeat, respiratory distress.     COMPARISON: 04/15/2010.     FINDINGS: Portable chest reveals evidence of an apical pneumothorax. The  pneumothorax is small in size. Basilar component is likely. Ill-defined  opacification seen within the right mid and lower lung field. Surgical  staple line identified within the right upper lobe. The left lung is  grossly clear.           Impression:       Apical pneumothorax is identified on the right. Ill-defined  opacification seen within the right mid and lower lung field. Surgical  staple line identified in the right upper lobe. Chronic changes seen  within the left mid and lower lung field.     D:  01/15/2018  E:  01/15/2018     This report was finalized on 1/15/2018 1:46 PM by Dr. Yanci Jolley MD.           Results for orders placed during the hospital encounter of 01/07/18   Adult Transthoracic Echo Complete W/ Cont if Necessary Per Protocol    Narrative Technically very limited study   1) Normal LV systolic function ( EF is about 55%)  2) Normal left atrial size with normal LVedp   3) Trace MR   4) Mild TR with normal PA pressures ( within limitations of study)  5) Severe RV dilation with wall motion abnormality ? Acute on chronic   corpulmonale   6) Severe reduction in RV function        I have reviewed the medications.    Assessment/Plan   Assessment / Plan     Hospital Problem List     *  (Principal)Hydropneumothorax, right    Acute on chronic respiratory failure with hypoxia    Pneumonia of both lower lobes    Chronic bullous emphysema (Chronic)    Hypertension    Tobacco use           Brief Hospital Course to date:  Niall Murguia is a 52 y.o. male who [facility] from [facility] for a higher level of care. Patient presented to [facility] on 1/7/18 with difficulty breathing. He complained of intermittent shortness of breath and cough for one week. Admitted to ICU at [place], requiring BiPAP intermittently.     Assessment & Plan:  - Acute on chronic hypoxemic respiratory failure: Currently on 5LNC  - MRSA PNA with R-sided pneumothorax and pleural effusion: Resp Cx +1/9, Continue Vanc. DC ceftriaxone, doxycycline. CT-guided chest tube placement 1/15. Pleural fluid studies obtained. CXR and CT chest reviewed  - Hx of spontaneous R PTX s/p repair 2010: Tobacco use counseling  - Macrocytic anemia: Will obtain TSH, B12 level. Empiric daily folic acid. ?alcohol use    DVT Prophylaxis:  [facility]    CODE STATUS: Full Code    Disposition: I expect the patient to be discharged TBMARTHA Martino MD  01/15/18  3:04 PM

## 2018-01-15 NOTE — PAYOR COMM NOTE
"Alyssa Corea (52 y.o. Male)     INITIAL NOTIFICATION AND CLINICALS      Date of Birth Social Security Number Address Home Phone MRN    1965  [address] [facility] [id number] [phone] [id number]    Mandaen Marital Status          None Single       Admission Date Admission Type Admitting Provider Attending Provider Department, Room/Bed    18 Elective Jake Tinsley MD Kalantar, Masoud, MD [facility] 4H, S466/1    Discharge Date Discharge Disposition Discharge Destination                      Attending Provider: Jake Tinsley MD     Allergies:  No Known Allergies    Isolation:  None   Infection:  MRSA (18)   Code Status:  FULL    Ht:  172.7 cm (68\")   Wt:  66.6 kg (146 lb 14.4 oz)    Admission Cmt:  None   Principal Problem:  Hydropneumothorax, right [J94.8]                 Active Insurance as of 2018     Primary Coverage     Payor Plan Insurance Group Employer/Plan Group    HUMANA MEDICAID HU[employer]NA [employer] [id number]     Payor Plan Address Payor Plan Phone Number Effective From Effective To    PO  [phone] 2018     [place] [address]       Subscriber Name Subscriber Birth Date Member ID       ALYSSA COREA 1965 [id number]                 Emergency Contacts      (Rel.) Home Phone Work Phone Mobile Phone    Lakeisha Rehman (Significant Other) [phone] -- --    Krissy Macias (Sister) [phone] -- --            Insurance Information                HUMANA MEDICAID/HUMANA [employer] Phone: [phone]    Subscriber: Alyssa Corea Subscriber#: [id number]    Group#: [id number] Precert#:              History & Physical      Jake Tinsley MD at 2018  7:48 PM              [facility]  HISTORY AND PHYSICAL    Patient Name: Alyssa Corea  : 1965  MRN: [id number]  Primary Care Physician: KEAGAN Rowley    Subjective   Subjective     Chief Complaint: Difficultly " Breathing/Right hydropneumothorax    HPI:  Niall Murguia is a 52 y.o. male who presents to [facility] from [facility] for a higher level of care. Patient presented to [facility] on 1/7/18 with difficulty breathing. He complained of intermittent shortness of breath and cough for one week. He states that the day prior to his evaluation in the ED at [facility] his sputum turned from a yellowish color to red-brown in color. Patient also reports that he became severely short of breath, nothing improved it and movement made it worse. He had been placed on prednisone and augmentin.     Patient reports that he has a history of tobacco use, and a spontaneous pneumothorax several years ago which required a chest tube and eventually decortication. He has recently been treated for hypertension. Otherwise patient has no other significant medical history.     While in the emergency department patient was given 2L IV fluid, lasix, and diagnosed with sepsis. Patient has responded well to antibiotics and IV hydration. Chest imaging revealed a right hydropneumothorax, and bilateral pneumonia. Patient was transferred for a higher level of care due to [facility] not having CTS. Patient will be admitted to the hospital service for further evaluation and treatment.     Review of Systems   Constitutional: Positive for fever. Negative for chills, diaphoresis and fatigue.   HENT: Positive for congestion.    Respiratory: Positive for cough and shortness of breath. Negative for wheezing.    Cardiovascular: Negative for chest pain, palpitations and leg swelling.   Gastrointestinal: Negative for abdominal pain, nausea and vomiting.   Genitourinary: Negative for dysuria, frequency and urgency.   Musculoskeletal: Negative for arthralgias and myalgias.   Skin: Negative for color change, pallor, rash and wound.   Neurological: Negative for dizziness, syncope, weakness and light-headedness.   Psychiatric/Behavioral: Negative for agitation  and confusion.   All other systems reviewed and are negative.     Otherwise 10-system ROS reviewed and is negative except as mentioned in the HPI.    Personal History     Past Medical History:   Diagnosis Date   • COPD (chronic obstructive pulmonary disease)        Past Surgical History:   Procedure Laterality Date   • CARDIAC SURGERY         Family History: family history includes Heart disease in his father; No Known Problems in his brother, daughter, mother, sister, and son.     Social History:  reports that he has been smoking Cigarettes.  He has a 40.00 pack-year smoking history. He has never used smokeless tobacco. He reports that he drinks about 1.2 oz of alcohol per week  He reports that he does not use illicit drugs.  Social History     Social History Narrative   • No narrative on file       Medications:  Prescriptions Prior to Admission   Medication Sig Dispense Refill Last Dose   • acetaminophen (TYLENOL) 325 MG tablet Take 2 tablets by mouth Every 4 (Four) Hours As Needed for Mild Pain .      • budesonide-formoterol (SYMBICORT) 160-4.5 MCG/ACT inhaler Inhale 2 puffs 2 (Two) Times a Day.  12    • citalopram (CeleXA) 40 MG tablet Take 40 mg by mouth Daily.   1/6/2018 at 0800   • enoxaparin (LOVENOX) 40 MG/0.4ML solution syringe Inject 0.4 mL under the skin Daily. 11.2 mL     • ipratropium-albuterol (DUO-NEB) 0.5-2.5 mg/mL nebulizer Take 3 mL by nebulization Every 4 (Four) Hours As Needed for Wheezing or Shortness of Air. 360 mL     • [START ON 1/15/2018] levoFLOXacin (LEVAQUIN) 500 MG tablet Take 1 tablet by mouth Daily for 3 doses. Indications: Pneumonia 3 tablet 0    • methylPREDNISolone sodium succinate (SOLU-Medrol) 40 MG injection Infuse 1 mL into a venous catheter Every 8 (Eight) Hours.      • nicotine (NICODERM CQ) 21 MG/24HR patch Place 1 patch on the skin Daily.      • ondansetron (ZOFRAN) 4 MG/2ML injection Infuse 2 mL into a venous catheter Every 6 (Six) Hours As Needed for Nausea or Vomiting.       • saccharomyces boulardii (FLORASTOR) 250 MG capsule Take 1 capsule by mouth 2 (Two) Times a Day.      • sodium chloride 0.9 % flush Infuse 10 mL into a venous catheter As Needed for Line Care.      • sodium chloride 0.9 % flush Infuse 1-10 mL into a venous catheter As Needed for Line Care.      • Sunscreens (CHAPSTICK) stick Apply 1 each topically 5 (Five) Times a Day As Needed (chapped lips).          No Known Allergies    Objective   Objective     Vital Signs:   Temp:  [98.3 °F (36.8 °C)-98.5 °F (36.9 °C)] 98.5 °F (36.9 °C)  Heart Rate:  [60-97] 97  Resp:  [18] 18  BP: (129-136)/(78-84) 129/79        Physical Exam   Constitutional: He is oriented to person, place, and time. He appears well-developed and well-nourished.   HENT:   Head: Normocephalic and atraumatic.   Eyes: EOM are normal. Pupils are equal, round, and reactive to light. No scleral icterus.   Neck: Normal range of motion. Neck supple. No JVD present.   Cardiovascular: Normal rate, regular rhythm, normal heart sounds and intact distal pulses.  Exam reveals no gallop and no friction rub.    No murmur heard.  Pulmonary/Chest: Effort normal. No respiratory distress. He has decreased breath sounds in the right middle field and the right lower field. He has wheezes. He has no rales. He exhibits no tenderness.   Abdominal: Soft. Bowel sounds are normal. He exhibits no distension and no mass. There is no tenderness. There is no rebound and no guarding. No hernia.   Musculoskeletal: Normal range of motion. He exhibits no edema, tenderness or deformity.   Neurological: He is alert and oriented to person, place, and time.   Skin: Skin is warm and dry. No rash noted. No erythema. No pallor.   Psychiatric: He has a normal mood and affect. His behavior is normal. Judgment and thought content normal.   Nursing note and vitals reviewed.     Results Reviewed:  I have personally reviewed current lab, radiology, and data and agree.      Results from last 7  days  Lab Units 01/14/18  1300   WBC 10*3/mm3 18.12*   HEMOGLOBIN g/dL 12.9*   HEMATOCRIT % 38.1*   PLATELETS 10*3/mm3 208       Results from last 7 days  Lab Units 01/14/18  1300   SODIUM mmol/L 137   POTASSIUM mmol/L 3.9   CHLORIDE mmol/L 98   CO2 mmol/L 35.0*   BUN mg/dL 15   CREATININE mg/dL 0.60   GLUCOSE mg/dL 142*   CALCIUM mg/dL 8.6   ALT (SGPT) U/L 222*   AST (SGOT) U/L 70*     Brief Urine Lab Results  (Last result in the past 365 days)      Color   Clarity   Blood   Leuk Est   Nitrite   Protein   CREAT   Urine HCG        01/07/18 1748 Yellow Slightly Cloudy(A) Small (1+)(A) Negative Negative 100 mg/dL (2+)(A)             No results found for: BNP  No results found for: PHART  Imaging Results (last 24 hours)     ** No results found for the last 24 hours. **        Results for orders placed during the hospital encounter of 01/07/18   Adult Transthoracic Echo Complete W/ Cont if Necessary Per Protocol    Narrative Technically very limited study   1) Normal LV systolic function ( EF is about 55%)  2) Normal left atrial size with normal LVedp   3) Trace MR   4) Mild TR with normal PA pressures ( within limitations of study)  5) Severe RV dilation with wall motion abnormality ? Acute on chronic   corpulmonale   6) Severe reduction in RV function        Assessment/Plan   Assessment / Plan     Hospital Problem List     * (Principal)Hydropneumothorax, right    Acute on chronic respiratory failure with hypoxia    Pneumonia of both lower lobes    Chronic bullous emphysema (Chronic)    Hypertension    Tobacco use        Assessment & Plan:  - Admit to telemetry  - VS q4h  - Consult to CT Surgery   - Right hydropneumothorax  - Sputum + MRSA at [facility]  - Continue inhalers/nebs  - Transition Solumedrol to Oral prednisone   - Will taper   - Follow results from [facility]  - Will broaden abx coverage from Levaquin to vancomycin/rocephin/doxy  - AM Labs  - AM EKG  - AM CXR  - Nicotine patch  - Tobacco cessation education    Tobacco  Cessation Counselin minutes of tobacco cessation counseling provided, including but not limited to, risks of ongoing tobacco use, pertinence to current or future health status and availability/examples of cessation resources.  Patient expresses desire to quit.        DVT prophylaxis: TEDs/SCDs/HOLD PHARMACOLOGIC PENDING CT SURGERY EVAL    CODE STATUS: FULL      Admission Status:  I believe this patient meets INPATIENT status due to the need for care which can only be reasonably provided in an hospital setting such as aggressive/expedited ancillary services and/or consultation services, the necessity for IV medications, close physician monitoring and/or the possible need for procedures.  In such, I feel patient’s risk for adverse outcomes and need for care warrant INPATIENT evaluation and predict the patient’s care encounter to likely last beyond 2 midnights.      Maddy Hernandez, APRN   18   7:48 PM      Patient seen and examined at the bedside.  Patient is a 52-year-old  male with past medical history significant for COPD, hypertension, ongoing tobacco abuse.  It seems that patient was in his usual state of health until about 2 weeks ago.  At that time patient started having episodes of fever and cough and felt some respiratory difficulties which waxed and waned.  About a week ago patient started having significant shortness of breath and went to the local hospital and was admitted there.  Patient was treated for pneumonia however now patient is noted to have significant right-sided pleural effusion.  Sputum culture also was positive for MRSA.  Patient was on Levaquin for that he tells me that since admission to the other hospital his symptoms improved significantly.  On physical exam patient is resting in bed in no acute distress.  Pupils are equally reactive to light and accommodation.  Neck is supple and there is no palpable lymphadenopathy present.  There are no wheezing or crackles on  lung exam but breath sounds are decreased on the right side compared to the left.  Cough on deep inspiration.  Regular rate and rhythm and normal murmur gallop or rub was audible.  Abdomen was soft, nontender, not distended and bowel sounds were present.  No edema lower extremities were present.    Spent an plan:  *Pneumonia with MRSA and also pleural effusion.  Patient has history of decortication on the same side some years ago done by Dr. Ge.  We will admit the patient and continue antibiotic with IV Rocephin, doxycycline, and vancomycin.  We will also ask thoracic surgery to see the patient in a.m.  Please see the above for more details.  Other long conversation with the patient and explained the findings and plan of care to him at the bedside.  This patient needs in-hospital further evaluation, workup, and treatment.  Patient will be admitted as inpatient.     Electronically signed by Jake Tinsley MD at 1/14/2018 10:54 PM        Vital Signs (last 72 hrs)       01/11 0700  -  01/12 0659 01/12 0700  -  01/13 0659 01/13 0700 - 01/14 0659 01/14 0700  -  01/14 2318   Most Recent    Temp (°F) 97.5 -  98.3    98 -  98.4    97.9 -  98.3    98.5 -  98.6     98.6 (37)    Heart Rate 53 -  89    54 -  93    60 -  94    62 -  101     72    Resp 16 -  26    16 -  20    16 -  18    18 -  20     18    /72 -  131/71    117/69 -  137/84    118/75 -  136/78    120/89 -  136/84     120/89    SpO2 (%) (!)87 -  96    (!)88 -  97    92 -  95    93 -  95     94          Prior to Admission Medications     Prescriptions Last Dose Informant Patient Reported? Taking?    acetaminophen (TYLENOL) 325 MG tablet   No No    Take 2 tablets by mouth Every 4 (Four) Hours As Needed for Mild Pain .    budesonide-formoterol (SYMBICORT) 160-4.5 MCG/ACT inhaler   No No    Inhale 2 puffs 2 (Two) Times a Day.    citalopram (CeleXA) 40 MG tablet  Self Yes No    Take 40 mg by mouth Daily.    enoxaparin (LOVENOX) 40 MG/0.4ML solution syringe    No No    Inject 0.4 mL under the skin Daily.    ipratropium-albuterol (DUO-NEB) 0.5-2.5 mg/mL nebulizer   No No    Take 3 mL by nebulization Every 4 (Four) Hours As Needed for Wheezing or Shortness of Air.    levoFLOXacin (LEVAQUIN) 500 MG tablet   No No    Take 1 tablet by mouth Daily for 3 doses. Indications: Pneumonia    methylPREDNISolone sodium succinate (SOLU-Medrol) 40 MG injection   No No    Infuse 1 mL into a venous catheter Every 8 (Eight) Hours.    nicotine (NICODERM CQ) 21 MG/24HR patch   No No    Place 1 patch on the skin Daily.    ondansetron (ZOFRAN) 4 MG/2ML injection   No No    Infuse 2 mL into a venous catheter Every 6 (Six) Hours As Needed for Nausea or Vomiting.    saccharomyces boulardii (FLORASTOR) 250 MG capsule   No No    Take 1 capsule by mouth 2 (Two) Times a Day.    sodium chloride 0.9 % flush   No No    Infuse 10 mL into a venous catheter As Needed for Line Care.    sodium chloride 0.9 % flush   No No    Infuse 1-10 mL into a venous catheter As Needed for Line Care.    Sunscreens (CHAPSTICK) stick   No No    Apply 1 each topically 5 (Five) Times a Day As Needed (chapped lips).            Lab Results (last 24 hours)     Procedure Component Value Units Date/Time    aPTT [[id number]]  (Normal) Collected:  01/14/18 2104    Specimen:  Blood Updated:  01/14/18 2209     PTT 27.8 seconds     Narrative:       PTT = The equivalent PTT values for the therapeutic range of heparin levels at 0.3 to 0.5 U/ml are 45 to 60 seconds.        Imaging Results (last 24 hours)     ** No results found for the last 24 hours. **        Physician Progress Notes (last 24 hours) (Notes from 1/13/2018 11:18 PM through 1/14/2018 11:18 PM)     No notes of this type exist for this encounter.

## 2018-01-16 ENCOUNTER — APPOINTMENT (OUTPATIENT)
Dept: GENERAL RADIOLOGY | Facility: HOSPITAL | Age: 53
End: 2018-01-16

## 2018-01-16 LAB
ALBUMIN FLD-MCNC: 1.4 G/DL
ALBUMIN SERPL-MCNC: 2.7 G/DL (ref 3.2–4.8)
ANION GAP SERPL CALCULATED.3IONS-SCNC: 4 MMOL/L (ref 3–11)
BUN BLD-MCNC: 20 MG/DL (ref 9–23)
BUN/CREAT SERPL: 40 (ref 7–25)
CALCIUM SPEC-SCNC: 7.4 MG/DL (ref 8.7–10.4)
CHLORIDE SERPL-SCNC: 92 MMOL/L (ref 99–109)
CO2 SERPL-SCNC: 31 MMOL/L (ref 20–31)
CREAT BLD-MCNC: 0.5 MG/DL (ref 0.6–1.3)
CREAT UR-MCNC: 42.6 MG/DL
DEPRECATED RDW RBC AUTO: 48.5 FL (ref 37–54)
ERYTHROCYTE [DISTWIDTH] IN BLOOD BY AUTOMATED COUNT: 12.9 % (ref 11.3–14.5)
GFR SERPL CREATININE-BSD FRML MDRD: >150 ML/MIN/1.73
GLUCOSE BLD-MCNC: 135 MG/DL (ref 70–100)
HCT VFR BLD AUTO: 40.6 % (ref 38.9–50.9)
HGB BLD-MCNC: 13.9 G/DL (ref 13.1–17.5)
MCH RBC QN AUTO: 35.4 PG (ref 27–31)
MCHC RBC AUTO-ENTMCNC: 34.2 G/DL (ref 32–36)
MCV RBC AUTO: 103.3 FL (ref 80–99)
OSMOLALITY SERPL: 272 MOSM/KG (ref 275–295)
PHOSPHATE SERPL-MCNC: 2.6 MG/DL (ref 2.4–5.1)
PLATELET # BLD AUTO: 246 10*3/MM3 (ref 150–450)
PMV BLD AUTO: 10 FL (ref 6–12)
POTASSIUM BLD-SCNC: 4 MMOL/L (ref 3.5–5.5)
RBC # BLD AUTO: 3.93 10*6/MM3 (ref 4.2–5.76)
SODIUM BLD-SCNC: 127 MMOL/L (ref 132–146)
SODIUM UR-SCNC: 39 MMOL/L (ref 30–90)
VANCOMYCIN TROUGH SERPL-MCNC: 9.2 MCG/ML (ref 10–20)
VIT B12 BLD-MCNC: 1706 PG/ML (ref 211–911)
WBC NRBC COR # BLD: 18.5 10*3/MM3 (ref 3.5–10.8)

## 2018-01-16 PROCEDURE — 25010000002 VANCOMYCIN PER 500 MG

## 2018-01-16 PROCEDURE — 94799 UNLISTED PULMONARY SVC/PX: CPT

## 2018-01-16 PROCEDURE — 71045 X-RAY EXAM CHEST 1 VIEW: CPT

## 2018-01-16 PROCEDURE — 84300 ASSAY OF URINE SODIUM: CPT | Performed by: HOSPITALIST

## 2018-01-16 PROCEDURE — 85027 COMPLETE CBC AUTOMATED: CPT | Performed by: HOSPITALIST

## 2018-01-16 PROCEDURE — 25010000002 HEPARIN (PORCINE) PER 1000 UNITS: Performed by: HOSPITALIST

## 2018-01-16 PROCEDURE — 82570 ASSAY OF URINE CREATININE: CPT | Performed by: HOSPITALIST

## 2018-01-16 PROCEDURE — 83930 ASSAY OF BLOOD OSMOLALITY: CPT | Performed by: HOSPITALIST

## 2018-01-16 PROCEDURE — 82607 VITAMIN B-12: CPT | Performed by: HOSPITALIST

## 2018-01-16 PROCEDURE — 80069 RENAL FUNCTION PANEL: CPT | Performed by: HOSPITALIST

## 2018-01-16 PROCEDURE — 94760 N-INVAS EAR/PLS OXIMETRY 1: CPT

## 2018-01-16 PROCEDURE — 99233 SBSQ HOSP IP/OBS HIGH 50: CPT | Performed by: HOSPITALIST

## 2018-01-16 PROCEDURE — 80202 ASSAY OF VANCOMYCIN: CPT | Performed by: INTERNAL MEDICINE

## 2018-01-16 PROCEDURE — 94640 AIRWAY INHALATION TREATMENT: CPT

## 2018-01-16 PROCEDURE — 25010000002 VANCOMYCIN PER 500 MG: Performed by: INTERNAL MEDICINE

## 2018-01-16 RX ORDER — LIDOCAINE 50 MG/G
1 PATCH TOPICAL
Status: DISCONTINUED | OUTPATIENT
Start: 2018-01-16 | End: 2018-01-24 | Stop reason: HOSPADM

## 2018-01-16 RX ORDER — VANCOMYCIN HYDROCHLORIDE 1 G/200ML
15 INJECTION, SOLUTION INTRAVENOUS EVERY 8 HOURS
Status: DISCONTINUED | OUTPATIENT
Start: 2018-01-16 | End: 2018-01-17

## 2018-01-16 RX ORDER — HYDROCODONE BITARTRATE AND ACETAMINOPHEN 5; 325 MG/1; MG/1
1 TABLET ORAL EVERY 4 HOURS PRN
Status: DISCONTINUED | OUTPATIENT
Start: 2018-01-16 | End: 2018-01-24 | Stop reason: HOSPADM

## 2018-01-16 RX ADMIN — VANCOMYCIN HYDROCHLORIDE 1000 MG: 1 INJECTION, SOLUTION INTRAVENOUS at 17:18

## 2018-01-16 RX ADMIN — BUDESONIDE AND FORMOTEROL FUMARATE DIHYDRATE 2 PUFF: 160; 4.5 AEROSOL RESPIRATORY (INHALATION) at 21:19

## 2018-01-16 RX ADMIN — HYDROCODONE BITARTATE AND ACETAMINOPHEN 1 TABLET: 5; 325 TABLET ORAL at 09:34

## 2018-01-16 RX ADMIN — Medication 250 MG: at 08:11

## 2018-01-16 RX ADMIN — LIDOCAINE 1 PATCH: 50 PATCH CUTANEOUS at 10:50

## 2018-01-16 RX ADMIN — HYDROCODONE BITARTATE AND ACETAMINOPHEN 1 TABLET: 5; 325 TABLET ORAL at 21:56

## 2018-01-16 RX ADMIN — ACETAMINOPHEN 650 MG: 325 TABLET, FILM COATED ORAL at 20:54

## 2018-01-16 RX ADMIN — Medication 250 MG: at 20:54

## 2018-01-16 RX ADMIN — NICOTINE 1 PATCH: 21 PATCH, EXTENDED RELEASE TRANSDERMAL at 20:55

## 2018-01-16 RX ADMIN — HYDROCODONE BITARTATE AND ACETAMINOPHEN 1 TABLET: 5; 325 TABLET ORAL at 18:27

## 2018-01-16 RX ADMIN — HEPARIN SODIUM 5000 UNITS: 5000 INJECTION, SOLUTION INTRAVENOUS; SUBCUTANEOUS at 21:01

## 2018-01-16 RX ADMIN — HEPARIN SODIUM 5000 UNITS: 5000 INJECTION, SOLUTION INTRAVENOUS; SUBCUTANEOUS at 14:03

## 2018-01-16 RX ADMIN — HEPARIN SODIUM 5000 UNITS: 5000 INJECTION, SOLUTION INTRAVENOUS; SUBCUTANEOUS at 05:18

## 2018-01-16 RX ADMIN — FOLIC ACID 1 MG: 1 TABLET ORAL at 08:11

## 2018-01-16 RX ADMIN — CITALOPRAM HYDROBROMIDE 40 MG: 20 TABLET ORAL at 08:11

## 2018-01-16 RX ADMIN — BUDESONIDE AND FORMOTEROL FUMARATE DIHYDRATE 2 PUFF: 160; 4.5 AEROSOL RESPIRATORY (INHALATION) at 10:24

## 2018-01-16 RX ADMIN — VANCOMYCIN HYDROCHLORIDE 1000 MG: 1 INJECTION, SOLUTION INTRAVENOUS at 09:34

## 2018-01-16 RX ADMIN — HYDROCODONE BITARTATE AND ACETAMINOPHEN 1 TABLET: 5; 325 TABLET ORAL at 14:04

## 2018-01-16 NOTE — PROGRESS NOTES
"Pharmacy Consult-Vancomycin Dosing  Niall Murguia is a  52 y.o. male receiving vancomycin therapy.     Indication: MRSA Pneumonia  Consulting Provider: Alvina ZUÑIGA Consult: No    Goal Trough: 15-20 mcg/mL    Current Antimicrobial Therapy  Vancomycin, Day 3/8    s/p Doxy and ceftriaxone    Allergies  Allergies as of 01/14/2018   • (No Known Allergies)     Labs  Results from last 7 days   Lab Units 01/16/18  0833 01/15/18  0526 01/14/18  1300   BUN mg/dL 20 20 15   CREATININE mg/dL 0.50* 0.60 0.60     Results from last 7 days   Lab Units 01/16/18  0833 01/15/18  0526 01/14/18  1300   WBC 10*3/mm3 18.50* 18.53* 18.12*     Evaluation of Dosing  Ht - 172.7 cm (68\")  Wt - 62.2 kg (137 lb 3.2 oz)    Estimated Creatinine Clearance: 153.8 mL/min (by C-G formula based on Cr of 0.5).    Intake & Output (last 3 days)         01/13 0701 - 01/14 0700 01/14 0701 - 01/15 0700 01/15 0701 - 01/16 0700 01/16 0701 - 01/17 0700      P.O.  60  240    IV Piggyback  300  200    Total Intake(mL/kg)  360 (5.4)  440 (7.1)    Urine (mL/kg/hr)  350 1925 (1.3) 200 (1.1)    Other   420 (0.3)     Stool   0 (0)     Total Output   350 2345 200    Net   +10 -2345 +240            Unmeasured Urine Occurrence   1 x     Unmeasured Stool Occurrence   1 x           Microbiology and Radiology  Microbiology Results (last 10 days)       Procedure Component Value - Date/Time      Body Fluid Culture - Body Fluid, Pleural Cavity [[id number]]  (Normal) Collected:  01/15/18 1555    Lab Status:  Preliminary result Specimen:  Body Fluid from Pleural Cavity Updated:  01/16/18 0830     BF Culture No growth     Gram Stain Result Moderate (3+) WBCs seen      No organisms seen    Clostridium Difficile Toxin - Stool, Per Rectum [[id number]] Collected:  01/09/18 1535    Lab Status:  Final result Specimen:  Stool from Per Rectum Updated:  01/09/18 2056    Narrative:       The following orders were created for panel order Clostridium Difficile Toxin - Stool, Per " Rectum.  Procedure                               Abnormality         Status                     ---------                               -----------         ------                     Clostridium Difficile EI...[[id number]]  Normal              Final result                 Please view results for these tests on the individual orders.    Clostridium Difficile EIA - Stool, Per Rectum [[id number]]  (Normal) Collected:  01/09/18 1535    Lab Status:  Final result Specimen:  Stool from Per Rectum Updated:  01/09/18 2056     C Diff GDH / Toxin Negative    Respiratory Culture - Sputum, Cough [[id number]]  (Abnormal)  (Susceptibility) Collected:  01/09/18 0010    Lab Status:  Final result Specimen:  Sputum from Cough Updated:  01/11/18 0924     Respiratory Culture --      Scant growth (1+) Staphylococcus aureus, MRSA (A)      Sensitivity to follow.     Methicillin resistant Staphylococcus aureus, Patient may be an isolation risk.        Gram Stain Result Rare (1+) Gram positive cocci in pairs      Greater than 20 WBCs per low power field      Less than 10 Epithelial cells per low power field    Susceptibility        Staphylococcus aureus, MRSA     JASON     Amoxicillin + Clavulanate >4/2 ug/ml Resistant     Ampicillin >8 ug/ml Resistant     Ampicillin + Sulbactam 16/8 ug/ml Resistant     Cefazolin 8 ug/ml Resistant     Ciprofloxacin <=1 ug/ml Susceptible     Clindamycin <=0.5 ug/ml Susceptible     Erythromycin >4 ug/ml Resistant     Gentamicin <=4 ug/ml Susceptible     Levofloxacin <=1 ug/ml Susceptible  [1]      Linezolid 2 ug/ml Susceptible     Moxifloxacin <=0.5 ug/ml Susceptible     Oxacillin >2 ug/ml Resistant     Penicillin G >8 ug/ml Resistant     Quinupristin + Dalfopristin <=1 ug/ml Susceptible     Rifampin <=1 ug/ml Susceptible     Tetracycline <=4 ug/ml Susceptible     Trimethoprim + Sulfamethoxazole <=0.5/9.5 ug/ml Susceptible     Vancomycin 2 ug/ml Susceptible              [1]   Staphylococcus species may develop  resistance during prolonged therapy with quinolones.  Isolates that are initially susceptible may become resistant within three to four days after initiation of therapy. Testing of repeat isolates may be warranted.                   S. Pneumo Ag Urine or CSF - Urine, Urine, Clean Catch [[id number]] Collected:  01/07/18 1742    Lab Status:  Final result Specimen:  Urine from Urine, Clean Catch Updated:  01/10/18 1517     Specimen Source Urine     STREP PNEUMONIAE ANTIGEN Negative     Body Fluid Culture, Sterile Not Indicated     Organism ID Not indicated.     Please note Comment      College of [employer]ologists standards require a culture to be  performed on CSF specimens submitted for bacterial antigen testing.  (CAP [id number]) Urine specimens will not be cultured.       Narrative:       Performed at:  [address]  [id number]  : Aristides Win MD, Phone:  [phone]    Blood Culture - Blood, [[id number]]  (Normal) Collected:  01/07/18 0245    Lab Status:  Final result Specimen:  Blood from Hand, Right Updated:  01/12/18 0309     Blood Culture No growth at 5 days    Blood Culture - Blood, [[id number]]  (Normal) Collected:  01/07/18 0241    Lab Status:  Final result Specimen:  Blood from Arm, Right Updated:  01/12/18 0309     Blood Culture No growth at 5 days    Influenza Antigen, Rapid - Swab, Nasopharynx [[id number]]  (Normal) Collected:  01/07/18 0224    Lab Status:  Final result Specimen:  Swab from Nasopharynx Updated:  01/07/18 0243     Influenza A Ag, EIA Negative     Influenza B Ag, EIA Negative          Evaluation of Level  Lab Results   Component Value Date    [facility] 9.20 (L) 01/16/2018   Level drawn 0833, ~12hr level    Assessment/Plan:  Trough level this AM was subtherapeutic at 9.2 mcg/mL.    1. Increase to vancomycin 1000 mg q8h  2. Level approaching steady state scheduled for tomorrow 1/17 PM at 1730.  3. Will reassess regimen tomorrow and  adjust as necessary based on level, renal function and clinical picture.     Thank you,   Lesia Dubon, PharmD Candidate 2018  1/16/2018  9:55 AM    Yudelka Quevedo PharmD  01/16/18  10:15 AM

## 2018-01-16 NOTE — PROGRESS NOTES
"Niall Murguia  [id number]  1965     LOS: 2 days   Patient Care Team:  KEAGAN Rowley as PCP - General (Family Medicine)    Chief Complaint: shortness of breath       Subjective: Alert, no complaints    Objective:     Vital Sign Min/Max for last 24 hours  Temp  Min: 98 °F (36.7 °C)  Max: 98.9 °F (37.2 °C)   BP  Min: 117/74  Max: 128/72   Pulse  Min: 64  Max: 87   Resp  Min: 17  Max: 32   SpO2  Min: 90 %  Max: 96 %   Flow (L/min)  Min: 5  Max: 5   Weight  Min: 62.2 kg (137 lb 3.2 oz)  Max: 62.2 kg (137 lb 3.2 oz)     Flowsheet Rows         First Filed Value    Admission Height  172.7 cm (68\") Documented at 01/14/2018 1853    Admission Weight  66.6 kg (146 lb 14.4 oz) Documented at 01/14/2018 1853          Physical Exam:    Wound:    Pulses:     Mediastinal and Chest Tube Drainage:No air leak noted       Results Review:     Results from last 7 days  Lab Units 01/15/18  0526   WBC 10*3/mm3 18.53*   HEMOGLOBIN g/dL 12.3*   HEMATOCRIT % 37.2*   PLATELETS 10*3/mm3 226       Results from last 7 days  Lab Units 01/15/18  0526   SODIUM mmol/L 134   POTASSIUM mmol/L 4.2   CHLORIDE mmol/L 100   CO2 mmol/L 34.0*   BUN mg/dL 20   CREATININE mg/dL 0.60   GLUCOSE mg/dL 90   CALCIUM mg/dL 8.1*             Assessment    Principal Problem:    Hydropneumothorax, right  Active Problems:    Acute on chronic respiratory failure with hypoxia    Pneumonia of both lower lobes    Chronic bullous emphysema    Hypertension    Tobacco use      Still has a basilar pneumothorax, 5 AM portable chest x-ray, ambulate in [facility]        Roland Ge MD  01/16/18  7:19 AM      Please note that portions of this note were completed with a voice recognition program. Efforts were made to edit the dictations, but words may be mistranscribed  "

## 2018-01-16 NOTE — PROGRESS NOTES
[facility] Medicine Services  PROGRESS NOTE    Patient Name: Niall Murguia  : 1965  MRN: [id number]    Date of Admission: 2018  Length of Stay: 2  Primary Care Physician: KEAGAN Rowley    Subjective   Subjective     CC:  FU SOB    HPI:  Reports feeling well. Minimal cough with some sputum production. Ambulated to bathroom. Dyspnea with ambulation. Pain at chest tube site hopefully better with Norco.    Review of Systems  Gen- No fevers, chills  CV- No chest pain, palpitations  Resp- +Cough, +dyspnea  GI- No N/V/D, abd pain    Otherwise ROS is negative except as mentioned in the HPI.    Objective   Objective     Vital Signs:   Temp:  [98 °F (36.7 °C)-98.9 °F (37.2 °C)] 98 °F (36.7 °C)  Heart Rate:  [] 91  Resp:  [18-24] 20  BP: (103-126)/(61-77) 103/61        Physical Exam:  Constitutional: No acute distress, awake, alert on 5LNC   Eyes: PERRLA, sclerae anicteric, no conjunctival injection  HENT: NCAT, mucous membranes moist  Neck: Supple, no thyromegaly, no lymphadenopathy, trachea midline  Respiratory: Coarse BS R>L, no wheezing, nonlabored respirations   Cardiovascular: RRR, no murmurs, rubs, or gallops, palpable pedal pulses bilaterally  Gastrointestinal: Positive bowel sounds, soft, nontender, nondistended  Musculoskeletal: No bilateral ankle edema, no clubbing or cyanosis to extremities, R chest tube in place  Psychiatric: Appropriate affect, cooperative  Neurologic: Oriented x 3, strength symmetric in all extremities, Cranial Nerves grossly intact to confrontation, speech clear  Skin: No rashes    Results Reviewed:  I have personally reviewed current lab, radiology, and data and agree.      Results from last 7 days  Lab Units 18  0833 01/15/18  0526 18  1300   WBC 10*3/mm3 18.50* 18.53* 18.12*   HEMOGLOBIN g/dL 13.9 12.3* 12.9*   HEMATOCRIT % 40.6 37.2* 38.1*   PLATELETS 10*3/mm3 246 226 208   INR   --  1.10  --        Results from last  7 days  Lab Units 01/16/18  0833 01/15/18  0526 01/14/18  1300 01/11/18  1000   SODIUM mmol/L 127* 134 137 135*   POTASSIUM mmol/L 4.0 4.2 3.9 4.0   CHLORIDE mmol/L 92* 100 98 102   CO2 mmol/L 31.0 34.0* 35.0* 28.0   BUN mg/dL 20 20 15 25*   CREATININE mg/dL 0.50* 0.60 0.60 0.60   GLUCOSE mg/dL 135* 90 142* 139*   CALCIUM mg/dL 7.4* 8.1* 8.6 8.3*   ALT (SGPT) U/L  --  175* 222* 114*   AST (SGOT) U/L  --  57* 70* 49*     No results found for: BNP  No results found for: PHART    Microbiology Results Abnormal     Procedure Component Value - Date/Time    Body Fluid Culture - Body Fluid, Pleural Cavity [[id number]]  (Normal) Collected:  01/15/18 1555    Lab Status:  Preliminary result Specimen:  Body Fluid from Pleural Cavity Updated:  01/16/18 0830     BF Culture No growth     Gram Stain Result Moderate (3+) WBCs seen      No organisms seen          Imaging Results (last 24 hours)     Procedure Component Value Units Date/Time    XR Chest 1 View [[id number]] Collected:  01/15/18 0837     Updated:  01/15/18 1348    Narrative:       EXAMINATION: XR CHEST 1 VW-01/15/2018:      INDICATION: Repeat, respiratory distress.     COMPARISON: 04/15/2010.     FINDINGS: Portable chest reveals evidence of an apical pneumothorax. The  pneumothorax is small in size. Basilar component is likely. Ill-defined  opacification seen within the right mid and lower lung field. Surgical  staple line identified within the right upper lobe. The left lung is  grossly clear.           Impression:       Apical pneumothorax is identified on the right. Ill-defined  opacification seen within the right mid and lower lung field. Surgical  staple line identified in the right upper lobe. Chronic changes seen  within the left mid and lower lung field.     D:  01/15/2018  E:  01/15/2018     This report was finalized on 1/15/2018 1:46 PM by Dr. Yanci Jolley MD.       XR Chest 1 View [[id number]] Collected:  01/16/18 0923     Updated:  01/16/18 0923     Narrative:       EXAMINATION: XR CHEST 1 VW- 01/16/2018     INDICATION: pneumohydrothorax      COMPARISON: 01/15/2018     FINDINGS: A right chest tube has been inserted since the previous  examination. The right apical pneumothorax is smaller. There is  persistent airspace disease in a perihilar and bibasilar distribution,  right greater than left.           Impression:       Pneumothorax is slightly smaller following insertion of a  chest tube.     D:  01/16/2018  E:  01/16/2018          CT Guided Chest Tube [[id number]] Collected:  01/16/18 0958     Updated:  01/16/18 0959    Narrative:       EXAMINATION: CT GUIDED CHEST TUBE-      INDICATION: Right apical pneumothorax; CT-guided catheter thoracentesis  for therapeutic and diagnostic purposes. Complex pleural effusion right  chest.     TECHNIQUE: Patient has no known allergies.     The clotting profile is acceptable. The PTT is 27.8, PT 12.0, INR 1.10,  platelets 226,000.     The radiation dose reduction device was turned on for each scan per the  ALARA (As Low as Reasonably Achievable) protocol.     COMPARISON: None.     FINDINGS:   1. The procedure, risks, complications and side effects of CT-guided  catheter placement in the right pleural space for therapeutic and  diagnostic drainage of the effusion in the right chest and for  decompression of the patient's right pneumothorax was discussed and  reviewed in detail with the patient including possible risk and  complications which include bleeding, infection, injury or laceration of  the intercostal artery with massive bleeding, puncture of the patient's  known high-grade bulla in the right lung with high-grade pleural leak,  air embolization, and other possible risk and complications.  Patient  understood and consented.     2. With the patient in the supine position, under sterile technique,  local anesthesia and meticulous CT guidance, from the right posterior  axillary line, a #12 [place] multi-sidehole drainage  catheter was  introduced into the right posterior inferior pleural space. The patient  has loculated pleural fluid and a complex airspace consolidative disease  extensively and therefore the catheter was placed in one of the  loculated compartments of fluid. Hopefully this catheter position will  communicate with other areas of fluid in the right hemithorax.     3. Fluid was extracted and immediately sent to the laboratory for all  diagnostic studies ordered by the attending physician.     A total of 180 mL of fluid was removed from the right pleural space for  diagnostic laboratory purposes.     4. The catheter was then sutured in place with 0 silk. A revolution  fixation device was applied and a sterile dressing was applied.     The catheter will be placed to a Pleur-evac atrium suction device for  decompression of the right chest and the right pneumothorax.       Impression:       1. A #12 [place] drainage catheter has been placed into the right  posterior inferior pleural space from the right posterior axillary line.  Fluid was obtained for all diagnostic studies ordered by the attending  physician.  2. The catheter was fixed, sutured and dressed. Catheter will be placed  to a Pleur-evac atrium suction device for decompression of free fluid  from the right hemithorax and hopefully compression of the right  pneumothorax.  3. Patient tolerated the procedure well. There were no complications. He  was taken back to his room in satisfactory and stable condition.     D:  01/16/2018  E:  01/16/2018           Results for orders placed during the hospital encounter of 01/07/18   Adult Transthoracic Echo Complete W/ Cont if Necessary Per Protocol    Narrative Technically very limited study   1) Normal LV systolic function ( EF is about 55%)  2) Normal left atrial size with normal LVedp   3) Trace MR   4) Mild TR with normal PA pressures ( within limitations of study)  5) Severe RV dilation with wall motion abnormality ?  Acute on chronic   corpulmonale   6) Severe reduction in RV function        I have reviewed the medications.    Assessment/Plan   Assessment / Plan     Hospital Problem List     * (Principal)Hydropneumothorax, right    Acute on chronic respiratory failure with hypoxia    Pneumonia of both lower lobes    Chronic bullous emphysema (Chronic)    Hypertension    Tobacco use           Brief Hospital Course to date:  Niall Murguia is a 52 y.o. male who presents to [facility] from [facility] for a higher level of care. Patient presented to [facility] on 1/7/18 with difficulty breathing. He complained of intermittent shortness of breath and cough for one week. Admitted to ICU at [place], requiring BiPAP intermittently.     Assessment & Plan:  - Acute on chronic hypoxemic respiratory failure: Currently on 5LNC  - MRSA PNA with R-sided pneumothorax and parapneumonic effusion: Resp Cx +1/9, Continue Vanc. CT-guided chest tube placement 1/15. 160cc removed. Pleural fluid studies obtained. Prelim appears to be exudative. CXR and CT chest reviewed. Continue CT to LWS. CTS on board  - Hx of spontaneous R PTX s/p repair 2010: Tobacco use counseling  - Asymptomatic hyponatremia: Obtain urine lytes  - Leukocytosis: Was on steroids  - Macrocytic anemia: TSH normal, B12 level normal. Empiric daily folic acid    DVT Prophylaxis:  [facility]    CODE STATUS: Full Code    Disposition: I expect the patient to be discharged MAKENNA Martino MD  01/16/18  10:20 AM

## 2018-01-16 NOTE — PLAN OF CARE
Problem: Patient Care Overview (Adult)  Goal: Plan of Care Review  Outcome: Ongoing (interventions implemented as appropriate)   01/16/18 7802   Coping/Psychosocial Response Interventions   Plan Of Care Reviewed With patient   Patient Care Overview   Progress progress towards functional goals is fair   Outcome Evaluation   Outcome Summary/Follow up Plan Pt doing ok today. Up to chair and ambulated with assist of 1 with increased SOA and fatigue. Patch applied to back for pain and lortab also added. Weaning oxygen. Pt on 4L this afternoon. PT consulted. Continue to monitor. Home when ready.        Problem: Respiratory Insufficiency (Adult)  Goal: Acid/Base Balance  Outcome: Ongoing (interventions implemented as appropriate)    Goal: Effective Ventilation  Outcome: Ongoing (interventions implemented as appropriate)      Problem: Pneumonia (Adult)  Goal: Signs and Symptoms of Listed Potential Problems Will be Absent or Manageable (Pneumonia)  Outcome: Ongoing (interventions implemented as appropriate)

## 2018-01-16 NOTE — PLAN OF CARE
Problem: Patient Care Overview (Adult)  Goal: Plan of Care Review  Outcome: Ongoing (interventions implemented as appropriate)   01/15/18 0309 01/16/18 0315   Coping/Psychosocial Response Interventions   Plan Of Care Reviewed With --  patient   Patient Care Overview   Progress no change --    Outcome Evaluation   Outcome Summary/Follow up Plan --  Pt rested well tonight. Denies pain. Still on 5L NC. VSS. Will continue to monitor.     Goal: Adult Individualization and Mutuality  Outcome: Ongoing (interventions implemented as appropriate)    Goal: Discharge Needs Assessment  Outcome: Ongoing (interventions implemented as appropriate)      Problem: Respiratory Insufficiency (Adult)  Goal: Acid/Base Balance  Outcome: Ongoing (interventions implemented as appropriate)    Goal: Effective Ventilation  Outcome: Ongoing (interventions implemented as appropriate)      Problem: Pneumonia (Adult)  Goal: Signs and Symptoms of Listed Potential Problems Will be Absent or Manageable (Pneumonia)  Outcome: Ongoing (interventions implemented as appropriate)

## 2018-01-17 ENCOUNTER — APPOINTMENT (OUTPATIENT)
Dept: GENERAL RADIOLOGY | Facility: HOSPITAL | Age: 53
End: 2018-01-17

## 2018-01-17 LAB
ALBUMIN SERPL-MCNC: 2.6 G/DL (ref 3.2–4.8)
ANION GAP SERPL CALCULATED.3IONS-SCNC: 5 MMOL/L (ref 3–11)
BUN BLD-MCNC: 16 MG/DL (ref 9–23)
BUN/CREAT SERPL: 40 (ref 7–25)
CALCIUM SPEC-SCNC: 7.6 MG/DL (ref 8.7–10.4)
CHLORIDE SERPL-SCNC: 95 MMOL/L (ref 99–109)
CO2 SERPL-SCNC: 28 MMOL/L (ref 20–31)
CREAT BLD-MCNC: 0.4 MG/DL (ref 0.6–1.3)
DEPRECATED RDW RBC AUTO: 48.7 FL (ref 37–54)
ERYTHROCYTE [DISTWIDTH] IN BLOOD BY AUTOMATED COUNT: 12.9 % (ref 11.3–14.5)
GFR SERPL CREATININE-BSD FRML MDRD: >150 ML/MIN/1.73
GLUCOSE BLD-MCNC: 85 MG/DL (ref 70–100)
GLUCOSE BLDC GLUCOMTR-MCNC: 112 MG/DL (ref 70–130)
HCT VFR BLD AUTO: 40.6 % (ref 38.9–50.9)
HGB BLD-MCNC: 13.5 G/DL (ref 13.1–17.5)
LAB AP CASE REPORT: NORMAL
Lab: NORMAL
MCH RBC QN AUTO: 34.7 PG (ref 27–31)
MCHC RBC AUTO-ENTMCNC: 33.3 G/DL (ref 32–36)
MCV RBC AUTO: 104.4 FL (ref 80–99)
PATH REPORT.FINAL DX SPEC: NORMAL
PHOSPHATE SERPL-MCNC: 2.8 MG/DL (ref 2.4–5.1)
PLATELET # BLD AUTO: 229 10*3/MM3 (ref 150–450)
PMV BLD AUTO: 10.1 FL (ref 6–12)
POTASSIUM BLD-SCNC: 4.1 MMOL/L (ref 3.5–5.5)
RBC # BLD AUTO: 3.89 10*6/MM3 (ref 4.2–5.76)
SODIUM BLD-SCNC: 128 MMOL/L (ref 132–146)
VANCOMYCIN TROUGH SERPL-MCNC: 11.8 MCG/ML (ref 10–20)
WBC NRBC COR # BLD: 16.81 10*3/MM3 (ref 3.5–10.8)

## 2018-01-17 PROCEDURE — 94799 UNLISTED PULMONARY SVC/PX: CPT

## 2018-01-17 PROCEDURE — 94640 AIRWAY INHALATION TREATMENT: CPT

## 2018-01-17 PROCEDURE — 82962 GLUCOSE BLOOD TEST: CPT

## 2018-01-17 PROCEDURE — 97162 PT EVAL MOD COMPLEX 30 MIN: CPT

## 2018-01-17 PROCEDURE — 85027 COMPLETE CBC AUTOMATED: CPT | Performed by: HOSPITALIST

## 2018-01-17 PROCEDURE — 71045 X-RAY EXAM CHEST 1 VIEW: CPT

## 2018-01-17 PROCEDURE — 99233 SBSQ HOSP IP/OBS HIGH 50: CPT | Performed by: HOSPITALIST

## 2018-01-17 PROCEDURE — 25010000002 VANCOMYCIN PER 500 MG

## 2018-01-17 PROCEDURE — 97116 GAIT TRAINING THERAPY: CPT

## 2018-01-17 PROCEDURE — 97110 THERAPEUTIC EXERCISES: CPT

## 2018-01-17 PROCEDURE — 94760 N-INVAS EAR/PLS OXIMETRY 1: CPT

## 2018-01-17 PROCEDURE — 80202 ASSAY OF VANCOMYCIN: CPT

## 2018-01-17 PROCEDURE — 80069 RENAL FUNCTION PANEL: CPT | Performed by: HOSPITALIST

## 2018-01-17 PROCEDURE — 25010000002 HEPARIN (PORCINE) PER 1000 UNITS: Performed by: HOSPITALIST

## 2018-01-17 RX ADMIN — HEPARIN SODIUM 5000 UNITS: 5000 INJECTION, SOLUTION INTRAVENOUS; SUBCUTANEOUS at 14:26

## 2018-01-17 RX ADMIN — CITALOPRAM HYDROBROMIDE 40 MG: 20 TABLET ORAL at 08:14

## 2018-01-17 RX ADMIN — FOLIC ACID 1 MG: 1 TABLET ORAL at 08:14

## 2018-01-17 RX ADMIN — VANCOMYCIN HYDROCHLORIDE 1000 MG: 1 INJECTION, SOLUTION INTRAVENOUS at 18:47

## 2018-01-17 RX ADMIN — Medication 250 MG: at 20:27

## 2018-01-17 RX ADMIN — HYDROCODONE BITARTATE AND ACETAMINOPHEN 1 TABLET: 5; 325 TABLET ORAL at 17:57

## 2018-01-17 RX ADMIN — HYDROCODONE BITARTATE AND ACETAMINOPHEN 1 TABLET: 5; 325 TABLET ORAL at 12:11

## 2018-01-17 RX ADMIN — HEPARIN SODIUM 5000 UNITS: 5000 INJECTION, SOLUTION INTRAVENOUS; SUBCUTANEOUS at 06:09

## 2018-01-17 RX ADMIN — HYDROCODONE BITARTATE AND ACETAMINOPHEN 1 TABLET: 5; 325 TABLET ORAL at 06:13

## 2018-01-17 RX ADMIN — VANCOMYCIN HYDROCHLORIDE 1000 MG: 1 INJECTION, SOLUTION INTRAVENOUS at 01:45

## 2018-01-17 RX ADMIN — IPRATROPIUM BROMIDE AND ALBUTEROL SULFATE 3 ML: .5; 3 SOLUTION RESPIRATORY (INHALATION) at 08:58

## 2018-01-17 RX ADMIN — Medication 250 MG: at 08:14

## 2018-01-17 RX ADMIN — LIDOCAINE 1 PATCH: 50 PATCH CUTANEOUS at 08:14

## 2018-01-17 RX ADMIN — HEPARIN SODIUM 5000 UNITS: 5000 INJECTION, SOLUTION INTRAVENOUS; SUBCUTANEOUS at 20:26

## 2018-01-17 RX ADMIN — VANCOMYCIN HYDROCHLORIDE 1000 MG: 1 INJECTION, SOLUTION INTRAVENOUS at 10:07

## 2018-01-17 RX ADMIN — BUDESONIDE AND FORMOTEROL FUMARATE DIHYDRATE 2 PUFF: 160; 4.5 AEROSOL RESPIRATORY (INHALATION) at 22:05

## 2018-01-17 RX ADMIN — NICOTINE 1 PATCH: 21 PATCH, EXTENDED RELEASE TRANSDERMAL at 20:28

## 2018-01-17 RX ADMIN — BUDESONIDE AND FORMOTEROL FUMARATE DIHYDRATE 2 PUFF: 160; 4.5 AEROSOL RESPIRATORY (INHALATION) at 08:58

## 2018-01-17 RX ADMIN — IPRATROPIUM BROMIDE AND ALBUTEROL SULFATE 3 ML: .5; 3 SOLUTION RESPIRATORY (INHALATION) at 22:06

## 2018-01-17 NOTE — PLAN OF CARE
Problem: Patient Care Overview (Adult)  Goal: Plan of Care Review  Outcome: Ongoing (interventions implemented as appropriate)   01/17/18 9435   Coping/Psychosocial Response Interventions   Plan Of Care Reviewed With patient   Patient Care Overview   Progress progress towards functional goals is fair   Outcome Evaluation   Outcome Summary/Follow up Plan Pt drowsy today and requiring more oxygen once ambulating. Continue to encourage IS and walking. Manage pain and CT possibly out tomorrow.        Problem: Respiratory Insufficiency (Adult)  Goal: Acid/Base Balance  Outcome: Ongoing (interventions implemented as appropriate)    Goal: Effective Ventilation  Outcome: Ongoing (interventions implemented as appropriate)

## 2018-01-17 NOTE — PLAN OF CARE
Problem: Patient Care Overview (Adult)  Goal: Plan of Care Review  Outcome: Ongoing (interventions implemented as appropriate)   01/16/18 2000 01/17/18 0502   Coping/Psychosocial Response Interventions   Plan Of Care Reviewed With patient --    Patient Care Overview   Progress --  improving   Outcome Evaluation   Outcome Summary/Follow up Plan --  Pt slept well overnight. Only requested pain medication once. On 3L now, doing well. Will continue to monitor.     Goal: Adult Individualization and Mutuality  Outcome: Ongoing (interventions implemented as appropriate)    Goal: Discharge Needs Assessment  Outcome: Ongoing (interventions implemented as appropriate)      Problem: Respiratory Insufficiency (Adult)  Goal: Acid/Base Balance  Outcome: Ongoing (interventions implemented as appropriate)    Goal: Effective Ventilation  Outcome: Ongoing (interventions implemented as appropriate)      Problem: Pneumonia (Adult)  Goal: Signs and Symptoms of Listed Potential Problems Will be Absent or Manageable (Pneumonia)  Outcome: Ongoing (interventions implemented as appropriate)

## 2018-01-17 NOTE — PLAN OF CARE
Problem: Patient Care Overview (Adult)  Goal: Plan of Care Review  Outcome: Ongoing (interventions implemented as appropriate)   01/17/18 1009   Coping/Psychosocial Response Interventions   Plan Of Care Reviewed With patient   Patient Care Overview   Progress progress towards functional goals is fair   Outcome Evaluation   Outcome Summary/Follow up Plan Presents w/ R C.T. to matt suct (disconn for gt), signif. incis.pain,decr strength/endurance, & impaired funct mobil; able to amb 160 ft. w/min HHA, but desat to 87% on 3 L w/ init. MIP (o2 incr. to 4L for gt), & limited by dizziness, fatigue & min.SOB;recommend HHPT F/U at d/c to meet all mobil. goals/return to PLOF        Problem: Inpatient Physical Therapy  Goal: Bed Mobility Goal LTG- PT  Outcome: Ongoing (interventions implemented as appropriate)   01/17/18 1009   Bed Mobility PT LTG   Bed Mobility PT LTG, Date Established 01/17/18   Bed Mobility PT LTG, Time to Achieve 5 days   Bed Mobility PT LTG, Activity Type all bed mobility   Bed Mobility PT LTG, [place] Level independent     Goal: Transfer Training Goal 1 LTG- PT  Outcome: Ongoing (interventions implemented as appropriate)   01/17/18 1009   Transfer Training PT LTG   Transfer Training PT LTG, Date Established 01/17/18   Transfer Training PT LTG, Time to Achieve 5 days   Transfer Training PT LTG, Activity Type bed to chair /chair to bed;sit to stand/stand to sit;toilet   Transfer Training PT LTG, [place] Level independent     Goal: Gait Training Goal LTG- PT  Outcome: Ongoing (interventions implemented as appropriate)   01/17/18 1009   Gait Training PT LTG   Gait Training Goal PT LTG, Date Established 01/17/18   Gait Training Goal PT LTG, Time to Achieve 5 days   Gait Training Goal PT LTG, [place] Level supervision required  (stable VS; O2 SAT. > 92%)   Gait Training Goal PT LTG, Distance to Achieve 250     Goal: Stair Training Goal LTG- PT  Outcome: Ongoing (interventions implemented as  appropriate)   01/17/18 1009   Stair Training PT LTG   Stair Training Goal PT LTG, Date Established 01/17/18   Stair Training Goal PT LTG, Time to Achieve 5 days   Stair Training Goal PT LTG, Number of Steps 4   Stair Training Goal PT LTG, [place] Level supervision required  (stable VS; o2 sat >92%)   Stair Training Goal PT LTG, Assist Device 1 handrail

## 2018-01-17 NOTE — PROGRESS NOTES
[facility] Medicine Services  PROGRESS NOTE    Patient Name: Niall Murguia  : 1965  MRN: [id number]    Date of Admission: 2018  Length of Stay: 3  Primary Care Physician: KEAGAN Rowley    Subjective   Subjective     CC:  F/u pneumonia    HPI:  Patient sitting up in chair when seen.  Denies fevers or chills.  Reports ongoing cough, productive of purulent sputum.  Reports mild to moderate right chest wall pain.  Has had some increased oxygen requirements today but denies significant dyspnea during my visit.    Review of Systems  Gen- No fevers, chills  CV- No palpitations  Resp- As above  GI- No N/V/D, abd pain    Otherwise ROS is negative except as mentioned in the HPI.    Objective   Objective     Vital Signs:   Temp:  [98 °F (36.7 °C)] 98 °F (36.7 °C)  Heart Rate:  [] 105  Resp:  [18-20] 20  BP: (107)/(67) 107/67     Physical Exam:  Constitutional: Ill appearing but nontoxic, awake, alert  HENT: NCAT, mucous membranes moist  Respiratory: Decreased breath sounds to bilateral bases with poor expansion, respiratory effort mildly increased, right thoracostomy with serous drainage, no air leak in Pleurevac  Cardiovascular: RRR (sinus tach on tele), no murmurs, rubs, or gallops, palpable pedal pulses bilaterally  Gastrointestinal: Positive bowel sounds, soft, nontender, nondistended  Musculoskeletal: No bilateral ankle edema  Psychiatric: Appropriate affect, cooperative  Neurologic: Oriented x 3, strength symmetric in all extremities, Cranial Nerves grossly intact to confrontation, speech clear  Skin: No rashes    Results Reviewed:  I have personally reviewed current lab, radiology, and data and agree.      Results from last 7 days  Lab Units 18  0520 18  0833 01/15/18  0526   WBC 10*3/mm3 16.81* 18.50* 18.53*   HEMOGLOBIN g/dL 13.5 13.9 12.3*   HEMATOCRIT % 40.6 40.6 37.2*   PLATELETS 10*3/mm3 229 246 226   INR   --   --  1.10       Results from  last 7 days  Lab Units 01/17/18  0520 01/16/18  0833 01/15/18  0526 01/14/18  1300 01/11/18  1000   SODIUM mmol/L 128* 127* 134 137 135*   POTASSIUM mmol/L 4.1 4.0 4.2 3.9 4.0   CHLORIDE mmol/L 95* 92* 100 98 102   CO2 mmol/L 28.0 31.0 34.0* 35.0* 28.0   BUN mg/dL 16 20 20 15 25*   CREATININE mg/dL 0.40* 0.50* 0.60 0.60 0.60   GLUCOSE mg/dL 85 135* 90 142* 139*   CALCIUM mg/dL 7.6* 7.4* 8.1* 8.6 8.3*   ALT (SGPT) U/L  --   --  175* 222* 114*   AST (SGOT) U/L  --   --  57* 70* 49*     No results found for: BNP  No results found for: PHART    Microbiology Results Abnormal     Procedure Component Value - Date/Time    Body Fluid Culture - Body Fluid, Pleural Cavity [[id number]]  (Abnormal) Collected:  01/15/18 1555    Lab Status:  Preliminary result Specimen:  Body Fluid from Pleural Cavity Updated:  01/17/18 0953     BF Culture --      Scant growth (1+) Staphylococcus aureus (A)     Gram Stain Result Moderate (3+) WBCs seen      No organisms seen    Anaerobic Culture - Body Fluid, Pleural Cavity [[id number]]  (Normal) Collected:  01/15/18 1555    Lab Status:  Preliminary result Specimen:  Body Fluid from Pleural Cavity Updated:  01/16/18 1239     Culture No anaerobes isolated    AFB Culture - Body Fluid, Pleural Cavity [[id number]] Collected:  01/15/18 1555    Lab Status:  Preliminary result Specimen:  Body Fluid from Pleural Cavity Updated:  01/16/18 1219     AFB Stain No acid fast bacilli seen on concentrated smear          Imaging Results (last 24 hours)     Procedure Component Value Units Date/Time    XR Chest 1 View [[id number]] Collected:  01/17/18 1001     Updated:  01/17/18 1350    Narrative:       EXAMINATION: XR CHEST 1 VW- 01/17/2018     INDICATION: postop      COMPARISON: 01/16/2018     FINDINGS: A right chest tube remains well-positioned. There has been  reduction in the right pneumothorax. There is persistent patchy  bibasilar airspace disease.           Impression:       The pneumothorax has largely  resolved. The bibasilar  pulmonary changes are unchanged.     D:  01/17/2018  E:  01/17/2018     This report was finalized on 1/17/2018 1:48 PM by Dr. Desmond Calvin MD.           Results for orders placed during the hospital encounter of 01/07/18   Adult Transthoracic Echo Complete W/ Cont if Necessary Per Protocol    Narrative Technically very limited study   1) Normal LV systolic function ( EF is about 55%)  2) Normal left atrial size with normal LVedp   3) Trace MR   4) Mild TR with normal PA pressures ( within limitations of study)  5) Severe RV dilation with wall motion abnormality ? Acute on chronic   corpulmonale   6) Severe reduction in RV function        I have reviewed the medications.    Assessment/Plan   Assessment / Plan     Hospital Problem List     * (Principal)Hydropneumothorax, right    Acute on chronic respiratory failure with hypoxia    Pneumonia of both lower lobes    Chronic bullous emphysema (Chronic)    Hypertension    Tobacco use         Brief Hospital Course to date:  Niall Murguia is a 52 y.o. male who was transferred to [facility] on 1/14 from [facility] after presenting there on 1/7 with acute dyspnea, found to have acute hypoxemic respiratory failure and MRSA pneumonia:     Assessment & Plan:    Acute on Chronic Hypoxemic Respiratory Failure, persistent   Right-sided MRSA Pneumonia, persistent  Complicated Right Parapneumonic Effusion with probable MRSA in pleural fluid culture, dx of complicated effusion is new today   - Continue supplemental O2, encourage pulmonary toilet with IS and up to chair   - New finding today of staph aureus (likely MRSA) in pleural fluid culture  - Consult [facility] due to possible need for prolonged IV abx   - Continue IV vancomycin  - Repeat CBC in AM, WBC remains elevated   - Continue tube thoracostomy for complicated effusion  - CXR from this AM personally reviewed, agree with official interp    Right Hydropneumothorax s/p CT-guided chest tube 1/15  -  improved, continue Chest tube for ~ 48 more hours per CTS    COPD  Hx of Spontaneous PTX 2010  Tobacco Abuse  - continue supportive care with symbicort, prn nebs  - Nicotine patch    Mild Hyponatremia  - likely SIADH due to above  - Repeat in AM    Hypertension   - BP normal, on no meds at present, will monitor     Macrocytic Anemia   - Improved    DVT Prophylaxis:  SQ heparin  CODE STATUS: Full Code    Disposition: I expect the patient to be discharged home with home health in 3-4 days.    Chuy Alanis MD  01/17/18  4:29 PM

## 2018-01-17 NOTE — PROGRESS NOTES
"Pharmacy Consult-Vancomycin Dosing  Niall Murguia is a  52 y.o. male receiving vancomycin therapy.     Indication: Pneumonia  Consulting Provider: Alvina ZUÑIGA Consult: No    Goal Trough: 15-20 mcg/mL  Subjective     Current Antimicrobial Therapy  Vanc 1/21 (9 days)     s/p Doxy and ceftriaxone    Anti-Infectives       Ordered     Dose/Rate Route Frequency Start Stop      01/16/18 0945  vancomycin (VANCOCIN) in iso-osmotic dextrose IVPB 1 g (premix) 200 mL     Ordering Provider:  NAHID Quevedo RPH    15 mg/kg × 62.4 kg  over 60 Minutes Intravenous Every 8 Hours 01/16/18 1800 01/20/18 1759    01/14/18 2103  vancomycin 1250 mg/250 mL 0.9% NS IVPB ([facility])     Ordering Provider:  Jake Tinsley MD    20 mg/kg × 62.4 kg  over 90 Minutes Intravenous Once 01/14/18 2200 01/15/18 0041    01/14/18 2055  Pharmacy to dose vancomycin     Ordering Provider:  KEAGAN Orellana     Does not apply Continuous PRN 01/14/18 2049 01/21/18 2048            Allergies  Allergies as of 01/14/2018   • (No Known Allergies)        Objective     Labs    Results from last 7 days   Lab Units 01/17/18  0520 01/16/18  0833 01/15/18  0526   BUN mg/dL 16 20 20   CREATININE mg/dL 0.40* 0.50* 0.60       Results from last 7 days   Lab Units 01/17/18  0520 01/16/18  0833 01/15/18  0526   WBC 10*3/mm3 16.81* 18.50* 18.53*       Evaluation of Dosing     Last Dose Received in the ED/Outside Facility: received one vanc dose at [facility]    Ht - 172.7 cm (68\")  Wt - 63.5 kg (140 lb)    Estimated Creatinine Clearance: 196.2 mL/min (by C-G formula based on Cr of 0.4).    Intake & Output (last 3 days)         01/14 0701 - 01/15 0700 01/15 0701 - 01/16 0700 01/16 0701 - 01/17 0700 01/17 0701 - 01/18 0700      P.O. 60  1200 960    IV Piggyback 300  400 200    Total Intake(mL/kg) 360 (5.4)  1600 (25.2) 1160 (18.3)    Urine (mL/kg/hr) 350 1925 (1.3) 1900 (1.2) 1200 (1.6)    Other  420 (0.3) 150 (0.1) 120 (0.2)    Stool  0 (0)      Total Output 350 2345 2050 1320 "    Net +10 -2345 -450 -160            Unmeasured Urine Occurrence  1 x 1 x 1 x    Unmeasured Stool Occurrence  1 x              Microbiology and Radiology  Microbiology Results (last 10 days)       Procedure Component Value - Date/Time      Anaerobic Culture - Body Fluid, Pleural Cavity [[id number]]  (Normal) Collected:  01/15/18 7072    Lab Status:  Preliminary result Specimen:  Body Fluid from Pleural Cavity Updated:  01/16/18 1239     Culture No anaerobes isolated    Body Fluid Culture - Body Fluid, Pleural Cavity [[id number]]  (Abnormal) Collected:  01/15/18 7755    Lab Status:  Preliminary result Specimen:  Body Fluid from Pleural Cavity Updated:  01/17/18 0953     BF Culture --      Scant growth (1+) Staphylococcus aureus (A)     Gram Stain Result Moderate (3+) WBCs seen      No organisms seen    AFB Culture - Body Fluid, Pleural Cavity [[id number]] Collected:  01/15/18 4951    Lab Status:  Preliminary result Specimen:  Body Fluid from Pleural Cavity Updated:  01/16/18 1219     AFB Stain No acid fast bacilli seen on concentrated smear    Clostridium Difficile Toxin - Stool, Per Rectum [[id number]] Collected:  01/09/18 1535    Lab Status:  Final result Specimen:  Stool from Per Rectum Updated:  01/09/18 2056    Narrative:       The following orders were created for panel order Clostridium Difficile Toxin - Stool, Per Rectum.  Procedure                               Abnormality         Status                     ---------                               -----------         ------                     Clostridium Difficile EI...[[id number]]  Normal              Final result                 Please view results for these tests on the individual orders.    Clostridium Difficile EIA - Stool, Per Rectum [[id number]]  (Normal) Collected:  01/09/18 1535    Lab Status:  Final result Specimen:  Stool from Per Rectum Updated:  01/09/18 2056     C Diff GDH / Toxin Negative    Respiratory Culture - Sputum, Cough [[id number]]   (Abnormal)  (Susceptibility) Collected:  01/09/18 0010    Lab Status:  Final result Specimen:  Sputum from Cough Updated:  01/11/18 0924     Respiratory Culture --      Scant growth (1+) Staphylococcus aureus, MRSA (A)      Sensitivity to follow.     Methicillin resistant Staphylococcus aureus, Patient may be an isolation risk.        Gram Stain Result Rare (1+) Gram positive cocci in pairs      Greater than 20 WBCs per low power field      Less than 10 Epithelial cells per low power field    Susceptibility        Staphylococcus aureus, MRSA     JASON     Amoxicillin + Clavulanate >4/2 ug/ml Resistant     Ampicillin >8 ug/ml Resistant     Ampicillin + Sulbactam 16/8 ug/ml Resistant     Cefazolin 8 ug/ml Resistant     Ciprofloxacin <=1 ug/ml Susceptible     Clindamycin <=0.5 ug/ml Susceptible     Erythromycin >4 ug/ml Resistant     Gentamicin <=4 ug/ml Susceptible     Levofloxacin <=1 ug/ml Susceptible  [1]      Linezolid 2 ug/ml Susceptible     Moxifloxacin <=0.5 ug/ml Susceptible     Oxacillin >2 ug/ml Resistant     Penicillin G >8 ug/ml Resistant     Quinupristin + Dalfopristin <=1 ug/ml Susceptible     Rifampin <=1 ug/ml Susceptible     Tetracycline <=4 ug/ml Susceptible     Trimethoprim + Sulfamethoxazole <=0.5/9.5 ug/ml Susceptible     Vancomycin 2 ug/ml Susceptible              [1]   Staphylococcus species may develop resistance during prolonged therapy with quinolones.  Isolates that are initially susceptible may become resistant within three to four days after initiation of therapy. Testing of repeat isolates may be warranted.                            Assessment/Plan     Evaluation of Level    Lab Results   Component Value Date    [facility] 11.80 01/17/2018   Level drawn 1757, ~8hr level    Assessment/Plan:    - Trough level remains SUBtherapeutic at 11.8 mcg/mL with goal of 15-20 mcg/mL. Patient remains febrile.  - Will increase dose to vancomycin 1250 mg IV q8h  - No additional troughs ordered at this  time, consider repeat trough prior to AM dose 1/19.  - Will continue to follow and adjust as necessary based on level, renal function, and clinical picture.     Thank you,   Tee Pedraza, PharmD  Pharmacy Resident  1/17/2018  6:46 PM

## 2018-01-17 NOTE — THERAPY EVALUATION
Acute Care - Physical Therapy Initial Evaluation  [facility]     Patient Name: Niall Murguia  : 1965  MRN: [id number]  Today's Date: 2018   Onset of Illness/Injury or Date of Surgery Date: 18  Date of Referral to PT: 18  Referring Physician: MD Tino      Admit Date: 2018     Visit Dx:    ICD-10-CM ICD-9-CM   1. Impaired functional mobility, balance, gait, and endurance Z74.09 V49.89     Patient Active Problem List   Diagnosis   • Acute on chronic respiratory failure with hypoxia   • Pneumonia of both lower lobes   • Chronic bullous emphysema   • Hydropneumothorax, right   • Hypertension   • Tobacco use     Past Medical History:   Diagnosis Date   • COPD (chronic obstructive pulmonary disease)      Past Surgical History:   Procedure Laterality Date   • CARDIAC SURGERY            PT ASSESSMENT (last 72 hours)      PT Evaluation       18 0820 01/15/18 1522    Rehab Evaluation    Document Type evaluation  -DM     Subjective Information agree to therapy;complains of;weakness;dyspnea   nsg amb w/Rwx yest on o2(disconn CT)  -DM     Patient Effort, Rehab Treatment good  -DM     Symptoms Noted During/After Treatment dizziness;fatigue;shortness of breath;significant change in vital signs  -DM     Symptoms Noted Comment Amb on 4L d/t desat to 87 % w/ warm ups & MIP  -DM     General Information    Patient Profile Review yes  -DM     Onset of Illness/Injury or Date of Surgery Date 18  -DM     Referring Physician MD Tino  -DM     General Observations PEMBERTON'S Posn.in bed;tele,IV hep locked,o2,R C.T. conn. to wall suct  -DM     Pertinent History Of Current Problem onset SOB, cough x 1 week,then yellow sputum; to R 1--18; CXR: R hydroPNX,[place] PNA;TRNSF TO [facility] for HLOC;DX W/ PL.EFF, SEPSIS,MRSA; R C.T. inserted (to wall suct)   -DM     Precautions/Limitations fall precautions;oxygen therapy device and L/min   Can disconn.R C.T.from wall suct for gt  -DM     Prior Level of  Function independent:;all household mobility;community mobility;gait;transfer;bed mobility;ADL's;home management;dependent:;yard work   indep gt w/o AD;son did yd wk  -DM     Equipment Currently Used at Home none  -DM none  -TH    Plans/Goals Discussed With patient;agreed upon  -DM     Risks Reviewed patient:;LOB;dizziness;increased discomfort;change in vital signs;increased drainage;lines disloged  -DM     Benefits Reviewed patient:;improve function;increase independence;increase strength;increase balance;decrease pain;increase knowledge  -DM     Barriers to Rehab none identified  -DM     Living Environment    Lives With child(clemencia), adult   SON;plans to stay w/S.O.(Works)@d/c(sisterAvail;can helpPRN)  -DM child(clemencia), adult  -TH    Living Arrangements mobile home  -DM mobile home  -TH    Home Accessibility bed and bath on same level;stairs to enter home  -DM no concerns  -TH    Number of Stairs to Enter Home 2   4 at ent. of S.O.'s home  -DM     Stair Railings at Home outside, present on right side   rail at S.O.'s; none at pt's  -DM     Type of Financial/Environmental Concern eviction pending  -DM none  -TH    Transportation Available car;family or friend will provide  -DM car;family or friend will provide  -TH    Clinical Impression    Date of Referral to PT 01/16/18  -DM     PT Diagnosis impaired funct mobil.  -DM     Criteria for Skilled Therapeutic Interventions Met yes;treatment indicated  -DM     Pathology/Pathophysiology Noted (Describe Specifically for Each System) pulmonary  -DM     Impairments Found (describe specific impairments) gait, locomotion, and balance  -DM     Rehab Potential good, to achieve stated therapy goals  -DM     Vital Signs    Pre Systolic BP Rehab 110  -DM     Pre Treatment Diastolic BP 82  -DM     Post Systolic BP Rehab 109  -DM     Post Treatment Diastolic BP 70  -DM     Pretreatment Heart Rate (beats/min) 69  -DM     Intratreatment Heart Rate (beats/min) 104  -DM     Posttreatment  Heart Rate (beats/min) 85  -DM     Pre SpO2 (%) 89  -DM     O2 Delivery Pre Treatment supplemental O2   3 L  -DM     Intra SpO2 (%) 87  -DM     O2 Delivery Intra Treatment supplemental O2   init gt on 3L;incr. to 4L; sat. 90%   -DM     Post SpO2 (%) 91  -DM     O2 Delivery Post Treatment supplemental O2   4 L  -DM     Pre Patient Position Supine  -DM     Intra Patient Position Standing  -DM     Post Patient Position Sitting   1400 CC respirex  -DM     Pain Assessment    Pain Assessment 0-10  -DM     Pain Score 7  -DM     Post Pain Score 8  -DM     Pain Type Acute pain  -DM     Pain Location Rib cage  -DM     Pain Orientation Right  -DM     Pain Descriptors Aching  -DM     Pain Frequency Constant/continuous  -DM     Patient's Stated Pain Goal No pain  -DM     Pain Intervention(s) Repositioned;Rest  -DM     Response to Interventions tolerated  -DM     Vision Assessment/Intervention    Visual Impairment WNL  -DM     Cognitive Assessment/Intervention    Current Cognitive/Communication Assessment functional  -DM     Orientation Status oriented x 4  -DM     Follows Commands/Answers Questions 100% of the time;able to follow single-step instructions;needs cueing;needs increased time;needs repetition  -DM     Personal Safety WNL/WFL  -DM     Personal Safety Interventions fall prevention program maintained;gait belt;nonskid shoes/slippers when out of bed  -DM     ROM (Range of Motion)    General ROM upper extremity range of motion deficits identified   R shldr abd welch.25% d/t R C.T. discomf.  -DM     MMT (Manual Muscle Testing)    General MMT Assessment upper extremity strength deficits identified;lower extremity strength deficits identified   R shldr grossly 4+/5 in avail.range;B quads 4 +/5  -DM     Bed Mobility, Assessment/Treatment    Bed Mobility, Assistive Device bed rails;head of bed elevated  -DM     Bed Mobility, Roll Left, [place] conditional independence  -DM     Bed Mob, Supine to Sit, [place]  conditional independence  -DM     Bed Mobility, Safety Issues decreased use of arms for pushing/pulling  -DM     Bed Mobility, Impairments strength decreased;pain  -DM     Bed Mobility, Comment cues for HP  -DM     Transfer Assessment/Treatment    Transfers, Sit-Stand [place] contact guard assist;verbal cues required   MIP 1 MIN.EOB;O2sat 87%;incr. to  4L;sat.91%  -DM     Transfers, Stand-Sit [place] contact guard assist;verbal cues required  -DM     Transfers, Sit-Stand-Sit, Assist Device --   GT BELT; UE supp  -DM     Transfer, Safety Issues weight-shifting ability decreased  -DM     Transfer, Impairments strength decreased;impaired balance;pain  -DM     Transfer, Comment cues for HP, seq.  -DM     Gait Assessment/Treatment    Gait, [place] Level minimum assist (75% patient effort)   pt.decl. R wx;req. HHA;Has R C.T.(disconn for gt),o2  -DM     Gait, Assistive Device other (see comments)   gt belt;UE supp;2 stand.rests  -DM     Gait, Distance (Feet) 160   gt on 4L;desat.to88%;c/o dizzy,SOB,fat.;Tends to gaze@floor  -DM     Gait, Gait Pattern Analysis swing-through gait  -DM     Gait, Gait Deviations johnny decreased;decreased heel strike;step length decreased;weight-shifting ability decreased  -DM     Gait, Safety Issues step length decreased;supplemental O2  -DM     Gait, Impairments strength decreased;impaired balance;pain  -DM     Gait, Comment cues for incr. step length, trunk ext, focusing ahead,PLB  -DM     Motor Skills/Interventions    Additional Documentation Balance Skills Training (Group)  -DM     Balance Skills Training    Sitting-Level of Assistance Close supervision  -DM     Sitting-Balance Support Feet supported  -DM     Sitting-Balance Activities Trunk control activities   trunk ext, SCOOTING,LE exer,PLB  -DM     Sitting # of Minutes 6  -DM     Standing-Level of Assistance Minimum assistance  -DM     Static Standing Balance Support Right upper extremity supported  -DM      Standing-Balance Activities Weight Shift A-P;Weight Shift R-L   MIP; trunk ext;PLB  -DM     Standing Balance # of Minutes 1  -DM     Gait Balance-Level of Assistance Minimum assistance  -DM     Gait Balance Support Right upper extremity supported  -DM     Gait Balance Activities scanning environment R/L;side-stepping  -DM     Therapy Exercises    Bilateral Lower Extremities AROM:;10 reps;sitting;standing;ankle pumps/circles;hip abduction/adduction;hip flexion;LAQ   MIP  -DM     Bilateral Upper Extremity AROM:;10 reps;sitting;elbow flexion/extension;shoulder abduction/adduction;shoulder extension/flexion  -DM     Positioning and Restraints    Pre-Treatment Position in bed  -DM     Post Treatment Position chair  -DM     In Chair notified nsg;reclined;call light within reach;encouraged to call for assist;with other staff;legs elevated   R.T. in for rx.  -DM       01/14/18 2015 01/14/18 2006    General Information    Equipment Currently Used at Home  none  -LJ    Living Environment    Lives With child(clemencia), adult  -     Living Arrangements mobile home  -LJ     Home Accessibility bed and bath are not on the first floor;stairs to enter home  -     Number of Stairs to Enter Home 2  -LJ     Stair Railings at Home none  -LJ     Type of Financial/Environmental Concern none  -     Transportation Available car;family or friend will provide  -       01/14/18 1109       Rehab Evaluation    Document Type therapy note (daily note)  -CC     Subjective Information agree to therapy;no complaints  -CC     Patient Effort, Rehab Treatment good  -CC     Symptoms Noted During/After Treatment fatigue  -CC     General Information    Precautions/Limitations fall precautions  -CC     Vital Signs    Pre SpO2 (%) 91  -CC     O2 Delivery Pre Treatment supplemental O2  -CC     Intra SpO2 (%) 87  -CC     O2 Delivery Intra Treatment supplemental O2  -CC     Post SpO2 (%) 89  -CC     O2 Delivery Post Treatment supplemental O2  -CC     Pre  Patient Position Supine  -CC     Intra Patient Position Standing  -CC     Post Patient Position Sitting  -CC     Pain Assessment    Pain Assessment No/denies pain  -CC     Bed Mobility, Assessment/Treatment    Bed Mobility, Assistive Device bed rails;head of bed elevated  -CC     Bed Mob, Supine to Sit, [place] conditional independence;independent  -CC     Transfer Assessment/Treatment    Transfers, Bed-Chair [place] stand by assist;verbal cues required  -CC     Transfers, Bed-Chair-Bed, Assist Device rolling walker  -CC     Transfers, Sit-Stand [place] stand by assist;verbal cues required  -CC     Transfers, Stand-Sit [place] stand by assist;verbal cues required  -CC     Transfers, Sit-Stand-Sit, Assist Device rolling walker  -CC     Transfer, Safety Issues balance decreased during turns;step length decreased  -     Transfer, Impairments strength decreased;impaired balance  -     Gait Assessment/Treatment    Gait, [place] Level contact guard assist;stand by assist;verbal cues required  -CC     Gait, Assistive Device rolling walker  -CC     Gait, Distance (Feet) 165  -     Gait, Gait Pattern Analysis swing-through gait  -     Gait, Gait Deviations johnny decreased;step length decreased  -     Gait, Safety Issues balance decreased during turns;step length decreased;supplemental O2  -     Gait, Impairments strength decreased;impaired balance  -     Therapy Exercises    Bilateral Lower Extremities AROM:;10 reps;supine;ankle pumps/circles;quad sets;heel slides;hip abduction/adduction;sitting;LAQ  -CC     Positioning and Restraints    Pre-Treatment Position in bed  -CC     Post Treatment Position chair  -CC     In Chair sitting;call light within reach;encouraged to call for assist;with family/caregiver  -       User Key  (r) = Recorded By, (t) = Taken By, (c) = Cosigned By    Initials Name Provider Type    ROBBY Dougherty, PT Physical Therapist    DERIAN Aguilar RN  Registered Nurse    LUC Franco, PTA Physical Therapy Assistant    TH Arcelia Rockwell, RN Case Manager          Physical Therapy Education     Title: PT OT SLP Therapies (Active)     Topic: Physical Therapy (Active)     Point: Mobility training (Active)    Learning Progress Summary    Learner Readiness Method Response Comment Documented by Status   Patient Eager D,E NR  DM 01/17/18 1008 Active               Point: Home exercise program (Active)    Learning Progress Summary    Learner Readiness Method Response Comment Documented by Status   Patient Eager D,E NR  DM 01/17/18 1008 Active               Point: Body mechanics (Active)    Learning Progress Summary    Learner Readiness Method Response Comment Documented by Status   Patient Eager D,E NR  DM 01/17/18 1008 Active               Point: Precautions (Active)    Learning Progress Summary    Learner Readiness Method Response Comment Documented by Status   Patient Eager D,E NR  DM 01/17/18 1008 Active                      User Key     Initials Effective Dates Name Provider Type Discipline     06/19/15 -  Yasmeen Dougherty, PT Physical Therapist PT                PT Recommendation and Plan  Anticipated Discharge Disposition: home with home health  PT Frequency: daily  Plan of Care Review  Plan Of Care Reviewed With: patient  Progress: progress towards functional goals is fair  Outcome Summary/Follow up Plan: Presents w/ R C.T. to wall suct (disconn for gt), signif. incis.pain,decr strength/endurance, & impaired funct mobil; able to amb 160 ft. w/min HHA, but desat to 87% on 3 L w/ init. MIP (o2 incr. to 4L for gt), & limited by dizziness, fatigue & min.SOB;recommend HHPT F/U at d/c to meet all mobil. goals/return to PLOF           IP PT Goals       01/17/18 1009 01/14/18 1109 01/14/18 1104    Bed Mobility PT LTG    Bed Mobility PT LTG, Date Established 01/17/18  -DM      Bed Mobility PT LTG, Time to Achieve 5 days  -DM      Bed Mobility PT LTG, Activity Type  all bed mobility  -DM      Bed Mobility PT LTG, [place] Level independent  -DM      Transfer Training PT LTG    Transfer Training PT LTG, Date Established 01/17/18  -DM      Transfer Training PT LTG, Time to Achieve 5 days  -DM      Transfer Training PT LTG, Activity Type bed to chair /chair to bed;sit to stand/stand to sit;toilet  -DM      Transfer Training PT LTG, [place] Level independent  -DM      Transfer Training PT LTG, Outcome  goal ongoing  -CC     Gait Training PT LTG    Gait Training Goal PT LTG, Date Established 01/17/18  -DM      Gait Training Goal PT LTG, Time to Achieve 5 days  -DM      Gait Training Goal PT LTG, [place] Level supervision required   stable VS; O2 SAT. > 92%  -DM      Gait Training Goal PT LTG, Distance to Achieve 250  -DM      Gait Training Goal PT LTG, Outcome  goal ongoing  -CC     Stair Training PT LTG    Stair Training Goal PT LTG, Date Established 01/17/18  -DM      Stair Training Goal PT LTG, Time to Achieve 5 days  -DM      Stair Training Goal PT LTG, Number of Steps 4  -DM      Stair Training Goal PT LTG, [place] Level supervision required   stable VS; o2 sat >92%  -DM      Stair Training Goal PT LTG, Assist Device 1 handrail  -DM      Strength Goal PT LTG    Strength Goal PT LTG, Outcome   goal partially met  -CC      01/11/18 1145          Transfer Training PT LTG    Transfer Training PT LTG, Date Established 01/11/18  -LM      Transfer Training PT LTG, Time to Achieve 2 wks  -LM      Transfer Training PT LTG, Activity Type sit to stand/stand to sit;bed to chair /chair to bed  -LM      Transfer Training PT LTG, [place] Level supervision required  -LM      Transfer Training PT LTG, Outcome goal ongoing  -LM      Gait Training PT LTG    Gait Training Goal PT LTG, Date Established 01/11/18  -LM      Gait Training Goal PT LTG, Time to Achieve 2 wks  -LM      Gait Training Goal PT LTG, [place] Level supervision required  -LM      Gait Training Goal  PT LTG, Distance to Achieve 400  -LM      Gait Training Goal PT LTG, Additional Goal Maintain O2 sats above 90%.  -LM      Gait Training Goal PT LTG, Outcome goal ongoing  -LM      Strength Goal PT LTG    Strength Goal PT LTG, Date Established 01/11/18  -LM      Strength Goal PT LTG, Time to Achieve 2 wks  -LM      Strength Goal PT LTG, Measure to Achieve Patient will perform B LE ther ex x 15 reps to improve functional strength for mobility.  -LM      Strength Goal PT LTG, Outcome goal ongoing  -LM        User Key  (r) = Recorded By, (t) = Taken By, (c) = Cosigned By    Initials Name Provider Type    ROBBY Dougherty, PT Physical Therapist    LM Destini Padgett, PT Physical Therapist    CC Sosa Franco, PTA Physical Therapy Assistant                Outcome Measures       01/17/18 0820          How much help from another person do you currently need...    Turning from your back to your side while in flat bed without using bedrails? 4  -DM      Moving from lying on back to sitting on the side of a flat bed without bedrails? 4  -DM      Moving to and from a bed to a chair (including a wheelchair)? 3  -DM      Standing up from a chair using your arms (e.g., wheelchair, bedside chair)? 3  -DM      Climbing 3-5 steps with a railing? 2  -DM      To walk in hospital room? 3  -DM      AM-PAC 6 Clicks Score 19  -DM      Functional Assessment    Outcome Measure Options AM-PAC 6 Clicks Basic Mobility (PT)  -DM        User Key  (r) = Recorded By, (t) = Taken By, (c) = Cosigned By    Initials Name Provider Type    DM Yasmeen Dougherty, PT Physical Therapist           Time Calculation:         PT Charges       01/17/18 1015          Time Calculation    Start Time 0820  -DM      PT Received On 01/17/18  -DM      PT Goal Re-Cert Due Date 01/27/18  -DM      Time Calculation- PT    Total Timed Code Minutes- PT 40 minute(s)  -DM        User Key  (r) = Recorded By, (t) = Taken By, (c) = Cosigned By    Initials Name Provider Type    ROBBY  Yasmeen Dougherty, PT Physical Therapist          Therapy Charges for Today     Code Description Service Date Service Provider Modifiers Qty    83594689975 HC PT EVAL MOD COMPLEXITY 4 1/17/2018 Yasmeen Dougherty, PT GP 1    64823477119 HC PT THER PROC EA 15 MIN 1/17/2018 Yasmeen Dougherty, PT GP 2    41512503372 HC GAIT TRAINING EA 15 MIN 1/17/2018 Yasmeen Dougherty, PT GP 1          PT G-Codes  Outcome Measure Options: AM-PAC 6 Clicks Basic Mobility (PT)      Yasmeen Dougherty, PT  1/17/2018

## 2018-01-17 NOTE — PROGRESS NOTES
"Niall Murguia  [id number]  1965     LOS: 3 days   Patient Care Team:  KEAGAN Rowley as PCP - General (Family Medicine)    Chief Complaint: shortness of breath      Subjective: No complaints    Objective:     Vital Sign Min/Max for last 24 hours  Temp  Min: 98 °F (36.7 °C)  Max: 98 °F (36.7 °C)   BP  Min: 103/61  Max: 107/67   Pulse  Min: 64  Max: 110   Resp  Min: 18  Max: 20   SpO2  Min: 89 %  Max: 95 %   Flow (L/min)  Min: 3  Max: 5   Weight  Min: 63.5 kg (140 lb)  Max: 63.5 kg (140 lb)     Flowsheet Rows         First Filed Value    Admission Height  172.7 cm (68\") Documented at 01/14/2018 1853    Admission Weight  66.6 kg (146 lb 14.4 oz) Documented at 01/14/2018 1853          Physical Exam:Breathing easily    Wound:    Pulses:     Mediastinal and Chest Tube Drainage:       Results Review:     Results from last 7 days  Lab Units 01/17/18  0520   WBC 10*3/mm3 16.81*   HEMOGLOBIN g/dL 13.5   HEMATOCRIT % 40.6   PLATELETS 10*3/mm3 229       Results from last 7 days  Lab Units 01/16/18  0833   SODIUM mmol/L 127*   POTASSIUM mmol/L 4.0   CHLORIDE mmol/L 92*   CO2 mmol/L 31.0   BUN mg/dL 20   CREATININE mg/dL 0.50*   GLUCOSE mg/dL 135*   CALCIUM mg/dL 7.4*             Assessment    Principal Problem:    Hydropneumothorax, right  Active Problems:    Acute on chronic respiratory failure with hypoxia    Pneumonia of both lower lobes    Chronic bullous emphysema    Hypertension    Tobacco use      Reviewed his chest x-ray, lung appears to be more expanded, patient doing better hopefully can DC chest tube in 48 hours, 5 AM portable chest x-ray        Roland Ge MD  01/17/18  7:13 AM      Please note that portions of this note were completed with a voice recognition program. Efforts were made to edit the dictations, but words may be mistranscribed  "

## 2018-01-18 ENCOUNTER — APPOINTMENT (OUTPATIENT)
Dept: GENERAL RADIOLOGY | Facility: HOSPITAL | Age: 53
End: 2018-01-18

## 2018-01-18 LAB
ALBUMIN SERPL-MCNC: 2.7 G/DL (ref 3.2–4.8)
ALBUMIN/GLOB SERPL: 0.9 G/DL (ref 1.5–2.5)
ALP SERPL-CCNC: 58 U/L (ref 25–100)
ALT SERPL W P-5'-P-CCNC: 65 U/L (ref 7–40)
ANION GAP SERPL CALCULATED.3IONS-SCNC: 0 MMOL/L (ref 3–11)
AST SERPL-CCNC: 15 U/L (ref 0–33)
BACTERIA FLD CULT: ABNORMAL
BACTERIA FLD CULT: ABNORMAL
BILIRUB SERPL-MCNC: 0.7 MG/DL (ref 0.3–1.2)
BUN BLD-MCNC: 9 MG/DL (ref 9–23)
BUN/CREAT SERPL: 18 (ref 7–25)
CALCIUM SPEC-SCNC: 8 MG/DL (ref 8.7–10.4)
CHLORIDE SERPL-SCNC: 98 MMOL/L (ref 99–109)
CO2 SERPL-SCNC: 27 MMOL/L (ref 20–31)
CREAT BLD-MCNC: 0.5 MG/DL (ref 0.6–1.3)
DEPRECATED RDW RBC AUTO: 46.7 FL (ref 37–54)
ERYTHROCYTE [DISTWIDTH] IN BLOOD BY AUTOMATED COUNT: 12.7 % (ref 11.3–14.5)
GFR SERPL CREATININE-BSD FRML MDRD: >150 ML/MIN/1.73
GLOBULIN UR ELPH-MCNC: 3.1 GM/DL
GLUCOSE BLD-MCNC: 146 MG/DL (ref 70–100)
GRAM STN SPEC: ABNORMAL
GRAM STN SPEC: ABNORMAL
HCT VFR BLD AUTO: 35.5 % (ref 38.9–50.9)
HGB BLD-MCNC: 12.1 G/DL (ref 13.1–17.5)
MCH RBC QN AUTO: 35 PG (ref 27–31)
MCHC RBC AUTO-ENTMCNC: 34.1 G/DL (ref 32–36)
MCV RBC AUTO: 102.6 FL (ref 80–99)
OSMOLALITY SERPL: 268 MOSM/KG (ref 275–295)
OSMOLALITY UR: 451 MOSM/KG (ref 300–1100)
PLATELET # BLD AUTO: 234 10*3/MM3 (ref 150–450)
PMV BLD AUTO: 9.9 FL (ref 6–12)
POTASSIUM BLD-SCNC: 3.8 MMOL/L (ref 3.5–5.5)
PROT SERPL-MCNC: 5.8 G/DL (ref 5.7–8.2)
RBC # BLD AUTO: 3.46 10*6/MM3 (ref 4.2–5.76)
SODIUM BLD-SCNC: 125 MMOL/L (ref 132–146)
SODIUM BLD-SCNC: 126 MMOL/L (ref 132–146)
SODIUM UR-SCNC: 56 MMOL/L (ref 30–90)
WBC NRBC COR # BLD: 16.75 10*3/MM3 (ref 3.5–10.8)

## 2018-01-18 PROCEDURE — 25010000002 VANCOMYCIN: Performed by: HOSPITALIST

## 2018-01-18 PROCEDURE — 84295 ASSAY OF SERUM SODIUM: CPT | Performed by: HOSPITALIST

## 2018-01-18 PROCEDURE — 85027 COMPLETE CBC AUTOMATED: CPT | Performed by: HOSPITALIST

## 2018-01-18 PROCEDURE — 94760 N-INVAS EAR/PLS OXIMETRY 1: CPT

## 2018-01-18 PROCEDURE — 94799 UNLISTED PULMONARY SVC/PX: CPT

## 2018-01-18 PROCEDURE — 87070 CULTURE OTHR SPECIMN AEROBIC: CPT | Performed by: HOSPITALIST

## 2018-01-18 PROCEDURE — 25010000002 HEPARIN (PORCINE) PER 1000 UNITS: Performed by: HOSPITALIST

## 2018-01-18 PROCEDURE — 97116 GAIT TRAINING THERAPY: CPT

## 2018-01-18 PROCEDURE — 71045 X-RAY EXAM CHEST 1 VIEW: CPT

## 2018-01-18 PROCEDURE — 25010000002 VANCOMYCIN

## 2018-01-18 PROCEDURE — 87205 SMEAR GRAM STAIN: CPT | Performed by: HOSPITALIST

## 2018-01-18 PROCEDURE — 94640 AIRWAY INHALATION TREATMENT: CPT

## 2018-01-18 PROCEDURE — 83930 ASSAY OF BLOOD OSMOLALITY: CPT | Performed by: HOSPITALIST

## 2018-01-18 PROCEDURE — 80053 COMPREHEN METABOLIC PANEL: CPT | Performed by: HOSPITALIST

## 2018-01-18 PROCEDURE — 99233 SBSQ HOSP IP/OBS HIGH 50: CPT | Performed by: HOSPITALIST

## 2018-01-18 PROCEDURE — 84300 ASSAY OF URINE SODIUM: CPT | Performed by: HOSPITALIST

## 2018-01-18 PROCEDURE — 97110 THERAPEUTIC EXERCISES: CPT

## 2018-01-18 PROCEDURE — 99231 SBSQ HOSP IP/OBS SF/LOW 25: CPT | Performed by: THORACIC SURGERY (CARDIOTHORACIC VASCULAR SURGERY)

## 2018-01-18 PROCEDURE — 83935 ASSAY OF URINE OSMOLALITY: CPT | Performed by: HOSPITALIST

## 2018-01-18 PROCEDURE — 25010000002 VANCOMYCIN 10 G RECONSTITUTED SOLUTION: Performed by: HOSPITALIST

## 2018-01-18 RX ORDER — SODIUM CHLORIDE 1000 MG
1 TABLET, SOLUBLE MISCELLANEOUS 2 TIMES DAILY WITH MEALS
Status: DISCONTINUED | OUTPATIENT
Start: 2018-01-18 | End: 2018-01-22

## 2018-01-18 RX ORDER — IPRATROPIUM BROMIDE AND ALBUTEROL SULFATE 2.5; .5 MG/3ML; MG/3ML
3 SOLUTION RESPIRATORY (INHALATION)
Status: DISCONTINUED | OUTPATIENT
Start: 2018-01-18 | End: 2018-01-24 | Stop reason: HOSPADM

## 2018-01-18 RX ORDER — CITALOPRAM 20 MG/1
20 TABLET ORAL DAILY
Status: COMPLETED | OUTPATIENT
Start: 2018-01-19 | End: 2018-01-21

## 2018-01-18 RX ADMIN — Medication 250 MG: at 08:03

## 2018-01-18 RX ADMIN — HEPARIN SODIUM 5000 UNITS: 5000 INJECTION, SOLUTION INTRAVENOUS; SUBCUTANEOUS at 14:24

## 2018-01-18 RX ADMIN — HEPARIN SODIUM 5000 UNITS: 5000 INJECTION, SOLUTION INTRAVENOUS; SUBCUTANEOUS at 06:00

## 2018-01-18 RX ADMIN — VANCOMYCIN HYDROCHLORIDE 1250 MG: 10 INJECTION, POWDER, LYOPHILIZED, FOR SOLUTION INTRAVENOUS at 10:28

## 2018-01-18 RX ADMIN — HEPARIN SODIUM 5000 UNITS: 5000 INJECTION, SOLUTION INTRAVENOUS; SUBCUTANEOUS at 20:07

## 2018-01-18 RX ADMIN — Medication 250 MG: at 20:07

## 2018-01-18 RX ADMIN — HYDROCODONE BITARTATE AND ACETAMINOPHEN 1 TABLET: 5; 325 TABLET ORAL at 00:03

## 2018-01-18 RX ADMIN — NICOTINE 1 PATCH: 21 PATCH, EXTENDED RELEASE TRANSDERMAL at 20:07

## 2018-01-18 RX ADMIN — Medication 1 G: at 13:25

## 2018-01-18 RX ADMIN — LIDOCAINE 1 PATCH: 50 PATCH CUTANEOUS at 08:03

## 2018-01-18 RX ADMIN — HYDROCODONE BITARTATE AND ACETAMINOPHEN 1 TABLET: 5; 325 TABLET ORAL at 14:25

## 2018-01-18 RX ADMIN — IPRATROPIUM BROMIDE AND ALBUTEROL SULFATE 3 ML: .5; 3 SOLUTION RESPIRATORY (INHALATION) at 20:29

## 2018-01-18 RX ADMIN — HYDROCODONE BITARTATE AND ACETAMINOPHEN 1 TABLET: 5; 325 TABLET ORAL at 06:00

## 2018-01-18 RX ADMIN — HYDROCODONE BITARTATE AND ACETAMINOPHEN 1 TABLET: 5; 325 TABLET ORAL at 10:28

## 2018-01-18 RX ADMIN — BUDESONIDE AND FORMOTEROL FUMARATE DIHYDRATE 2 PUFF: 160; 4.5 AEROSOL RESPIRATORY (INHALATION) at 08:49

## 2018-01-18 RX ADMIN — FOLIC ACID 1 MG: 1 TABLET ORAL at 08:03

## 2018-01-18 RX ADMIN — CITALOPRAM HYDROBROMIDE 40 MG: 20 TABLET ORAL at 08:03

## 2018-01-18 RX ADMIN — IPRATROPIUM BROMIDE AND ALBUTEROL SULFATE 3 ML: .5; 3 SOLUTION RESPIRATORY (INHALATION) at 13:44

## 2018-01-18 RX ADMIN — VANCOMYCIN HYDROCHLORIDE 1250 MG: 10 INJECTION, POWDER, LYOPHILIZED, FOR SOLUTION INTRAVENOUS at 17:22

## 2018-01-18 RX ADMIN — VANCOMYCIN HYDROCHLORIDE 1250 MG: 10 INJECTION, POWDER, LYOPHILIZED, FOR SOLUTION INTRAVENOUS at 02:40

## 2018-01-18 RX ADMIN — Medication 1 G: at 17:22

## 2018-01-18 RX ADMIN — BUDESONIDE AND FORMOTEROL FUMARATE DIHYDRATE 2 PUFF: 160; 4.5 AEROSOL RESPIRATORY (INHALATION) at 20:30

## 2018-01-18 RX ADMIN — HYDROCODONE BITARTATE AND ACETAMINOPHEN 1 TABLET: 5; 325 TABLET ORAL at 20:10

## 2018-01-18 NOTE — PLAN OF CARE
Problem: Patient Care Overview (Adult)  Goal: Plan of Care Review  Outcome: Ongoing (interventions implemented as appropriate)   01/18/18 1541   Coping/Psychosocial Response Interventions   Plan Of Care Reviewed With patient   Patient Care Overview   Progress no change   Outcome Evaluation   Outcome Summary/Follow up Plan pt alert and cooperative, still requiring oxygen, walked with PT and sat in chair. plan for chest xray in AM with possible d/c of CT. will continue to monitor       Problem: Respiratory Insufficiency (Adult)  Goal: Acid/Base Balance  Outcome: Ongoing (interventions implemented as appropriate)    Goal: Effective Ventilation  Outcome: Ongoing (interventions implemented as appropriate)      Problem: Pneumonia (Adult)  Goal: Signs and Symptoms of Listed Potential Problems Will be Absent or Manageable (Pneumonia)  Outcome: Ongoing (interventions implemented as appropriate)

## 2018-01-18 NOTE — PAYOR COMM NOTE
"Alyssa Corea (52 y.o. Male)     Date of Birth Social Security Number Address Home Phone MRN    1965  [address] [facility] [address] [phone] [id number]    Orthodox Marital Status          None Single       Admission Date Admission Type Admitting Provider Attending Provider Department, Room/Bed    18 Elective Chuy Alanis MD Repass, Gregory L, MD [address], [address]/1    Discharge Date Discharge Disposition Discharge Destination                      Attending Provider: Chuy Alanis MD     Allergies:  No Known Allergies    Isolation:  None   Infection:  MRSA (18)   Code Status:  FULL    Ht:  172.7 cm (68\")   Wt:  65.3 kg (144 lb)    Admission Cmt:  None   Principal Problem:  Hydropneumothorax, right [J94.8]                 Active Insurance as of 2018     Primary Coverage     Payor Plan Insurance Group Employer/Plan Group    HU[employer]NA MEDICAID [employer] [facility]     Payor Plan Address Payor Plan Phone Number Effective From Effective To    PO  [phone] 2018     [place] [address]       Subscriber Name Subscriber Birth Date Member ID       ALYSSA COREA 1965 [id number]                 Emergency Contacts      (Rel.) Home Phone Work Phone Mobile Phone    Lakeisha Rehman (Significant Other) [phone] -- --    Krissy Macias (Sister) [phone] -- --    Caleb Corea (Son) [phone] -- --               Physician Progress Notes (last 24 hours) (Notes from 2018 10:19 AM through 2018 10:19 AM)      Chuy Alanis MD at 2018  4:29 PM  Version 1 of 1             [facility] Medicine Services  PROGRESS NOTE    Patient Name: Alyssa Corea  : 1965  MRN: [id number]    Date of Admission: 2018  Length of Stay: 3  Primary Care Physician: KEAGAN Rowley    Subjective   Subjective     CC:  F/u pneumonia    HPI:  Patient sitting up in chair when seen.  Denies " fevers or chills.  Reports ongoing cough, productive of purulent sputum.  Reports mild to moderate right chest wall pain.  Has had some increased oxygen requirements today but denies significant dyspnea during my visit.    Review of Systems  Gen- No fevers, chills  CV- No palpitations  Resp- As above  GI- No N/V/D, abd pain    Otherwise ROS is negative except as mentioned in the HPI.    Objective   Objective     Vital Signs:   Temp:  [98 °F (36.7 °C)] 98 °F (36.7 °C)  Heart Rate:  [] 105  Resp:  [18-20] 20  BP: (107)/(67) 107/67     Physical Exam:  Constitutional: Ill appearing but nontoxic, awake, alert  HENT: NCAT, mucous membranes moist  Respiratory: Decreased breath sounds to bilateral bases with poor expansion, respiratory effort mildly increased, right thoracostomy with serous drainage, no air leak in Pleurevac  Cardiovascular: RRR (sinus tach on tele), no murmurs, rubs, or gallops, palpable pedal pulses bilaterally  Gastrointestinal: Positive bowel sounds, soft, nontender, nondistended  Musculoskeletal: No bilateral ankle edema  Psychiatric: Appropriate affect, cooperative  Neurologic: Oriented x 3, strength symmetric in all extremities, Cranial Nerves grossly intact to confrontation, speech clear  Skin: No rashes    Results Reviewed:  I have personally reviewed current lab, radiology, and data and agree.      Results from last 7 days  Lab Units 01/17/18  0520 01/16/18  0833 01/15/18  0526   WBC 10*3/mm3 16.81* 18.50* 18.53*   HEMOGLOBIN g/dL 13.5 13.9 12.3*   HEMATOCRIT % 40.6 40.6 37.2*   PLATELETS 10*3/mm3 229 246 226   INR   --   --  1.10       Results from last 7 days  Lab Units 01/17/18  0520 01/16/18  0833 01/15/18  0526 01/14/18  1300 01/11/18  1000   SODIUM mmol/L 128* 127* 134 137 135*   POTASSIUM mmol/L 4.1 4.0 4.2 3.9 4.0   CHLORIDE mmol/L 95* 92* 100 98 102   CO2 mmol/L 28.0 31.0 34.0* 35.0* 28.0   BUN mg/dL 16 20 20 15 25*   CREATININE mg/dL 0.40* 0.50* 0.60 0.60 0.60   GLUCOSE mg/dL 85  135* 90 142* 139*   CALCIUM mg/dL 7.6* 7.4* 8.1* 8.6 8.3*   ALT (SGPT) U/L  --   --  175* 222* 114*   AST (SGOT) U/L  --   --  57* 70* 49*     No results found for: BNP  No results found for: PHART    Microbiology Results Abnormal     Procedure Component Value - Date/Time    Body Fluid Culture - Body Fluid, Pleural Cavity [[id number]]  (Abnormal) Collected:  01/15/18 1579    Lab Status:  Preliminary result Specimen:  Body Fluid from Pleural Cavity Updated:  01/17/18 0953     BF Culture --      Scant growth (1+) Staphylococcus aureus (A)     Gram Stain Result Moderate (3+) WBCs seen      No organisms seen    Anaerobic Culture - Body Fluid, Pleural Cavity [[id number]]  (Normal) Collected:  01/15/18 9096    Lab Status:  Preliminary result Specimen:  Body Fluid from Pleural Cavity Updated:  01/16/18 1239     Culture No anaerobes isolated    AFB Culture - Body Fluid, Pleural Cavity [[id number]] Collected:  01/15/18 6706    Lab Status:  Preliminary result Specimen:  Body Fluid from Pleural Cavity Updated:  01/16/18 1219     AFB Stain No acid fast bacilli seen on concentrated smear          Imaging Results (last 24 hours)     Procedure Component Value Units Date/Time    XR Chest 1 View [[id number]] Collected:  01/17/18 1001     Updated:  01/17/18 1350    Narrative:       EXAMINATION: XR CHEST 1 VW- 01/17/2018     INDICATION: postop      COMPARISON: 01/16/2018     FINDINGS: A right chest tube remains well-positioned. There has been  reduction in the right pneumothorax. There is persistent patchy  bibasilar airspace disease.           Impression:       The pneumothorax has largely resolved. The bibasilar  pulmonary changes are unchanged.     D:  01/17/2018  E:  01/17/2018     This report was finalized on 1/17/2018 1:48 PM by Dr. Desmond Calvin MD.           Results for orders placed during the hospital encounter of 01/07/18   Adult Transthoracic Echo Complete W/ Cont if Necessary Per Protocol    Narrative Technically very limited  study   1) Normal LV systolic function ( EF is about 55%)  2) Normal left atrial size with normal LVedp   3) Trace MR   4) Mild TR with normal PA pressures ( within limitations of study)  5) Severe RV dilation with wall motion abnormality ? Acute on chronic   corpulmonale   6) Severe reduction in RV function        I have reviewed the medications.    Assessment/Plan   Assessment / Plan     Hospital Problem List     * (Principal)Hydropneumothorax, right    Acute on chronic respiratory failure with hypoxia    Pneumonia of both lower lobes    Chronic bullous emphysema (Chronic)    Hypertension    Tobacco use         Brief Hospital Course to date:  Niall Murguia is a 52 y.o. male who was transferred to [facility] on 1/14 from [facility] after presenting there on 1/7 with acute dyspnea, found to have acute hypoxemic respiratory failure and MRSA pneumonia:     Assessment & Plan:    Acute on Chronic Hypoxemic Respiratory Failure, persistent   Right-sided MRSA Pneumonia, persistent  Complicated Right Parapneumonic Effusion with probable MRSA in pleural fluid culture, dx of complicated effusion is new today   - Continue supplemental O2, encourage pulmonary toilet with IS and up to chair   - New finding today of staph aureus (likely MRSA) in pleural fluid culture  - Consult [facility] due to possible need for prolonged IV abx   - Continue IV vancomycin  - Repeat CBC in AM, WBC remains elevated   - Continue tube thoracostomy for complicated effusion  - CXR from this AM personally reviewed, agree with official interp    Right Hydropneumothorax s/p CT-guided chest tube 1/15  - improved, continue Chest tube for ~ 48 more hours per CTS    COPD  Hx of Spontaneous PTX 2010  Tobacco Abuse  - continue supportive care with symbicort, prn nebs  - Nicotine patch    Mild Hyponatremia  - likely SIADH due to above  - Repeat in AM    Hypertension   - BP normal, on no meds at present, will monitor     Macrocytic Anemia   - Improved    DVT  "Prophylaxis:  SQ heparin  CODE STATUS: Full Code    Disposition: I expect the patient to be discharged home with home health in 3-4 days.    Chuy Alanis MD  01/17/18  4:29 PM           Electronically signed by Chuy Alanis MD at 1/17/2018  4:48 PM      Roland Ge MD at 1/18/2018  7:03 AM  Version 1 of Lizbeth Murguia  [id number]  1965     LOS: 4 days   Patient Care Team:  KEAGAN Rowley as PCP - General (Family Medicine)    Chief Complaint: Pneumothorax      Subjective: Denies chest pain    Objective:     Vital Sign Min/Max for last 24 hours  Temp  Min: 97.9 °F (36.6 °C)  Max: 99 °F (37.2 °C)   BP  Min: 118/65  Max: 121/70   Pulse  Min: 69  Max: 105   Resp  Min: 16  Max: 20   SpO2  Min: 89 %  Max: 95 %   Flow (L/min)  Min: 3  Max: 5.5   Weight  Min: 65.3 kg (144 lb)  Max: 65.3 kg (144 lb)     Flowsheet Rows         First Filed Value    Admission Height  172.7 cm (68\") Documented at 01/14/2018 1853    Admission Weight  66.6 kg (146 lb 14.4 oz) Documented at 01/14/2018 1853          Physical Exam:Being easily Wound:    Pulses:     Mediastinal and Chest Tube Drainage: No air leak   Results Review:     Results from last 7 days  Lab Units 01/18/18  0420   WBC 10*3/mm3 16.75*   HEMOGLOBIN g/dL 12.1*   HEMATOCRIT % 35.5*   PLATELETS 10*3/mm3 234       Results from last 7 days  Lab Units 01/18/18  0420   SODIUM mmol/L 125*   POTASSIUM mmol/L 3.8   CHLORIDE mmol/L 98*   CO2 mmol/L 27.0   BUN mg/dL 9   CREATININE mg/dL 0.50*   GLUCOSE mg/dL 146*   CALCIUM mg/dL 8.0*             Assessment    Principal Problem:    Hydropneumothorax, right  Active Problems:    Acute on chronic respiratory failure with hypoxia    Pneumonia of both lower lobes    Chronic bullous emphysema    Hypertension    Tobacco use      5 AM portable chest x-ray, we'll plan to DC chest tube tomorrow tentatively        Roland Ge MD  01/18/18  7:03 AM      Please note that portions of this note were " completed with a voice recognition program. Efforts were made to edit the dictations, but words may be mistranscribed     Electronically signed by Roland Ge MD at 1/18/2018  7:04 AM

## 2018-01-18 NOTE — THERAPY TREATMENT NOTE
Acute Care - Physical Therapy Treatment Note  [facility]     Patient Name: Niall Murguia  : 1965  MRN: [id number]  Today's Date: 2018  Onset of Illness/Injury or Date of Surgery Date: 18  Date of Referral to PT: 18  Referring Physician: MD Tino    Admit Date: 2018    Visit Dx:    ICD-10-CM ICD-9-CM   1. Impaired functional mobility, balance, gait, and endurance Z74.09 V49.89     Patient Active Problem List   Diagnosis   • Acute on chronic respiratory failure with hypoxia   • Pneumonia of both lower lobes   • Chronic bullous emphysema   • Hydropneumothorax, right   • Hypertension   • Tobacco use               Adult Rehabilitation Note       18 1300          Rehab Assessment/Intervention    Discipline physical therapy assistant  -UD      Document Type therapy note (daily note)  -UD      Subjective Information agree to therapy;no complaints  -UD      Patient Effort, Rehab Treatment good  -UD      Symptoms Noted During/After Treatment fatigue  -UD      Precautions/Limitations fall precautions;oxygen therapy device and L/min  -UD      Specific Treatment Considerations ches tube  -UD      Recorded by [UD] Eleanor Kumar PTA      Vital Signs    Pre SpO2 (%) 93  -UD      O2 Delivery Pre Treatment supplemental O2  -UD      Post SpO2 (%) 93  -UD      O2 Delivery Post Treatment supplemental O2  -UD      Pre Patient Position Sitting  -UD      Intra Patient Position Standing  -UD      Post Patient Position Sitting  -UD      Recorded by [UD] Eleanor Kumar PTA      Pain Assessment    Pain Assessment No/denies pain  -UD      Recorded by [UD] Eleanor Kumar PTA      Cognitive Assessment/Intervention    Orientation Status oriented x 4  -UD      Follows Commands/Answers Questions 100% of the time  -UD      Recorded by [UD] Eleanor Kumar PTA      Bed Mobility, Assessment/Treatment    Bed Mob, Supine to Sit, [place] not tested   up in chair  -UD      Recorded by [UD] Eleanor Kumar PTA       Transfer Assessment/Treatment    Transfers, Sit-Stand [place] contact guard assist  -UD      Transfers, Stand-Sit [place] contact guard assist  -UD      Recorded by [UD] Eleanor Kumar PTA      Gait Assessment/Treatment    Gait, [place] Level contact guard assist;1 person + 1 person to manage equipment  -UD      Gait, Assistive Device rolling walker  -UD      Gait, Distance (Feet) 350  -UD      Gait, Gait Deviations johnny decreased  -UD      Gait, Safety Issues step length decreased;supplemental O2  -UD      Gait, Impairments strength decreased  -UD      Recorded by [UD] Eleanor Kumar PTA      Therapy Exercises    Bilateral Lower Extremities AROM:;10 reps;sitting  -UD      Bilateral Upper Extremity AROM:;10 reps;sitting  -UD      Recorded by [UD] Eleanor Kumar PTA      Positioning and Restraints    Pre-Treatment Position sitting in chair/recliner  -UD      Post Treatment Position chair  -UD      In Chair notified nsg;reclined;sitting;call light within reach;legs elevated  -UD      Recorded by [UD] Eleanor Kumar PTA        User Key  (r) = Recorded By, (t) = Taken By, (c) = Cosigned By    Initials Name Effective Dates    UD Eleanor Kumar PTA 06/22/15 -                 IP PT Goals       01/18/18 1329 01/17/18 1009 01/14/18 1109    Bed Mobility PT LTG    Bed Mobility PT LTG, Date Established  01/17/18  -DM     Bed Mobility PT LTG, Time to Achieve  5 days  -DM     Bed Mobility PT LTG, Activity Type  all bed mobility  -DM     Bed Mobility PT LTG, [place] Level  independent  -DM     Bed Mobility PT LTG, Outcome goal ongoing  -UD      Transfer Training PT LTG    Transfer Training PT LTG, Date Established  01/17/18  -DM     Transfer Training PT LTG, Time to Achieve  5 days  -DM     Transfer Training PT LTG, Activity Type  bed to chair /chair to bed;sit to stand/stand to sit;toilet  -DM     Transfer Training PT LTG, [place] Level  independent  -DM     Transfer Training PT LTG, Outcome goal ongoing   -UD  goal ongoing  -CC    Gait Training PT LTG    Gait Training Goal PT LTG, Date Established  01/17/18  -DM     Gait Training Goal PT LTG, Time to Achieve  5 days  -DM     Gait Training Goal PT LTG, [place] Level  supervision required   stable VS; O2 SAT. > 92%  -DM     Gait Training Goal PT LTG, Distance to Achieve  250  -DM     Gait Training Goal PT LTG, Outcome goal partially met  -UD  goal ongoing  -CC    Stair Training PT LTG    Stair Training Goal PT LTG, Date Established  01/17/18  -DM     Stair Training Goal PT LTG, Time to Achieve  5 days  -DM     Stair Training Goal PT LTG, Number of Steps  4  -DM     Stair Training Goal PT LTG, [place] Level  supervision required   stable VS; o2 sat >92%  -DM     Stair Training Goal PT LTG, Assist Device  1 handrail  -DM     Stair Training Goal PT LTG, Outcome goal ongoing  -UD        01/14/18 1104 01/11/18 1145       Transfer Training PT LTG    Transfer Training PT LTG, Date Established  01/11/18  -LM     Transfer Training PT LTG, Time to Achieve  2 wks  -LM     Transfer Training PT LTG, Activity Type  sit to stand/stand to sit;bed to chair /chair to bed  -LM     Transfer Training PT LTG, [place] Level  supervision required  -LM     Transfer Training PT LTG, Outcome  goal ongoing  -LM     Gait Training PT LTG    Gait Training Goal PT LTG, Date Established  01/11/18  -LM     Gait Training Goal PT LTG, Time to Achieve  2 wks  -LM     Gait Training Goal PT LTG, [place] Level  supervision required  -LM     Gait Training Goal PT LTG, Distance to Achieve  400  -LM     Gait Training Goal PT LTG, Additional Goal  Maintain O2 sats above 90%.  -LM     Gait Training Goal PT LTG, Outcome  goal ongoing  -LM     Strength Goal PT LTG    Strength Goal PT LTG, Date Established  01/11/18  -LM     Strength Goal PT LTG, Time to Achieve  2 wks  -LM     Strength Goal PT LTG, Measure to Achieve  Patient will perform B LE ther ex x 15 reps to improve functional strength for  mobility.  -LM     Strength Goal PT LTG, Outcome goal partially met  -CC goal ongoing  -LM       User Key  (r) = Recorded By, (t) = Taken By, (c) = Cosigned By    Initials Name Provider Type    UD Eleanor Kumar, NITISH Physical Therapy Assistant    ROBBY Dougherty, PT Physical Therapist    LM Destini Padgett, PT Physical Therapist    CC Sosa Franco, PTA Physical Therapy Assistant          Physical Therapy Education     Title: PT OT SLP Therapies (Done)     Topic: Physical Therapy (Done)     Point: Mobility training (Done)    Learning Progress Summary    Learner Readiness Method Response Comment Documented by Status   Patient MARTHA Hurley,NR   01/18/18 1329 Done    Eager D,E NR   01/17/18 1008 Active               Point: Home exercise program (Done)    Learning Progress Summary    Learner Readiness Method Response Comment Documented by Status   Patient MARTHA Hurley,NR   01/18/18 1329 Done    Eager D,E NR   01/17/18 1008 Active               Point: Body mechanics (Done)    Learning Progress Summary    Learner Readiness Method Response Comment Documented by Status   Patient MARTHA Hurley,NR   01/18/18 1329 Done    Acceptance E VU   01/17/18 1340 Done    Eager D,E NR   01/17/18 1008 Active               Point: Precautions (Done)    Learning Progress Summary    Learner Readiness Method Response Comment Documented by Status   Patient MARTHA Hurley,NR   01/18/18 1329 Done    Eager D,E NR   01/17/18 1008 Active                      User Key     Initials Effective Dates Name Provider Type Discipline     06/22/15 -  Eleanor Kumar PTA Physical Therapy Assistant PT     06/19/15 -  Yasmeen Dougherty, PT Physical Therapist PT     06/16/16 -  Heidy Malik RN Registered Nurse Nurse                    PT Recommendation and Plan  Anticipated Discharge Disposition: home with home health  PT Frequency: daily  Plan of Care Review  Plan Of Care Reviewed With: patient  Progress: improving  Outcome Summary/Follow  up Plan: pt feels better,up in chair.still has chest tube.ambulat. 350 ft went over all exercises          Outcome Measures       01/18/18 1300 01/17/18 0820       How much help from another person do you currently need...    Turning from your back to your side while in flat bed without using bedrails? 4  -UD 4  -DM     Moving from lying on back to sitting on the side of a flat bed without bedrails? 3  -UD 4  -DM     Moving to and from a bed to a chair (including a wheelchair)? 3  -UD 3  -DM     Standing up from a chair using your arms (e.g., wheelchair, bedside chair)? 3  -UD 3  -DM     Climbing 3-5 steps with a railing? 3  -UD 2  -DM     To walk in hospital room? 3  -UD 3  -DM     AM-PAC 6 Clicks Score 19  -UD 19  -DM     Functional Assessment    Outcome Measure Options  AM-PAC 6 Clicks Basic Mobility (PT)  -DM       User Key  (r) = Recorded By, (t) = Taken By, (c) = Cosigned By    Initials Name Provider Type    SANTOS Kumar PTA Physical Therapy Assistant    ROBBY Dougherty, PT Physical Therapist           Time Calculation:         PT Charges       01/18/18 1331          Time Calculation    PT Received On 01/18/18  -UD      PT Goal Re-Cert Due Date 01/27/18  -UD      Time Calculation- PT    Total Timed Code Minutes- PT 24 minute(s)  -UD        User Key  (r) = Recorded By, (t) = Taken By, (c) = Cosigned By    Initials Name Provider Type    UD Eleanor Kumar PTA Physical Therapy Assistant          Therapy Charges for Today     Code Description Service Date Service Provider Modifiers Qty    43538936446 HC PT THER PROC EA 15 MIN 1/18/2018 Eleanor Kumar PTA GP 1    18338864164 HC GAIT TRAINING EA 15 MIN 1/18/2018 Eleanor Kumar PTA GP 1    18366586648 HC PT THER SUPP EA 15 MIN 1/18/2018 Eleanor Kumar PTA GP 1          PT G-Codes  Outcome Measure Options: AM-PAC 6 Clicks Basic Mobility (PT)    Eleanor Kumar PTA  1/18/2018

## 2018-01-18 NOTE — CONSULTS
Niall Murguia  1965  [id number]    Date of Consult: 1/18/2018    Admit Date:  1/14/2018      Requesting Provider: Jake Tinsley MD  Evaluating Physician: Rafael Sr MD    Chief Complaint: Shortness of breath/cough    Reason for Consultation: Pneumonia/empyema    History of present illness:    Patient is a 52 y.o.  Yr old male with history of bullous emphysema with prior right lung surgery in 2010 per patient related to collapsed lung but he did not recall any prior infection and specifics of that are unclear.  He was admitted to [facility] January 7 with 3 days progressive cough/dyspnea and hemoptysis.  Concern for preceding viral prodrome per outside records.  He had acute kidney injury, severe leukocytosis and elevated lactate with diagnosis acute severe sepsis/acute hypoxic respiratory failure and extensive right-sided pneumonia, admitted to ICU; chest imaging apparently had right hydropneumothorax and bilateral pneumonia with transferred to [facility] on January 14.  He was seen by Dr. Ge and underwent right sided chest tube.  Sputum from January 9 has MRSA, pleural fluid from January 15 has staph aureus in culture.    Patient continues to have shortness of breath/cough, requiring nasal cannula oxygen but generally feels somewhat better.  No headache photophobia or neck stiffness.  He denies chest pain at present.  Tube remains.  No nausea vomiting diarrhea or abdominal pain.  No skin rash.  No dysuria hematuria or pyuria.    No raw or undercooked food.  No unpasteurized milk or milk products.  No animal insect or arthropod exposure.  No tick bites.  No outdoor camping or hunting exposure.  No travel exposure.  No ill exposure.  No history of TB or TB exposure.   Denies a history of VRE and no history of C. difficile or ESBL/KPC  organisms.    Past Medical History:   Diagnosis Date   • COPD (chronic obstructive pulmonary disease)        Past Surgical History:    Procedure Laterality Date   • CARDIAC SURGERY         Pediatric History   Patient Guardian Status   • Not on file.     Other Topics Concern   • Not on file     Social History Narrative   • No narrative on file   Chronic tobacco, he denies alcohol or illicit drugs    family history includes Heart disease in his father; No Known Problems in his brother, daughter, mother, sister, and son.    No Known Allergies    Medication:  Current Facility-Administered Medications   Medication Dose Route Frequency Provider Last Rate Last Dose   • acetaminophen (TYLENOL) tablet 650 mg  650 mg Oral Q4H PRN KEAGAN Orellana   650 mg at 01/16/18 2054   • budesonide-formoterol (SYMBICORT) 160-4.5 MCG/ACT inhaler 2 puff  2 puff Inhalation BID - RT KEAGAN Orellana   2 puff at 01/17/18 2205   • citalopram (CeleXA) tablet 40 mg  40 mg Oral Daily KEAGAN Orellana   40 mg at 01/17/18 0814   • folic acid (FOLVITE) tablet 1 mg  1 mg Oral Daily Carmen Martino MD   1 mg at 01/17/18 0814   • heparin (porcine) 5000 UNIT/ML injection 5,000 Units  5,000 Units Subcutaneous Q8H Carmen Martino MD   5,000 Units at 01/18/18 0600   • HYDROcodone-acetaminophen (NORCO) 5-325 MG per tablet 1 tablet  1 tablet Oral Q4H PRN Roland Ge MD   1 tablet at 01/18/18 0600   • ipratropium-albuterol (DUO-NEB) nebulizer solution 3 mL  3 mL Nebulization Q4H PRN KEAGAN Orellana   3 mL at 01/17/18 2206   • lidocaine (LIDODERM) 5 % 1 patch  1 patch Transdermal Q24H Carmen Martino MD   1 patch at 01/17/18 0814   • nicotine (NICODERM CQ) 21 MG/24HR patch 1 patch  1 patch Transdermal Q24H KEAGAN Orellana   1 patch at 01/17/18 2028   • ondansetron (ZOFRAN) tablet 4 mg  4 mg Oral Q6H PRN KEAGAN Orellana        Or   • ondansetron (ZOFRAN) injection 4 mg  4 mg Intravenous Q6H PRN Maddy Marshal, APRN       • Pharmacy to dose vancomycin   Does not apply Continuous PRN Maddy Hernandez, APRN       • saccharomyces boulardii (FLORASTOR) capsule  250 mg  250 mg Oral BID KEAGAN Orellana   250 mg at 01/17/18 2027   • sodium chloride 0.9 % flush 1-10 mL  1-10 mL Intravenous PRN KEAGAN Orellana       • vancomycin 1250 mg/250 mL 0.9% NS IVPB ([facility])  1,250 mg Intravenous Q8H Chuy Alanis MD   1,250 mg at 01/18/18 0240       Antibiotics:  Anti-Infectives     Ordered     Dose/Rate Route Frequency Start Stop    01/17/18 1859  vancomycin 1250 mg/250 mL 0.9% NS IVPB ([facility])     Chuy Alanis MD reviewed the order on 01/18/18 0610.   Ordering Provider:  Chuy Alanis MD    1,250 mg  over 90 Minutes Intravenous Every 8 Hours 01/18/18 0200 01/27/18 1759    01/14/18 2103  vancomycin 1250 mg/250 mL 0.9% NS IVPB ([facility])     Ordering Provider:  Jake Tinsley MD    20 mg/kg × 62.4 kg  over 90 Minutes Intravenous Once 01/14/18 2200 01/15/18 0041    01/14/18 2055  Pharmacy to dose vancomycin     Ordering Provider:  KEAGAN Orellana     Does not apply Continuous PRN 01/14/18 2049 01/21/18 2048            Review of Systems    Constitutional-- No Fever, chills or sweats.  Appetite Diminished and generally fatigued  Heent-- No new vision, hearing or throat complaints.  No epistaxis or oral sores.  Denies odynophagia or dysphagia.  No flashers, floaters or eye pain. No odynophagia or dysphagia. No headache, photophobia or neck stiffness.  CV-- No chest pain, palpitation or syncope  Resp-- as above  GI- No nausea, vomiting, or diarrhea.  No hematochezia, melena, or hematemesis. Denies jaundice or chronic liver disease.  -- No dysuria, hematuria, or flank pain.  Denies hesitancy, urgency or flank pain.  Lymph- no swollen lymph nodes in neck/axilla or groin.   Heme- No active bruising or bleeding; no Hx of DVT or PE.  MS-- no swelling or pain in the bones or joints of arms/legs.  No new back pain.  Neuro-- No acute focal weakness or numbness in the arms or legs.  No seizures.    Full 12 point review of systems reviewed and negative otherwise for acute  "complaints, except for above    Physical Exam:   Vital Signs   /70  Pulse 90  Temp 97.9 °F (36.6 °C) (Oral)   Resp 18  Ht 172.7 cm (68\")  Wt 65.3 kg (144 lb)  SpO2 92%  BMI 21.9 kg/m2    GENERAL: Awake and alert, in no acute distress.  Nasal cannula oxygen  HEENT: Normocephalic, atraumatic.  PERRL. EOMI. No conjunctival injection. No icterus. Oropharynx clear without evidence of thrush or exudate. No evidence of peridontal disease.    NECK: Supple without nuchal rigidity. No mass.  LYMPH: No cervical, axillary or inguinal lymphadenopathy.  HEART: RRR; No murmur, rubs, gallops.   LUNGS: Diminished on right more so than left with scattered rhonchi. Normal respiratory effort. Nonlabored. No dullness.  Chest tube  ABDOMEN: Soft, nontender, nondistended. Positive bowel sounds. No rebound or guarding. NO mass or HSM.  EXT:  No cyanosis, clubbing or edema. No cord.  : Genitalia generally unremarkable.  Without Pedraza catheter.  MSK: FROM without joint effusions noted arms/legs.    SKIN: Warm and dry without cutaneous eruptions on Inspection/palpation.    NEURO: Oriented to PPT. No focal deficits on motor/sensory exam at arms/legs.  PSYCHIATRIC: Normal insight and judgement. Cooperative with PE    No peripheral stigmata/phenomena of endocarditis    Laboratory Data      Results from last 7 days  Lab Units 01/18/18  0420 01/17/18  0520 01/16/18  0833   WBC 10*3/mm3 16.75* 16.81* 18.50*   HEMOGLOBIN g/dL 12.1* 13.5 13.9   HEMATOCRIT % 35.5* 40.6 40.6   PLATELETS 10*3/mm3 234 229 246       Results from last 7 days  Lab Units 01/18/18  0420   SODIUM mmol/L 125*   POTASSIUM mmol/L 3.8   CHLORIDE mmol/L 98*   CO2 mmol/L 27.0   BUN mg/dL 9   CREATININE mg/dL 0.50*   GLUCOSE mg/dL 146*   CALCIUM mg/dL 8.0*       Results from last 7 days  Lab Units 01/18/18  0420 01/15/18  0526   ALK PHOS U/L 58 55   BILIRUBIN mg/dL 0.7 0.5   BILIRUBIN DIRECT mg/dL  --  0.2   ALT (SGPT) U/L 65* 175*   AST (SGOT) U/L 15 57*           "     Estimated Creatinine Clearance: 161.4 mL/min (by C-G formula based on Cr of 0.5).      Microbiology:      Radiology:  Imaging Results (last 72 hours)     Procedure Component Value Units Date/Time    XR Chest 1 View [[id number]] Collected:  01/15/18 0837     Updated:  01/15/18 1348    Narrative:       EXAMINATION: XR CHEST 1 VW-01/15/2018:      INDICATION: Repeat, respiratory distress.     COMPARISON: 04/15/2010.     FINDINGS: Portable chest reveals evidence of an apical pneumothorax. The  pneumothorax is small in size. Basilar component is likely. Ill-defined  opacification seen within the right mid and lower lung field. Surgical  staple line identified within the right upper lobe. The left lung is  grossly clear.           Impression:       Apical pneumothorax is identified on the right. Ill-defined  opacification seen within the right mid and lower lung field. Surgical  staple line identified in the right upper lobe. Chronic changes seen  within the left mid and lower lung field.     D:  01/15/2018  E:  01/15/2018     This report was finalized on 1/15/2018 1:46 PM by Dr. Yanci Jolley MD.       CT Guided Chest Tube [[id number]] Collected:  01/16/18 0958     Updated:  01/16/18 1105    Narrative:       EXAMINATION: CT GUIDED CHEST TUBE-      INDICATION: Right apical pneumothorax; CT-guided catheter thoracentesis  for therapeutic and diagnostic purposes. Complex pleural effusion right  chest.     TECHNIQUE: Patient has no known allergies.     The clotting profile is acceptable. The PTT is 27.8, PT 12.0, INR 1.10,  platelets 226,000.     The radiation dose reduction device was turned on for each scan per the  ALARA (As Low as Reasonably Achievable) protocol.     COMPARISON: None.     FINDINGS:   1. The procedure, risks, complications and side effects of CT-guided  catheter placement in the right pleural space for therapeutic and  diagnostic drainage of the effusion in the right chest and for  decompression of  the patient's right pneumothorax was discussed and  reviewed in detail with the patient including possible risk and  complications which include bleeding, infection, injury or laceration of  the intercostal artery with massive bleeding, puncture of the patient's  known high-grade bulla in the right lung with high-grade pleural leak,  air embolization, and other possible risk and complications.  Patient  understood and consented.     2. With the patient in the supine position, under sterile technique,  local anesthesia and meticulous CT guidance, from the right posterior  axillary line, a #12 Dutch multi-sidehole drainage catheter was  introduced into the right posterior inferior pleural space. The patient  has loculated pleural fluid and a complex airspace consolidative disease  extensively and therefore the catheter was placed in one of the  loculated compartments of fluid. Hopefully this catheter position will  communicate with other areas of fluid in the right hemithorax.     3. Fluid was extracted and immediately sent to the laboratory for all  diagnostic studies ordered by the attending physician.     A total of 180 mL of fluid was removed from the right pleural space for  diagnostic laboratory purposes.     4. The catheter was then sutured in place with 0 silk. A revolution  fixation device was applied and a sterile dressing was applied.     The catheter will be placed to a Pleur-evac atrium suction device for  decompression of the right chest and the right pneumothorax.       Impression:       1. A #12 Dutch drainage catheter has been placed into the right  posterior inferior pleural space from the right posterior axillary line.  Fluid was obtained for all diagnostic studies ordered by the attending  physician.  2. The catheter was fixed, sutured and dressed. Catheter will be placed  to a Pleur-evac atrium suction device for decompression of free fluid  from the right hemithorax and hopefully compression of  the right  pneumothorax.  3. Patient tolerated the procedure well. There were no complications. He  was taken back to his room in satisfactory and stable condition.     D:  01/16/2018  E:  01/16/2018     This report was finalized on 1/16/2018 11:03 AM by Dr. Harlan Fisher MD.       XR Chest 1 View [[id number]] Collected:  01/16/18 0923     Updated:  01/16/18 1405    Narrative:       EXAMINATION: XR CHEST 1 VW- 01/16/2018     INDICATION: pneumohydrothorax      COMPARISON: 01/15/2018     FINDINGS: A right chest tube has been inserted since the previous  examination. The right apical pneumothorax is smaller. There is  persistent airspace disease in a perihilar and bibasilar distribution,  right greater than left.           Impression:       Pneumothorax is slightly smaller following insertion of a  chest tube.     D:  01/16/2018  E:  01/16/2018     This report was finalized on 1/16/2018 2:02 PM by Dr. Desmond Calvin MD.       XR Chest 1 View [[id number]] Collected:  01/17/18 1001     Updated:  01/17/18 1350    Narrative:       EXAMINATION: XR CHEST 1 VW- 01/17/2018     INDICATION: postop      COMPARISON: 01/16/2018     FINDINGS: A right chest tube remains well-positioned. There has been  reduction in the right pneumothorax. There is persistent patchy  bibasilar airspace disease.           Impression:       The pneumothorax has largely resolved. The bibasilar  pulmonary changes are unchanged.     D:  01/17/2018  E:  01/17/2018     This report was finalized on 1/17/2018 1:48 PM by Dr. Desmond Calvin MD.       XR Chest 1 View [[id number]] Updated:  01/18/18 0643            Impression:   --Acute severe sepsis.  Sepsis parameters improved since presentation to [facility].  Gen. resuscitative measures per internal medicine.  Otherwise as below    --Acute bilateral pneumonia with MRSA in sputum complicated by right sided empyema with culture positive pleural fluid and hydropneumothorax by prior imaging with current  chest tube per cardiothoracic surgery.  Complex process in this patient with severe bullous emphysema, past lung surgery on the right per patient and ongoing tobacco abuse in addition to other comorbidity.  I have discussed directly with Dr. Ge and Dr. Alanis; Dr. Ge to decide on timing/options/threshold for further intervention such as surgery/decortication versus alternative tube drainage or other intervention regarding empyema and pneumothorax.  Patient understands that antibiotics are not a guarantee for cure and that he runs a risk for persistent/relapse/progressive infection in addition to chronic pulmonary functional restrictions and other serious morbidity/serious sequela etc.  Blood cultures negative with no other obvious metastatic focus of involvement.  Primary options for treatment include vancomycin versus zyvox; Dr. Alanis to consider stopping SSRI to help minimize risk for interaction with Zyvox to help facilitate transition to that agent.      --Acute right sided empyema with staph aureus and culture associated with pneumonia as above.  Complicated with prior history of surgery, ongoing bullous emphysema etc.  Unclear at present whether he has bronchopleural fistula but any further decision regarding timing/options/threshold for surgical intervention such as decortication, etc. is left to Dr. Ge.  I discussed directly with him.    --Acute kidney injury at presentation to [facility].  Creatinine has trended down; likely initially prerenal associated with sepsis    --Chronic bullous emphysema with prior right thoracotomy per patient.  No operative note available to me.  This would need to be clarified by Dr. Ge    --Chronic tobacco abuse.  I discussed potential benefits of cessation.  It is not clear at present but he is committed to quitting    PLAN: Thank you for asking us to see Niall Murguia, I recommend the following:    --IV vancomycin    --I discussed potential  risks and benefits of the prescribed antibiotics that include, but are not limited to, solid organ toxicity, neuro/zak/vestibular toxicity, renal toxicity, CDiff, cytopenias, hypersensitivity,  etc.. Patient/Family voice understanding and agree to proceed.    --Monitor IV and IV antibiotic with risk for systemic complication and potential drug interaction    --Check/review labs cultures and scans    --Highly complex set of issues with high risk for further serious morbidity and other serious sequela    --d/w Dr Alanis and Dr Ge as above       Rafael Sr MD  1/18/2018

## 2018-01-18 NOTE — PROGRESS NOTES
Continued Stay Note  [facility]     Patient Name: Niall Murguia  MRN: [id number]  Today's Date: 1/18/2018    Admit Date: 1/14/2018          Discharge Plan       01/17/18 15:00    Case Management/Social Work Plan    Plan Home with significant other + home O2    Patient/Family In Agreement With Plan yes    Additional Comments Met with patient at bedside. He still has his chest tube in and says the doctor plans to possibly remove it on Friday. Tentatively planning for discharge over the weekend. He is still on 5 LPM O2, so will need qualifying O2 sats within 48 hours of discharge for home oxygen as he was not on oxygen prior to admission. Do not anticipate that he will need home health at this time, but will continue to follow. Arcelia Rockwell RN [phone]              Discharge Codes     None        Expected Discharge Date and Time     Expected Discharge Date Expected Discharge Time    Jan 20, 2018             Arcelia Rockwell RN

## 2018-01-18 NOTE — PLAN OF CARE
Problem: Patient Care Overview (Adult)  Goal: Plan of Care Review  Outcome: Ongoing (interventions implemented as appropriate)   01/18/18 1329   Coping/Psychosocial Response Interventions   Plan Of Care Reviewed With patient   Patient Care Overview   Progress improving   Outcome Evaluation   Outcome Summary/Follow up Plan pt feels better,up in chair.still has chest tube.ambulat. 350 ft went over all exercises       Problem: Inpatient Physical Therapy  Goal: Bed Mobility Goal LTG- PT  Outcome: Ongoing (interventions implemented as appropriate)   01/17/18 1009 01/18/18 1329   Bed Mobility PT LTG   Bed Mobility PT LTG, Date Established 01/17/18 --    Bed Mobility PT LTG, Time to Achieve 5 days --    Bed Mobility PT LTG, Activity Type all bed mobility --    Bed Mobility PT LTG, [place] Level independent --    Bed Mobility PT LTG, Outcome --  goal ongoing     Goal: Transfer Training Goal 1 LTG- PT  Outcome: Ongoing (interventions implemented as appropriate)   01/17/18 1009 01/18/18 1329   Transfer Training PT LTG   Transfer Training PT LTG, Date Established 01/17/18 --    Transfer Training PT LTG, Time to Achieve 5 days --    Transfer Training PT LTG, Activity Type bed to chair /chair to bed;sit to stand/stand to sit;toilet --    Transfer Training PT LTG, [place] Level independent --    Transfer Training PT LTG, Outcome --  goal ongoing     Goal: Gait Training Goal LTG- PT  Outcome: Ongoing (interventions implemented as appropriate)   01/17/18 1009 01/18/18 1329   Gait Training PT LTG   Gait Training Goal PT LTG, Date Established 01/17/18 --    Gait Training Goal PT LTG, Time to Achieve 5 days --    Gait Training Goal PT LTG, [place] Level supervision required  (stable VS; O2 SAT. > 92%) --    Gait Training Goal PT LTG, Distance to Achieve 250 --    Gait Training Goal PT LTG, Outcome --  goal partially met     Goal: Stair Training Goal LTG- PT  Outcome: Ongoing (interventions implemented as appropriate)    01/17/18 1009 01/18/18 1329   Stair Training PT LTG   Stair Training Goal PT LTG, Date Established 01/17/18 --    Stair Training Goal PT LTG, Time to Achieve 5 days --    Stair Training Goal PT LTG, Number of Steps 4 --    Stair Training Goal PT LTG, [place] Level supervision required  (stable VS; o2 sat >92%) --    Stair Training Goal PT LTG, Assist Device 1 handrail --    Stair Training Goal PT LTG, Outcome --  goal ongoing

## 2018-01-18 NOTE — PROGRESS NOTES
"Niall Murguia  [id number]  1965     LOS: 4 days   Patient Care Team:  KEAGAN Rowley as PCP - General (Family Medicine)    Chief Complaint: Pneumothorax      Subjective: Denies chest pain    Objective:     Vital Sign Min/Max for last 24 hours  Temp  Min: 97.9 °F (36.6 °C)  Max: 99 °F (37.2 °C)   BP  Min: 118/65  Max: 121/70   Pulse  Min: 69  Max: 105   Resp  Min: 16  Max: 20   SpO2  Min: 89 %  Max: 95 %   Flow (L/min)  Min: 3  Max: 5.5   Weight  Min: 65.3 kg (144 lb)  Max: 65.3 kg (144 lb)     Flowsheet Rows         First Filed Value    Admission Height  172.7 cm (68\") Documented at 01/14/2018 1853    Admission Weight  66.6 kg (146 lb 14.4 oz) Documented at 01/14/2018 1853          Physical Exam:Being easily Wound:    Pulses:     Mediastinal and Chest Tube Drainage: No air leak   Results Review:     Results from last 7 days  Lab Units 01/18/18  0420   WBC 10*3/mm3 16.75*   HEMOGLOBIN g/dL 12.1*   HEMATOCRIT % 35.5*   PLATELETS 10*3/mm3 234       Results from last 7 days  Lab Units 01/18/18  0420   SODIUM mmol/L 125*   POTASSIUM mmol/L 3.8   CHLORIDE mmol/L 98*   CO2 mmol/L 27.0   BUN mg/dL 9   CREATININE mg/dL 0.50*   GLUCOSE mg/dL 146*   CALCIUM mg/dL 8.0*             Assessment    Principal Problem:    Hydropneumothorax, right  Active Problems:    Acute on chronic respiratory failure with hypoxia    Pneumonia of both lower lobes    Chronic bullous emphysema    Hypertension    Tobacco use      5 AM portable chest x-ray, we'll plan to DC chest tube tomorrow tentatively        Roland Ge MD  01/18/18  7:03 AM      Please note that portions of this note were completed with a voice recognition program. Efforts were made to edit the dictations, but words may be mistranscribed  "

## 2018-01-18 NOTE — PLAN OF CARE
Problem: Patient Care Overview (Adult)  Goal: Plan of Care Review  Outcome: Ongoing (interventions implemented as appropriate)   01/17/18 1637 01/17/18 2015 01/18/18 0403   Coping/Psychosocial Response Interventions   Plan Of Care Reviewed With --  patient --    Patient Care Overview   Progress progress towards functional goals is fair --  --    Outcome Evaluation   Outcome Summary/Follow up Plan --  --  Patient has not slept well tonight. Requiring 5L o2. Mild pain. Otherwise, VSS. Will continue to monitor.     Goal: Adult Individualization and Mutuality  Outcome: Ongoing (interventions implemented as appropriate)    Goal: Discharge Needs Assessment  Outcome: Ongoing (interventions implemented as appropriate)      Problem: Respiratory Insufficiency (Adult)  Goal: Acid/Base Balance  Outcome: Ongoing (interventions implemented as appropriate)    Goal: Effective Ventilation  Outcome: Ongoing (interventions implemented as appropriate)      Problem: Pneumonia (Adult)  Goal: Signs and Symptoms of Listed Potential Problems Will be Absent or Manageable (Pneumonia)  Outcome: Ongoing (interventions implemented as appropriate)

## 2018-01-19 ENCOUNTER — APPOINTMENT (OUTPATIENT)
Dept: GENERAL RADIOLOGY | Facility: HOSPITAL | Age: 53
End: 2018-01-19

## 2018-01-19 LAB
ALBUMIN SERPL-MCNC: 2.6 G/DL (ref 3.2–4.8)
ALBUMIN/GLOB SERPL: 0.8 G/DL (ref 1.5–2.5)
ALP SERPL-CCNC: 60 U/L (ref 25–100)
ALT SERPL W P-5'-P-CCNC: 55 U/L (ref 7–40)
ANION GAP SERPL CALCULATED.3IONS-SCNC: 5 MMOL/L (ref 3–11)
AST SERPL-CCNC: 22 U/L (ref 0–33)
BILIRUB SERPL-MCNC: 0.8 MG/DL (ref 0.3–1.2)
BUN BLD-MCNC: 8 MG/DL (ref 9–23)
BUN/CREAT SERPL: 13.3 (ref 7–25)
CALCIUM SPEC-SCNC: 8.3 MG/DL (ref 8.7–10.4)
CHLORIDE SERPL-SCNC: 94 MMOL/L (ref 99–109)
CO2 SERPL-SCNC: 27 MMOL/L (ref 20–31)
CREAT BLD-MCNC: 0.6 MG/DL (ref 0.6–1.3)
DEPRECATED RDW RBC AUTO: 48.2 FL (ref 37–54)
ERYTHROCYTE [DISTWIDTH] IN BLOOD BY AUTOMATED COUNT: 12.8 % (ref 11.3–14.5)
GFR SERPL CREATININE-BSD FRML MDRD: 142 ML/MIN/1.73
GLOBULIN UR ELPH-MCNC: 3.2 GM/DL
GLUCOSE BLD-MCNC: 106 MG/DL (ref 70–100)
HCT VFR BLD AUTO: 33.7 % (ref 38.9–50.9)
HGB BLD-MCNC: 11.2 G/DL (ref 13.1–17.5)
MCH RBC QN AUTO: 34.4 PG (ref 27–31)
MCHC RBC AUTO-ENTMCNC: 33.2 G/DL (ref 32–36)
MCV RBC AUTO: 103.4 FL (ref 80–99)
PLATELET # BLD AUTO: 245 10*3/MM3 (ref 150–450)
PMV BLD AUTO: 9.9 FL (ref 6–12)
POTASSIUM BLD-SCNC: 4.1 MMOL/L (ref 3.5–5.5)
PROT SERPL-MCNC: 5.8 G/DL (ref 5.7–8.2)
RBC # BLD AUTO: 3.26 10*6/MM3 (ref 4.2–5.76)
SODIUM BLD-SCNC: 126 MMOL/L (ref 132–146)
SODIUM BLD-SCNC: 127 MMOL/L (ref 132–146)
VANCOMYCIN TROUGH SERPL-MCNC: 15.6 MCG/ML (ref 10–20)
WBC NRBC COR # BLD: 14.14 10*3/MM3 (ref 3.5–10.8)

## 2018-01-19 PROCEDURE — 80202 ASSAY OF VANCOMYCIN: CPT

## 2018-01-19 PROCEDURE — 80053 COMPREHEN METABOLIC PANEL: CPT | Performed by: INTERNAL MEDICINE

## 2018-01-19 PROCEDURE — 25010000002 HEPARIN (PORCINE) PER 1000 UNITS: Performed by: HOSPITALIST

## 2018-01-19 PROCEDURE — 94799 UNLISTED PULMONARY SVC/PX: CPT

## 2018-01-19 PROCEDURE — 71045 X-RAY EXAM CHEST 1 VIEW: CPT

## 2018-01-19 PROCEDURE — 94640 AIRWAY INHALATION TREATMENT: CPT

## 2018-01-19 PROCEDURE — 25010000002 VANCOMYCIN 10 G RECONSTITUTED SOLUTION: Performed by: HOSPITALIST

## 2018-01-19 PROCEDURE — 25010000002 VANCOMYCIN: Performed by: HOSPITALIST

## 2018-01-19 PROCEDURE — 97110 THERAPEUTIC EXERCISES: CPT

## 2018-01-19 PROCEDURE — 84295 ASSAY OF SERUM SODIUM: CPT | Performed by: HOSPITALIST

## 2018-01-19 PROCEDURE — 94760 N-INVAS EAR/PLS OXIMETRY 1: CPT

## 2018-01-19 PROCEDURE — 85027 COMPLETE CBC AUTOMATED: CPT | Performed by: INTERNAL MEDICINE

## 2018-01-19 PROCEDURE — 97116 GAIT TRAINING THERAPY: CPT

## 2018-01-19 PROCEDURE — 25010000002 PIPERACILLIN SOD-TAZOBACTAM PER 1 G: Performed by: INTERNAL MEDICINE

## 2018-01-19 PROCEDURE — 99232 SBSQ HOSP IP/OBS MODERATE 35: CPT | Performed by: HOSPITALIST

## 2018-01-19 RX ORDER — CITALOPRAM 20 MG/1
10 TABLET ORAL DAILY
Status: COMPLETED | OUTPATIENT
Start: 2018-01-22 | End: 2018-01-23

## 2018-01-19 RX ADMIN — IPRATROPIUM BROMIDE AND ALBUTEROL SULFATE 3 ML: .5; 3 SOLUTION RESPIRATORY (INHALATION) at 04:54

## 2018-01-19 RX ADMIN — Medication 250 MG: at 20:33

## 2018-01-19 RX ADMIN — IPRATROPIUM BROMIDE AND ALBUTEROL SULFATE 3 ML: .5; 3 SOLUTION RESPIRATORY (INHALATION) at 12:58

## 2018-01-19 RX ADMIN — IPRATROPIUM BROMIDE AND ALBUTEROL SULFATE 3 ML: .5; 3 SOLUTION RESPIRATORY (INHALATION) at 08:05

## 2018-01-19 RX ADMIN — FOLIC ACID 1 MG: 1 TABLET ORAL at 09:48

## 2018-01-19 RX ADMIN — NICOTINE 1 PATCH: 21 PATCH, EXTENDED RELEASE TRANSDERMAL at 20:32

## 2018-01-19 RX ADMIN — TAZOBACTAM SODIUM AND PIPERACILLIN SODIUM 3.38 G: 375; 3 INJECTION, SOLUTION INTRAVENOUS at 12:44

## 2018-01-19 RX ADMIN — BUDESONIDE AND FORMOTEROL FUMARATE DIHYDRATE 2 PUFF: 160; 4.5 AEROSOL RESPIRATORY (INHALATION) at 08:05

## 2018-01-19 RX ADMIN — Medication 1 G: at 18:04

## 2018-01-19 RX ADMIN — HEPARIN SODIUM 5000 UNITS: 5000 INJECTION, SOLUTION INTRAVENOUS; SUBCUTANEOUS at 09:47

## 2018-01-19 RX ADMIN — VANCOMYCIN HYDROCHLORIDE 1250 MG: 10 INJECTION, POWDER, LYOPHILIZED, FOR SOLUTION INTRAVENOUS at 00:38

## 2018-01-19 RX ADMIN — VANCOMYCIN HYDROCHLORIDE 1250 MG: 10 INJECTION, POWDER, LYOPHILIZED, FOR SOLUTION INTRAVENOUS at 18:04

## 2018-01-19 RX ADMIN — Medication 1 G: at 09:47

## 2018-01-19 RX ADMIN — CITALOPRAM HYDROBROMIDE 20 MG: 20 TABLET ORAL at 09:47

## 2018-01-19 RX ADMIN — TAZOBACTAM SODIUM AND PIPERACILLIN SODIUM 3.38 G: 375; 3 INJECTION, SOLUTION INTRAVENOUS at 18:05

## 2018-01-19 RX ADMIN — Medication 250 MG: at 09:48

## 2018-01-19 RX ADMIN — HYDROCODONE BITARTATE AND ACETAMINOPHEN 1 TABLET: 5; 325 TABLET ORAL at 09:52

## 2018-01-19 RX ADMIN — IPRATROPIUM BROMIDE AND ALBUTEROL SULFATE 3 ML: .5; 3 SOLUTION RESPIRATORY (INHALATION) at 18:34

## 2018-01-19 RX ADMIN — VANCOMYCIN HYDROCHLORIDE 1250 MG: 10 INJECTION, POWDER, LYOPHILIZED, FOR SOLUTION INTRAVENOUS at 12:44

## 2018-01-19 RX ADMIN — HYDROCODONE BITARTATE AND ACETAMINOPHEN 1 TABLET: 5; 325 TABLET ORAL at 18:32

## 2018-01-19 RX ADMIN — BUDESONIDE AND FORMOTEROL FUMARATE DIHYDRATE 2 PUFF: 160; 4.5 AEROSOL RESPIRATORY (INHALATION) at 18:34

## 2018-01-19 RX ADMIN — LIDOCAINE 1 PATCH: 50 PATCH CUTANEOUS at 09:48

## 2018-01-19 RX ADMIN — HYDROCODONE BITARTATE AND ACETAMINOPHEN 1 TABLET: 5; 325 TABLET ORAL at 03:38

## 2018-01-19 RX ADMIN — HEPARIN SODIUM 5000 UNITS: 5000 INJECTION, SOLUTION INTRAVENOUS; SUBCUTANEOUS at 18:04

## 2018-01-19 NOTE — PROGRESS NOTES
Adult Nutrition  Assessment/PES    Patient Name:  Niall Murguia  YOB: 1965  MRN: [id number]  Admit Date:  1/14/2018    Assessment Date:  1/19/2018    Comments:            Reason for Assessment       01/19/18 0757    Reason for Assessment    Reason For Assessment/Visit length of stay    Time Spent (min) 5                              Problem/Interventions:        Problem 1       01/19/18 0757    Nutrition Diagnoses Problem 1    Problem 1 Nutrition Appropriate for Condition at this Time    Signs/Symptoms (evidenced by) PO Intake                          Education/Evaluation       01/19/18 0757    Monitor/Evaluation    Monitor Per protocol        Electronically signed by:  Anita Rausch RD  01/19/18 7:57 AM

## 2018-01-19 NOTE — PROGRESS NOTES
"Niall Murguia  [id number]  1965     LOS: 5 days   Patient Care Team:  KEAGAN Rowley as PCP - General (Family Medicine)    Chief Complaint: shortness of breath      Subjective:     Objective:     Vital Sign Min/Max for last 24 hours  Temp  Min: 97.8 °F (36.6 °C)  Max: 98.1 °F (36.7 °C)   BP  Min: 111/64  Max: 117/70   Pulse  Min: 82  Max: 98   Resp  Min: 14  Max: 18   SpO2  Min: 92 %  Max: 93 %   Flow (L/min)  Min: 3  Max: 5   Weight  Min: 65.8 kg (145 lb)  Max: 65.8 kg (145 lb)     Flowsheet Rows         First Filed Value    Admission Height  172.7 cm (68\") Documented at 01/14/2018 1853    Admission Weight  66.6 kg (146 lb 14.4 oz) Documented at 01/14/2018 1853          Physical Exam:    Wound:    Pulses:     Mediastinal and Chest Tube Drainage:No air leak       Results Review:     Results from last 7 days  Lab Units 01/19/18  0407   WBC 10*3/mm3 14.14*   HEMOGLOBIN g/dL 11.2*   HEMATOCRIT % 33.7*   PLATELETS 10*3/mm3 245       Results from last 7 days  Lab Units 01/19/18  0407   SODIUM mmol/L 126*   POTASSIUM mmol/L 4.1   CHLORIDE mmol/L 94*   CO2 mmol/L 27.0   BUN mg/dL 8*   CREATININE mg/dL 0.60   GLUCOSE mg/dL 106*   CALCIUM mg/dL 8.3*             Assessment    Principal Problem:    Hydropneumothorax, right  Active Problems:    Acute on chronic respiratory failure with hypoxia    Pneumonia of both lower lobes    Chronic bullous emphysema    Hypertension    Tobacco use      Doing satisfactory, we will DC chest tube today, 5 AM portable chest x-ray        Roland Ge MD  01/19/18  7:11 AM      Please note that portions of this note were completed with a voice recognition program. Efforts were made to edit the dictations, but words may be mistranscribed  "

## 2018-01-19 NOTE — THERAPY TREATMENT NOTE
Acute Care - Physical Therapy Treatment Note  [facility]     Patient Name: Niall Murguia  : 1965  MRN: [id number]  Today's Date: 2018  Onset of Illness/Injury or Date of Surgery Date: 18  Date of Referral to PT: 18  Referring Physician: MD Tino    Admit Date: 2018    Visit Dx:    ICD-10-CM ICD-9-CM   1. Impaired functional mobility, balance, gait, and endurance Z74.09 V49.89     Patient Active Problem List   Diagnosis   • Acute on chronic respiratory failure with hypoxia   • Pneumonia of both lower lobes   • Chronic bullous emphysema   • Hydropneumothorax, right   • Hypertension   • Tobacco use               Adult Rehabilitation Note       18 1420 18 1300       Rehab Assessment/Intervention    Discipline physical therapy assistant  -UD physical therapy assistant  -UD     Document Type therapy note (daily note)  -UD therapy note (daily note)  -UD     Subjective Information agree to therapy;no complaints  -UD agree to therapy;no complaints  -UD     Patient Effort, Rehab Treatment good  -UD good  -UD     Symptoms Noted During/After Treatment fatigue  -UD fatigue  -UD     Precautions/Limitations oxygen therapy device and L/min  -UD fall precautions;oxygen therapy device and L/min  -UD     Specific Treatment Considerations  ches tube  -UD     Recorded by [UD] Eleanor Kumar PTA [UD] Eleanor Kumar PTA     Vital Signs    Pre SpO2 (%)  93  -UD     O2 Delivery Pre Treatment  supplemental O2  -UD     Post SpO2 (%)  93  -UD     O2 Delivery Post Treatment supplemental O2  -UD supplemental O2  -UD     Pre Patient Position Supine  -UD Sitting  -UD     Intra Patient Position Standing  -UD Standing  -UD     Post Patient Position Sitting  -UD Sitting  -UD     Recorded by [UD] Eleanor Kumar PTA [UD] Eleanor Kumar PTA     Pain Assessment    Pain Assessment No/denies pain  -UD No/denies pain  -UD     Recorded by [UD] Eleanor Kumar PTA [UD] Eleanor Kumar PTA     Cognitive  Assessment/Intervention    Orientation Status oriented x 4  -UD oriented x 4  -UD     Follows Commands/Answers Questions 100% of the time  -% of the time  -UD     Recorded by [UD] Eleanor Kumar PTA [UD] Eleanor Kumar PTA     Bed Mobility, Assessment/Treatment    Bed Mob, Supine to Sit, [place] conditional independence  -UD not tested   up in chair  -UD     Recorded by [UD] Eleanor Kumar PTA [UD] Eleanor Kumar PTA     Transfer Assessment/Treatment    Transfers, Sit-Stand [place] contact guard assist  -UD contact guard assist  -UD     Transfers, Stand-Sit [place] contact guard assist  -UD contact guard assist  -UD     Recorded by [UD] Eleanor Kumar PTA [UD] Eleanor Kumar PTA     Gait Assessment/Treatment    Gait, [place] Level contact guard assist  -UD contact guard assist;1 person + 1 person to manage equipment  -UD     Gait, Assistive Device  rolling walker  -UD     Gait, Distance (Feet) 400  -  -UD     Gait, Gait Deviations johnny decreased  -UD johnny decreased  -UD     Gait, Safety Issues step length decreased  -UD step length decreased;supplemental O2  -UD     Gait, Impairments strength decreased  -UD strength decreased  -UD     Recorded by [UD] Eleanor Kumar PTA [UD] Eleanor Kumar PTA     Therapy Exercises    Bilateral Lower Extremities AROM:;10 reps;sitting  -UD AROM:;10 reps;sitting  -UD     Bilateral Upper Extremity AROM:;10 reps;sitting  -UD AROM:;10 reps;sitting  -UD     Recorded by [UD] Eleanor Kumar PTA [UD] Eleanor Kumar PTA     Positioning and Restraints    Pre-Treatment Position in bed  -UD sitting in chair/recliner  -UD     Post Treatment Position chair  -UD chair  -UD     In Chair notified nsg;reclined;sitting;call light within reach;legs elevated  -UD notified nsg;reclined;sitting;call light within reach;legs elevated  -UD     Recorded by [UD] Eleanor Kumar PTA [UD] Eleanor Kumar PTA       User Key  (r) = Recorded By, (t) = Taken By, (c) = Cosigned By    Initials  Name Effective Dates    UD Eleanor Kumar, PTA 06/22/15 -                 IP PT Goals       01/19/18 1452 01/18/18 1329 01/17/18 1009    Bed Mobility PT LTG    Bed Mobility PT LTG, Date Established   01/17/18  -DM    Bed Mobility PT LTG, Time to Achieve   5 days  -DM    Bed Mobility PT LTG, Activity Type   all bed mobility  -DM    Bed Mobility PT LTG, [place] Level   independent  -DM    Bed Mobility PT LTG, Outcome goal met  -UD goal ongoing  -UD     Transfer Training PT LTG    Transfer Training PT LTG, Date Established   01/17/18  -DM    Transfer Training PT LTG, Time to Achieve   5 days  -DM    Transfer Training PT LTG, Activity Type   bed to chair /chair to bed;sit to stand/stand to sit;toilet  -DM    Transfer Training PT LTG, [place] Level   independent  -DM    Transfer Training PT LTG, Outcome goal ongoing  -UD goal ongoing  -UD     Gait Training PT LTG    Gait Training Goal PT LTG, Date Established   01/17/18  -DM    Gait Training Goal PT LTG, Time to Achieve   5 days  -DM    Gait Training Goal PT LTG, [place] Level   supervision required   stable VS; O2 SAT. > 92%  -DM    Gait Training Goal PT LTG, Distance to Achieve   250  -DM    Gait Training Goal PT LTG, Outcome goal partially met  -UD goal partially met  -UD     Stair Training PT LTG    Stair Training Goal PT LTG, Date Established   01/17/18  -DM    Stair Training Goal PT LTG, Time to Achieve   5 days  -DM    Stair Training Goal PT LTG, Number of Steps   4  -DM    Stair Training Goal PT LTG, [place] Level   supervision required   stable VS; o2 sat >92%  -DM    Stair Training Goal PT LTG, Assist Device   1 handrail  -DM    Stair Training Goal PT LTG, Outcome goal ongoing  -UD goal ongoing  -UD       01/14/18 1109 01/14/18 1104 01/11/18 1145    Transfer Training PT LTG    Transfer Training PT LTG, Date Established   01/11/18  -LM    Transfer Training PT LTG, Time to Achieve   2 wks  -LM    Transfer Training PT LTG, Activity Type   sit to  stand/stand to sit;bed to chair /chair to bed  -LM    Transfer Training PT LTG, [place] Level   supervision required  -LM    Transfer Training PT LTG, Outcome goal ongoing  -CC  goal ongoing  -LM    Gait Training PT LTG    Gait Training Goal PT LTG, Date Established   01/11/18  -LM    Gait Training Goal PT LTG, Time to Achieve   2 wks  -LM    Gait Training Goal PT LTG, [place] Level   supervision required  -LM    Gait Training Goal PT LTG, Distance to Achieve   400  -LM    Gait Training Goal PT LTG, Additional Goal   Maintain O2 sats above 90%.  -LM    Gait Training Goal PT LTG, Outcome goal ongoing  -CC  goal ongoing  -LM    Strength Goal PT LTG    Strength Goal PT LTG, Date Established   01/11/18  -LM    Strength Goal PT LTG, Time to Achieve   2 wks  -LM    Strength Goal PT LTG, Measure to Achieve   Patient will perform B LE ther ex x 15 reps to improve functional strength for mobility.  -LM    Strength Goal PT LTG, Outcome  goal partially met  -CC goal ongoing  -LM      User Key  (r) = Recorded By, (t) = Taken By, (c) = Cosigned By    Initials Name Provider Type    SANTOS Kumar, PTA Physical Therapy Assistant    ROBBY Dougherty, PT Physical Therapist    LM Destini Padgett, PT Physical Therapist    CC Sosa Franco, PTA Physical Therapy Assistant          Physical Therapy Education     Title: PT OT SLP Therapies (Done)     Topic: Physical Therapy (Done)     Point: Mobility training (Done)    Learning Progress Summary    Learner Readiness Method Response Comment Documented by Status   Patient MARTHA Hurley,NR   01/19/18 1452 Done    MARTHA Hurley,NR   01/18/18 1329 Done    DARNELL Frazier NR   01/17/18 1008 Active               Point: Home exercise program (Done)    Learning Progress Summary    Learner Readiness Method Response Comment Documented by Status   Patient MARTHA Hurley,NR   01/19/18 1452 Done    MARTHA Hurley,NR   01/18/18 1329 Done    DARNELL Frazier NR   01/17/18 1008 Active                Point: Body mechanics (Done)    Learning Progress Summary    Learner Readiness Method Response Comment Documented by Status   Patient Carloser E,D DU,NR  UD 01/19/18 1452 Done    Eager E,D DU,NR  UD 01/18/18 1329 Done    Acceptance E VU  CC 01/17/18 1340 Done    Eager D,E NR  DM 01/17/18 1008 Active               Point: Precautions (Done)    Learning Progress Summary    Learner Readiness Method Response Comment Documented by Status   Patient Carloser E,D DU,NR   01/19/18 1452 Done    Eager E,D DU,NR  UD 01/18/18 1329 Done    Eager D,E NR  DM 01/17/18 1008 Active                      User Key     Initials Effective Dates Name Provider Type Discipline     06/22/15 -  Eleanor Kumar, PTA Physical Therapy Assistant PT     06/19/15 -  Yasmeen Dougherty, PT Physical Therapist PT     06/16/16 -  Heidy Malik RN Registered Nurse Nurse                    PT Recommendation and Plan  Anticipated Discharge Disposition: home with home health  PT Frequency: daily  Plan of Care Review  Plan Of Care Reviewed With: patient  Progress: improving  Outcome Summary/Follow up Plan: pt has chest tube out.needs only CG assist for gait.ambulat 400 ft.went over all exercises.try steps          Outcome Measures       01/19/18 1420 01/18/18 1300 01/17/18 0820    How much help from another person do you currently need...    Turning from your back to your side while in flat bed without using bedrails? 4  -UD 4  -UD 4  -DM    Moving from lying on back to sitting on the side of a flat bed without bedrails? 4  -UD 3  -UD 4  -DM    Moving to and from a bed to a chair (including a wheelchair)? 3  -UD 3  -UD 3  -DM    Standing up from a chair using your arms (e.g., wheelchair, bedside chair)? 3  -UD 3  -UD 3  -DM    Climbing 3-5 steps with a railing? 3  -UD 3  -UD 2  -DM    To walk in hospital room? 3  -UD 3  -UD 3  -DM    AM-PAC 6 Clicks Score 20  -UD 19  -UD 19  -DM    Functional Assessment    Outcome Measure Options   AM-PAC 6 Clicks Basic  Mobility (PT)  -DM      User Key  (r) = Recorded By, (t) = Taken By, (c) = Cosigned By    Initials Name Provider Type    SANTOS Kumar PTA Physical Therapy Assistant    ROBBY Dougherty, PT Physical Therapist           Time Calculation:         PT Charges       01/19/18 2905          Time Calculation    PT Received On 01/19/18  -UD      PT Goal Re-Cert Due Date 01/27/18  -      Time Calculation- PT    Total Timed Code Minutes- PT 24 minute(s)  -UD        User Key  (r) = Recorded By, (t) = Taken By, (c) = Cosigned By    Initials Name Provider Type    SANTOS Kumar PTA Physical Therapy Assistant          Therapy Charges for Today     Code Description Service Date Service Provider Modifiers Qty    42254107716 HC PT THER PROC EA 15 MIN 1/18/2018 Eleanor Kumar, PTA GP 1    18815408105 HC GAIT TRAINING EA 15 MIN 1/18/2018 Eleanor Kumar, PTA GP 1    95737156550 HC PT THER SUPP EA 15 MIN 1/18/2018 Eleanor Kumar, PTA GP 1    88445277201 HC GAIT TRAINING EA 15 MIN 1/19/2018 Eleanor Kumar, PTA GP 1    13890299168 HC PT THER PROC EA 15 MIN 1/19/2018 Eleanor Kumar, PTA GP 1          PT G-Codes  Outcome Measure Options: AM-PAC 6 Clicks Basic Mobility (PT)    Eleanor Kumar PTA  1/19/2018

## 2018-01-19 NOTE — PLAN OF CARE
Problem: Patient Care Overview (Adult)  Goal: Plan of Care Review  Outcome: Ongoing (interventions implemented as appropriate)    Goal: Adult Individualization and Mutuality  Outcome: Ongoing (interventions implemented as appropriate)    Goal: Discharge Needs Assessment  Outcome: Ongoing (interventions implemented as appropriate)      Problem: Pneumonia (Adult)  Goal: Signs and Symptoms of Listed Potential Problems Will be Absent or Manageable (Pneumonia)  Outcome: Ongoing (interventions implemented as appropriate)

## 2018-01-19 NOTE — PLAN OF CARE
Problem: Patient Care Overview (Adult)  Goal: Plan of Care Review  Outcome: Ongoing (interventions implemented as appropriate)   01/19/18 1452   Coping/Psychosocial Response Interventions   Plan Of Care Reviewed With patient   Patient Care Overview   Progress improving   Outcome Evaluation   Outcome Summary/Follow up Plan pt has chest tube out.needs only CG assist for gait.ambulat 400 ft.went over all exercises.try steps       Problem: Inpatient Physical Therapy  Goal: Bed Mobility Goal LTG- PT  Outcome: Outcome(s) achieved Date Met: 01/19/18 01/17/18 1009 01/19/18 1452   Bed Mobility PT LTG   Bed Mobility PT LTG, Date Established 01/17/18 --    Bed Mobility PT LTG, Time to Achieve 5 days --    Bed Mobility PT LTG, Activity Type all bed mobility --    Bed Mobility PT LTG, [place] Level independent --    Bed Mobility PT LTG, Outcome --  goal met     Goal: Transfer Training Goal 1 LTG- PT  Outcome: Ongoing (interventions implemented as appropriate)   01/17/18 1009 01/19/18 1452   Transfer Training PT LTG   Transfer Training PT LTG, Date Established 01/17/18 --    Transfer Training PT LTG, Time to Achieve 5 days --    Transfer Training PT LTG, Activity Type bed to chair /chair to bed;sit to stand/stand to sit;toilet --    Transfer Training PT LTG, [place] Level independent --    Transfer Training PT LTG, Outcome --  goal ongoing     Goal: Gait Training Goal LTG- PT  Outcome: Ongoing (interventions implemented as appropriate)   01/17/18 1009 01/19/18 1452   Gait Training PT LTG   Gait Training Goal PT LTG, Date Established 01/17/18 --    Gait Training Goal PT LTG, Time to Achieve 5 days --    Gait Training Goal PT LTG, [place] Level supervision required  (stable VS; O2 SAT. > 92%) --    Gait Training Goal PT LTG, Distance to Achieve 250 --    Gait Training Goal PT LTG, Outcome --  goal partially met     Goal: Stair Training Goal LTG- PT  Outcome: Ongoing (interventions implemented as appropriate)    01/17/18 1009 01/19/18 1452   Stair Training PT LTG   Stair Training Goal PT LTG, Date Established 01/17/18 --    Stair Training Goal PT LTG, Time to Achieve 5 days --    Stair Training Goal PT LTG, Number of Steps 4 --    Stair Training Goal PT LTG, [place] Level supervision required  (stable VS; o2 sat >92%) --    Stair Training Goal PT LTG, Assist Device 1 handrail --    Stair Training Goal PT LTG, Outcome --  goal ongoing

## 2018-01-19 NOTE — PROGRESS NOTES
"Pharmacy Consult-Vancomycin Dosing  Niall Murguia is a  52 y.o. male receiving vancomycin therapy.     Indication: Pneumonia  Consulting Provider: Alvina ZUÑIGA Consult: No    Goal Trough: 15-20 mcg/mL    Current Antimicrobial Therapy  Stop dates:  Vanc 1250mg q8h - 1/27 (10 days)   Zosyn 3.375g q8h - 1/28 (10 days)    Anti-Infectives       Ordered     Dose/Rate Route Frequency Start Stop      01/19/18 0917  piperacillin-tazobactam (ZOSYN) 3.375 g in iso-osmotic dextrose 50 ml (premix)     Ordering Provider:  Rafael Sr MD    3.375 g  over 4 Hours Intravenous Every 8 Hours 01/19/18 1600 01/29/18 1559    01/19/18 0917  piperacillin-tazobactam (ZOSYN) 3.375 g in iso-osmotic dextrose 50 ml (premix)     Ordering Provider:  Rafael Sr MD    3.375 g  over 30 Minutes Intravenous Once 01/19/18 1000      01/17/18 1859  vancomycin 1250 mg/250 mL 0.9% NS IVPB ([facility])     Chuy Alanis MD reviewed the order on 01/18/18 0610.   Ordering Provider:  Chuy Alanis MD    1,250 mg  over 90 Minutes Intravenous Every 8 Hours 01/18/18 0200 01/27/18 1759    01/14/18 2103  vancomycin 1250 mg/250 mL 0.9% NS IVPB ([facility])     Ordering Provider:  Jake Tinsley MD    20 mg/kg × 62.4 kg  over 90 Minutes Intravenous Once 01/14/18 2200 01/15/18 0041    01/14/18 2055  Pharmacy to dose vancomycin     Ordering Provider:  KEAGAN Orellana     Does not apply Continuous PRN 01/14/18 2049 01/21/18 2048          Allergies  Allergies as of 01/14/2018   • (No Known Allergies)     Labs    Results from last 7 days   Lab Units 01/19/18  0407 01/18/18  0420 01/17/18  0520   BUN mg/dL 8* 9 16   CREATININE mg/dL 0.60 0.50* 0.40*     Results from last 7 days   Lab Units 01/19/18  0407 01/18/18  0420 01/17/18  0520   WBC 10*3/mm3 14.14* 16.75* 16.81*     Evaluation of Dosing     Last Dose Received in the ED/Outside Facility: received one vanc dose at [facility] ([place]    Ht - 172.7 cm (68\")  Wt - 65.8 kg (145 lb)    Estimated Creatinine Clearance: " 135.6 mL/min (by C-G formula based on Cr of 0.6).    Intake & Output (last 3 days)         01/16 0701 - 01/17 0700 01/17 0701 - 01/18 0700 01/18 0701 - 01/19 0700 01/19 0701 - 01/20 0700      P.O. 1200 1200 240 480    IV Piggyback 400 400 500     Total Intake(mL/kg) 1600 (25.2) 1600 (24.5) 740 (11.3) 480 (7.3)    Urine (mL/kg/hr) 1900 (1.2) 1900 (1.2) 1525 (1) 500 (2)    Other 150 (0.1) 190 (0.1) 40 (0)     Stool   0 (0)     Total Output 2050 2090 1565 500    Net -450 -490 -825 -20            Unmeasured Urine Occurrence 1 x 201 x      Unmeasured Stool Occurrence   1 x           Microbiology and Radiology  Microbiology Results (last 10 days)       Procedure Component Value - Date/Time      Respiratory Culture - Sputum, Cough [[id number]] Collected:  01/18/18 1428    Lab Status:  Preliminary result Specimen:  Sputum from Cough Updated:  01/19/18 0753     Respiratory Culture --      Light growth (2+) Normal Respiratory Jacqueline     Gram Stain Result Many (4+) WBCs per low power field      Few (2+) Epithelial cells per low power field      Few (2+) Gram positive cocci in pairs      Occasional Gram positive bacilli    Anaerobic Culture - Body Fluid, Pleural Cavity [[id number]]  (Normal) Collected:  01/15/18 1555    Lab Status:  Preliminary result Specimen:  Body Fluid from Pleural Cavity Updated:  01/16/18 1239     Culture No anaerobes isolated    Body Fluid Culture - Body Fluid, Pleural Cavity [[id number]]  (Abnormal)  (Susceptibility) Collected:  01/15/18 1555    Lab Status:  Final result Specimen:  Body Fluid from Pleural Cavity Updated:  01/18/18 1221     BF Culture --      Scant growth (1+) Staphylococcus aureus, MRSA (A)        Methicillin resistant Staphylococcus aureus, Patient may be an isolation risk.  This isolate does not demonstrate inducible clindamycin resistance in vitro.          Gram Stain Result Moderate (3+) WBCs seen      No organisms seen    Susceptibility        Staphylococcus aureus, MRSA     JASON      Clindamycin 2 ug/ml Intermediate     Daptomycin 1 ug/ml Susceptible     Erythromycin >4 ug/ml Resistant     Gentamicin <=4 ug/ml Susceptible     Levofloxacin <=1 ug/ml Susceptible  [1]      Linezolid 2 ug/ml Susceptible     Oxacillin >2 ug/ml Resistant     Penicillin G >8 ug/ml Resistant     Quinupristin + Dalfopristin <=0.5 ug/ml Susceptible     Rifampin <=1 ug/ml Susceptible     Tetracycline <=4 ug/ml Susceptible     Trimethoprim + Sulfamethoxazole <=0.5/9.5 ug/ml Susceptible     Vancomycin 2 ug/ml Susceptible              [1]   Staphylococcus species may develop resistance during prolonged therapy with quinolones.  Isolates that are initially susceptible may become resistant within three to four days after initiation of therapy. Testing of repeat isolates may be warranted.                   AFB Culture - Body Fluid, Pleural Cavity [[id number]] Collected:  01/15/18 4151    Lab Status:  Preliminary result Specimen:  Body Fluid from Pleural Cavity Updated:  01/18/18 1429     AFB Stain No acid fast bacilli seen on concentrated smear    Clostridium Difficile Toxin - Stool, Per Rectum [[id number]] Collected:  01/09/18 1535    Lab Status:  Final result Specimen:  Stool from Per Rectum Updated:  01/09/18 2056    Narrative:       The following orders were created for panel order Clostridium Difficile Toxin - Stool, Per Rectum.  Procedure                               Abnormality         Status                     ---------                               -----------         ------                     Clostridium Difficile EI...[[id number]]  Normal              Final result                 Please view results for these tests on the individual orders.    Clostridium Difficile EIA - Stool, Per Rectum [[id number]]  (Normal) Collected:  01/09/18 1535    Lab Status:  Final result Specimen:  Stool from Per Rectum Updated:  01/09/18 2056     C Diff GDH / Toxin Negative          Evaluation of Level    Lab Results   Component Value Date     ELIZABETH 15.60 01/19/2018   Level drawn 1003, ~ 9.5hr level    Assessment/Plan:  - Trough level this AM was therapeutic at 15.6.  - Will continue current dose of vancomycin 1250mg q8h.   - Of note, ID considering stopping patient's SSRI and starting patient on Zyvox with possible discharge soon. Dr. Alanis has started SSRI taper.  - No future levels have been scheduled. Last dose of vancomycin scheduled for 1/27.  - Will continue to follow and adjust as necessary based on level, renal function, and clinical picture.     Thank you,   Lesia Dubon, PharmD Candidate 2018  1/19/2018  10:57 AM      Lovely Logan PharmD  1/19/2018  11:24 AM

## 2018-01-19 NOTE — PROGRESS NOTES
Continued Stay Note  [facility]     Patient Name: Niall Murguia  MRN: [id number]  Today's Date: 1/19/2018    Admit Date: 1/14/2018          Discharge Plan       01/19/18 1235    Case Management/Social Work Plan    Plan Home with significant other    Additional Comments Per Dr. Alanis, patient will likely stay in the hospital at least through the weekend. Upon discharge he may need home O2. He does not have a DME company preference. Plan is home with significant other, Lakeisha Rehman, at discharge. Her address is [address] [place] [address]. Do not anticipate that HH will be needed. Will continue to follow. Arcelia Rockwell RN [phone]              Discharge Codes     None        Expected Discharge Date and Time     Expected Discharge Date Expected Discharge Time    Jan 20, 2018             Arcelia Rockwell RN

## 2018-01-19 NOTE — PROGRESS NOTES
[facility] Progress Note        Antibiotics:  Anti-Infectives     Ordered     Dose/Rate Route Frequency Start Stop    01/17/18 1859  vancomycin 1250 mg/250 mL 0.9% NS IVPB ([facility])     Chuy Alanis MD reviewed the order on 01/18/18 0610.   Ordering Provider:  Chuy Alanis MD    1,250 mg  over 90 Minutes Intravenous Every 8 Hours 01/18/18 0200 01/27/18 1759    01/14/18 2103  vancomycin 1250 mg/250 mL 0.9% NS IVPB ([facility])     Ordering Provider:  Jake Tinsley MD    20 mg/kg × 62.4 kg  over 90 Minutes Intravenous Once 01/14/18 2200 01/15/18 0041    01/14/18 2055  Pharmacy to dose vancomycin     Ordering Provider:  KEAGAN Orellana     Does not apply Continuous PRN 01/14/18 2049 01/21/18 2048          CC: sob/cough    HPI:  Patient is a 52 y.o.  Yr old male with history of bullous emphysema with prior right lung surgery in 2010 per patient related to collapsed lung but he did not recall any prior infection and specifics of that are unclear.  He was admitted to [facility] January 7 with 3 days progressive cough/dyspnea and hemoptysis.  Concern for preceding viral prodrome per outside records.  He had acute kidney injury, severe leukocytosis and elevated lactate with diagnosis acute severe sepsis/acute hypoxic respiratory failure and extensive right-sided pneumonia, admitted to ICU; chest imaging apparently had right hydropneumothorax and bilateral pneumonia with transferred to [facility] on January 14.  He was seen by Dr. eG and underwent right sided chest tube.  Sputum from January 9 has MRSA, pleural fluid from January 15 has staph aureus in culture.     1/19/18 Patient continues to have shortness of breath/cough, requiring nasal cannula oxygen but generally feels somewhat better.  No headache photophobia or neck stiffness.  He denies chest pain at present.  Tube remains.  No nausea vomiting diarrhea or abdominal pain.  No skin rash.  No dysuria hematuria or pyuria.    ROS:   "    1/19/18 No f/c/s. No n/v/d. No rash. No new ADR to Abx.     Constitutional-- No Fever, chills or sweats.  Appetite Diminished and generally fatigued  Heent-- No new vision, hearing or throat complaints.  No epistaxis or oral sores.  Denies odynophagia or dysphagia.  No flashers, floaters or eye pain. No odynophagia or dysphagia. No headache, photophobia or neck stiffness.  CV-- No chest pain, palpitation or syncope  Resp-- as above  GI- No nausea, vomiting, or diarrhea.  No hematochezia, melena, or hematemesis. Denies jaundice or chronic liver disease.  -- No dysuria, hematuria, or flank pain.  Denies hesitancy, urgency or flank pain.  Lymph- no swollen lymph nodes in neck/axilla or groin.   Heme- No active bruising or bleeding; no Hx of DVT or PE.  MS-- no swelling or pain in the bones or joints of arms/legs.  No new back pain.  Neuro-- No acute focal weakness or numbness in the arms or legs.  No seizures.     Full 12 point review of systems reviewed and negative otherwise for acute complaints, except for above      PE:   /64 (BP Location: Left arm, Patient Position: Lying)  Pulse 98  Temp 97.8 °F (36.6 °C) (Oral)   Resp 17  Ht 172.7 cm (68\")  Wt 65.8 kg (145 lb)  SpO2 90%  BMI 22.05 kg/m2    GENERAL: Awake and alert, in no acute distress.  Nasal cannula oxygen  HEENT: Normocephalic, atraumatic.  PERRL. EOMI. No conjunctival injection. No icterus. Oropharynx clear without evidence of thrush or exudate. No evidence of peridontal disease.    NECK: Supple without nuchal rigidity. No mass.  LYMPH: No cervical, axillary or inguinal lymphadenopathy.  HEART: RRR; No murmur, rubs, gallops.   LUNGS: Diminished on right more so than left with scattered rhonchi. Normal respiratory effort. Nonlabored. No dullness.  Chest tube  ABDOMEN: Soft, nontender, nondistended. Positive bowel sounds. No rebound or guarding. NO mass or HSM.  EXT:  No cyanosis, clubbing or edema. No cord.  : Genitalia generally " unremarkable.  Without Pedraza catheter.  MSK: FROM without joint effusions noted arms/legs.    SKIN: Warm and dry without cutaneous eruptions on Inspection/palpation.    NEURO: Oriented to PPT. No focal deficits on motor/sensory exam at arms/legs.  PSYCHIATRIC: Normal insight and judgement. Cooperative with PE     No peripheral stigmata/phenomena of endocarditis    Laboratory Data      Results from last 7 days  Lab Units 01/19/18  0407 01/18/18  0420 01/17/18  0520   WBC 10*3/mm3 14.14* 16.75* 16.81*   HEMOGLOBIN g/dL 11.2* 12.1* 13.5   HEMATOCRIT % 33.7* 35.5* 40.6   PLATELETS 10*3/mm3 245 234 229       Results from last 7 days  Lab Units 01/19/18  0407   SODIUM mmol/L 126*   POTASSIUM mmol/L 4.1   CHLORIDE mmol/L 94*   CO2 mmol/L 27.0   BUN mg/dL 8*   CREATININE mg/dL 0.60   GLUCOSE mg/dL 106*   CALCIUM mg/dL 8.3*       Results from last 7 days  Lab Units 01/19/18  0407  01/15/18  0526   ALK PHOS U/L 60  < > 55   BILIRUBIN mg/dL 0.8  < > 0.5   BILIRUBIN DIRECT mg/dL  --   --  0.2   ALT (SGPT) U/L 55*  < > 175*   AST (SGOT) U/L 22  < > 57*   < > = values in this interval not displayed.            Estimated Creatinine Clearance: 135.6 mL/min (by C-G formula based on Cr of 0.6).      Microbiology:      Radiology:  Imaging Results (last 72 hours)     Procedure Component Value Units Date/Time    CT Guided Chest Tube [[id number]] Collected:  01/16/18 0958     Updated:  01/16/18 1105    Narrative:       EXAMINATION: CT GUIDED CHEST TUBE-      INDICATION: Right apical pneumothorax; CT-guided catheter thoracentesis  for therapeutic and diagnostic purposes. Complex pleural effusion right  chest.     TECHNIQUE: Patient has no known allergies.     The clotting profile is acceptable. The PTT is 27.8, PT 12.0, INR 1.10,  platelets 226,000.     The radiation dose reduction device was turned on for each scan per the  ALARA (As Low as Reasonably Achievable) protocol.     COMPARISON: None.     FINDINGS:   1. The procedure, risks,  complications and side effects of CT-guided  catheter placement in the right pleural space for therapeutic and  diagnostic drainage of the effusion in the right chest and for  decompression of the patient's right pneumothorax was discussed and  reviewed in detail with the patient including possible risk and  complications which include bleeding, infection, injury or laceration of  the intercostal artery with massive bleeding, puncture of the patient's  known high-grade bulla in the right lung with high-grade pleural leak,  air embolization, and other possible risk and complications.  Patient  understood and consented.     2. With the patient in the supine position, under sterile technique,  local anesthesia and meticulous CT guidance, from the right posterior  axillary line, a #12 Russian multi-sidehole drainage catheter was  introduced into the right posterior inferior pleural space. The patient  has loculated pleural fluid and a complex airspace consolidative disease  extensively and therefore the catheter was placed in one of the  loculated compartments of fluid. Hopefully this catheter position will  communicate with other areas of fluid in the right hemithorax.     3. Fluid was extracted and immediately sent to the laboratory for all  diagnostic studies ordered by the attending physician.     A total of 180 mL of fluid was removed from the right pleural space for  diagnostic laboratory purposes.     4. The catheter was then sutured in place with 0 silk. A revolution  fixation device was applied and a sterile dressing was applied.     The catheter will be placed to a Pleur-evac atrium suction device for  decompression of the right chest and the right pneumothorax.       Impression:       1. A #12 Russian drainage catheter has been placed into the right  posterior inferior pleural space from the right posterior axillary line.  Fluid was obtained for all diagnostic studies ordered by the attending  physician.  2.  The catheter was fixed, sutured and dressed. Catheter will be placed  to a Pleur-evac atrium suction device for decompression of free fluid  from the right hemithorax and hopefully compression of the right  pneumothorax.  3. Patient tolerated the procedure well. There were no complications. He  was taken back to his room in satisfactory and stable condition.     D:  01/16/2018  E:  01/16/2018     This report was finalized on 1/16/2018 11:03 AM by Dr. Harlan Fisher MD.       XR Chest 1 View [[id number]] Collected:  01/16/18 0923     Updated:  01/16/18 1405    Narrative:       EXAMINATION: XR CHEST 1 VW- 01/16/2018     INDICATION: pneumohydrothorax      COMPARISON: 01/15/2018     FINDINGS: A right chest tube has been inserted since the previous  examination. The right apical pneumothorax is smaller. There is  persistent airspace disease in a perihilar and bibasilar distribution,  right greater than left.           Impression:       Pneumothorax is slightly smaller following insertion of a  chest tube.     D:  01/16/2018  E:  01/16/2018     This report was finalized on 1/16/2018 2:02 PM by Dr. Desmond Calvin MD.       XR Chest 1 View [[id number]] Collected:  01/17/18 1001     Updated:  01/17/18 1350    Narrative:       EXAMINATION: XR CHEST 1 VW- 01/17/2018     INDICATION: postop      COMPARISON: 01/16/2018     FINDINGS: A right chest tube remains well-positioned. There has been  reduction in the right pneumothorax. There is persistent patchy  bibasilar airspace disease.           Impression:       The pneumothorax has largely resolved. The bibasilar  pulmonary changes are unchanged.     D:  01/17/2018  E:  01/17/2018     This report was finalized on 1/17/2018 1:48 PM by Dr. Desmond Calvin MD.       XR Chest 1 View [[id number]] Collected:  01/18/18 0937     Updated:  01/18/18 1159    Narrative:       EXAMINATION: XR CHEST 1 VW-01/18/2018:      INDICATION: Postop; Z74.09-Other reduced mobility.      COMPARISON: 01/17/2018.      FINDINGS: A right chest tube remains in position. The right lung is  expanded. There is patchy right basilar airspace disease. The cardiac  silhouette is normal.           Impression:       Severe chronic obstructive and postinflammatory changes with  patchy airspace disease in the right upper lobe and with a right chest  tube in position. There is no pneumothorax and there has been no change  since the previous examination.     D:  01/18/2018  E:  01/18/2018     This report was finalized on 1/18/2018 11:57 AM by Dr. Desmond Calvin MD.       XR Chest 1 View [[id number]] Collected:  01/19/18 0838     Updated:  01/19/18 0838    Narrative:       EXAMINATION: XR CHEST 1 VW-01/19/2018:      INDICATION: Postop; Z74.09-Other reduced mobility.      COMPARISON: 01/18/2018.     FINDINGS: Right pleural drain remains in place. There is advanced  bullous disease of the upper lobes. No pneumothorax is identified. Hazy  opacity of the right base is stable or only minimally increased. Chronic  appearing left basilar interstitial changes appear stable.           Impression:       No new chest disease.     D:  01/19/2018  E:  01/19/2018                     Impression:   --Acute severe sepsis.  Sepsis parameters improved since presentation to [facility].  Gen. resuscitative measures per internal medicine.  Otherwise as below     --Acute bilateral pneumonia with MRSA in sputum at outside hospital but mixed grams stain here; complicated by right sided empyema with culture positive pleural fluid and hydropneumothorax by prior imaging with current chest tube per cardiothoracic surgery.  Complex process in this patient with severe bullous emphysema, past lung surgery on the right per patient and ongoing tobacco abuse in addition to other comorbidity.  I have discussed directly with Dr. Ge and Dr. Alanis; Dr. Ge to decide on timing/options/threshold for further intervention such as surgery/decortication versus  alternative tube drainage or other intervention regarding empyema and pneumothorax.  Patient understands that antibiotics are not a guarantee for cure and that he runs a risk for persistent/relapse/progressive infection in addition to chronic pulmonary functional restrictions and other serious morbidity/serious sequela etc.  Blood cultures negative with no other obvious metastatic focus of involvement.  Primary options for treatment include vancomycin versus zyvox; Dr. Alanis tapering SSRI to help minimize risk for interaction with Zyvox to help facilitate it as better option in future.        --Acute right sided empyema with staph aureus and culture associated with pneumonia as above.  Complicated with prior history of surgery, ongoing bullous emphysema etc.  Unclear at present whether he has bronchopleural fistula but any further decision regarding timing/options/threshold for surgical intervention such as decortication, etc. is left to Dr. Ge.  I discussed directly with him.     --Acute kidney injury at presentation to [facility].  Creatinine has trended down; likely initially prerenal associated with sepsis     --Chronic bullous emphysema with prior right thoracotomy per patient.  No operative note available to me.  This would need to be clarified by Dr. Ge     --Chronic tobacco abuse.  I discussed potential benefits of cessation.  It is not clear at present but he is committed to quitting    PLAN:   --IV vancomycin/zosyn     --I discussed potential risks and benefits of the prescribed antibiotics that include, but are not limited to, solid organ toxicity, neuro/zak/vestibular toxicity, renal toxicity, CDiff, cytopenias, hypersensitivity,  etc.. Patient/Family voice understanding and agree to proceed.     --Monitor IV and IV antibiotic with risk for systemic complication and potential drug interaction     --Check/review labs cultures and scans     --Highly complex set of issues with high  risk for further serious morbidity and other serious sequela     --d/w Dr Alanis and Dr Ge as above    Rafael Sr MD  1/19/2018

## 2018-01-19 NOTE — PROGRESS NOTES
[facility] Medicine Services  PROGRESS NOTE    Patient Name: Niall Murguia  : 1965  MRN: [id number]    Date of Admission: 2018  Length of Stay: 5  Primary Care Physician: KEAGAN Rowley    Subjective   Subjective     CC:  F/u pneumonia    HPI:  Patient notes subjective improvement to his dyspnea and chest pain since removal of chest tube this morning.  Productive cough is also slowly improving.  No fevers or chills.  Reports improved energy today.    Review of Systems  Gen- No fevers, chills  CV- No chest pain, palpitations  Resp- Improved cough and dyspnea  GI- No N/V/D, abd pain    Otherwise ROS is negative except as mentioned in the HPI.    Objective   Objective     Vital Signs:   Temp:  [97.8 °F (36.6 °C)-99.7 °F (37.6 °C)] 99.7 °F (37.6 °C)  Heart Rate:  [] 98  Resp:  [14-18] 17  BP: (103-117)/(63-70) 103/63     Physical Exam:  Constitutional: Ill appearing but nontoxic, awake, alert  HENT: NCAT, mucous membranes moist  Respiratory: Improved aeration of lung fields today with mild crackles to right base, no crepitus at bandage overlying former thoracostomy tube site  Cardiovascular: RRR, no murmurs, rubs, or gallops, palpable pedal pulses bilaterally  Gastrointestinal: Positive bowel sounds, soft, nontender, nondistended  Musculoskeletal: No bilateral ankle edema  Psychiatric: Appropriate affect, cooperative  Neurologic: Oriented x 3, strength symmetric in all extremities, Cranial Nerves grossly intact to confrontation, speech clear  Skin: No rashes    Results Reviewed:  I have personally reviewed current lab, radiology, and data and agree.      Results from last 7 days  Lab Units 18  0407 18  0420 18  0520  01/15/18  0526   WBC 10*3/mm3 14.14* 16.75* 16.81*  < > 18.53*   HEMOGLOBIN g/dL 11.2* 12.1* 13.5  < > 12.3*   HEMATOCRIT % 33.7* 35.5* 40.6  < > 37.2*   PLATELETS 10*3/mm3 245 234 229  < > 226   INR   --   --   --   --  1.10   <  > = values in this interval not displayed.    Results from last 7 days  Lab Units 01/19/18  0407 01/18/18  1900 01/18/18  0420 01/17/18  0520  01/15/18  0526   SODIUM mmol/L 126* 126* 125* 128*  < > 134   POTASSIUM mmol/L 4.1  --  3.8 4.1  < > 4.2   CHLORIDE mmol/L 94*  --  98* 95*  < > 100   CO2 mmol/L 27.0  --  27.0 28.0  < > 34.0*   BUN mg/dL 8*  --  9 16  < > 20   CREATININE mg/dL 0.60  --  0.50* 0.40*  < > 0.60   GLUCOSE mg/dL 106*  --  146* 85  < > 90   CALCIUM mg/dL 8.3*  --  8.0* 7.6*  < > 8.1*   ALT (SGPT) U/L 55*  --  65*  --   --  175*   AST (SGOT) U/L 22  --  15  --   --  57*   < > = values in this interval not displayed.  No results found for: BNP  No results found for: PHART    Microbiology Results Abnormal     Procedure Component Value - Date/Time    Respiratory Culture - Sputum, Cough [[id number]] Collected:  01/18/18 1428    Lab Status:  Preliminary result Specimen:  Sputum from Cough Updated:  01/19/18 0753     Respiratory Culture --      Light growth (2+) Normal Respiratory Jacqueline     Gram Stain Result Many (4+) WBCs per low power field      Few (2+) Epithelial cells per low power field      Few (2+) Gram positive cocci in pairs      Occasional Gram positive bacilli    AFB Culture - Body Fluid, Pleural Cavity [[id number]] Collected:  01/15/18 5211    Lab Status:  Preliminary result Specimen:  Body Fluid from Pleural Cavity Updated:  01/18/18 1429     AFB Stain No acid fast bacilli seen on concentrated smear    Body Fluid Culture - Body Fluid, Pleural Cavity [[id number]]  (Abnormal)  (Susceptibility) Collected:  01/15/18 6630    Lab Status:  Final result Specimen:  Body Fluid from Pleural Cavity Updated:  01/18/18 1221     BF Culture --      Scant growth (1+) Staphylococcus aureus, MRSA (A)        Methicillin resistant Staphylococcus aureus, Patient may be an isolation risk.  This isolate does not demonstrate inducible clindamycin resistance in vitro.          Gram Stain Result Moderate (3+) WBCs seen       No organisms seen    Susceptibility      Staphylococcus aureus, MRSA     JASON     Clindamycin 2 ug/ml Intermediate     Daptomycin 1 ug/ml Susceptible     Erythromycin >4 ug/ml Resistant     Gentamicin <=4 ug/ml Susceptible     Levofloxacin <=1 ug/ml Susceptible  [1]      Linezolid 2 ug/ml Susceptible     Oxacillin >2 ug/ml Resistant     Penicillin G >8 ug/ml Resistant     Quinupristin + Dalfopristin <=0.5 ug/ml Susceptible     Rifampin <=1 ug/ml Susceptible     Tetracycline <=4 ug/ml Susceptible     Trimethoprim + Sulfamethoxazole <=0.5/9.5 ug/ml Susceptible     Vancomycin 2 ug/ml Susceptible            [1]   Staphylococcus species may develop resistance during prolonged therapy with quinolones.  Isolates that are initially susceptible may become resistant within three to four days after initiation of therapy. Testing of repeat isolates may be warranted.                 Anaerobic Culture - Body Fluid, Pleural Cavity [[id number]]  (Normal) Collected:  01/15/18 1555    Lab Status:  Preliminary result Specimen:  Body Fluid from Pleural Cavity Updated:  01/16/18 1239     Culture No anaerobes isolated          Imaging Results (last 24 hours)     Procedure Component Value Units Date/Time    XR Chest 1 View [[id number]] Collected:  01/19/18 0838     Updated:  01/19/18 0838    Narrative:       EXAMINATION: XR CHEST 1 VW-01/19/2018:      INDICATION: Postop; Z74.09-Other reduced mobility.      COMPARISON: 01/18/2018.     FINDINGS: Right pleural drain remains in place. There is advanced  bullous disease of the upper lobes. No pneumothorax is identified. Hazy  opacity of the right base is stable or only minimally increased. Chronic  appearing left basilar interstitial changes appear stable.           Impression:       No new chest disease.     D:  01/19/2018  E:  01/19/2018                 Results for orders placed during the hospital encounter of 01/07/18   Adult Transthoracic Echo Complete W/ Cont if Necessary Per Protocol     Narrative Technically very limited study   1) Normal LV systolic function ( EF is about 55%)  2) Normal left atrial size with normal LVedp   3) Trace MR   4) Mild TR with normal PA pressures ( within limitations of study)  5) Severe RV dilation with wall motion abnormality ? Acute on chronic   corpulmonale   6) Severe reduction in RV function        I have reviewed the medications.    Assessment/Plan   Assessment / Plan     Hospital Problem List     * (Principal)Hydropneumothorax, right    Acute on chronic respiratory failure with hypoxia    Pneumonia of both lower lobes    Chronic bullous emphysema (Chronic)    Hypertension    Tobacco use         Brief Hospital Course to date:  Niall Murguia is a 52 y.o. male who was transferred to [facility] on 1/14 from [facility] after presenting there on 1/7 with acute dyspnea, found to have acute hypoxemic respiratory failure and MRSA pneumonia:     Assessment & Plan:    Acute on Chronic Hypoxemic Respiratory Failure, Improving   Right-sided MRSA Pneumonia, improving  Complicated Right Parapneumonic Effusion with MRSA in pleural fluid culture, suspect empyema, unable to rule out bronchopleural fistula  - Continue supplemental O2, wean as tolerated, continue pulmonary toilet   - Discussed case in detail and reviewed culture and imaging results with Dr. Sr on 1/18. Unable to rule out bronchopleural fistula. Continue abx and monitor closely with trend of WBC until normalized.   -Taper off Celexa , 20 mg ×2 More days, then 10 mg times 2 days and stop, discussed with pharmacist and taper ordered   - CXR from this AM personally reviewed and satisfactory, repeat ordered for tomorrow     Right Hydropneumothorax s/p CT-guided chest tube 1/15  - Resolved, CT out today     COPD  Hx of Spontaneous PTX 2010  Tobacco Abuse  - continue supportive care with symbicort  - Nicotine patch    Mild Hyponatremia due to SIADH, likely from acute pulmonary infection  - Urine and serum  osms consistent with SIADH, continue salt tabs and monitor on fluid restriction, tapering SSRI as above  - Repeat Na q12hrs ordered, trending up slowly     Hypertension   - BP normal, on no meds at present, will monitor     Macrocytic Anemia   - Improved    DVT Prophylaxis:  SQ heparin  CODE STATUS: Full Code    Disposition: I expect the patient to be discharged home with home health in ~2-3 days. Discussed case on multidisciplinary rounds.     Chuy Alanis MD  01/19/18  5:30 PM

## 2018-01-20 ENCOUNTER — APPOINTMENT (OUTPATIENT)
Dept: GENERAL RADIOLOGY | Facility: HOSPITAL | Age: 53
End: 2018-01-20

## 2018-01-20 LAB
ALBUMIN SERPL-MCNC: 2.8 G/DL (ref 3.2–4.8)
ALBUMIN/GLOB SERPL: 0.9 G/DL (ref 1.5–2.5)
ALP SERPL-CCNC: 54 U/L (ref 25–100)
ALT SERPL W P-5'-P-CCNC: 51 U/L (ref 7–40)
ANION GAP SERPL CALCULATED.3IONS-SCNC: 5 MMOL/L (ref 3–11)
AST SERPL-CCNC: 25 U/L (ref 0–33)
BACTERIA SPEC RESP CULT: NORMAL
BACTERIA SPEC RESP CULT: NORMAL
BILIRUB SERPL-MCNC: 0.8 MG/DL (ref 0.3–1.2)
BUN BLD-MCNC: 9 MG/DL (ref 9–23)
BUN/CREAT SERPL: 18 (ref 7–25)
CALCIUM SPEC-SCNC: 8.2 MG/DL (ref 8.7–10.4)
CHLORIDE SERPL-SCNC: 95 MMOL/L (ref 99–109)
CO2 SERPL-SCNC: 29 MMOL/L (ref 20–31)
CREAT BLD-MCNC: 0.5 MG/DL (ref 0.6–1.3)
DEPRECATED RDW RBC AUTO: 48.1 FL (ref 37–54)
ERYTHROCYTE [DISTWIDTH] IN BLOOD BY AUTOMATED COUNT: 12.5 % (ref 11.3–14.5)
GFR SERPL CREATININE-BSD FRML MDRD: >150 ML/MIN/1.73
GLOBULIN UR ELPH-MCNC: 3 GM/DL
GLUCOSE BLD-MCNC: 87 MG/DL (ref 70–100)
GRAM STN SPEC: NORMAL
HCT VFR BLD AUTO: 31.6 % (ref 38.9–50.9)
HGB BLD-MCNC: 10.7 G/DL (ref 13.1–17.5)
MCH RBC QN AUTO: 35.4 PG (ref 27–31)
MCHC RBC AUTO-ENTMCNC: 33.9 G/DL (ref 32–36)
MCV RBC AUTO: 104.6 FL (ref 80–99)
PLATELET # BLD AUTO: 241 10*3/MM3 (ref 150–450)
PMV BLD AUTO: 9.4 FL (ref 6–12)
POTASSIUM BLD-SCNC: 4.1 MMOL/L (ref 3.5–5.5)
PROT SERPL-MCNC: 5.8 G/DL (ref 5.7–8.2)
RBC # BLD AUTO: 3.02 10*6/MM3 (ref 4.2–5.76)
SODIUM BLD-SCNC: 128 MMOL/L (ref 132–146)
SODIUM BLD-SCNC: 129 MMOL/L (ref 132–146)
WBC NRBC COR # BLD: 8.16 10*3/MM3 (ref 3.5–10.8)

## 2018-01-20 PROCEDURE — 94640 AIRWAY INHALATION TREATMENT: CPT

## 2018-01-20 PROCEDURE — 94799 UNLISTED PULMONARY SVC/PX: CPT

## 2018-01-20 PROCEDURE — 25010000002 VANCOMYCIN 10 G RECONSTITUTED SOLUTION: Performed by: HOSPITALIST

## 2018-01-20 PROCEDURE — 71045 X-RAY EXAM CHEST 1 VIEW: CPT

## 2018-01-20 PROCEDURE — 25010000002 PIPERACILLIN SOD-TAZOBACTAM PER 1 G: Performed by: INTERNAL MEDICINE

## 2018-01-20 PROCEDURE — 99231 SBSQ HOSP IP/OBS SF/LOW 25: CPT | Performed by: THORACIC SURGERY (CARDIOTHORACIC VASCULAR SURGERY)

## 2018-01-20 PROCEDURE — 25010000002 HEPARIN (PORCINE) PER 1000 UNITS: Performed by: HOSPITALIST

## 2018-01-20 PROCEDURE — 84295 ASSAY OF SERUM SODIUM: CPT | Performed by: HOSPITALIST

## 2018-01-20 PROCEDURE — 80053 COMPREHEN METABOLIC PANEL: CPT | Performed by: INTERNAL MEDICINE

## 2018-01-20 PROCEDURE — 99232 SBSQ HOSP IP/OBS MODERATE 35: CPT | Performed by: HOSPITALIST

## 2018-01-20 PROCEDURE — 25010000002 VANCOMYCIN: Performed by: HOSPITALIST

## 2018-01-20 PROCEDURE — 85027 COMPLETE CBC AUTOMATED: CPT | Performed by: INTERNAL MEDICINE

## 2018-01-20 RX ADMIN — CITALOPRAM HYDROBROMIDE 20 MG: 20 TABLET ORAL at 09:15

## 2018-01-20 RX ADMIN — Medication 1 G: at 09:14

## 2018-01-20 RX ADMIN — FOLIC ACID 1 MG: 1 TABLET ORAL at 09:15

## 2018-01-20 RX ADMIN — IPRATROPIUM BROMIDE AND ALBUTEROL SULFATE 3 ML: .5; 3 SOLUTION RESPIRATORY (INHALATION) at 00:16

## 2018-01-20 RX ADMIN — IPRATROPIUM BROMIDE AND ALBUTEROL SULFATE 3 ML: .5; 3 SOLUTION RESPIRATORY (INHALATION) at 12:34

## 2018-01-20 RX ADMIN — HEPARIN SODIUM 5000 UNITS: 5000 INJECTION, SOLUTION INTRAVENOUS; SUBCUTANEOUS at 15:10

## 2018-01-20 RX ADMIN — TAZOBACTAM SODIUM AND PIPERACILLIN SODIUM 3.38 G: 375; 3 INJECTION, SOLUTION INTRAVENOUS at 17:33

## 2018-01-20 RX ADMIN — HYDROCODONE BITARTATE AND ACETAMINOPHEN 1 TABLET: 5; 325 TABLET ORAL at 00:07

## 2018-01-20 RX ADMIN — VANCOMYCIN HYDROCHLORIDE 1250 MG: 10 INJECTION, POWDER, LYOPHILIZED, FOR SOLUTION INTRAVENOUS at 09:14

## 2018-01-20 RX ADMIN — TAZOBACTAM SODIUM AND PIPERACILLIN SODIUM 3.38 G: 375; 3 INJECTION, SOLUTION INTRAVENOUS at 09:14

## 2018-01-20 RX ADMIN — TAZOBACTAM SODIUM AND PIPERACILLIN SODIUM 3.38 G: 375; 3 INJECTION, SOLUTION INTRAVENOUS at 00:02

## 2018-01-20 RX ADMIN — NICOTINE 1 PATCH: 21 PATCH, EXTENDED RELEASE TRANSDERMAL at 21:06

## 2018-01-20 RX ADMIN — HEPARIN SODIUM 5000 UNITS: 5000 INJECTION, SOLUTION INTRAVENOUS; SUBCUTANEOUS at 05:03

## 2018-01-20 RX ADMIN — HYDROCODONE BITARTATE AND ACETAMINOPHEN 1 TABLET: 5; 325 TABLET ORAL at 05:05

## 2018-01-20 RX ADMIN — LIDOCAINE 1 PATCH: 50 PATCH CUTANEOUS at 09:14

## 2018-01-20 RX ADMIN — HYDROCODONE BITARTATE AND ACETAMINOPHEN 1 TABLET: 5; 325 TABLET ORAL at 17:33

## 2018-01-20 RX ADMIN — HEPARIN SODIUM 5000 UNITS: 5000 INJECTION, SOLUTION INTRAVENOUS; SUBCUTANEOUS at 21:06

## 2018-01-20 RX ADMIN — Medication 250 MG: at 21:06

## 2018-01-20 RX ADMIN — Medication 250 MG: at 09:15

## 2018-01-20 RX ADMIN — BUDESONIDE AND FORMOTEROL FUMARATE DIHYDRATE 2 PUFF: 160; 4.5 AEROSOL RESPIRATORY (INHALATION) at 18:42

## 2018-01-20 RX ADMIN — VANCOMYCIN HYDROCHLORIDE 1250 MG: 10 INJECTION, POWDER, LYOPHILIZED, FOR SOLUTION INTRAVENOUS at 17:33

## 2018-01-20 RX ADMIN — Medication 1 G: at 17:33

## 2018-01-20 RX ADMIN — HYDROCODONE BITARTATE AND ACETAMINOPHEN 1 TABLET: 5; 325 TABLET ORAL at 09:14

## 2018-01-20 RX ADMIN — IPRATROPIUM BROMIDE AND ALBUTEROL SULFATE 3 ML: .5; 3 SOLUTION RESPIRATORY (INHALATION) at 06:25

## 2018-01-20 RX ADMIN — BUDESONIDE AND FORMOTEROL FUMARATE DIHYDRATE 2 PUFF: 160; 4.5 AEROSOL RESPIRATORY (INHALATION) at 06:25

## 2018-01-20 RX ADMIN — IPRATROPIUM BROMIDE AND ALBUTEROL SULFATE 3 ML: .5; 3 SOLUTION RESPIRATORY (INHALATION) at 18:42

## 2018-01-20 RX ADMIN — VANCOMYCIN HYDROCHLORIDE 1250 MG: 10 INJECTION, POWDER, LYOPHILIZED, FOR SOLUTION INTRAVENOUS at 00:48

## 2018-01-20 RX ADMIN — HYDROCODONE BITARTATE AND ACETAMINOPHEN 1 TABLET: 5; 325 TABLET ORAL at 21:06

## 2018-01-20 NOTE — PLAN OF CARE
Problem: Patient Care Overview (Adult)  Goal: Plan of Care Review  Outcome: Ongoing (interventions implemented as appropriate)   01/19/18 1452 01/20/18 1200   Coping/Psychosocial Response Interventions   Plan Of Care Reviewed With --  patient   Patient Care Overview   Progress improving --    Outcome Evaluation   Outcome Summary/Follow up Plan pt has chest tube out.needs only CG assist for gait.ambulat 400 ft.went over all exercises.try steps --      Goal: Adult Individualization and Mutuality  Outcome: Ongoing (interventions implemented as appropriate)      Problem: Respiratory Insufficiency (Adult)  Goal: Acid/Base Balance  Outcome: Ongoing (interventions implemented as appropriate)    Goal: Effective Ventilation  Outcome: Ongoing (interventions implemented as appropriate)      Problem: Pneumonia (Adult)  Goal: Signs and Symptoms of Listed Potential Problems Will be Absent or Manageable (Pneumonia)  Outcome: Ongoing (interventions implemented as appropriate)

## 2018-01-20 NOTE — PROGRESS NOTES
Cardiothoracic Surgery Progress Note      PPD # 5 s/p CT guided tube thoracostomy    Chief complaint: Some pain at tube site     LOS: 6 days      Subjective:  No complaints.  Denies shortness of breath      Objective:  Vital Signs  Temp:  [97.7 °F (36.5 °C)-97.8 °F (36.6 °C)] 97.8 °F (36.6 °C)  Heart Rate:  [] 101  Resp:  [16-22] 22  BP: (102-120)/(58-66) 120/66    Physical Exam:   General Appearance: alert, appears stated age and cooperative   Lungs: clear to auscultation, respirations regular, respirations even and respirations unlabored   Heart: regular rhythm & normal rate, normal S1, S2 and no murmur, no alejandra, no rub       Results:    Results from last 7 days  Lab Units 01/20/18  0447   WBC 10*3/mm3 8.16   HEMOGLOBIN g/dL 10.7*   HEMATOCRIT % 31.6*   PLATELETS 10*3/mm3 241       Results from last 7 days  Lab Units 01/20/18  0447   SODIUM mmol/L 129*   POTASSIUM mmol/L 4.1   CHLORIDE mmol/L 95*   CO2 mmol/L 29.0   BUN mg/dL 9   CREATININE mg/dL 0.50*   GLUCOSE mg/dL 87   CALCIUM mg/dL 8.2*         Assessment:  Right apical pneumothorax    Plan:  No pneumothorax on CXR after chest tube removed  Pulmonary toilet  ABX as per ID for MRSA PNA    Josesito Garcia MD  01/20/18  5:22 PM

## 2018-01-20 NOTE — PLAN OF CARE
Problem: Patient Care Overview (Adult)  Goal: Plan of Care Review  Outcome: Ongoing (interventions implemented as appropriate)    Goal: Adult Individualization and Mutuality  Outcome: Ongoing (interventions implemented as appropriate)    Goal: Discharge Needs Assessment  Outcome: Ongoing (interventions implemented as appropriate)      Problem: Respiratory Insufficiency (Adult)  Goal: Acid/Base Balance  Outcome: Ongoing (interventions implemented as appropriate)    Goal: Effective Ventilation  Outcome: Ongoing (interventions implemented as appropriate)      Problem: Pneumonia (Adult)  Goal: Signs and Symptoms of Listed Potential Problems Will be Absent or Manageable (Pneumonia)  Outcome: Ongoing (interventions implemented as appropriate)

## 2018-01-20 NOTE — PROGRESS NOTES
[facility]  PROGRESS NOTE    Patient Name: Niall Murguia  : 1965  MRN: [id number]    Date of Admission: 2018  Length of Stay: 6  Primary Care Physician: KEAGAN Rowley    Subjective   Subjective     CC:  F/u pneumonia    HPI:  Patient still reports some mild inferior lateral right chest wall pain (at site of chest tube) but notes continued slow improvement with dyspnea and mild cough.  No fevers or chills.  Energy is improving daily.    Review of Systems  Gen- No fevers, chills  CV- No palpitations  Resp- Improving cough and dyspnea  GI- No N/V/D, abd pain    Otherwise ROS is negative except as mentioned in the HPI.    Objective   Objective     Vital Signs:   Temp:  [97.7 °F (36.5 °C)-99.7 °F (37.6 °C)] 97.8 °F (36.6 °C)  Heart Rate:  [] 91  Resp:  [16-18] 16  BP: (102-119)/(58-63) 119/61     Physical Exam:  Constitutional: Ill appearing but nontoxic, awake, alert  HENT: NCAT, mucous membranes moist  Respiratory: Diminished throughout with mild crackles to right base, no crepitus at bandage overlying former thoracostomy tube site  Cardiovascular: RRR, no murmurs, rubs, or gallops, palpable pedal pulses bilaterally  Gastrointestinal: Positive bowel sounds, soft, nontender, nondistended  Musculoskeletal: No bilateral ankle edema  Psychiatric: Appropriate affect, cooperative  Neurologic: Oriented x 3, strength symmetric in all extremities, Cranial Nerves grossly intact to confrontation, speech clear  Skin: No rashes    Results Reviewed:  I have personally reviewed current lab, radiology, and data and agree.      Results from last 7 days  Lab Units 18  0447 18  0407 18  0420  01/15/18  0526   WBC 10*3/mm3 8.16 14.14* 16.75*  < > 18.53*   HEMOGLOBIN g/dL 10.7* 11.2* 12.1*  < > 12.3*   HEMATOCRIT % 31.6* 33.7* 35.5*  < > 37.2*   PLATELETS 10*3/mm3 241 245 234  < > 226   INR   --   --   --   --  1.10   < > = values in this  interval not displayed.    Results from last 7 days  Lab Units 01/20/18  0447 01/19/18  1800 01/19/18  0407  01/18/18  0420   SODIUM mmol/L 129* 127* 126*  < > 125*   POTASSIUM mmol/L 4.1  --  4.1  --  3.8   CHLORIDE mmol/L 95*  --  94*  --  98*   CO2 mmol/L 29.0  --  27.0  --  27.0   BUN mg/dL 9  --  8*  --  9   CREATININE mg/dL 0.50*  --  0.60  --  0.50*   GLUCOSE mg/dL 87  --  106*  --  146*   CALCIUM mg/dL 8.2*  --  8.3*  --  8.0*   ALT (SGPT) U/L 51*  --  55*  --  65*   AST (SGOT) U/L 25  --  22  --  15   < > = values in this interval not displayed.  No results found for: BNP  No results found for: PHART    Microbiology Results Abnormal     Procedure Component Value - Date/Time    Respiratory Culture - Sputum, Cough [[id number]] Collected:  01/18/18 1428    Lab Status:  Final result Specimen:  Sputum from Cough Updated:  01/20/18 1004     Respiratory Culture --      Moderate growth (3+) Normal Respiratory Jacqueline     Gram Stain Result Many (4+) WBCs per low power field      Few (2+) Epithelial cells per low power field      Few (2+) Gram positive cocci in pairs      Occasional Gram positive bacilli    AFB Culture - Body Fluid, Pleural Cavity [[id number]] Collected:  01/15/18 7068    Lab Status:  Preliminary result Specimen:  Body Fluid from Pleural Cavity Updated:  01/18/18 1429     AFB Stain No acid fast bacilli seen on concentrated smear    Body Fluid Culture - Body Fluid, Pleural Cavity [[id number]]  (Abnormal)  (Susceptibility) Collected:  01/15/18 1555    Lab Status:  Final result Specimen:  Body Fluid from Pleural Cavity Updated:  01/18/18 1221     BF Culture --      Scant growth (1+) Staphylococcus aureus, MRSA (A)        Methicillin resistant Staphylococcus aureus, Patient may be an isolation risk.  This isolate does not demonstrate inducible clindamycin resistance in vitro.          Gram Stain Result Moderate (3+) WBCs seen      No organisms seen    Susceptibility      Staphylococcus aureus, MRSA     JASON      Clindamycin 2 ug/ml Intermediate     Daptomycin 1 ug/ml Susceptible     Erythromycin >4 ug/ml Resistant     Gentamicin <=4 ug/ml Susceptible     Levofloxacin <=1 ug/ml Susceptible  [1]      Linezolid 2 ug/ml Susceptible     Oxacillin >2 ug/ml Resistant     Penicillin G >8 ug/ml Resistant     Quinupristin + Dalfopristin <=0.5 ug/ml Susceptible     Rifampin <=1 ug/ml Susceptible     Tetracycline <=4 ug/ml Susceptible     Trimethoprim + Sulfamethoxazole <=0.5/9.5 ug/ml Susceptible     Vancomycin 2 ug/ml Susceptible            [1]   Staphylococcus species may develop resistance during prolonged therapy with quinolones.  Isolates that are initially susceptible may become resistant within three to four days after initiation of therapy. Testing of repeat isolates may be warranted.                 Anaerobic Culture - Body Fluid, Pleural Cavity [[id number]]  (Normal) Collected:  01/15/18 1555    Lab Status:  Preliminary result Specimen:  Body Fluid from Pleural Cavity Updated:  01/16/18 1239     Culture No anaerobes isolated          Imaging Results (last 24 hours)     Procedure Component Value Units Date/Time    XR Chest 1 View [[id number]] Collected:  01/19/18 0838     Updated:  01/20/18 0949    Narrative:       EXAMINATION: XR CHEST 1 VW-01/19/2018:      INDICATION: Postop; Z74.09-Other reduced mobility.      COMPARISON: 01/18/2018.     FINDINGS: Right pleural drain remains in place. There is advanced  bullous disease of the upper lobes. No pneumothorax is identified. Hazy  opacity of the right base is stable or only minimally increased. Chronic  appearing left basilar interstitial changes appear stable.           Impression:       No new chest disease.     D:  01/19/2018  E:  01/19/2018     This report was finalized on 1/20/2018 9:47 AM by DR. Kamran Warren MD.       XR Chest 1 View [[id number]] Collected:  01/20/18 0756     Updated:  01/20/18 1342    Narrative:          EXAMINATION: XR CHEST 1 VW - 01/20/2018     INDICATION:  Chest tube pull; Z74.09-Other reduced mobility.      COMPARISON: 01/19/2018.     FINDINGS: Interval removal of right pigtail chest tube with preserved  ventilation and unchanged aeration of the right hemithorax with  persistent scarring and mid lung opacifications from prior. Trace right  pleural effusion. No discrete pneumothorax.           Impression:       Removal of right pigtail chest tube without significant  change in overall aeration or midlung opacifications.     DICTATED:     01/20/2018  EDITED    :     01/20/2018      This report was finalized on 1/20/2018 1:40 PM by Dr. Myles Upton.           Results for orders placed during the hospital encounter of 01/07/18   Adult Transthoracic Echo Complete W/ Cont if Necessary Per Protocol    Narrative Technically very limited study   1) Normal LV systolic function ( EF is about 55%)  2) Normal left atrial size with normal LVedp   3) Trace MR   4) Mild TR with normal PA pressures ( within limitations of study)  5) Severe RV dilation with wall motion abnormality ? Acute on chronic   corpulmonale   6) Severe reduction in RV function        I have reviewed the medications.    Assessment/Plan   Assessment / Plan     Hospital Problem List     * (Principal)Hydropneumothorax, right    Acute on chronic respiratory failure with hypoxia    Pneumonia of both lower lobes    Chronic bullous emphysema (Chronic)    Hypertension    Tobacco use         Brief Hospital Course to date:  Niall Murguia is a 52 y.o. male who was transferred to [facility] on 1/14 from [facility] after presenting there on 1/7 with acute dyspnea, found to have acute hypoxemic respiratory failure and MRSA pneumonia:     Assessment & Plan:    Acute on Chronic Hypoxemic Respiratory Failure, Improving   Right-sided MRSA Pneumonia, improving  Complicated Right Parapneumonic Effusion with MRSA in pleural fluid culture, suspect empyema, unable to rule out bronchopleural fistula  - Continue supplemental O2  (on 3LNC when seen), wean as tolerated, continue pulmonary toilet   -  Unable to rule out bronchopleural fistula.   -  Continue IV vancomycin. Dr. Sr added zosyn to cover for gram negative organisms with subsequent improvement in WBC  - Repeat CBC in AM, monitor pt closely   -Taper off Celexa , 20 mg ×1 more day, then 10 mg times 2 days and stop, taper ordered in Epic   - CXR from this AM personally reviewed and stable from prior on report, ? 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 Discussed case on multidisciplinary rounds.     Chuy Alanis MD  01/20/18  12:14 PM

## 2018-01-20 NOTE — PROGRESS NOTES
[facility] Progress Note        Antibiotics:  Anti-Infectives     Ordered     Dose/Rate Route Frequency Start Stop    01/20/18 1325  Pharmacy to dose vancomycin     Ordering Provider:  Rafael Sr MD     Does not apply Continuous PRN 01/20/18 1325 01/27/18 1324    01/19/18 0917  piperacillin-tazobactam (ZOSYN) 3.375 g in iso-osmotic dextrose 50 ml (premix)     Ordering Provider:  Rafael Sr MD    3.375 g  over 4 Hours Intravenous Every 8 Hours 01/19/18 1600 01/29/18 1559    01/19/18 0917  piperacillin-tazobactam (ZOSYN) 3.375 g in iso-osmotic dextrose 50 ml (premix)     Ordering Provider:  Rafael Sr MD    3.375 g  over 30 Minutes Intravenous Once 01/19/18 1000 01/19/18 1314    01/17/18 1859  vancomycin 1250 mg/250 mL 0.9% NS IVPB ([facility])     Chuy Alanis MD reviewed the order on 01/18/18 0610.   Ordering Provider:  Chuy Alanis MD    1,250 mg  over 90 Minutes Intravenous Every 8 Hours 01/18/18 0200 01/27/18 1759    01/14/18 2103  vancomycin 1250 mg/250 mL 0.9% NS IVPB ([facility])     Ordering Provider:  Jake Tinsley MD    20 mg/kg × 62.4 kg  over 90 Minutes Intravenous Once 01/14/18 2200 01/15/18 0041    01/14/18 2055  Pharmacy to dose vancomycin     Ordering Provider:  KEAGAN Orellana     Does not apply Continuous PRN 01/14/18 2049 01/21/18 2048          CC: sob/cough    HPI:  Patient is a 52 y.o.  Yr old male with history of bullous emphysema with prior right lung surgery in 2010 per patient related to collapsed lung but he did not recall any prior infection and specifics of that are unclear.  He was admitted to [facility] January 7 with 3 days progressive cough/dyspnea and hemoptysis.  Concern for preceding viral prodrome per outside records.  He had acute kidney injury, severe leukocytosis and elevated lactate with diagnosis acute severe sepsis/acute hypoxic respiratory failure and extensive right-sided pneumonia, admitted to ICU; chest imaging apparently had right  "hydropneumothorax and bilateral pneumonia with transferred to [facility] on January 14.  He was seen by Dr. Ge and underwent right sided chest tube.  Sputum from January 9 has MRSA, pleural fluid from January 15 has staph aureus in culture.     1/20/18 seen early and sleepy;  Per nursing,  less shortness of breath/cough, requiring nasal cannula oxygen but generally feels somewhat better.  No headache photophobia or neck stiffness.  He denies chest pain at present.  Tube remains.  No nausea vomiting diarrhea or abdominal pain.  No skin rash.  No dysuria hematuria or pyuria.    ROS:      1/20/18 No f/c/s. No n/v/d. No rash. No new ADR to Abx.     Constitutional-- No Fever, chills or sweats.  Appetite Diminished and generally fatigued  Heent-- No new vision, hearing or throat complaints.  No epistaxis or oral sores.  Denies odynophagia or dysphagia.  No flashers, floaters or eye pain. No odynophagia or dysphagia. No headache, photophobia or neck stiffness.  CV-- No chest pain, palpitation or syncope  Resp-- as above  GI- No nausea, vomiting, or diarrhea.  No hematochezia, melena, or hematemesis. Denies jaundice or chronic liver disease.  -- No dysuria, hematuria, or flank pain.  Denies hesitancy, urgency or flank pain.  Lymph- no swollen lymph nodes in neck/axilla or groin.   Heme- No active bruising or bleeding; no Hx of DVT or PE.  MS-- no swelling or pain in the bones or joints of arms/legs.  No new back pain.  Neuro-- No acute focal weakness or numbness in the arms or legs.  No seizures.     Full 12 point review of systems reviewed and negative otherwise for acute complaints, except for above      PE:   /61 (BP Location: Left arm, Patient Position: Lying)  Pulse 92  Temp 97.8 °F (36.6 °C) (Oral)   Resp 16  Ht 172.7 cm (68\")  Wt 66.7 kg (147 lb)  SpO2 92%  BMI 22.35 kg/m2    GENERAL: sleepy, in no acute distress.  Nasal cannula oxygen  HEENT: Normocephalic, atraumatic.  PERRL. EOMI. " No conjunctival injection. No icterus. Oropharynx clear without evidence of thrush or exudate. No evidence of peridontal disease.    NECK: Supple without nuchal rigidity. No mass.  LYMPH: No cervical, axillary or inguinal lymphadenopathy.  HEART: RRR; No murmur, rubs, gallops.   LUNGS: Diminished on right more so than left with scattered rhonchi. Normal respiratory effort. Nonlabored. No dullness.  Chest tube  ABDOMEN: Soft, nontender, nondistended. Positive bowel sounds. No rebound or guarding. NO mass or HSM.  EXT:  No cyanosis, clubbing or edema. No cord.  : Genitalia generally unremarkable.  Without Pedraza catheter.  MSK: FROM without joint effusions noted arms/legs.    SKIN: Warm and dry without cutaneous eruptions on Inspection/palpation.    NEURO: Oriented to PPT. No focal deficits on motor/sensory exam at arms/legs.  PSYCHIATRIC: Normal insight and judgement. Cooperative with PE     No peripheral stigmata/phenomena of endocarditis    Laboratory Data      Results from last 7 days  Lab Units 01/20/18 0447 01/19/18  0407 01/18/18  0420   WBC 10*3/mm3 8.16 14.14* 16.75*   HEMOGLOBIN g/dL 10.7* 11.2* 12.1*   HEMATOCRIT % 31.6* 33.7* 35.5*   PLATELETS 10*3/mm3 241 245 234       Results from last 7 days  Lab Units 01/20/18  0447   SODIUM mmol/L 129*   POTASSIUM mmol/L 4.1   CHLORIDE mmol/L 95*   CO2 mmol/L 29.0   BUN mg/dL 9   CREATININE mg/dL 0.50*   GLUCOSE mg/dL 87   CALCIUM mg/dL 8.2*       Results from last 7 days  Lab Units 01/20/18  0447  01/15/18  0526   ALK PHOS U/L 54  < > 55   BILIRUBIN mg/dL 0.8  < > 0.5   BILIRUBIN DIRECT mg/dL  --   --  0.2   ALT (SGPT) U/L 51*  < > 175*   AST (SGOT) U/L 25  < > 57*   < > = values in this interval not displayed.            Estimated Creatinine Clearance: 164.9 mL/min (by C-G formula based on Cr of 0.5).      Microbiology:      Radiology:  Imaging Results (last 72 hours)     Procedure Component Value Units Date/Time    CT Guided Chest Tube [[id number]] Collected:   01/16/18 0958     Updated:  01/16/18 1105    Narrative:       EXAMINATION: CT GUIDED CHEST TUBE-      INDICATION: Right apical pneumothorax; CT-guided catheter thoracentesis  for therapeutic and diagnostic purposes. Complex pleural effusion right  chest.     TECHNIQUE: Patient has no known allergies.     The clotting profile is acceptable. The PTT is 27.8, PT 12.0, INR 1.10,  platelets 226,000.     The radiation dose reduction device was turned on for each scan per the  ALARA (As Low as Reasonably Achievable) protocol.     COMPARISON: None.     FINDINGS:   1. The procedure, risks, complications and side effects of CT-guided  catheter placement in the right pleural space for therapeutic and  diagnostic drainage of the effusion in the right chest and for  decompression of the patient's right pneumothorax was discussed and  reviewed in detail with the patient including possible risk and  complications which include bleeding, infection, injury or laceration of  the intercostal artery with massive bleeding, puncture of the patient's  known high-grade bulla in the right lung with high-grade pleural leak,  air embolization, and other possible risk and complications.  Patient  understood and consented.     2. With the patient in the supine position, under sterile technique,  local anesthesia and meticulous CT guidance, from the right posterior  axillary line, a #12 Gabonese multi-sidehole drainage catheter was  introduced into the right posterior inferior pleural space. The patient  has loculated pleural fluid and a complex airspace consolidative disease  extensively and therefore the catheter was placed in one of the  loculated compartments of fluid. Hopefully this catheter position will  communicate with other areas of fluid in the right hemithorax.     3. Fluid was extracted and immediately sent to the laboratory for all  diagnostic studies ordered by the attending physician.     A total of 180 mL of fluid was removed from  the right pleural space for  diagnostic laboratory purposes.     4. The catheter was then sutured in place with 0 silk. A revolution  fixation device was applied and a sterile dressing was applied.     The catheter will be placed to a Pleur-evac atrium suction device for  decompression of the right chest and the right pneumothorax.       Impression:       1. A #12 Kosovan drainage catheter has been placed into the right  posterior inferior pleural space from the right posterior axillary line.  Fluid was obtained for all diagnostic studies ordered by the attending  physician.  2. The catheter was fixed, sutured and dressed. Catheter will be placed  to a Pleur-evac atrium suction device for decompression of free fluid  from the right hemithorax and hopefully compression of the right  pneumothorax.  3. Patient tolerated the procedure well. There were no complications. He  was taken back to his room in satisfactory and stable condition.     D:  01/16/2018  E:  01/16/2018     This report was finalized on 1/16/2018 11:03 AM by Dr. Harlan Fisher MD.       XR Chest 1 View [[id number]] Collected:  01/16/18 0923     Updated:  01/16/18 1405    Narrative:       EXAMINATION: XR CHEST 1 VW- 01/16/2018     INDICATION: pneumohydrothorax      COMPARISON: 01/15/2018     FINDINGS: A right chest tube has been inserted since the previous  examination. The right apical pneumothorax is smaller. There is  persistent airspace disease in a perihilar and bibasilar distribution,  right greater than left.           Impression:       Pneumothorax is slightly smaller following insertion of a  chest tube.     D:  01/16/2018  E:  01/16/2018     This report was finalized on 1/16/2018 2:02 PM by Dr. Desmond Calvin MD.       XR Chest 1 View [[id number]] Collected:  01/17/18 1001     Updated:  01/17/18 1350    Narrative:       EXAMINATION: XR CHEST 1 VW- 01/17/2018     INDICATION: postop      COMPARISON: 01/16/2018     FINDINGS: A right chest tube remains  well-positioned. There has been  reduction in the right pneumothorax. There is persistent patchy  bibasilar airspace disease.           Impression:       The pneumothorax has largely resolved. The bibasilar  pulmonary changes are unchanged.     D:  01/17/2018  E:  01/17/2018     This report was finalized on 1/17/2018 1:48 PM by Dr. Desmond Calvin MD.       XR Chest 1 View [[id number]] Collected:  01/18/18 0937     Updated:  01/18/18 1159    Narrative:       EXAMINATION: XR CHEST 1 VW-01/18/2018:      INDICATION: Postop; Z74.09-Other reduced mobility.      COMPARISON: 01/17/2018.     FINDINGS: A right chest tube remains in position. The right lung is  expanded. There is patchy right basilar airspace disease. The cardiac  silhouette is normal.           Impression:       Severe chronic obstructive and postinflammatory changes with  patchy airspace disease in the right upper lobe and with a right chest  tube in position. There is no pneumothorax and there has been no change  since the previous examination.     D:  01/18/2018  E:  01/18/2018     This report was finalized on 1/18/2018 11:57 AM by Dr. Desmond Calvin MD.       XR Chest 1 View [[id number]] Collected:  01/19/18 0838     Updated:  01/19/18 0838    Narrative:       EXAMINATION: XR CHEST 1 VW-01/19/2018:      INDICATION: Postop; Z74.09-Other reduced mobility.      COMPARISON: 01/18/2018.     FINDINGS: Right pleural drain remains in place. There is advanced  bullous disease of the upper lobes. No pneumothorax is identified. Hazy  opacity of the right base is stable or only minimally increased. Chronic  appearing left basilar interstitial changes appear stable.           Impression:       No new chest disease.     D:  01/19/2018  E:  01/19/2018                     Impression:   --Acute severe sepsis.  Sepsis parameters improved since presentation to [facility].  Gen. resuscitative measures per internal medicine.  Otherwise as below     --Acute bilateral  pneumonia with MRSA/usual placido in sputum; complicated by right sided empyema with culture positive pleural fluid and hydropneumothorax by prior imaging with current chest tube per cardiothoracic surgery.  Complex process in this patient with severe bullous emphysema, past lung surgery on the right per patient and ongoing tobacco abuse in addition to other comorbidity.  I have discussed directly with Dr. Ge and Dr. Alanis; Dr. Ge to decide on timing/options/threshold for further intervention such as surgery/decortication versus alternative tube drainage or other intervention regarding empyema and pneumothorax.  Patient understands that antibiotics are not a guarantee for cure and that he runs a risk for persistent/relapse/progressive infection in addition to chronic pulmonary functional restrictions and other serious morbidity/serious sequela etc.  Blood cultures negative with no other obvious metastatic focus of involvement.  Primary options for treatment include vancomycin versus zyvox; Dr. Alanis tapering SSRI to help minimize risk for interaction with Zyvox to help facilitate it as better option in future.        --Acute right sided empyema with staph aureus and culture associated with pneumonia as above.  Complicated with prior history of surgery, ongoing bullous emphysema etc.  Unclear at present whether he has bronchopleural fistula but any further decision regarding timing/options/threshold for surgical intervention such as decortication, etc. is left to Dr. Ge.  I discussed directly with him.     --Acute kidney injury at presentation to [facility].  Creatinine has trended down; likely initially prerenal associated with sepsis     --Chronic bullous emphysema with prior right thoracotomy per patient.  No operative note available to me.  This would need to be clarified by Dr. Ge     --Chronic tobacco abuse.  I discussed potential benefits of cessation.  It is not clear at present  but he is committed to quitting    PLAN:   --IV vancomycin/zosyn     --Monitor IV and IV antibiotic with risk for systemic complication and potential drug interaction     --Check/review labs cultures and scans     --Highly complex set of issues with high risk for further serious morbidity and other serious sequela     --d/w Dr Alanis and Dr Ge as above    Rafael Sr MD  1/20/2018

## 2018-01-21 ENCOUNTER — APPOINTMENT (OUTPATIENT)
Dept: GENERAL RADIOLOGY | Facility: HOSPITAL | Age: 53
End: 2018-01-21

## 2018-01-21 LAB
ANION GAP SERPL CALCULATED.3IONS-SCNC: 7 MMOL/L (ref 3–11)
BUN BLD-MCNC: 8 MG/DL (ref 9–23)
BUN/CREAT SERPL: 13.3 (ref 7–25)
CALCIUM SPEC-SCNC: 8.5 MG/DL (ref 8.7–10.4)
CHLORIDE SERPL-SCNC: 96 MMOL/L (ref 99–109)
CO2 SERPL-SCNC: 26 MMOL/L (ref 20–31)
CREAT BLD-MCNC: 0.6 MG/DL (ref 0.6–1.3)
DEPRECATED RDW RBC AUTO: 48.5 FL (ref 37–54)
ERYTHROCYTE [DISTWIDTH] IN BLOOD BY AUTOMATED COUNT: 12.7 % (ref 11.3–14.5)
GFR SERPL CREATININE-BSD FRML MDRD: 142 ML/MIN/1.73
GLUCOSE BLD-MCNC: 85 MG/DL (ref 70–100)
HCT VFR BLD AUTO: 30.4 % (ref 38.9–50.9)
HGB BLD-MCNC: 10.2 G/DL (ref 13.1–17.5)
MCH RBC QN AUTO: 34.7 PG (ref 27–31)
MCHC RBC AUTO-ENTMCNC: 33.6 G/DL (ref 32–36)
MCV RBC AUTO: 103.4 FL (ref 80–99)
PLATELET # BLD AUTO: 256 10*3/MM3 (ref 150–450)
PMV BLD AUTO: 9.8 FL (ref 6–12)
POTASSIUM BLD-SCNC: 4.1 MMOL/L (ref 3.5–5.5)
RBC # BLD AUTO: 2.94 10*6/MM3 (ref 4.2–5.76)
SODIUM BLD-SCNC: 127 MMOL/L (ref 132–146)
SODIUM BLD-SCNC: 129 MMOL/L (ref 132–146)
WBC NRBC COR # BLD: 8.55 10*3/MM3 (ref 3.5–10.8)

## 2018-01-21 PROCEDURE — 25010000002 PIPERACILLIN SOD-TAZOBACTAM PER 1 G: Performed by: INTERNAL MEDICINE

## 2018-01-21 PROCEDURE — 84295 ASSAY OF SERUM SODIUM: CPT | Performed by: HOSPITALIST

## 2018-01-21 PROCEDURE — 99233 SBSQ HOSP IP/OBS HIGH 50: CPT | Performed by: INTERNAL MEDICINE

## 2018-01-21 PROCEDURE — 94640 AIRWAY INHALATION TREATMENT: CPT

## 2018-01-21 PROCEDURE — 80048 BASIC METABOLIC PNL TOTAL CA: CPT | Performed by: HOSPITALIST

## 2018-01-21 PROCEDURE — 94799 UNLISTED PULMONARY SVC/PX: CPT

## 2018-01-21 PROCEDURE — 71045 X-RAY EXAM CHEST 1 VIEW: CPT

## 2018-01-21 PROCEDURE — 99231 SBSQ HOSP IP/OBS SF/LOW 25: CPT | Performed by: PHYSICIAN ASSISTANT

## 2018-01-21 PROCEDURE — 25010000002 VANCOMYCIN 10 G RECONSTITUTED SOLUTION: Performed by: HOSPITALIST

## 2018-01-21 PROCEDURE — 85027 COMPLETE CBC AUTOMATED: CPT | Performed by: HOSPITALIST

## 2018-01-21 PROCEDURE — 25010000002 HEPARIN (PORCINE) PER 1000 UNITS: Performed by: HOSPITALIST

## 2018-01-21 PROCEDURE — 25010000002 VANCOMYCIN: Performed by: HOSPITALIST

## 2018-01-21 RX ADMIN — VANCOMYCIN HYDROCHLORIDE 1250 MG: 10 INJECTION, POWDER, LYOPHILIZED, FOR SOLUTION INTRAVENOUS at 17:16

## 2018-01-21 RX ADMIN — HYDROCODONE BITARTATE AND ACETAMINOPHEN 1 TABLET: 5; 325 TABLET ORAL at 17:16

## 2018-01-21 RX ADMIN — LIDOCAINE 1 PATCH: 50 PATCH CUTANEOUS at 08:25

## 2018-01-21 RX ADMIN — HEPARIN SODIUM 5000 UNITS: 5000 INJECTION, SOLUTION INTRAVENOUS; SUBCUTANEOUS at 05:21

## 2018-01-21 RX ADMIN — TAZOBACTAM SODIUM AND PIPERACILLIN SODIUM 3.38 G: 375; 3 INJECTION, SOLUTION INTRAVENOUS at 08:25

## 2018-01-21 RX ADMIN — IPRATROPIUM BROMIDE AND ALBUTEROL SULFATE 3 ML: .5; 3 SOLUTION RESPIRATORY (INHALATION) at 13:49

## 2018-01-21 RX ADMIN — BUDESONIDE AND FORMOTEROL FUMARATE DIHYDRATE 2 PUFF: 160; 4.5 AEROSOL RESPIRATORY (INHALATION) at 19:05

## 2018-01-21 RX ADMIN — VANCOMYCIN HYDROCHLORIDE 1250 MG: 10 INJECTION, POWDER, LYOPHILIZED, FOR SOLUTION INTRAVENOUS at 03:50

## 2018-01-21 RX ADMIN — CITALOPRAM HYDROBROMIDE 20 MG: 20 TABLET ORAL at 08:39

## 2018-01-21 RX ADMIN — HEPARIN SODIUM 5000 UNITS: 5000 INJECTION, SOLUTION INTRAVENOUS; SUBCUTANEOUS at 13:33

## 2018-01-21 RX ADMIN — TAZOBACTAM SODIUM AND PIPERACILLIN SODIUM 3.38 G: 375; 3 INJECTION, SOLUTION INTRAVENOUS at 00:04

## 2018-01-21 RX ADMIN — TAZOBACTAM SODIUM AND PIPERACILLIN SODIUM 3.38 G: 375; 3 INJECTION, SOLUTION INTRAVENOUS at 23:25

## 2018-01-21 RX ADMIN — NICOTINE 1 PATCH: 21 PATCH, EXTENDED RELEASE TRANSDERMAL at 20:35

## 2018-01-21 RX ADMIN — VANCOMYCIN HYDROCHLORIDE 1250 MG: 10 INJECTION, POWDER, LYOPHILIZED, FOR SOLUTION INTRAVENOUS at 11:24

## 2018-01-21 RX ADMIN — Medication 250 MG: at 08:24

## 2018-01-21 RX ADMIN — Medication 1 G: at 08:24

## 2018-01-21 RX ADMIN — HEPARIN SODIUM 5000 UNITS: 5000 INJECTION, SOLUTION INTRAVENOUS; SUBCUTANEOUS at 20:36

## 2018-01-21 RX ADMIN — IPRATROPIUM BROMIDE AND ALBUTEROL SULFATE 3 ML: .5; 3 SOLUTION RESPIRATORY (INHALATION) at 00:29

## 2018-01-21 RX ADMIN — Medication 1 G: at 17:16

## 2018-01-21 RX ADMIN — IPRATROPIUM BROMIDE AND ALBUTEROL SULFATE 3 ML: .5; 3 SOLUTION RESPIRATORY (INHALATION) at 07:40

## 2018-01-21 RX ADMIN — BUDESONIDE AND FORMOTEROL FUMARATE DIHYDRATE 2 PUFF: 160; 4.5 AEROSOL RESPIRATORY (INHALATION) at 07:41

## 2018-01-21 RX ADMIN — TAZOBACTAM SODIUM AND PIPERACILLIN SODIUM 3.38 G: 375; 3 INJECTION, SOLUTION INTRAVENOUS at 17:15

## 2018-01-21 RX ADMIN — HYDROCODONE BITARTATE AND ACETAMINOPHEN 1 TABLET: 5; 325 TABLET ORAL at 23:25

## 2018-01-21 RX ADMIN — FOLIC ACID 1 MG: 1 TABLET ORAL at 08:24

## 2018-01-21 RX ADMIN — HYDROCODONE BITARTATE AND ACETAMINOPHEN 1 TABLET: 5; 325 TABLET ORAL at 08:25

## 2018-01-21 RX ADMIN — Medication 250 MG: at 20:35

## 2018-01-21 RX ADMIN — IPRATROPIUM BROMIDE AND ALBUTEROL SULFATE 3 ML: .5; 3 SOLUTION RESPIRATORY (INHALATION) at 18:55

## 2018-01-21 NOTE — PROGRESS NOTES
[facility] Progress Note        Antibiotics:  Anti-Infectives     Ordered     Dose/Rate Route Frequency Start Stop    01/20/18 1325  Pharmacy to dose vancomycin     Ordering Provider:  Rafael Sr MD     Does not apply Continuous PRN 01/20/18 1325 01/27/18 1324    01/19/18 0917  piperacillin-tazobactam (ZOSYN) 3.375 g in iso-osmotic dextrose 50 ml (premix)     Ordering Provider:  Rafael Sr MD    3.375 g  over 4 Hours Intravenous Every 8 Hours 01/19/18 1600 01/29/18 1559    01/19/18 0917  piperacillin-tazobactam (ZOSYN) 3.375 g in iso-osmotic dextrose 50 ml (premix)     Ordering Provider:  Rafael Sr MD    3.375 g  over 30 Minutes Intravenous Once 01/19/18 1000 01/19/18 1314    01/17/18 1859  vancomycin 1250 mg/250 mL 0.9% NS IVPB ([facility])     Chuy Alanis MD reviewed the order on 01/18/18 0610.   Ordering Provider:  Chuy Alanis MD    1,250 mg  over 90 Minutes Intravenous Every 8 Hours 01/18/18 0200 01/27/18 1759    01/14/18 2103  vancomycin 1250 mg/250 mL 0.9% NS IVPB ([facility])     Ordering Provider:  Jake Tinsley MD    20 mg/kg × 62.4 kg  over 90 Minutes Intravenous Once 01/14/18 2200 01/15/18 0041          CC: sob/cough    HPI:  Patient is a 52 y.o.  Yr old male with history of bullous emphysema with prior right lung surgery in 2010 per patient related to collapsed lung but he did not recall any prior infection and specifics of that are unclear.  He was admitted to [facility] January 7 with 3 days progressive cough/dyspnea and hemoptysis.  Concern for preceding viral prodrome per outside records.  He had acute kidney injury, severe leukocytosis and elevated lactate with diagnosis acute severe sepsis/acute hypoxic respiratory failure and extensive right-sided pneumonia, admitted to ICU; chest imaging apparently had right hydropneumothorax and bilateral pneumonia with transferred to [facility] on January 14.  He was seen by Dr. Ge and underwent right sided  "chest tube.  Sputum from January 9 has MRSA, pleural fluid from January 15 has staph aureus in culture.     1/21/18 seen early and sleepy;  Per nursing,  less shortness of breath/cough, requiring nasal cannula oxygen but generally feels somewhat better.  No headache photophobia or neck stiffness.  He denies chest pain at present.  Tube remains.  No nausea vomiting diarrhea or abdominal pain.  No skin rash.  No dysuria hematuria or pyuria.    ROS:      1/21/18 No f/c/s. No n/v/d. No rash. No new ADR to Abx.     Constitutional-- No Fever, chills or sweats.  Appetite Diminished and generally fatigued  Heent-- No new vision, hearing or throat complaints.  No epistaxis or oral sores.  Denies odynophagia or dysphagia.  No flashers, floaters or eye pain. No odynophagia or dysphagia. No headache, photophobia or neck stiffness.  CV-- No chest pain, palpitation or syncope  Resp-- as above  GI- No nausea, vomiting, or diarrhea.  No hematochezia, melena, or hematemesis. Denies jaundice or chronic liver disease.  -- No dysuria, hematuria, or flank pain.  Denies hesitancy, urgency or flank pain.  Lymph- no swollen lymph nodes in neck/axilla or groin.   Heme- No active bruising or bleeding; no Hx of DVT or PE.  MS-- no swelling or pain in the bones or joints of arms/legs.  No new back pain.  Neuro-- No acute focal weakness or numbness in the arms or legs.  No seizures.     Full 12 point review of systems reviewed and negative otherwise for acute complaints, except for above      PE:   /69  Pulse 86  Temp 97.2 °F (36.2 °C)  Resp 20  Ht 172.7 cm (68\")  Wt 66.7 kg (147 lb)  SpO2 96%  BMI 22.35 kg/m2    GENERAL: sleepy, in no acute distress.  Nasal cannula oxygen  HEENT: Normocephalic, atraumatic.  PERRL. EOMI. No conjunctival injection. No icterus. Oropharynx clear without evidence of thrush or exudate. No evidence of peridontal disease.    NECK: Supple without nuchal rigidity. No mass.  LYMPH: No cervical, axillary or " inguinal lymphadenopathy.  HEART: RRR; No murmur, rubs, gallops.   LUNGS: Diminished on right more so than left with scattered rhonchi. Normal respiratory effort. Nonlabored. No dullness.  Chest tube  ABDOMEN: Soft, nontender, nondistended. Positive bowel sounds. No rebound or guarding. NO mass or HSM.  EXT:  No cyanosis, clubbing or edema. No cord.  : Genitalia generally unremarkable.  Without Pedraza catheter.  MSK: FROM without joint effusions noted arms/legs.    SKIN: Warm and dry without cutaneous eruptions on Inspection/palpation.    NEURO: Oriented to PPT. No focal deficits on motor/sensory exam at arms/legs.  PSYCHIATRIC: Normal insight and judgement. Cooperative with PE     No peripheral stigmata/phenomena of endocarditis    Laboratory Data      Results from last 7 days  Lab Units 01/21/18  0435 01/20/18  0447 01/19/18  0407   WBC 10*3/mm3 8.55 8.16 14.14*   HEMOGLOBIN g/dL 10.2* 10.7* 11.2*   HEMATOCRIT % 30.4* 31.6* 33.7*   PLATELETS 10*3/mm3 256 241 245       Results from last 7 days  Lab Units 01/21/18  0435   SODIUM mmol/L 129*   POTASSIUM mmol/L 4.1   CHLORIDE mmol/L 96*   CO2 mmol/L 26.0   BUN mg/dL 8*   CREATININE mg/dL 0.60   GLUCOSE mg/dL 85   CALCIUM mg/dL 8.5*       Results from last 7 days  Lab Units 01/20/18  0447  01/15/18  0526   ALK PHOS U/L 54  < > 55   BILIRUBIN mg/dL 0.8  < > 0.5   BILIRUBIN DIRECT mg/dL  --   --  0.2   ALT (SGPT) U/L 51*  < > 175*   AST (SGOT) U/L 25  < > 57*   < > = values in this interval not displayed.            Estimated Creatinine Clearance: 137.4 mL/min (by C-G formula based on Cr of 0.6).      Microbiology:      Radiology:  Imaging Results (last 72 hours)     Procedure Component Value Units Date/Time    CT Guided Chest Tube [[id number]] Collected:  01/16/18 0958     Updated:  01/16/18 1105    Narrative:       EXAMINATION: CT GUIDED CHEST TUBE-      INDICATION: Right apical pneumothorax; CT-guided catheter thoracentesis  for therapeutic and diagnostic purposes.  Complex pleural effusion right  chest.     TECHNIQUE: Patient has no known allergies.     The clotting profile is acceptable. The PTT is 27.8, PT 12.0, INR 1.10,  platelets 226,000.     The radiation dose reduction device was turned on for each scan per the  ALARA (As Low as Reasonably Achievable) protocol.     COMPARISON: None.     FINDINGS:   1. The procedure, risks, complications and side effects of CT-guided  catheter placement in the right pleural space for therapeutic and  diagnostic drainage of the effusion in the right chest and for  decompression of the patient's right pneumothorax was discussed and  reviewed in detail with the patient including possible risk and  complications which include bleeding, infection, injury or laceration of  the intercostal artery with massive bleeding, puncture of the patient's  known high-grade bulla in the right lung with high-grade pleural leak,  air embolization, and other possible risk and complications.  Patient  understood and consented.     2. With the patient in the supine position, under sterile technique,  local anesthesia and meticulous CT guidance, from the right posterior  axillary line, a #12 Uzbek multi-sidehole drainage catheter was  introduced into the right posterior inferior pleural space. The patient  has loculated pleural fluid and a complex airspace consolidative disease  extensively and therefore the catheter was placed in one of the  loculated compartments of fluid. Hopefully this catheter position will  communicate with other areas of fluid in the right hemithorax.     3. Fluid was extracted and immediately sent to the laboratory for all  diagnostic studies ordered by the attending physician.     A total of 180 mL of fluid was removed from the right pleural space for  diagnostic laboratory purposes.     4. The catheter was then sutured in place with 0 silk. A revolution  fixation device was applied and a sterile dressing was applied.     The catheter  will be placed to a Pleur-evac atrium suction device for  decompression of the right chest and the right pneumothorax.       Impression:       1. A #12 Iraqi drainage catheter has been placed into the right  posterior inferior pleural space from the right posterior axillary line.  Fluid was obtained for all diagnostic studies ordered by the attending  physician.  2. The catheter was fixed, sutured and dressed. Catheter will be placed  to a Pleur-evac atrium suction device for decompression of free fluid  from the right hemithorax and hopefully compression of the right  pneumothorax.  3. Patient tolerated the procedure well. There were no complications. He  was taken back to his room in satisfactory and stable condition.     D:  01/16/2018  E:  01/16/2018     This report was finalized on 1/16/2018 11:03 AM by Dr. Harlan Fisher MD.       XR Chest 1 View [[id number]] Collected:  01/16/18 0923     Updated:  01/16/18 1405    Narrative:       EXAMINATION: XR CHEST 1 VW- 01/16/2018     INDICATION: pneumohydrothorax      COMPARISON: 01/15/2018     FINDINGS: A right chest tube has been inserted since the previous  examination. The right apical pneumothorax is smaller. There is  persistent airspace disease in a perihilar and bibasilar distribution,  right greater than left.           Impression:       Pneumothorax is slightly smaller following insertion of a  chest tube.     D:  01/16/2018  E:  01/16/2018     This report was finalized on 1/16/2018 2:02 PM by Dr. Desmond Calvin MD.       XR Chest 1 View [[id number]] Collected:  01/17/18 1001     Updated:  01/17/18 1350    Narrative:       EXAMINATION: XR CHEST 1 VW- 01/17/2018     INDICATION: postop      COMPARISON: 01/16/2018     FINDINGS: A right chest tube remains well-positioned. There has been  reduction in the right pneumothorax. There is persistent patchy  bibasilar airspace disease.           Impression:       The pneumothorax has largely resolved. The  bibasilar  pulmonary changes are unchanged.     D:  01/17/2018  E:  01/17/2018     This report was finalized on 1/17/2018 1:48 PM by Dr. Desmond Calvin MD.       XR Chest 1 View [[id number]] Collected:  01/18/18 0937     Updated:  01/18/18 1159    Narrative:       EXAMINATION: XR CHEST 1 VW-01/18/2018:      INDICATION: Postop; Z74.09-Other reduced mobility.      COMPARISON: 01/17/2018.     FINDINGS: A right chest tube remains in position. The right lung is  expanded. There is patchy right basilar airspace disease. The cardiac  silhouette is normal.           Impression:       Severe chronic obstructive and postinflammatory changes with  patchy airspace disease in the right upper lobe and with a right chest  tube in position. There is no pneumothorax and there has been no change  since the previous examination.     D:  01/18/2018  E:  01/18/2018     This report was finalized on 1/18/2018 11:57 AM by Dr. Desmond Calvin MD.       XR Chest 1 View [[id number]] Collected:  01/19/18 0838     Updated:  01/19/18 0838    Narrative:       EXAMINATION: XR CHEST 1 VW-01/19/2018:      INDICATION: Postop; Z74.09-Other reduced mobility.      COMPARISON: 01/18/2018.     FINDINGS: Right pleural drain remains in place. There is advanced  bullous disease of the upper lobes. No pneumothorax is identified. Hazy  opacity of the right base is stable or only minimally increased. Chronic  appearing left basilar interstitial changes appear stable.           Impression:       No new chest disease.     D:  01/19/2018  E:  01/19/2018                     Impression:   --Acute severe sepsis.  Sepsis parameters improved since presentation to [facility].  Gen. resuscitative measures per internal medicine.  Otherwise as below     --Acute bilateral pneumonia with MRSA/usual placido in sputum; complicated by right sided empyema with culture positive pleural fluid and hydropneumothorax by prior imaging with current chest tube per cardiothoracic  surgery.  Complex process in this patient with severe bullous emphysema, past lung surgery on the right per patient and ongoing tobacco abuse in addition to other comorbidity.  I have discussed directly with Dr. Ge and Dr. Alanis; Dr. Ge to decide on timing/options/threshold for further intervention such as surgery/decortication versus alternative tube drainage or other intervention regarding empyema and pneumothorax.  Patient understands that antibiotics are not a guarantee for cure and that he runs a risk for persistent/relapse/progressive infection in addition to chronic pulmonary functional restrictions and other serious morbidity/serious sequela etc.  Blood cultures negative with no other obvious metastatic focus of involvement.  Primary options for treatment include vancomycin versus zyvox; Dr. Alanis tapering SSRI to help minimize risk for interaction with Zyvox to help facilitate it an option in future.        --Acute right sided empyema with staph aureus and culture associated with pneumonia as above.  Complicated with prior history of surgery, ongoing bullous emphysema etc.  Unclear at present whether he has bronchopleural fistula but any further decision regarding timing/options/threshold for surgical intervention such as decortication, etc. is left to Dr. Ge.  I discussed directly with him.     --Acute kidney injury at presentation to [facility].  Creatinine has trended down; likely initially prerenal associated with sepsis     --Chronic bullous emphysema with prior right thoracotomy per patient.  No operative note available to me.  This would need to be clarified by Dr. Ge     --Chronic tobacco abuse.  I discussed potential benefits of cessation.  It is not clear at present but he is committed to quitting    PLAN:   --IV vancomycin/zosyn     --Monitor IV and IV antibiotic with risk for systemic complication and potential drug interaction     --Check/review labs cultures and  scans     --Highly complex set of issues with high risk for further serious morbidity and other serious sequela         Rafael Sr MD  1/21/2018

## 2018-01-21 NOTE — PROGRESS NOTES
[facility] Medicine Services  PROGRESS NOTE    Patient Name: Niall Murguia  : 1965  MRN: [id number]    Date of Admission: 2018  Length of Stay: 7  Primary Care Physician: KEAGAN Rowley    Subjective   Subjective     CC:  F/u pneumonia    HPI:  Patient still reports some mild inferior lateral right chest wall pain (at site of chest tube) but notes continued slow improvement with dyspnea and mild cough.  No issues overnight otherwise. Sitting up eating M&Ms this am    Review of Systems  Gen- No fevers, chills  CV- No palpitations  Resp- Improving cough and dyspnea  GI- No N/V/D, abd pain    Otherwise ROS is negative except as mentioned in the HPI.    Objective   Objective     Vital Signs:   Temp:  [97.2 °F (36.2 °C)-98.1 °F (36.7 °C)] 97.2 °F (36.2 °C)  Heart Rate:  [] 80  Resp:  [18-22] 20  BP: (119-120)/(66-69) 119/69     Physical Exam:  Constitutional: Ill appearing but nontoxic, awake, alert  HENT: NCAT, mucous membranes moist  Respiratory: Diminished throughout with mild crackles to right base, no crepitus at bandage overlying former thoracostomy tube site  Cardiovascular: RRR, no murmurs, rubs, or gallops, palpable pedal pulses bilaterally  Gastrointestinal: Positive bowel sounds, soft, nontender, nondistended  Musculoskeletal: No bilateral ankle edema  Psychiatric: Appropriate affect, cooperative  Neurologic: Oriented x 3, strength symmetric in all extremities, Cranial Nerves grossly intact to confrontation, speech clear  Skin: No rashes    Results Reviewed:  I have personally reviewed current lab, radiology, and data and agree.      Results from last 7 days  Lab Units 18  0435 18  0447 18  0407  01/15/18  0526   WBC 10*3/mm3 8.55 8.16 14.14*  < > 18.53*   HEMOGLOBIN g/dL 10.2* 10.7* 11.2*  < > 12.3*   HEMATOCRIT % 30.4* 31.6* 33.7*  < > 37.2*   PLATELETS 10*3/mm3 256 241 245  < > 226   INR   --   --   --   --  1.10   < > = values  in this interval not displayed.    Results from last 7 days  Lab Units 01/21/18  0435 01/20/18  1742 01/20/18  0447  01/19/18  0407  01/18/18  0420   SODIUM mmol/L 129* 128* 129*  < > 126*  < > 125*   POTASSIUM mmol/L 4.1  --  4.1  --  4.1  --  3.8   CHLORIDE mmol/L 96*  --  95*  --  94*  --  98*   CO2 mmol/L 26.0  --  29.0  --  27.0  --  27.0   BUN mg/dL 8*  --  9  --  8*  --  9   CREATININE mg/dL 0.60  --  0.50*  --  0.60  --  0.50*   GLUCOSE mg/dL 85  --  87  --  106*  --  146*   CALCIUM mg/dL 8.5*  --  8.2*  --  8.3*  --  8.0*   ALT (SGPT) U/L  --   --  51*  --  55*  --  65*   AST (SGOT) U/L  --   --  25  --  22  --  15   < > = values in this interval not displayed.  No results found for: BNP  No results found for: PHART    Microbiology Results Abnormal     Procedure Component Value - Date/Time    Fungus Culture - Body Fluid, Pleural Cavity [[id number]] Collected:  01/15/18 1555    Lab Status:  Preliminary result Specimen:  Body Fluid from Pleural Cavity Updated:  01/20/18 1631     Fungus Culture No fungus isolated at less than 1 week    AFB Culture - Body Fluid, Pleural Cavity [[id number]]  (Normal) Collected:  01/15/18 1555    Lab Status:  Preliminary result Specimen:  Body Fluid from Pleural Cavity Updated:  01/20/18 1631     AFB Culture No AFB isolated at less than 1 week     AFB Stain No acid fast bacilli seen on concentrated smear    Respiratory Culture - Sputum, Cough [[id number]] Collected:  01/18/18 1428    Lab Status:  Final result Specimen:  Sputum from Cough Updated:  01/20/18 1004     Respiratory Culture --      Moderate growth (3+) Normal Respiratory Jacqueline     Gram Stain Result Many (4+) WBCs per low power field      Few (2+) Epithelial cells per low power field      Few (2+) Gram positive cocci in pairs      Occasional Gram positive bacilli    Body Fluid Culture - Body Fluid, Pleural Cavity [[id number]]  (Abnormal)  (Susceptibility) Collected:  01/15/18 1555    Lab Status:  Final result Specimen:   Body Fluid from Pleural Cavity Updated:  01/18/18 1221     BF Culture --      Scant growth (1+) Staphylococcus aureus, MRSA (A)        Methicillin resistant Staphylococcus aureus, Patient may be an isolation risk.  This isolate does not demonstrate inducible clindamycin resistance in vitro.          Gram Stain Result Moderate (3+) WBCs seen      No organisms seen    Susceptibility      Staphylococcus aureus, MRSA     JASON     Clindamycin 2 ug/ml Intermediate     Daptomycin 1 ug/ml Susceptible     Erythromycin >4 ug/ml Resistant     Gentamicin <=4 ug/ml Susceptible     Levofloxacin <=1 ug/ml Susceptible  [1]      Linezolid 2 ug/ml Susceptible     Oxacillin >2 ug/ml Resistant     Penicillin G >8 ug/ml Resistant     Quinupristin + Dalfopristin <=0.5 ug/ml Susceptible     Rifampin <=1 ug/ml Susceptible     Tetracycline <=4 ug/ml Susceptible     Trimethoprim + Sulfamethoxazole <=0.5/9.5 ug/ml Susceptible     Vancomycin 2 ug/ml Susceptible            [1]   Staphylococcus species may develop resistance during prolonged therapy with quinolones.  Isolates that are initially susceptible may become resistant within three to four days after initiation of therapy. Testing of repeat isolates may be warranted.                 Anaerobic Culture - Body Fluid, Pleural Cavity [[id number]]  (Normal) Collected:  01/15/18 1555    Lab Status:  Preliminary result Specimen:  Body Fluid from Pleural Cavity Updated:  01/16/18 1239     Culture No anaerobes isolated          Imaging Results (last 24 hours)     Procedure Component Value Units Date/Time    XR Chest 1 View [[id number]] Collected:  01/21/18 0740     Updated:  01/21/18 1223    Narrative:          EXAMINATION: XR CHEST 1 VW - 01/21/2018     INDICATION:  Z74.09-Other reduced mobility. Follow-up pneumothorax.     COMPARISON: One-day prior.     FINDINGS: Overall aeration similar to prior with scattered postsurgical  changes and scarring within the right hemithorax noted. Increased  lucency  within the right lung apex appears to be large bulla formation  as no distinct pleural line is evident. Left hemithorax grossly  unchanged. Cardiac size unchanged.        Impression:       Increased lucency within the right lung apex without  distinct pleural line likely represents improved  visualization/enlargement of large bulla formation. No discrete  pneumothorax identified however attention on follow-up.     DICTATED:     01/21/2018  EDITED    :     01/21/2018      This report was finalized on 1/21/2018 12:21 PM by Dr. Myles Upton.           Results for orders placed during the hospital encounter of 01/07/18   Adult Transthoracic Echo Complete W/ Cont if Necessary Per Protocol    Narrative Technically very limited study   1) Normal LV systolic function ( EF is about 55%)  2) Normal left atrial size with normal LVedp   3) Trace MR   4) Mild TR with normal PA pressures ( within limitations of study)  5) Severe RV dilation with wall motion abnormality ? Acute on chronic   corpulmonale   6) Severe reduction in RV function        I have reviewed the medications.    Assessment/Plan   Assessment / Plan     Hospital Problem List     * (Principal)Hydropneumothorax, right    Acute on chronic respiratory failure with hypoxia    Pneumonia of both lower lobes    Chronic bullous emphysema (Chronic)    Hypertension    Tobacco use         Brief Hospital Course to date:  Niall Murguia is a 52 y.o. male who was transferred to [facility] on 1/14 from [facility] after presenting there on 1/7 with acute dyspnea, found to have acute hypoxemic respiratory failure and MRSA pneumonia:     Assessment & Plan:    Acute on Chronic Hypoxemic Respiratory Failure, Improving   Right-sided MRSA Pneumonia, improving  Complicated Right Parapneumonic Effusion with MRSA in pleural fluid culture, suspect empyema, unable to rule out bronchopleural fistula  - Continue supplemental O2 (on 3LNC when seen), wean as tolerated, continue pulmonary  toilet   -  Unable to rule out bronchopleural fistula.   -  Continue IV vancomycin. Dr. 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   Nazanin Rockwell MD  01/21/18  1:46 PM

## 2018-01-21 NOTE — PLAN OF CARE
Problem: Patient Care Overview (Adult)  Goal: Plan of Care Review  Outcome: Ongoing (interventions implemented as appropriate)   01/21/18 0556   Coping/Psychosocial Response Interventions   Plan Of Care Reviewed With patient   Patient Care Overview   Progress improving   Outcome Evaluation   Outcome Summary/Follow up Plan Pt up to chair. Ambulated 350. IV ABX continued. 3L NC at all times. continue to monitor.       Problem: Respiratory Insufficiency (Adult)  Goal: Acid/Base Balance  Outcome: Ongoing (interventions implemented as appropriate)    Goal: Effective Ventilation  Outcome: Ongoing (interventions implemented as appropriate)      Problem: Pneumonia (Adult)  Goal: Signs and Symptoms of Listed Potential Problems Will be Absent or Manageable (Pneumonia)  Outcome: Ongoing (interventions implemented as appropriate)

## 2018-01-21 NOTE — PROGRESS NOTES
Cardiothoracic Surgery Progress Note      PPD # 6 s/p CT guided tube thoracostomy    Chief complaint: Some pain at tube site     LOS: 7 days      Subjective:  No complaints.  Denies shortness of breath  No new complaints    Objective:  Vital Signs  Temp:  [97.2 °F (36.2 °C)-98.1 °F (36.7 °C)] 97.2 °F (36.2 °C)  Heart Rate:  [] 86  Resp:  [18-22] 20  BP: (119-120)/(66-69) 119/69    Physical Exam:   General Appearance: alert, appears stated age and cooperative   Lungs: clear to auscultation, respirations regular, respirations even and respirations unlabored   Heart: regular rhythm & normal rate, normal S1, S2 and no murmur, no alejandra, no rub       Results:    Results from last 7 days  Lab Units 01/21/18  0435   WBC 10*3/mm3 8.55   HEMOGLOBIN g/dL 10.2*   HEMATOCRIT % 30.4*   PLATELETS 10*3/mm3 256       Results from last 7 days  Lab Units 01/21/18  0435   SODIUM mmol/L 129*   POTASSIUM mmol/L 4.1   CHLORIDE mmol/L 96*   CO2 mmol/L 26.0   BUN mg/dL 8*   CREATININE mg/dL 0.60   GLUCOSE mg/dL 85   CALCIUM mg/dL 8.5*         Assessment:  Right apical pneumothorax  Status post Right chest tube for hydropneumothorax.  Chest tube is out  Plan:  No pneumothorax on CXR after chest tube removed  Pulmonary toilet  ABX as per ID for MRSA PNA    Jesse Harris PA-C  01/21/18  8:58 AM

## 2018-01-22 LAB
ANION GAP SERPL CALCULATED.3IONS-SCNC: 5 MMOL/L (ref 3–11)
BACTERIA SPEC ANAEROBE CULT: NORMAL
BUN BLD-MCNC: 10 MG/DL (ref 9–23)
BUN/CREAT SERPL: 11.1 (ref 7–25)
CALCIUM SPEC-SCNC: 8.7 MG/DL (ref 8.7–10.4)
CHLORIDE SERPL-SCNC: 100 MMOL/L (ref 99–109)
CO2 SERPL-SCNC: 27 MMOL/L (ref 20–31)
CREAT BLD-MCNC: 0.9 MG/DL (ref 0.6–1.3)
DEPRECATED RDW RBC AUTO: 48.1 FL (ref 37–54)
ERYTHROCYTE [DISTWIDTH] IN BLOOD BY AUTOMATED COUNT: 12.8 % (ref 11.3–14.5)
GFR SERPL CREATININE-BSD FRML MDRD: 89 ML/MIN/1.73
GLUCOSE BLD-MCNC: 100 MG/DL (ref 70–100)
HCT VFR BLD AUTO: 30.3 % (ref 38.9–50.9)
HGB BLD-MCNC: 10.2 G/DL (ref 13.1–17.5)
MCH RBC QN AUTO: 35.2 PG (ref 27–31)
MCHC RBC AUTO-ENTMCNC: 33.7 G/DL (ref 32–36)
MCV RBC AUTO: 104.5 FL (ref 80–99)
PLATELET # BLD AUTO: 237 10*3/MM3 (ref 150–450)
PMV BLD AUTO: 9.3 FL (ref 6–12)
POTASSIUM BLD-SCNC: 3.9 MMOL/L (ref 3.5–5.5)
RBC # BLD AUTO: 2.9 10*6/MM3 (ref 4.2–5.76)
SODIUM BLD-SCNC: 131 MMOL/L (ref 132–146)
SODIUM BLD-SCNC: 132 MMOL/L (ref 132–146)
WBC NRBC COR # BLD: 7.13 10*3/MM3 (ref 3.5–10.8)

## 2018-01-22 PROCEDURE — 25010000002 VANCOMYCIN: Performed by: HOSPITALIST

## 2018-01-22 PROCEDURE — 25010000002 HEPARIN (PORCINE) PER 1000 UNITS: Performed by: HOSPITALIST

## 2018-01-22 PROCEDURE — 85027 COMPLETE CBC AUTOMATED: CPT | Performed by: INTERNAL MEDICINE

## 2018-01-22 PROCEDURE — 97116 GAIT TRAINING THERAPY: CPT

## 2018-01-22 PROCEDURE — 94799 UNLISTED PULMONARY SVC/PX: CPT

## 2018-01-22 PROCEDURE — 97110 THERAPEUTIC EXERCISES: CPT

## 2018-01-22 PROCEDURE — 99233 SBSQ HOSP IP/OBS HIGH 50: CPT | Performed by: INTERNAL MEDICINE

## 2018-01-22 PROCEDURE — 80048 BASIC METABOLIC PNL TOTAL CA: CPT | Performed by: INTERNAL MEDICINE

## 2018-01-22 PROCEDURE — 25010000002 VANCOMYCIN 10 G RECONSTITUTED SOLUTION: Performed by: HOSPITALIST

## 2018-01-22 PROCEDURE — 94640 AIRWAY INHALATION TREATMENT: CPT

## 2018-01-22 PROCEDURE — 84295 ASSAY OF SERUM SODIUM: CPT | Performed by: HOSPITALIST

## 2018-01-22 PROCEDURE — 25010000002 PIPERACILLIN SOD-TAZOBACTAM PER 1 G: Performed by: INTERNAL MEDICINE

## 2018-01-22 RX ADMIN — VANCOMYCIN HYDROCHLORIDE 1250 MG: 10 INJECTION, POWDER, LYOPHILIZED, FOR SOLUTION INTRAVENOUS at 17:03

## 2018-01-22 RX ADMIN — FOLIC ACID 1 MG: 1 TABLET ORAL at 08:37

## 2018-01-22 RX ADMIN — CITALOPRAM HYDROBROMIDE 10 MG: 20 TABLET ORAL at 15:03

## 2018-01-22 RX ADMIN — TAZOBACTAM SODIUM AND PIPERACILLIN SODIUM 3.38 G: 375; 3 INJECTION, SOLUTION INTRAVENOUS at 08:40

## 2018-01-22 RX ADMIN — IPRATROPIUM BROMIDE AND ALBUTEROL SULFATE 3 ML: .5; 3 SOLUTION RESPIRATORY (INHALATION) at 07:38

## 2018-01-22 RX ADMIN — VANCOMYCIN HYDROCHLORIDE 1250 MG: 10 INJECTION, POWDER, LYOPHILIZED, FOR SOLUTION INTRAVENOUS at 12:09

## 2018-01-22 RX ADMIN — HYDROCODONE BITARTATE AND ACETAMINOPHEN 1 TABLET: 5; 325 TABLET ORAL at 15:03

## 2018-01-22 RX ADMIN — LIDOCAINE 1 PATCH: 50 PATCH CUTANEOUS at 08:36

## 2018-01-22 RX ADMIN — Medication 250 MG: at 08:36

## 2018-01-22 RX ADMIN — BUDESONIDE AND FORMOTEROL FUMARATE DIHYDRATE 2 PUFF: 160; 4.5 AEROSOL RESPIRATORY (INHALATION) at 18:50

## 2018-01-22 RX ADMIN — VANCOMYCIN HYDROCHLORIDE 1250 MG: 10 INJECTION, POWDER, LYOPHILIZED, FOR SOLUTION INTRAVENOUS at 02:51

## 2018-01-22 RX ADMIN — Medication 1 G: at 08:37

## 2018-01-22 RX ADMIN — IPRATROPIUM BROMIDE AND ALBUTEROL SULFATE 3 ML: .5; 3 SOLUTION RESPIRATORY (INHALATION) at 12:56

## 2018-01-22 RX ADMIN — HYDROCODONE BITARTATE AND ACETAMINOPHEN 1 TABLET: 5; 325 TABLET ORAL at 21:42

## 2018-01-22 RX ADMIN — BUDESONIDE AND FORMOTEROL FUMARATE DIHYDRATE 2 PUFF: 160; 4.5 AEROSOL RESPIRATORY (INHALATION) at 07:39

## 2018-01-22 RX ADMIN — TAZOBACTAM SODIUM AND PIPERACILLIN SODIUM 3.38 G: 375; 3 INJECTION, SOLUTION INTRAVENOUS at 17:03

## 2018-01-22 RX ADMIN — Medication 250 MG: at 21:36

## 2018-01-22 RX ADMIN — HYDROCODONE BITARTATE AND ACETAMINOPHEN 1 TABLET: 5; 325 TABLET ORAL at 08:40

## 2018-01-22 RX ADMIN — HEPARIN SODIUM 5000 UNITS: 5000 INJECTION, SOLUTION INTRAVENOUS; SUBCUTANEOUS at 14:59

## 2018-01-22 RX ADMIN — NICOTINE 1 PATCH: 21 PATCH, EXTENDED RELEASE TRANSDERMAL at 21:38

## 2018-01-22 RX ADMIN — IPRATROPIUM BROMIDE AND ALBUTEROL SULFATE 3 ML: .5; 3 SOLUTION RESPIRATORY (INHALATION) at 18:50

## 2018-01-22 RX ADMIN — HEPARIN SODIUM 5000 UNITS: 5000 INJECTION, SOLUTION INTRAVENOUS; SUBCUTANEOUS at 05:23

## 2018-01-22 RX ADMIN — HEPARIN SODIUM 5000 UNITS: 5000 INJECTION, SOLUTION INTRAVENOUS; SUBCUTANEOUS at 21:37

## 2018-01-22 RX ADMIN — IPRATROPIUM BROMIDE AND ALBUTEROL SULFATE 3 ML: .5; 3 SOLUTION RESPIRATORY (INHALATION) at 00:15

## 2018-01-22 NOTE — PLAN OF CARE
Problem: Patient Care Overview (Adult)  Goal: Plan of Care Review  Outcome: Ongoing (interventions implemented as appropriate)   01/22/18 0420   Coping/Psychosocial Response Interventions   Plan Of Care Reviewed With patient   Patient Care Overview   Progress improving   Outcome Evaluation   Outcome Summary/Follow up Plan Tolerating current plan of care, VSS, unable to wean from 3LNC. Compliant with care/fluid restriction. Will continue to monitor, plan for D/C in 1-2 days if medically ready       Problem: Respiratory Insufficiency (Adult)  Goal: Acid/Base Balance  Outcome: Ongoing (interventions implemented as appropriate)    Goal: Effective Ventilation  Outcome: Ongoing (interventions implemented as appropriate)      Problem: Pneumonia (Adult)  Goal: Signs and Symptoms of Listed Potential Problems Will be Absent or Manageable (Pneumonia)  Outcome: Ongoing (interventions implemented as appropriate)

## 2018-01-22 NOTE — PROGRESS NOTES
[facility] Progress Note        Antibiotics:  Anti-Infectives     Ordered     Dose/Rate Route Frequency Start Stop    01/20/18 1325  Pharmacy to dose vancomycin     Ordering Provider:  Rafael Sr MD     Does not apply Continuous PRN 01/20/18 1325 01/27/18 1324    01/19/18 0917  piperacillin-tazobactam (ZOSYN) 3.375 g in iso-osmotic dextrose 50 ml (premix)     Ordering Provider:  Rafael Sr MD    3.375 g  over 4 Hours Intravenous Every 8 Hours 01/19/18 1600 01/29/18 1559    01/19/18 0917  piperacillin-tazobactam (ZOSYN) 3.375 g in iso-osmotic dextrose 50 ml (premix)     Ordering Provider:  Rafael Sr MD    3.375 g  over 30 Minutes Intravenous Once 01/19/18 1000 01/19/18 1314    01/17/18 1859  vancomycin 1250 mg/250 mL 0.9% NS IVPB ([facility])     Chuy Alanis MD reviewed the order on 01/18/18 0610.   Ordering Provider:  Chuy Alanis MD    1,250 mg  over 90 Minutes Intravenous Every 8 Hours 01/18/18 0200 01/27/18 1759    01/14/18 2103  vancomycin 1250 mg/250 mL 0.9% NS IVPB ([facility])     Ordering Provider:  Jake Tinsley MD    20 mg/kg × 62.4 kg  over 90 Minutes Intravenous Once 01/14/18 2200 01/15/18 0041          CC: sob/cough    HPI:  Patient is a 52 y.o.  Yr old male with history of bullous emphysema with prior right lung surgery in 2010 per patient related to collapsed lung but he did not recall any prior infection and specifics of that are unclear.  He was admitted to [facility] January 7 with 3 days progressive cough/dyspnea and hemoptysis.  Concern for preceding viral prodrome per outside records.  He had acute kidney injury, severe leukocytosis and elevated lactate with diagnosis acute severe sepsis/acute hypoxic respiratory failure and extensive right-sided pneumonia, admitted to ICU; chest imaging apparently had right hydropneumothorax and bilateral pneumonia with transferred to [facility] on January 14.  He was seen by Dr. Ge and underwent right sided  "chest tube.  Sputum from January 9 has MRSA, pleural fluid from January 15 has staph aureus in culture.     1/22/18 Generally doing ok; less shortness of breath/cough, requiring nasal cannula oxygen but generally feels somewhat better.  No headache photophobia or neck stiffness.  He denies chest pain at present.  Tube remains.  No nausea vomiting diarrhea or abdominal pain.  No skin rash.  No dysuria hematuria or pyuria.    ROS:      1/22/18 No f/c/s. No n/v/d. No rash. No new ADR to Abx.     Constitutional-- No Fever, chills or sweats.  Appetite Diminished and generally fatigued  Heent-- No new vision, hearing or throat complaints.  No epistaxis or oral sores.  Denies odynophagia or dysphagia.  No flashers, floaters or eye pain. No odynophagia or dysphagia. No headache, photophobia or neck stiffness.  CV-- No chest pain, palpitation or syncope  Resp-- as above  GI- No nausea, vomiting, or diarrhea.  No hematochezia, melena, or hematemesis. Denies jaundice or chronic liver disease.  -- No dysuria, hematuria, or flank pain.  Denies hesitancy, urgency or flank pain.  Lymph- no swollen lymph nodes in neck/axilla or groin.   Heme- No active bruising or bleeding; no Hx of DVT or PE.  MS-- no swelling or pain in the bones or joints of arms/legs.  No new back pain.  Neuro-- No acute focal weakness or numbness in the arms or legs.  No seizures.     Full 12 point review of systems reviewed and negative otherwise for acute complaints, except for above      PE:   /69 (BP Location: Left arm, Patient Position: Lying)  Pulse 77  Temp 98.4 °F (36.9 °C) (Oral)   Resp 16  Ht 172.7 cm (68\")  Wt 70.6 kg (155 lb 9.6 oz)  SpO2 94%  BMI 23.66 kg/m2    GENERAL: sleepy, in no acute distress.  Nasal cannula oxygen  HEENT: Normocephalic, atraumatic.  PERRL. EOMI. No conjunctival injection. No icterus. Oropharynx clear without evidence of thrush or exudate. No evidence of peridontal disease.    NECK: Supple without nuchal " rigidity. No mass.  LYMPH: No cervical, axillary or inguinal lymphadenopathy.  HEART: RRR; No murmur, rubs, gallops.   LUNGS: Diminished on right more so than left with scattered rhonchi. Normal respiratory effort. Nonlabored. No dullness.  Chest tube  ABDOMEN: Soft, nontender, nondistended. Positive bowel sounds. No rebound or guarding. NO mass or HSM.  EXT:  No cyanosis, clubbing or edema. No cord.  : Genitalia generally unremarkable.  Without Pedraza catheter.  MSK: FROM without joint effusions noted arms/legs.    SKIN: Warm and dry without cutaneous eruptions on Inspection/palpation.    NEURO: Oriented to PPT. No focal deficits on motor/sensory exam at arms/legs.  PSYCHIATRIC: Normal insight and judgement. Cooperative with PE     No peripheral stigmata/phenomena of endocarditis    Laboratory Data      Results from last 7 days  Lab Units 01/22/18  0549 01/21/18  0435 01/20/18  0447   WBC 10*3/mm3 7.13 8.55 8.16   HEMOGLOBIN g/dL 10.2* 10.2* 10.7*   HEMATOCRIT % 30.3* 30.4* 31.6*   PLATELETS 10*3/mm3 237 256 241       Results from last 7 days  Lab Units 01/22/18  0549   SODIUM mmol/L 132   POTASSIUM mmol/L 3.9   CHLORIDE mmol/L 100   CO2 mmol/L 27.0   BUN mg/dL 10   CREATININE mg/dL 0.90   GLUCOSE mg/dL 100   CALCIUM mg/dL 8.7       Results from last 7 days  Lab Units 01/20/18  0447   ALK PHOS U/L 54   BILIRUBIN mg/dL 0.8   ALT (SGPT) U/L 51*   AST (SGOT) U/L 25               Estimated Creatinine Clearance: 97 mL/min (by C-G formula based on Cr of 0.9).      Microbiology:      Radiology:  Imaging Results (last 72 hours)     Procedure Component Value Units Date/Time    CT Guided Chest Tube [[id number]] Collected:  01/16/18 0958     Updated:  01/16/18 1105    Narrative:       EXAMINATION: CT GUIDED CHEST TUBE-      INDICATION: Right apical pneumothorax; CT-guided catheter thoracentesis  for therapeutic and diagnostic purposes. Complex pleural effusion right  chest.     TECHNIQUE: Patient has no known allergies.      The clotting profile is acceptable. The PTT is 27.8, PT 12.0, INR 1.10,  platelets 226,000.     The radiation dose reduction device was turned on for each scan per the  ALARA (As Low as Reasonably Achievable) protocol.     COMPARISON: None.     FINDINGS:   1. The procedure, risks, complications and side effects of CT-guided  catheter placement in the right pleural space for therapeutic and  diagnostic drainage of the effusion in the right chest and for  decompression of the patient's right pneumothorax was discussed and  reviewed in detail with the patient including possible risk and  complications which include bleeding, infection, injury or laceration of  the intercostal artery with massive bleeding, puncture of the patient's  known high-grade bulla in the right lung with high-grade pleural leak,  air embolization, and other possible risk and complications.  Patient  understood and consented.     2. With the patient in the supine position, under sterile technique,  local anesthesia and meticulous CT guidance, from the right posterior  axillary line, a #12 Somali multi-sidehole drainage catheter was  introduced into the right posterior inferior pleural space. The patient  has loculated pleural fluid and a complex airspace consolidative disease  extensively and therefore the catheter was placed in one of the  loculated compartments of fluid. Hopefully this catheter position will  communicate with other areas of fluid in the right hemithorax.     3. Fluid was extracted and immediately sent to the laboratory for all  diagnostic studies ordered by the attending physician.     A total of 180 mL of fluid was removed from the right pleural space for  diagnostic laboratory purposes.     4. The catheter was then sutured in place with 0 silk. A revolution  fixation device was applied and a sterile dressing was applied.     The catheter will be placed to a Pleur-evac atrium suction device for  decompression of the right  chest and the right pneumothorax.       Impression:       1. A #12 [place]guillan drainage catheter has been placed into the right  posterior inferior pleural space from the right posterior axillary line.  Fluid was obtained for all diagnostic studies ordered by the attending  physician.  2. The catheter was fixed, sutured and dressed. Catheter will be placed  to a Pleur-evac atrium suction device for decompression of free fluid  from the right hemithorax and hopefully compression of the right  pneumothorax.  3. Patient tolerated the procedure well. There were no complications. He  was taken back to his room in satisfactory and stable condition.     D:  01/16/2018  E:  01/16/2018     This report was finalized on 1/16/2018 11:03 AM by Dr. Harlan Fisher MD.       XR Chest 1 View [[id number]] Collected:  01/16/18 0923     Updated:  01/16/18 1405    Narrative:       EXAMINATION: XR CHEST 1 VW- 01/16/2018     INDICATION: pneumohydrothorax      COMPARISON: 01/15/2018     FINDINGS: A right chest tube has been inserted since the previous  examination. The right apical pneumothorax is smaller. There is  persistent airspace disease in a perihilar and bibasilar distribution,  right greater than left.           Impression:       Pneumothorax is slightly smaller following insertion of a  chest tube.     D:  01/16/2018  E:  01/16/2018     This report was finalized on 1/16/2018 2:02 PM by Dr. Desmond Calvin MD.       XR Chest 1 View [[id number]] Collected:  01/17/18 1001     Updated:  01/17/18 1350    Narrative:       EXAMINATION: XR CHEST 1 VW- 01/17/2018     INDICATION: postop      COMPARISON: 01/16/2018     FINDINGS: A right chest tube remains well-positioned. There has been  reduction in the right pneumothorax. There is persistent patchy  bibasilar airspace disease.           Impression:       The pneumothorax has largely resolved. The bibasilar  pulmonary changes are unchanged.     D:  01/17/2018  E:  01/17/2018     This report was  finalized on 1/17/2018 1:48 PM by Dr. Desmond Calvin MD.       XR Chest 1 View [[id number]] Collected:  01/18/18 0937     Updated:  01/18/18 1159    Narrative:       EXAMINATION: XR CHEST 1 VW-01/18/2018:      INDICATION: Postop; Z74.09-Other reduced mobility.      COMPARISON: 01/17/2018.     FINDINGS: A right chest tube remains in position. The right lung is  expanded. There is patchy right basilar airspace disease. The cardiac  silhouette is normal.           Impression:       Severe chronic obstructive and postinflammatory changes with  patchy airspace disease in the right upper lobe and with a right chest  tube in position. There is no pneumothorax and there has been no change  since the previous examination.     D:  01/18/2018  E:  01/18/2018     This report was finalized on 1/18/2018 11:57 AM by Dr. Desmond Calvin MD.       XR Chest 1 View [[id number]] Collected:  01/19/18 0838     Updated:  01/19/18 0838    Narrative:       EXAMINATION: XR CHEST 1 VW-01/19/2018:      INDICATION: Postop; Z74.09-Other reduced mobility.      COMPARISON: 01/18/2018.     FINDINGS: Right pleural drain remains in place. There is advanced  bullous disease of the upper lobes. No pneumothorax is identified. Hazy  opacity of the right base is stable or only minimally increased. Chronic  appearing left basilar interstitial changes appear stable.           Impression:       No new chest disease.     D:  01/19/2018  E:  01/19/2018                     Impression:   --Acute severe sepsis.  Sepsis parameters improved since presentation to [facility].  Gen. resuscitative measures per internal medicine.  Otherwise as below     --Acute bilateral pneumonia with MRSA/usual placido in sputum; complicated by right sided empyema with culture positive pleural fluid and hydropneumothorax by prior imaging with current chest tube per cardiothoracic surgery.  Complex process in this patient with severe bullous emphysema, past lung surgery on the  right per patient and ongoing tobacco abuse in addition to other comorbidity.  I have discussed directly with Dr. Ge and Dr. Alanis; Dr. Ge to decide on timing/options/threshold for further intervention such as surgery/decortication versus alternative tube drainage or other intervention regarding empyema and pneumothorax.  Patient understands that antibiotics are not a guarantee for cure and that he runs a risk for persistent/relapse/progressive infection in addition to chronic pulmonary functional restrictions and other serious morbidity/serious sequela etc.  Blood cultures negative with no other obvious metastatic focus of involvement.  Primary options for treatment include vancomycin versus zyvox; Dr. Alanis tapering SSRI to help minimize risk for interaction with Zyvox to help facilitate it an option in future.        --Acute right sided empyema with staph aureus and culture associated with pneumonia as above.  Complicated with prior history of surgery, ongoing bullous emphysema etc.  Unclear at present whether he has bronchopleural fistula but any further decision regarding timing/options/threshold for surgical intervention such as decortication, etc. is left to Dr. Ge.  I discussed directly with him.     --Acute kidney injury at presentation to [facility].  Creatinine has trended down; likely initially prerenal associated with sepsis     --Chronic bullous emphysema with prior right thoracotomy per patient.  No operative note available to me.  This would need to be clarified by Dr. Ge     --Chronic tobacco abuse.  I discussed potential benefits of cessation.  It is not clear at present but he is committed to quitting    PLAN:   --IV vancomycin/zosyn     --Monitor IV and IV antibiotic with risk for systemic complication and potential drug interaction     --Check/review labs cultures and scans     --Highly complex set of issues with high risk for further serious morbidity and other  serious sequela         Rafael Sr MD  1/22/2018

## 2018-01-22 NOTE — PROGRESS NOTES
[facility] Medicine Services  PROGRESS NOTE    Patient Name: Niall Murguia  : 1965  MRN: [id number]    Date of Admission: 2018  Length of Stay: 8  Primary Care Physician: KEAGAN Rowley    Subjective   Subjective     CC:  F/u pneumonia    HPI:  Doing well this am, no issues overnight. Sitting on commode when I went to evaluate him. States that his cough is doing better- did have some sinus congestion overnight but this is overall better.     Review of Systems  Gen- No fevers, chills  CV- No palpitations  Resp- Improving cough and dyspnea  GI- No N/V/D, abd pain    Otherwise ROS is negative except as mentioned in the HPI.    Objective   Objective     Vital Signs:   Temp:  [97.4 °F (36.3 °C)-98.7 °F (37.1 °C)] 98.4 °F (36.9 °C)  Heart Rate:  [] 84  Resp:  [16-20] 16  BP: (106-121)/(56-69) 106/69     Physical Exam:  Constitutional: Ill appearing but nontoxic, awake, alert  HENT: NCAT, mucous membranes moist  Respiratory: Diminished throughout with mild crackles to right base, no crepitus at bandage overlying former thoracostomy tube site  Cardiovascular: RRR, no murmurs, rubs, or gallops, palpable pedal pulses bilaterally  Gastrointestinal: Positive bowel sounds, soft, nontender, nondistended  Musculoskeletal: No bilateral ankle edema  Psychiatric: Appropriate affect, cooperative  Neurologic: Oriented x 3, strength symmetric in all extremities, Cranial Nerves grossly intact to confrontation, speech clear  Skin: No rashes    Results Reviewed:  I have personally reviewed current lab, radiology, and data and agree.      Results from last 7 days  Lab Units 18  0549 18  0435 18  0447   WBC 10*3/mm3 7.13 8.55 8.16   HEMOGLOBIN g/dL 10.2* 10.2* 10.7*   HEMATOCRIT % 30.3* 30.4* 31.6*   PLATELETS 10*3/mm3 237 256 241       Results from last 7 days  Lab Units 18  0549 18  1807 18  0435  18  0447  18  0407  18  0420    SODIUM mmol/L 132 127* 129*  < > 129*  < > 126*  < > 125*   POTASSIUM mmol/L 3.9  --  4.1  --  4.1  --  4.1  --  3.8   CHLORIDE mmol/L 100  --  96*  --  95*  --  94*  --  98*   CO2 mmol/L 27.0  --  26.0  --  29.0  --  27.0  --  27.0   BUN mg/dL 10  --  8*  --  9  --  8*  --  9   CREATININE mg/dL 0.90  --  0.60  --  0.50*  --  0.60  --  0.50*   GLUCOSE mg/dL 100  --  85  --  87  --  106*  --  146*   CALCIUM mg/dL 8.7  --  8.5*  --  8.2*  --  8.3*  --  8.0*   ALT (SGPT) U/L  --   --   --   --  51*  --  55*  --  65*   AST (SGOT) U/L  --   --   --   --  25  --  22  --  15   < > = values in this interval not displayed.  No results found for: BNP  No results found for: PHART    Microbiology Results Abnormal     Procedure Component Value - Date/Time    Anaerobic Culture - Body Fluid, Pleural Cavity [[id number]]  (Normal) Collected:  01/15/18 1555    Lab Status:  Final result Specimen:  Body Fluid from Pleural Cavity Updated:  01/22/18 1330     Culture No anaerobes isolated    Fungus Culture - Body Fluid, Pleural Cavity [[id number]] Collected:  01/15/18 1555    Lab Status:  Preliminary result Specimen:  Body Fluid from Pleural Cavity Updated:  01/20/18 1631     Fungus Culture No fungus isolated at less than 1 week    AFB Culture - Body Fluid, Pleural Cavity [[id number]]  (Normal) Collected:  01/15/18 1555    Lab Status:  Preliminary result Specimen:  Body Fluid from Pleural Cavity Updated:  01/20/18 1631     AFB Culture No AFB isolated at less than 1 week     AFB Stain No acid fast bacilli seen on concentrated smear    Respiratory Culture - Sputum, Cough [[id number]] Collected:  01/18/18 1428    Lab Status:  Final result Specimen:  Sputum from Cough Updated:  01/20/18 1004     Respiratory Culture --      Moderate growth (3+) Normal Respiratory Jacqueline     Gram Stain Result Many (4+) WBCs per low power field      Few (2+) Epithelial cells per low power field      Few (2+) Gram positive cocci in pairs      Occasional Gram  positive bacilli    Body Fluid Culture - Body Fluid, Pleural Cavity [[id number]]  (Abnormal)  (Susceptibility) Collected:  01/15/18 1555    Lab Status:  Final result Specimen:  Body Fluid from Pleural Cavity Updated:  01/18/18 1221     BF Culture --      Scant growth (1+) Staphylococcus aureus, MRSA (A)        Methicillin resistant Staphylococcus aureus, Patient may be an isolation risk.  This isolate does not demonstrate inducible clindamycin resistance in vitro.          Gram Stain Result Moderate (3+) WBCs seen      No organisms seen    Susceptibility      Staphylococcus aureus, MRSA     JASON     Clindamycin 2 ug/ml Intermediate     Daptomycin 1 ug/ml Susceptible     Erythromycin >4 ug/ml Resistant     Gentamicin <=4 ug/ml Susceptible     Levofloxacin <=1 ug/ml Susceptible  [1]      Linezolid 2 ug/ml Susceptible     Oxacillin >2 ug/ml Resistant     Penicillin G >8 ug/ml Resistant     Quinupristin + Dalfopristin <=0.5 ug/ml Susceptible     Rifampin <=1 ug/ml Susceptible     Tetracycline <=4 ug/ml Susceptible     Trimethoprim + Sulfamethoxazole <=0.5/9.5 ug/ml Susceptible     Vancomycin 2 ug/ml Susceptible            [1]   Staphylococcus species may develop resistance during prolonged therapy with quinolones.  Isolates that are initially susceptible may become resistant within three to four days after initiation of therapy. Testing of repeat isolates may be warranted.                       Imaging Results (last 24 hours)     ** No results found for the last 24 hours. **        Results for orders placed during the hospital encounter of 01/07/18   Adult Transthoracic Echo Complete W/ Cont if Necessary Per Protocol    Narrative Technically very limited study   1) Normal LV systolic function ( EF is about 55%)  2) Normal left atrial size with normal LVedp   3) Trace MR   4) Mild TR with normal PA pressures ( within limitations of study)  5) Severe RV dilation with wall motion abnormality ? Acute on chronic   corpulmonale    6) Severe reduction in RV function        I have reviewed the medications.    Assessment/Plan   Assessment / Plan     Hospital Problem List     * (Principal)Hydropneumothorax, right    Acute on chronic respiratory failure with hypoxia    Pneumonia of both lower lobes    Chronic bullous emphysema (Chronic)    Hypertension    Tobacco use         Brief Hospital Course to date:  Niall Murguia is a 52 y.o. male who was transferred to [facility] on 1/14 from [facility] after presenting there on 1/7 with acute dyspnea, found to have acute hypoxemic respiratory failure and MRSA pneumonia:     Assessment & Plan:    Acute on Chronic Hypoxemic Respiratory Failure, Improving   Right-sided MRSA Pneumonia, improving  Complicated Right Parapneumonic Effusion with MRSA in pleural fluid culture, suspect empyema, unable to rule out bronchopleural fistula  - Continue supplemental O2 (on 3LNC when seen), wean as tolerated, continue pulmonary toilet   -  Unable to rule out bronchopleural fistula.   -  Continue IV vancomycin. Dr. 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  Will stop salt tabs    Hypertension   - BP normal, on no meds at present, will monitor     Macrocytic Anemia   - Stable    DVT Prophylaxis:  SQ heparin  CODE STATUS: Full Code    Disposition: I expect the patient to be discharged home with home health in ~1-2 days pending ID recs re: IV  ABX  Nazanin Rockwell MD  01/22/18  2:26 PM

## 2018-01-22 NOTE — PROGRESS NOTES
Continued Stay Note  [facility]     Patient Name: Niall Murguia  MRN: [id number]  Today's Date: 1/22/2018    Admit Date: 1/14/2018          Discharge Plan       01/22/18 1408    Case Management/Social Work Plan    Plan Home    Patient/Family In Agreement With Plan yes    Additional Comments Met with patient at bedside. He is still on about 2 LPM O2. Plan is still to go home and stay with his significant other at discharge while he recovers. Walked 700' with PT today. Do not anticipate home health needs, unless he needs IV abx at home. WIll continue to follow. Arcelia Rockwell RN [phone]              Discharge Codes     None        Expected Discharge Date and Time     Expected Discharge Date Expected Discharge Time    Jan 22, 2018             Arcelia Rockwell RN

## 2018-01-22 NOTE — PAYOR COMM NOTE
"Updated clinicals for continued hospitalization  auth # [id number]    Currently Last date covered was 1/17, updates were faxed on 1/18 and today 1/22    Thank You,  Tamara Archuleta RN  Utilization Review  [phone]  Fax [phone]    Alyssa Corea (52 y.o. Male)     Date of Birth Social Security Number Address Home Phone MRN    1965  [address] [facility] [address] [phone] [id number]    Rastafari Marital Status          None Single       Admission Date Admission Type Admitting Provider Attending Provider Department, Room/Bed    1/14/18 Elective Nazanin Rockwell MD [place], Nazanin Vanegas MD [address], [address]    Discharge Date Discharge Disposition Discharge Destination                      Attending Provider: Nazanin Rockwell MD     Allergies:  No Known Allergies    Isolation:  None   Infection:  MRSA (01/11/18)   Code Status:  FULL    Ht:  172.7 cm (68\")   Wt:  70.6 kg (155 lb 9.6 oz)    Admission Cmt:  None   Principal Problem:  Hydropneumothorax, right [J94.8]                 Active Insurance as of 1/14/2018     Primary Coverage     Payor Plan Insurance Group Employer/Plan Group    [place] [facility]     Payor Plan Address Payor Plan Phone Number Effective From Effective To    PO  [phone] 1/7/2018     [place] [address]       Subscriber Name Subscriber Birth Date Member ID       ALYSSA COREA 1965 [id number]                 Emergency Contacts      (Rel.) Home Phone Work Phone Mobile Phone    Lakeisha Rehman (Significant Other) [phone] -- --    Krissy Macias (Sister) [phone] -- --    Caleb Corea (Son) [phone] -- --            Vital Signs (last 72 hrs)       01/19 0700  -  01/20 0659 01/20 0700  -  01/21 0659 01/21 0700  -  01/22 0659 01/22 0700  -  01/22 1257   Most Recent    Temp (°F) 97.7 -  99.7    97.8 -  98.1    97.2 -  98.7      98.4     98.4 (36.9)    Heart Rate 77 -  116    " 75 -  109    69 -  102    76 -  105     77    Resp 16 -  18    16 -  22    18 -  20    16 -  18     16    /58 -  113/64    119/61 -  120/66    113/56 -  119/69    106/69 -  121/67     106/69    SpO2 (%) 90 -  94    (!)84 -  96    90 -  96    92 -  94     94          Intake & Output (last 3 days)       01/19 0701 - 01/20 0700 01/20 0701 - 01/21 0700 01/21 0701 - 01/22 0700 01/22 0701 - 01/23 0700    P.O. 1740 120 935 240    IV Piggyback 600 900 600     Total Intake(mL/kg) 2340 (35.1) 1020 (15.3) 1535 (21.7) 240 (3.4)    Urine (mL/kg/hr) 1725 (1.1) 1150 (0.7) 2900 (1.7) 350 (0.8)    Other        Stool 0 (0)       Total Output 1725 1150 2900 350    Net +615 -130 -1365 -110            Unmeasured Urine Occurrence 1 x  1 x     Unmeasured Stool Occurrence 1 x           Lines, Drains & Airways    Active LDAs     Name:   Placement date:   Placement time:   Site:   Days:    Peripheral IV Line - Single Lumen 01/21/18 1742 cephalic vein (lateral side of arm), right 20 gauge  01/21/18    1742      less than 1                Hospital Medications (active)       Dose Frequency Start End    acetaminophen (TYLENOL) tablet 650 mg 650 mg Every 4 Hours PRN 1/14/2018     Sig - Route: Take 2 tablets by mouth Every 4 (Four) Hours As Needed for Mild Pain . - Oral    budesonide-formoterol (SYMBICORT) 160-4.5 MCG/ACT inhaler 2 puff 2 puff 2 Times Daily - RT 1/14/2018     Sig - Route: Inhale 2 puffs 2 (Two) Times a Day. - Inhalation    citalopram (CeleXA) tablet 10 mg 10 mg Daily 1/22/2018 1/24/2018    Sig - Route: Take 0.5 tablets by mouth Daily. - Oral    folic acid (FOLVITE) tablet 1 mg 1 mg Daily 1/15/2018     Sig - Route: Take 1 tablet by mouth Daily. - Oral    heparin (porcine) 5000 UNIT/ML injection 5,000 Units 5,000 Units Every 8 Hours Scheduled 1/15/2018     Sig - Route: Inject 1 mL under the skin Every 8 (Eight) Hours. - Subcutaneous    HYDROcodone-acetaminophen (NORCO) 5-325 MG per tablet 1 tablet 1 tablet Every 4 Hours PRN  "1/16/2018     Sig - Route: Take 1 tablet by mouth Every 4 (Four) Hours As Needed for Moderate Pain . - Oral    ipratropium-albuterol (DUO-NEB) nebulizer solution 3 mL 3 mL Every 4 Hours PRN 1/14/2018     Sig - Route: Take 3 mL by nebulization Every 4 (Four) Hours As Needed for Wheezing or Shortness of Air. - Nebulization    ipratropium-albuterol (DUO-NEB) nebulizer solution 3 mL 3 mL Every 6 Hours - RT 1/18/2018     Sig - Route: Take 3 mL by nebulization Every 6 (Six) Hours. - Nebulization    lidocaine (LIDODERM) 5 % 1 patch 1 patch Every 24 Hours Scheduled 1/16/2018     Sig - Route: Place 1 patch on the skin Daily. - Transdermal    nicotine (NICODERM CQ) 21 MG/24HR patch 1 patch 1 patch Every 24 Hours 1/14/2018     Sig - Route: Place 1 patch on the skin Daily. - Transdermal    ondansetron (ZOFRAN) injection 4 mg 4 mg Every 6 Hours PRN 1/14/2018     Sig - Route: Infuse 2 mL into a venous catheter Every 6 (Six) Hours As Needed for Nausea or Vomiting. - Intravenous    Linked Group 1:  \"Or\" Linked Group Details        ondansetron (ZOFRAN) tablet 4 mg 4 mg Every 6 Hours PRN 1/14/2018     Sig - Route: Take 1 tablet by mouth Every 6 (Six) Hours As Needed for Nausea or Vomiting. - Oral    Linked Group 1:  \"Or\" Linked Group Details        Pharmacy to dose vancomycin  Continuous PRN 1/20/2018 1/27/2018    Sig - Route: Continuous As Needed for Consult (MRSA pneumonia). - Does not apply    piperacillin-tazobactam (ZOSYN) 3.375 g in iso-osmotic dextrose 50 ml (premix) 3.375 g Every 8 Hours 1/19/2018 1/29/2018    Sig - Route: Infuse 50 mL into a venous catheter Every 8 (Eight) Hours. - Intravenous    saccharomyces boulardii (FLORASTOR) capsule 250 mg 250 mg 2 Times Daily (BID) 1/14/2018     Sig - Route: Take 1 capsule by mouth 2 (Two) Times a Day. - Oral    sodium chloride 0.9 % flush 1-10 mL 1-10 mL As Needed 1/14/2018     Sig - Route: Infuse 1-10 mL into a venous catheter As Needed for Line Care. - Intravenous    sodium " chloride tablet 1 g 1 g 2 Times Daily With Meals 1/18/2018     Sig - Route: Take 1 tablet by mouth 2 (Two) Times a Day With Meals. - Oral    vancomycin 1250 mg/250 mL 0.9% NS IVPB (BHS) 1,250 mg Every 8 Hours 1/18/2018 1/27/2018    Sig - Route: Infuse 250 mL into a venous catheter Every 8 (Eight) Hours. - Intravenous          Blood Administration Record     None        Lab Results (last 72 hours)     Procedure Component Value Units Date/Time    Sodium [[id number]]  (Abnormal) Collected:  01/19/18 1800    Specimen:  Blood Updated:  01/19/18 1824     Sodium 127 (L) mmol/L     Comprehensive Metabolic Panel [[id number]]  (Abnormal) Collected:  01/20/18 0447    Specimen:  Blood Updated:  01/20/18 0629     Glucose 87 mg/dL      BUN 9 mg/dL      Creatinine 0.50 (L) mg/dL      Sodium 129 (L) mmol/L      Potassium 4.1 mmol/L      Chloride 95 (L) mmol/L      CO2 29.0 mmol/L      Calcium 8.2 (L) mg/dL      Total Protein 5.8 g/dL      Albumin 2.80 (L) g/dL      ALT (SGPT) 51 (H) U/L      AST (SGOT) 25 U/L      Alkaline Phosphatase 54 U/L      Total Bilirubin 0.8 mg/dL      eGFR Non African Amer >150 mL/min/1.73      Globulin 3.0 gm/dL      A/G Ratio 0.9 (L) g/dL      BUN/Creatinine Ratio 18.0     Anion Gap 5.0 mmol/L     Narrative:       National Kidney Foundation Guidelines    Stage     Description        GFR  1         Normal or High     90+  2         Mild decrease      60-89  3         Moderate decrease  30-59  4         Severe decrease    15-29  5         Kidney failure     <15    Respiratory Culture - Sputum, Cough [[id number]] Collected:  01/18/18 1428    Specimen:  Sputum from Cough Updated:  01/20/18 1004     Respiratory Culture --      Moderate growth (3+) Normal Respiratory Jacqueline     Gram Stain Result Many (4+) WBCs per low power field      Few (2+) Epithelial cells per low power field      Few (2+) Gram positive cocci in pairs      Occasional Gram positive bacilli    CBC (No Diff) [[id number]]  (Abnormal) Collected:   01/20/18 0447    Specimen:  Blood Updated:  01/20/18 1024     WBC 8.16 10*3/mm3      RBC 3.02 (L) 10*6/mm3      Hemoglobin 10.7 (L) g/dL      Hematocrit 31.6 (L) %      .6 (H) fL      MCH 35.4 (H) pg      MCHC 33.9 g/dL      RDW 12.5 %      RDW-SD 48.1 fl      MPV 9.4 fL      Platelets 241 10*3/mm3     Narrative:       Verified by repeat analysis.    Fungus Culture - Body Fluid, Pleural Cavity [[id number]] Collected:  01/15/18 1555    Specimen:  Body Fluid from Pleural Cavity Updated:  01/20/18 1631     Fungus Culture No fungus isolated at less than 1 week    AFB Culture - Body Fluid, Pleural Cavity [[id number]]  (Normal) Collected:  01/15/18 1555    Specimen:  Body Fluid from Pleural Cavity Updated:  01/20/18 1631     AFB Culture No AFB isolated at less than 1 week     AFB Stain No acid fast bacilli seen on concentrated smear    Sodium [[id number]]  (Abnormal) Collected:  01/20/18 1742    Specimen:  Blood Updated:  01/20/18 1817     Sodium 128 (L) mmol/L     CBC (No Diff) [[id number]]  (Abnormal) Collected:  01/21/18 0435    Specimen:  Blood Updated:  01/21/18 0600     WBC 8.55 10*3/mm3      RBC 2.94 (L) 10*6/mm3      Hemoglobin 10.2 (L) g/dL      Hematocrit 30.4 (L) %      .4 (H) fL      MCH 34.7 (H) pg      MCHC 33.6 g/dL      RDW 12.7 %      RDW-SD 48.5 fl      MPV 9.8 fL      Platelets 256 10*3/mm3     Basic Metabolic Panel [[id number]]  (Abnormal) Collected:  01/21/18 0435    Specimen:  Blood Updated:  01/21/18 0744     Glucose 85 mg/dL      BUN 8 (L) mg/dL      Creatinine 0.60 mg/dL      Sodium 129 (L) mmol/L      Potassium 4.1 mmol/L      Chloride 96 (L) mmol/L      CO2 26.0 mmol/L      Calcium 8.5 (L) mg/dL      eGFR Non African Amer 142 mL/min/1.73      BUN/Creatinine Ratio 13.3     Anion Gap 7.0 mmol/L     Narrative:       National Kidney Foundation Guidelines    Stage     Description        GFR  1         Normal or High     90+  2         Mild decrease      60-89  3         Moderate decrease   30-59  4         Severe decrease    15-29  5         Kidney failure     <15    Sodium [[id number]]  (Abnormal) Collected:  01/21/18 1807    Specimen:  Blood Updated:  01/21/18 1843     Sodium 127 (L) mmol/L     CBC (No Diff) [[id number]]  (Abnormal) Collected:  01/22/18 0549    Specimen:  Blood Updated:  01/22/18 0620     WBC 7.13 10*3/mm3      RBC 2.90 (L) 10*6/mm3      Hemoglobin 10.2 (L) g/dL      Hematocrit 30.3 (L) %      .5 (H) fL      MCH 35.2 (H) pg      MCHC 33.7 g/dL      RDW 12.8 %      RDW-SD 48.1 fl      MPV 9.3 fL      Platelets 237 10*3/mm3     Basic Metabolic Panel [[id number]]  (Normal) Collected:  01/22/18 0549    Specimen:  Blood Updated:  01/22/18 0713     Glucose 100 mg/dL      BUN 10 mg/dL      Creatinine 0.90 mg/dL      Sodium 132 mmol/L      Potassium 3.9 mmol/L      Chloride 100 mmol/L      CO2 27.0 mmol/L      Calcium 8.7 mg/dL      eGFR Non African Amer 89 mL/min/1.73      BUN/Creatinine Ratio 11.1     Anion Gap 5.0 mmol/L     Narrative:       National Kidney Foundation Guidelines    Stage     Description        GFR  1         Normal or High     90+  2         Mild decrease      60-89  3         Moderate decrease  30-59  4         Severe decrease    15-29  5         Kidney failure     <15          Imaging Results (last 72 hours)     Procedure Component Value Units Date/Time    XR Chest 1 View [[id number]] Collected:  01/19/18 0838     Updated:  01/20/18 0949    Narrative:       EXAMINATION: XR CHEST 1 VW-01/19/2018:      INDICATION: Postop; Z74.09-Other reduced mobility.      COMPARISON: 01/18/2018.     FINDINGS: Right pleural drain remains in place. There is advanced  bullous disease of the upper lobes. No pneumothorax is identified. Hazy  opacity of the right base is stable or only minimally increased. Chronic  appearing left basilar interstitial changes appear stable.           Impression:       No new chest disease.     D:  01/19/2018  E:  01/19/2018     This report was finalized on  1/20/2018 9:47 AM by DR. Kamran Warren MD.       XR Chest 1 View [[id number]] Collected:  01/20/18 0756     Updated:  01/20/18 1342    Narrative:          EXAMINATION: XR CHEST 1 VW - 01/20/2018     INDICATION: Chest tube pull; Z74.09-Other reduced mobility.      COMPARISON: 01/19/2018.     FINDINGS: Interval removal of right pigtail chest tube with preserved  ventilation and unchanged aeration of the right hemithorax with  persistent scarring and mid lung opacifications from prior. Trace right  pleural effusion. No discrete pneumothorax.           Impression:       Removal of right pigtail chest tube without significant  change in overall aeration or midlung opacifications.     DICTATED:     01/20/2018  EDITED    :     01/20/2018      This report was finalized on 1/20/2018 1:40 PM by Dr. Myles Upton.       XR Chest 1 View [[id number]] Collected:  01/21/18 0740     Updated:  01/21/18 1223    Narrative:          EXAMINATION: XR CHEST 1 VW - 01/21/2018     INDICATION:  Z74.09-Other reduced mobility. Follow-up pneumothorax.     COMPARISON: One-day prior.     FINDINGS: Overall aeration similar to prior with scattered postsurgical  changes and scarring within the right hemithorax noted. Increased  lucency within the right lung apex appears to be large bulla formation  as no distinct pleural line is evident. Left hemithorax grossly  unchanged. Cardiac size unchanged.        Impression:       Increased lucency within the right lung apex without  distinct pleural line likely represents improved  visualization/enlargement of large bulla formation. No discrete  pneumothorax identified however attention on follow-up.     DICTATED:     01/21/2018  EDITED    :     01/21/2018      This report was finalized on 1/21/2018 12:21 PM by Dr. Myles Upton.             ECG/EMG Results (last 72 hours)     ** No results found for the last 72 hours. **        Operative/Procedure Notes (last 72 hours) (Notes from 1/19/2018 12:57 PM through  2018 12:57 PM)     No notes of this type exist for this encounter.           Physician Progress Notes (last 72 hours) (Notes from 2018 12:58 PM through 2018 12:58 PM)      Chuy Alanis MD at 2018  5:30 PM  Version 1 of 1             [facility]  PROGRESS NOTE    Patient Name: Niall Murguia  : 1965  MRN: [id number]    Date of Admission: 2018  Length of Stay: 5  Primary Care Physician: KEAGAN Rowley    Subjective   Subjective     CC:  F/u pneumonia    HPI:  Patient notes subjective improvement to his dyspnea and chest pain since removal of chest tube this morning.  Productive cough is also slowly improving.  No fevers or chills.  Reports improved energy today.    Review of Systems  Gen- No fevers, chills  CV- No chest pain, palpitations  Resp- Improved cough and dyspnea  GI- No N/V/D, abd pain    Otherwise ROS is negative except as mentioned in the HPI.    Objective   Objective     Vital Signs:   Temp:  [97.8 °F (36.6 °C)-99.7 °F (37.6 °C)] 99.7 °F (37.6 °C)  Heart Rate:  [] 98  Resp:  [14-18] 17  BP: (103-117)/(63-70) 103/63     Physical Exam:  Constitutional: Ill appearing but nontoxic, awake, alert  HENT: NCAT, mucous membranes moist  Respiratory: Improved aeration of lung fields today with mild crackles to right base, no crepitus at bandage overlying former thoracostomy tube site  Cardiovascular: RRR, no murmurs, rubs, or gallops, palpable pedal pulses bilaterally  Gastrointestinal: Positive bowel sounds, soft, nontender, nondistended  Musculoskeletal: No bilateral ankle edema  Psychiatric: Appropriate affect, cooperative  Neurologic: Oriented x 3, strength symmetric in all extremities, Cranial Nerves grossly intact to confrontation, speech clear  Skin: No rashes    Results Reviewed:  I have personally reviewed current lab, radiology, and data and agree.      Results from last 7 days  Lab Units 18  5975  01/18/18  0420 01/17/18  0520  01/15/18  0526   WBC 10*3/mm3 14.14* 16.75* 16.81*  < > 18.53*   HEMOGLOBIN g/dL 11.2* 12.1* 13.5  < > 12.3*   HEMATOCRIT % 33.7* 35.5* 40.6  < > 37.2*   PLATELETS 10*3/mm3 245 234 229  < > 226   INR   --   --   --   --  1.10   < > = values in this interval not displayed.    Results from last 7 days  Lab Units 01/19/18  0407 01/18/18  1900 01/18/18  0420 01/17/18  0520  01/15/18  0526   SODIUM mmol/L 126* 126* 125* 128*  < > 134   POTASSIUM mmol/L 4.1  --  3.8 4.1  < > 4.2   CHLORIDE mmol/L 94*  --  98* 95*  < > 100   CO2 mmol/L 27.0  --  27.0 28.0  < > 34.0*   BUN mg/dL 8*  --  9 16  < > 20   CREATININE mg/dL 0.60  --  0.50* 0.40*  < > 0.60   GLUCOSE mg/dL 106*  --  146* 85  < > 90   CALCIUM mg/dL 8.3*  --  8.0* 7.6*  < > 8.1*   ALT (SGPT) U/L 55*  --  65*  --   --  175*   AST (SGOT) U/L 22  --  15  --   --  57*   < > = values in this interval not displayed.  No results found for: BNP  No results found for: PHART    Microbiology Results Abnormal     Procedure Component Value - Date/Time    Respiratory Culture - Sputum, Cough [[id number]] Collected:  01/18/18 1428    Lab Status:  Preliminary result Specimen:  Sputum from Cough Updated:  01/19/18 8982     Respiratory Culture --      Light growth (2+) Normal Respiratory Jacqueline     Gram Stain Result Many (4+) WBCs per low power field      Few (2+) Epithelial cells per low power field      Few (2+) Gram positive cocci in pairs      Occasional Gram positive bacilli    AFB Culture - Body Fluid, Pleural Cavity [[id number]] Collected:  01/15/18 2100    Lab Status:  Preliminary result Specimen:  Body Fluid from Pleural Cavity Updated:  01/18/18 1429     AFB Stain No acid fast bacilli seen on concentrated smear    Body Fluid Culture - Body Fluid, Pleural Cavity [[id number]]  (Abnormal)  (Susceptibility) Collected:  01/15/18 1557    Lab Status:  Final result Specimen:  Body Fluid from Pleural Cavity Updated:  01/18/18 1221     BF Culture --       Scant growth (1+) Staphylococcus aureus, MRSA (A)        Methicillin resistant Staphylococcus aureus, Patient may be an isolation risk.  This isolate does not demonstrate inducible clindamycin resistance in vitro.          Gram Stain Result Moderate (3+) WBCs seen      No organisms seen    Susceptibility      Staphylococcus aureus, MRSA     JASON     Clindamycin 2 ug/ml Intermediate     Daptomycin 1 ug/ml Susceptible     Erythromycin >4 ug/ml Resistant     Gentamicin <=4 ug/ml Susceptible     Levofloxacin <=1 ug/ml Susceptible  [1]      Linezolid 2 ug/ml Susceptible     Oxacillin >2 ug/ml Resistant     Penicillin G >8 ug/ml Resistant     Quinupristin + Dalfopristin <=0.5 ug/ml Susceptible     Rifampin <=1 ug/ml Susceptible     Tetracycline <=4 ug/ml Susceptible     Trimethoprim + Sulfamethoxazole <=0.5/9.5 ug/ml Susceptible     Vancomycin 2 ug/ml Susceptible            [1]   Staphylococcus species may develop resistance during prolonged therapy with quinolones.  Isolates that are initially susceptible may become resistant within three to four days after initiation of therapy. Testing of repeat isolates may be warranted.                 Anaerobic Culture - Body Fluid, Pleural Cavity [[id number]]  (Normal) Collected:  01/15/18 1555    Lab Status:  Preliminary result Specimen:  Body Fluid from Pleural Cavity Updated:  01/16/18 1239     Culture No anaerobes isolated          Imaging Results (last 24 hours)     Procedure Component Value Units Date/Time    XR Chest 1 View [[id number]] Collected:  01/19/18 0838     Updated:  01/19/18 0838    Narrative:       EXAMINATION: XR CHEST 1 VW-01/19/2018:      INDICATION: Postop; Z74.09-Other reduced mobility.      COMPARISON: 01/18/2018.     FINDINGS: Right pleural drain remains in place. There is advanced  bullous disease of the upper lobes. No pneumothorax is identified. Hazy  opacity of the right base is stable or only minimally increased. Chronic  appearing left basilar  interstitial changes appear stable.           Impression:       No new chest disease.     D:  01/19/2018  E:  01/19/2018                 Results for orders placed during the hospital encounter of 01/07/18   Adult Transthoracic Echo Complete W/ Cont if Necessary Per Protocol    Narrative Technically very limited study   1) Normal LV systolic function ( EF is about 55%)  2) Normal left atrial size with normal LVedp   3) Trace MR   4) Mild TR with normal PA pressures ( within limitations of study)  5) Severe RV dilation with wall motion abnormality ? Acute on chronic   corpulmonale   6) Severe reduction in RV function        I have reviewed the medications.    Assessment/Plan   Assessment / Plan     Hospital Problem List     * (Principal)Hydropneumothorax, right    Acute on chronic respiratory failure with hypoxia    Pneumonia of both lower lobes    Chronic bullous emphysema (Chronic)    Hypertension    Tobacco use         Brief Hospital Course to date:  Niall Murguia is a 52 y.o. male who was transferred to [facility] on 1/14 from [facility] after presenting there on 1/7 with acute dyspnea, found to have acute hypoxemic respiratory failure and MRSA pneumonia:     Assessment & Plan:    Acute on Chronic Hypoxemic Respiratory Failure, Improving   Right-sided MRSA Pneumonia, improving  Complicated Right Parapneumonic Effusion with MRSA in pleural fluid culture, suspect empyema, unable to rule out bronchopleural fistula  - Continue supplemental O2, wean as tolerated, continue pulmonary toilet   - Discussed case in detail and reviewed culture and imaging results with Dr. Sr on 1/18. Unable to rule out bronchopleural fistula. Continue abx and monitor closely with trend of WBC until normalized.   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 Discussed case on multidisciplinary rounds.     Chuy Alanis MD  18  5:30 PM           Electronically signed by Chuy Alanis MD at 2018  5:36 PM      Chuy Alanis MD at 2018 12:14 PM  Version 1 of 1             [facility]  PROGRESS NOTE    Patient Name: Niall Murguia  : 1965  MRN: [id number]    Date of Admission: 2018  Length of Stay: 6  Primary Care Physician: KEAGAN Rowley    Subjective   Subjective     CC:  F/u pneumonia    HPI:  Patient still reports some mild inferior lateral right chest wall pain (at site of chest tube) but notes continued slow improvement with dyspnea and mild cough.  No fevers or chills.  Energy is improving daily.    Review of Systems  Gen- No fevers, chills  CV- No palpitations  Resp- Improving cough and dyspnea  GI- No N/V/D, abd pain    Otherwise ROS is negative except as mentioned in the HPI.    Objective   Objective     Vital Signs:   Temp:  [97.7 °F (36.5 °C)-99.7 °F (37.6 °C)] 97.8 °F (36.6 °C)  Heart Rate:  [] 91  Resp:  [16-18] 16  BP: (102-119)/(58-63) 119/61     Physical Exam:  Constitutional: Ill appearing but nontoxic, awake, alert  HENT: NCAT, mucous membranes moist  Respiratory: Diminished throughout  with mild crackles to right base, no crepitus at bandage overlying former thoracostomy tube site  Cardiovascular: RRR, no murmurs, rubs, or gallops, palpable pedal pulses bilaterally  Gastrointestinal: Positive bowel sounds, soft, nontender, nondistended  Musculoskeletal: No bilateral ankle edema  Psychiatric: Appropriate affect, cooperative  Neurologic: Oriented x 3, strength symmetric in all extremities, Cranial Nerves grossly intact to confrontation, speech clear  Skin: No rashes    Results Reviewed:  I have personally reviewed current lab, radiology, and data and agree.      Results from last 7 days  Lab Units 01/20/18  0447 01/19/18  0407 01/18/18  0420  01/15/18  0526   WBC 10*3/mm3 8.16 14.14* 16.75*  < > 18.53*   HEMOGLOBIN g/dL 10.7* 11.2* 12.1*  < > 12.3*   HEMATOCRIT % 31.6* 33.7* 35.5*  < > 37.2*   PLATELETS 10*3/mm3 241 245 234  < > 226   INR   --   --   --   --  1.10   < > = values in this interval not displayed.    Results from last 7 days  Lab Units 01/20/18 0447 01/19/18  1800 01/19/18  0407  01/18/18  0420   SODIUM mmol/L 129* 127* 126*  < > 125*   POTASSIUM mmol/L 4.1  --  4.1  --  3.8   CHLORIDE mmol/L 95*  --  94*  --  98*   CO2 mmol/L 29.0  --  27.0  --  27.0   BUN mg/dL 9  --  8*  --  9   CREATININE mg/dL 0.50*  --  0.60  --  0.50*   GLUCOSE mg/dL 87  --  106*  --  146*   CALCIUM mg/dL 8.2*  --  8.3*  --  8.0*   ALT (SGPT) U/L 51*  --  55*  --  65*   AST (SGOT) U/L 25  --  22  --  15   < > = values in this interval not displayed.  No results found for: BNP  No results found for: PHART    Microbiology Results Abnormal     Procedure Component Value - Date/Time    Respiratory Culture - Sputum, Cough [[id number]] Collected:  01/18/18 9578    Lab Status:  Final result Specimen:  Sputum from Cough Updated:  01/20/18 1004     Respiratory Culture --      Moderate growth (3+) Normal Respiratory Jacqueline     Gram Stain Result Many (4+) WBCs per low power field      Few (2+) Epithelial cells per low power  field      Few (2+) Gram positive cocci in pairs      Occasional Gram positive bacilli    AFB Culture - Body Fluid, Pleural Cavity [[id number]] Collected:  01/15/18 1555    Lab Status:  Preliminary result Specimen:  Body Fluid from Pleural Cavity Updated:  01/18/18 1429     AFB Stain No acid fast bacilli seen on concentrated smear    Body Fluid Culture - Body Fluid, Pleural Cavity [[id number]]  (Abnormal)  (Susceptibility) Collected:  01/15/18 1555    Lab Status:  Final result Specimen:  Body Fluid from Pleural Cavity Updated:  01/18/18 1221     BF Culture --      Scant growth (1+) Staphylococcus aureus, MRSA (A)        Methicillin resistant Staphylococcus aureus, Patient may be an isolation risk.  This isolate does not demonstrate inducible clindamycin resistance in vitro.          Gram Stain Result Moderate (3+) WBCs seen      No organisms seen    Susceptibility      Staphylococcus aureus, MRSA     JASON     Clindamycin 2 ug/ml Intermediate     Daptomycin 1 ug/ml Susceptible     Erythromycin >4 ug/ml Resistant     Gentamicin <=4 ug/ml Susceptible     Levofloxacin <=1 ug/ml Susceptible  [1]      Linezolid 2 ug/ml Susceptible     Oxacillin >2 ug/ml Resistant     Penicillin G >8 ug/ml Resistant     Quinupristin + Dalfopristin <=0.5 ug/ml Susceptible     Rifampin <=1 ug/ml Susceptible     Tetracycline <=4 ug/ml Susceptible     Trimethoprim + Sulfamethoxazole <=0.5/9.5 ug/ml Susceptible     Vancomycin 2 ug/ml Susceptible            [1]   Staphylococcus species may develop resistance during prolonged therapy with quinolones.  Isolates that are initially susceptible may become resistant within three to four days after initiation of therapy. Testing of repeat isolates may be warranted.                 Anaerobic Culture - Body Fluid, Pleural Cavity [[id number]]  (Normal) Collected:  01/15/18 3291    Lab Status:  Preliminary result Specimen:  Body Fluid from Pleural Cavity Updated:  01/16/18 1239     Culture No anaerobes  isolated          Imaging Results (last 24 hours)     Procedure Component Value Units Date/Time    XR Chest 1 View [[id number]] Collected:  01/19/18 0838     Updated:  01/20/18 0949    Narrative:       EXAMINATION: XR CHEST 1 VW-01/19/2018:      INDICATION: Postop; Z74.09-Other reduced mobility.      COMPARISON: 01/18/2018.     FINDINGS: Right pleural drain remains in place. There is advanced  bullous disease of the upper lobes. No pneumothorax is identified. Hazy  opacity of the right base is stable or only minimally increased. Chronic  appearing left basilar interstitial changes appear stable.           Impression:       No new chest disease.     D:  01/19/2018  E:  01/19/2018     This report was finalized on 1/20/2018 9:47 AM by DR. Kamran Warren MD.       XR Chest 1 View [[id number]] Collected:  01/20/18 0756     Updated:  01/20/18 1342    Narrative:          EXAMINATION: XR CHEST 1 VW - 01/20/2018     INDICATION: Chest tube pull; Z74.09-Other reduced mobility.      COMPARISON: 01/19/2018.     FINDINGS: Interval removal of right pigtail chest tube with preserved  ventilation and unchanged aeration of the right hemithorax with  persistent scarring and mid lung opacifications from prior. Trace right  pleural effusion. No discrete pneumothorax.           Impression:       Removal of right pigtail chest tube without significant  change in overall aeration or midlung opacifications.     DICTATED:     01/20/2018  EDITED    :     01/20/2018      This report was finalized on 1/20/2018 1:40 PM by Dr. Myles Upton.           Results for orders placed during the hospital encounter of 01/07/18   Adult Transthoracic Echo Complete W/ Cont if Necessary Per Protocol    Narrative Technically very limited study   1) Normal LV systolic function ( EF is about 55%)  2) Normal left atrial size with normal LVedp   3) Trace MR   4) Mild TR with normal PA pressures ( within limitations of study)  5) Severe RV dilation with wall motion  abnormality ? Acute on chronic   corpulmonale   6) Severe reduction in RV function        I have reviewed the medications.    Assessment/Plan   Assessment / Plan     Hospital Problem List     * (Principal)Hydropneumothorax, right    Acute on chronic respiratory failure with hypoxia    Pneumonia of both lower lobes    Chronic bullous emphysema (Chronic)    Hypertension    Tobacco use         Brief Hospital Course to date:  Niall Murguia is a 52 y.o. male who was transferred to [facility] on 1/14 from [facility] after presenting there on 1/7 with acute dyspnea, found to have acute hypoxemic respiratory failure and MRSA pneumonia:     Assessment & Plan:    Acute on Chronic Hypoxemic Respiratory Failure, Improving   Right-sided MRSA Pneumonia, improving  Complicated Right Parapneumonic Effusion with MRSA in pleural fluid culture, suspect empyema, unable to rule out bronchopleural fistula  - Continue supplemental O2 (on 3LNC when seen), wean as tolerated, continue pulmonary toilet   -  Unable to rule out bronchopleural fistula.   -  Continue IV vancomycin. Dr. Sr added zosyn to cover for gram negative organisms with subsequent improvement in WBC  - Repeat CBC in AM, monitor pt closely   -Taper off Celexa , 20 mg × 1 more day, then 10 mg times 2 days and stop, taper ordered in Epic   - CXR from this AM personally reviewed and stable from prior on report, ? 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 Discussed case on multidisciplinary rounds.     Chuy Alanis MD  01/20/18  12:14 PM           Electronically signed by Chuy Alanis MD at 1/20/2018  1:58 PM      Rafael Sr MD at 1/20/2018  1:26 PM  Version 1 of [facility] Progress Note        Antibiotics:  Anti-Infectives     Ordered     Dose/Rate Route Frequency Start Stop    01/20/18 1325  Pharmacy to dose vancomycin     Ordering Provider:  Rafael Sr MD     Does not apply Continuous PRN 01/20/18 1325 01/27/18 1324    01/19/18 0917  piperacillin-tazobactam (ZOSYN) 3.375 g in iso-osmotic dextrose 50 ml (premix)     Ordering Provider:  Rafael Sr MD    3.375 g  over 4 Hours Intravenous Every 8 Hours 01/19/18 1600 01/29/18 1559    01/19/18 0917  piperacillin-tazobactam (ZOSYN) 3.375 g in iso-osmotic dextrose 50 ml (premix)     Ordering Provider:  Rafael Sr MD    3.375 g  over 30 Minutes Intravenous Once 01/19/18 1000 01/19/18 1314    01/17/18 1859  vancomycin 1250 mg/250 mL 0.9% NS IVPB ([facility])     Chuy Alanis MD reviewed the order on 01/18/18 0610.   Ordering Provider:  Chuy Alanis MD    1,250 mg  over 90 Minutes Intravenous Every 8 Hours 01/18/18 0200 01/27/18 1759    01/14/18 2103  vancomycin 1250 mg/250 mL 0.9% NS IVPB ([facility])     Ordering Provider:  Jake Tinsley MD    20 mg/kg × 62.4 kg  over 90 Minutes Intravenous Once 01/14/18 2200 01/15/18 0041    01/14/18 2055  Pharmacy to dose vancomycin     Ordering Provider:  KEAGAN Orellana     Does not apply Continuous PRN 01/14/18 2049 01/21/18 2048          CC: sob/cough    HPI:  Patient is a 52 y.o.  Yr old male with history of bullous emphysema with  prior right lung surgery in 2010 per patient related to collapsed lung but he did not recall any prior infection and specifics of that are unclear.  He was admitted to [facility] January 7 with 3 days progressive cough/dyspnea and hemoptysis.  Concern for preceding viral prodrome per outside records.  He had acute kidney injury, severe leukocytosis and elevated lactate with diagnosis acute severe sepsis/acute hypoxic respiratory failure and extensive right-sided pneumonia, admitted to ICU; chest imaging apparently had right hydropneumothorax and bilateral pneumonia with transferred to [facility] on January 14.  He was seen by Dr. Ge and underwent right sided chest tube.  Sputum from January 9 has MRSA, pleural fluid from January 15 has staph aureus in culture.     1/20/18 seen early and sleepy;  Per nursing,  less shortness of breath/cough, requiring nasal cannula oxygen but generally feels somewhat better.  No headache photophobia or neck stiffness.  He denies chest pain at present.  Tube remains.  No nausea vomiting diarrhea or abdominal pain.  No skin rash.  No dysuria hematuria or pyuria.    ROS:      1/20/18 No f/c/s. No n/v/d. No rash. No new ADR to Abx.     Constitutional-- No Fever, chills or sweats.  Appetite Diminished and generally fatigued  Heent-- No new vision, hearing or throat complaints.  No epistaxis or oral sores.  Denies odynophagia or dysphagia.  No flashers, floaters or eye pain. No odynophagia or dysphagia. No headache, photophobia or neck stiffness.  CV-- No chest pain, palpitation or syncope  Resp-- as above  GI- No nausea, vomiting, or diarrhea.  No hematochezia, melena, or hematemesis. Denies jaundice or chronic liver disease.  -- No dysuria, hematuria, or flank pain.  Denies hesitancy, urgency or flank pain.  Lymph- no swollen lymph nodes in neck/axilla or groin.   Heme- No active bruising or bleeding; no Hx of DVT or PE.  MS-- no swelling or pain in the  "bones or joints of arms/legs.  No new back pain.  Neuro-- No acute focal weakness or numbness in the arms or legs.  No seizures.     Full 12 point review of systems reviewed and negative otherwise for acute complaints, except for above      PE:   /61 (BP Location: Left arm, Patient Position: Lying)  Pulse 92  Temp 97.8 °F (36.6 °C) (Oral)   Resp 16  Ht 172.7 cm (68\")  Wt 66.7 kg (147 lb)  SpO2 92%  BMI 22.35 kg/m2    GENERAL: sleepy, in no acute distress.  Nasal cannula oxygen  HEENT: Normocephalic, atraumatic.  PERRL. EOMI. No conjunctival injection. No icterus. Oropharynx clear without evidence of thrush or exudate. No evidence of peridontal disease.    NECK: Supple without nuchal rigidity. No mass.  LYMPH: No cervical, axillary or inguinal lymphadenopathy.  HEART: RRR; No murmur, rubs, gallops.   LUNGS: Diminished on right more so than left with scattered rhonchi. Normal respiratory effort. Nonlabored. No dullness.  Chest tube  ABDOMEN: Soft, nontender, nondistended. Positive bowel sounds. No rebound or guarding. NO mass or HSM.  EXT:  No cyanosis, clubbing or edema. No cord.  : Genitalia generally unremarkable.  Without Pedraza catheter.  MSK: FROM without joint effusions noted arms/legs.    SKIN: Warm and dry without cutaneous eruptions on Inspection/palpation.    NEURO: Oriented to PPT. No focal deficits on motor/sensory exam at arms/legs.  PSYCHIATRIC: Normal insight and judgement. Cooperative with PE     No peripheral stigmata/phenomena of endocarditis    Laboratory Data      Results from last 7 days  Lab Units 01/20/18  0447 01/19/18  0407 01/18/18  0420   WBC 10*3/mm3 8.16 14.14* 16.75*   HEMOGLOBIN g/dL 10.7* 11.2* 12.1*   HEMATOCRIT % 31.6* 33.7* 35.5*   PLATELETS 10*3/mm3 241 245 234       Results from last 7 days  Lab Units 01/20/18  0447   SODIUM mmol/L 129*   POTASSIUM mmol/L 4.1   CHLORIDE mmol/L 95*   CO2 mmol/L 29.0   BUN mg/dL 9   CREATININE mg/dL 0.50*   GLUCOSE mg/dL 87   CALCIUM " mg/dL 8.2*       Results from last 7 days  Lab Units 01/20/18  0447  01/15/18  0526   ALK PHOS U/L 54  < > 55   BILIRUBIN mg/dL 0.8  < > 0.5   BILIRUBIN DIRECT mg/dL  --   --  0.2   ALT (SGPT) U/L 51*  < > 175*   AST (SGOT) U/L 25  < > 57*   < > = values in this interval not displayed.            Estimated Creatinine Clearance: 164.9 mL/min (by C-G formula based on Cr of 0.5).      Microbiology:      Radiology:  Imaging Results (last 72 hours)     Procedure Component Value Units Date/Time    CT Guided Chest Tube [[id number]] Collected:  01/16/18 0958     Updated:  01/16/18 1105    Narrative:       EXAMINATION: CT GUIDED CHEST TUBE-      INDICATION: Right apical pneumothorax; CT-guided catheter thoracentesis  for therapeutic and diagnostic purposes. Complex pleural effusion right  chest.     TECHNIQUE: Patient has no known allergies.     The clotting profile is acceptable. The PTT is 27.8, PT 12.0, INR 1.10,  platelets 226,000.     The radiation dose reduction device was turned on for each scan per the  ALARA (As Low as Reasonably Achievable) protocol.     COMPARISON: None.     FINDINGS:   1. The procedure, risks, complications and side effects of CT-guided  catheter placement in the right pleural space for therapeutic and  diagnostic drainage of the effusion in the right chest and for  decompression of the patient's right pneumothorax was discussed and  reviewed in detail with the patient including possible risk and  complications which include bleeding, infection, injury or laceration of  the intercostal artery with massive bleeding, puncture of the patient's  known high-grade bulla in the right lung with high-grade pleural leak,  air embolization, and other possible risk and complications.  Patient  understood and consented.     2. With the patient in the supine position, under sterile technique,  local anesthesia and meticulous CT guidance, from the right posterior  axillary line, a #12 Sinhala multi-sidehole  drainage catheter was  introduced into the right posterior inferior pleural space. The patient  has loculated pleural fluid and a complex airspace consolidative disease  extensively and therefore the catheter was placed in one of the  loculated compartments of fluid. Hopefully this catheter position will  communicate with other areas of fluid in the right hemithorax.     3. Fluid was extracted and immediately sent to the laboratory for all  diagnostic studies ordered by the attending physician.     A total of 180 mL of fluid was removed from the right pleural space for  diagnostic laboratory purposes.     4. The catheter was then sutured in place with 0 silk. A revolution  fixation device was applied and a sterile dressing was applied.     The catheter will be placed to a Pleur-evac atrium suction device for  decompression of the right chest and the right pneumothorax.       Impression:       1. A #12 Moldovan drainage catheter has been placed into the right  posterior inferior pleural space from the right posterior axillary line.  Fluid was obtained for all diagnostic studies ordered by the attending  physician.  2. The catheter was fixed, sutured and dressed. Catheter will be placed  to a Pleur-evac atrium suction device for decompression of free fluid  from the right hemithorax and hopefully compression of the right  pneumothorax.  3. Patient tolerated the procedure well. There were no complications. He  was taken back to his room in satisfactory and stable condition.     D:  01/16/2018  E:  01/16/2018     This report was finalized on 1/16/2018 11:03 AM by Dr. Harlan Fisher MD.       XR Chest 1 View [[id number]] Collected:  01/16/18 0923     Updated:  01/16/18 1405    Narrative:       EXAMINATION: XR CHEST 1 VW- 01/16/2018     INDICATION: pneumohydrothorax      COMPARISON: 01/15/2018     FINDINGS: A right chest tube has been inserted since the previous  examination. The right apical pneumothorax is smaller. There  is  persistent airspace disease in a perihilar and bibasilar distribution,  right greater than left.           Impression:       Pneumothorax is slightly smaller following insertion of a  chest tube.     D:  01/16/2018  E:  01/16/2018     This report was finalized on 1/16/2018 2:02 PM by Dr. Desmond Clavin MD.       XR Chest 1 View [[id number]] Collected:  01/17/18 1001     Updated:  01/17/18 1350    Narrative:       EXAMINATION: XR CHEST 1 VW- 01/17/2018     INDICATION: postop      COMPARISON: 01/16/2018     FINDINGS: A right chest tube remains well-positioned. There has been  reduction in the right pneumothorax. There is persistent patchy  bibasilar airspace disease.           Impression:       The pneumothorax has largely resolved. The bibasilar  pulmonary changes are unchanged.     D:  01/17/2018  E:  01/17/2018     This report was finalized on 1/17/2018 1:48 PM by Dr. Desmond Calvin MD.       XR Chest 1 View [[id number]] Collected:  01/18/18 0937     Updated:  01/18/18 1159    Narrative:       EXAMINATION: XR CHEST 1 VW-01/18/2018:      INDICATION: Postop; Z74.09-Other reduced mobility.      COMPARISON: 01/17/2018.     FINDINGS: A right chest tube remains in position. The right lung is  expanded. There is patchy right basilar airspace disease. The cardiac  silhouette is normal.           Impression:       Severe chronic obstructive and postinflammatory changes with  patchy airspace disease in the right upper lobe and with a right chest  tube in position. There is no pneumothorax and there has been no change  since the previous examination.     D:  01/18/2018  E:  01/18/2018     This report was finalized on 1/18/2018 11:57 AM by Dr. Desmond Calvin MD.       XR Chest 1 View [[id number]] Collected:  01/19/18 0838     Updated:  01/19/18 0838    Narrative:       EXAMINATION: XR CHEST 1 VW-01/19/2018:      INDICATION: Postop; Z74.09-Other reduced mobility.      COMPARISON: 01/18/2018.     FINDINGS: Right pleural drain remains  in place. There is advanced  bullous disease of the upper lobes. No pneumothorax is identified. Hazy  opacity of the right base is stable or only minimally increased. Chronic  appearing left basilar interstitial changes appear stable.           Impression:       No new chest disease.     D:  01/19/2018  E:  01/19/2018                     Impression:   --Acute severe sepsis.  Sepsis parameters improved since presentation to [facility].  Gen. resuscitative measures per internal medicine.  Otherwise as below     --Acute bilateral pneumonia with MRSA/usual placido in sputum; complicated by right sided empyema with culture positive pleural fluid and hydropneumothorax by prior imaging with current chest tube per cardiothoracic surgery.  Complex process in this patient with severe bullous emphysema, past lung surgery on the right per patient and ongoing tobacco abuse in addition to other comorbidity.  I have discussed directly with Dr. Ge and Dr. Alanis; Dr. Ge to decide on timing/options/threshold for further intervention such as surgery/decortication versus alternative tube drainage or other intervention regarding empyema and pneumothorax.  Patient understands that antibiotics are not a guarantee for cure and that he runs a risk for persistent/relapse/progressive infection in addition to chronic pulmonary functional restrictions and other serious morbidity/serious sequela etc.  Blood cultures negative with no other obvious metastatic focus of involvement.  Primary options for treatment include vancomycin versus zyvox; Dr. Alanis tapering SSRI to help minimize risk for interaction with Zyvox to help facilitate it as better option in future.        --Acute right sided empyema with staph aureus and culture associated with pneumonia as above.  Complicated with prior history of surgery, ongoing bullous emphysema etc.  Unclear at present whether he has bronchopleural fistula but any further decision  regarding timing/options/threshold for surgical intervention such as decortication, etc. is left to Dr. Ge.  I discussed directly with him.     --Acute kidney injury at presentation to [facility].  Creatinine has trended down; likely initially prerenal associated with sepsis     --Chronic bullous emphysema with prior right thoracotomy per patient.  No operative note available to me.  This would need to be clarified by Dr. Ge     --Chronic tobacco abuse.  I discussed potential benefits of cessation.  It is not clear at present but he is committed to quitting    PLAN:   --IV vancomycin/zosyn     --Monitor IV and IV antibiotic with risk for systemic complication and potential drug interaction     --Check/review labs cultures and scans     --Highly complex set of issues with high risk for further serious morbidity and other serious sequela     --d/w Dr Alanis and Dr Ge as above    Rafael Sr MD  1/20/2018         Electronically signed by Rafael Sr MD at 1/20/2018  1:28 PM      Josesito Garcia MD at 1/20/2018  5:22 PM  Version 1 of 1         Cardiothoracic Surgery Progress Note      PPD # 5 s/p CT guided tube thoracostomy    Chief complaint: Some pain at tube site     LOS: 6 days      Subjective:  No complaints.  Denies shortness of breath      Objective:  Vital Signs  Temp:  [97.7 °F (36.5 °C)-97.8 °F (36.6 °C)] 97.8 °F (36.6 °C)  Heart Rate:  [] 101  Resp:  [16-22] 22  BP: (102-120)/(58-66) 120/66    Physical Exam:   General Appearance: alert, appears stated age and cooperative   Lungs: clear to auscultation, respirations regular, respirations even and respirations unlabored   Heart: regular rhythm & normal rate, normal S1, S2 and no murmur, no alejandra, no rub       Results:    Results from last 7 days  Lab Units 01/20/18 0447   WBC 10*3/mm3 8.16   HEMOGLOBIN g/dL 10.7*   HEMATOCRIT % 31.6*   PLATELETS 10*3/mm3 241       Results from last 7 days  Lab Units 01/20/18 0447    SODIUM mmol/L 129*   POTASSIUM mmol/L 4.1   CHLORIDE mmol/L 95*   CO2 mmol/L 29.0   BUN mg/dL 9   CREATININE mg/dL 0.50*   GLUCOSE mg/dL 87   CALCIUM mg/dL 8.2*         Assessment:  Right apical pneumothorax    Plan:  No pneumothorax on CXR after chest tube removed  Pulmonary toilet  ABX as per ID for MRSA PNA    Josesito Garcia MD  01/20/18  5:22 PM             Electronically signed by Josesito Garcia MD at 1/20/2018  5:26 PM      Jesse Harris PA-C at 1/21/2018  8:58 AM  Version 2 of 2    Attestation signed by Josesito Garcia MD at 1/21/2018  7:08 PM        I have reviewed the documentation above and agree.  Wean oxygen as tolerated                                 Cardiothoracic Surgery Progress Note      PPD # 6 s/p CT guided tube thoracostomy    Chief complaint: Some pain at tube site     LOS: 7 days      Subjective:  No complaints.  Denies shortness of breath  No new complaints    Objective:  Vital Signs  Temp:  [97.2 °F (36.2 °C)-98.1 °F (36.7 °C)] 97.2 °F (36.2 °C)  Heart Rate:  [] 86  Resp:  [18-22] 20  BP: (119-120)/(66-69) 119/69    Physical Exam:   General Appearance: alert, appears stated age and cooperative   Lungs: clear to auscultation, respirations regular, respirations even and respirations unlabored   Heart: regular rhythm & normal rate, normal S1, S2 and no murmur, no alejandra, no rub       Results:    Results from last 7 days  Lab Units 01/21/18  0435   WBC 10*3/mm3 8.55   HEMOGLOBIN g/dL 10.2*   HEMATOCRIT % 30.4*   PLATELETS 10*3/mm3 256       Results from last 7 days  Lab Units 01/21/18  0435   SODIUM mmol/L 129*   POTASSIUM mmol/L 4.1   CHLORIDE mmol/L 96*   CO2 mmol/L 26.0   BUN mg/dL 8*   CREATININE mg/dL 0.60   GLUCOSE mg/dL 85   CALCIUM mg/dL 8.5*         Assessment:  Right apical pneumothorax  Status post Right chest tube for hydropneumothorax.  Chest tube is out  Plan:  No pneumothorax on CXR after chest tube removed  Pulmonary toilet  ABX as per ID for MRSA PNA    Jesse COLLINS  JASON Harris  18  8:58 AM             Electronically signed by Josesito Garcia MD at 2018  7:08 PM      Jesse Harris PA-C at 2018  8:58 AM  Version 1 of 2         Cardiothoracic Surgery Progress Note      PPD # 5 s/p CT guided tube thoracostomy    Chief complaint: Some pain at tube site     LOS: 7 days      Subjective:  No complaints.  Denies shortness of breath      Objective:  Vital Signs  Temp:  [97.2 °F (36.2 °C)-98.1 °F (36.7 °C)] 97.2 °F (36.2 °C)  Heart Rate:  [] 86  Resp:  [18-22] 20  BP: (119-120)/(66-69) 119/69    Physical Exam:   General Appearance: alert, appears stated age and cooperative   Lungs: clear to auscultation, respirations regular, respirations even and respirations unlabored   Heart: regular rhythm & normal rate, normal S1, S2 and no murmur, no alejandra, no rub       Results:    Results from last 7 days  Lab Units 18  0435   WBC 10*3/mm3 8.55   HEMOGLOBIN g/dL 10.2*   HEMATOCRIT % 30.4*   PLATELETS 10*3/mm3 256       Results from last 7 days  Lab Units 18  0435   SODIUM mmol/L 129*   POTASSIUM mmol/L 4.1   CHLORIDE mmol/L 96*   CO2 mmol/L 26.0   BUN mg/dL 8*   CREATININE mg/dL 0.60   GLUCOSE mg/dL 85   CALCIUM mg/dL 8.5*         Assessment:  Right apical pneumothorax    Plan:  No pneumothorax on CXR after chest tube removed  Pulmonary toilet  ABX as per ID for MRSA PNA    Jesse Harris PA-C  18  8:58 AM             Electronically signed by Jesse Harris PA-C at 2018  9:01 AM      Nazanin Rockwell MD at 2018 10:46 AM  Version 1 of 1             [facility] Medicine Services  PROGRESS NOTE    Patient Name: Niall Murguia  : 1965  MRN: [id number]    Date of Admission: 2018  Length of Stay: 7  Primary Care Physician: KEAGAN Rowley    Subjective   Subjective     CC:  F/u pneumonia    HPI:  Patient still reports some mild inferior lateral right chest wall pain (at site of chest tube)  but notes continued slow improvement with dyspnea and mild cough.  No issues overnight otherwise. Sitting up eating M&Ms this am    Review of Systems  Gen- No fevers, chills  CV- No palpitations  Resp- Improving cough and dyspnea  GI- No N/V/D, abd pain    Otherwise ROS is negative except as mentioned in the HPI.    Objective   Objective     Vital Signs:   Temp:  [97.2 °F (36.2 °C)-98.1 °F (36.7 °C)] 97.2 °F (36.2 °C)  Heart Rate:  [] 80  Resp:  [18-22] 20  BP: (119-120)/(66-69) 119/69     Physical Exam:  Constitutional: Ill appearing but nontoxic, awake, alert  HENT: NCAT, mucous membranes moist  Respiratory: Diminished throughout with mild crackles to right base, no crepitus at bandage overlying former thoracostomy tube site  Cardiovascular: RRR, no murmurs, rubs, or gallops, palpable pedal pulses bilaterally  Gastrointestinal: Positive bowel sounds, soft, nontender, nondistended  Musculoskeletal: No bilateral ankle edema  Psychiatric: Appropriate affect, cooperative  Neurologic: Oriented x 3, strength symmetric in all extremities, Cranial Nerves grossly intact to confrontation, speech clear  Skin: No rashes    Results Reviewed:  I have personally reviewed current lab, radiology, and data and agree.      Results from last 7 days  Lab Units 01/21/18  0435 01/20/18  0447 01/19/18  0407  01/15/18  0526   WBC 10*3/mm3 8.55 8.16 14.14*  < > 18.53*   HEMOGLOBIN g/dL 10.2* 10.7* 11.2*  < > 12.3*   HEMATOCRIT % 30.4* 31.6* 33.7*  < > 37.2*   PLATELETS 10*3/mm3 256 241 245  < > 226   INR   --   --   --   --  1.10   < > = values in this interval not displayed.    Results from last 7 days  Lab Units 01/21/18  0435 01/20/18  1742 01/20/18 0447 01/19/18  0407  01/18/18  0420   SODIUM mmol/L 129* 128* 129*  < > 126*  < > 125*   POTASSIUM mmol/L 4.1  --  4.1  --  4.1  --  3.8   CHLORIDE mmol/L 96*  --  95*  --  94*  --  98*   CO2 mmol/L 26.0  --  29.0  --  27.0  --  27.0   BUN mg/dL 8*  --  9  --  8*  --  9   CREATININE  mg/dL 0.60  --  0.50*  --  0.60  --  0.50*   GLUCOSE mg/dL 85  --  87  --  106*  --  146*   CALCIUM mg/dL 8.5*  --  8.2*  --  8.3*  --  8.0*   ALT (SGPT) U/L  --   --  51*  --  55*  --  65*   AST (SGOT) U/L  --   --  25  --  22  --  15   < > = values in this interval not displayed.  No results found for: BNP  No results found for: PHART    Microbiology Results Abnormal     Procedure Component Value - Date/Time    Fungus Culture - Body Fluid, Pleural Cavity [[id number]] Collected:  01/15/18 1555    Lab Status:  Preliminary result Specimen:  Body Fluid from Pleural Cavity Updated:  01/20/18 1631     Fungus Culture No fungus isolated at less than 1 week    AFB Culture - Body Fluid, Pleural Cavity [[id number]]  (Normal) Collected:  01/15/18 1555    Lab Status:  Preliminary result Specimen:  Body Fluid from Pleural Cavity Updated:  01/20/18 1631     AFB Culture No AFB isolated at less than 1 week     AFB Stain No acid fast bacilli seen on concentrated smear    Respiratory Culture - Sputum, Cough [[id number]] Collected:  01/18/18 1428    Lab Status:  Final result Specimen:  Sputum from Cough Updated:  01/20/18 1004     Respiratory Culture --      Moderate growth (3+) Normal Respiratory Jacqueline     Gram Stain Result Many (4+) WBCs per low power field      Few (2+) Epithelial cells per low power field      Few (2+) Gram positive cocci in pairs      Occasional Gram positive bacilli    Body Fluid Culture - Body Fluid, Pleural Cavity [[id number]]  (Abnormal)  (Susceptibility) Collected:  01/15/18 8304    Lab Status:  Final result Specimen:  Body Fluid from Pleural Cavity Updated:  01/18/18 1221     BF Culture --      Scant growth (1+) Staphylococcus aureus, MRSA (A)        Methicillin resistant Staphylococcus aureus, Patient may be an isolation risk.  This isolate does not demonstrate inducible clindamycin resistance in vitro.          Gram Stain Result Moderate (3+) WBCs seen      No organisms seen    Susceptibility       Staphylococcus aureus, MRSA     JASON     Clindamycin 2 ug/ml Intermediate     Daptomycin 1 ug/ml Susceptible     Erythromycin >4 ug/ml Resistant     Gentamicin <=4 ug/ml Susceptible     Levofloxacin <=1 ug/ml Susceptible  [1]      Linezolid 2 ug/ml Susceptible     Oxacillin >2 ug/ml Resistant     Penicillin G >8 ug/ml Resistant     Quinupristin + Dalfopristin <=0.5 ug/ml Susceptible     Rifampin <=1 ug/ml Susceptible     Tetracycline <=4 ug/ml Susceptible     Trimethoprim + Sulfamethoxazole <=0.5/9.5 ug/ml Susceptible     Vancomycin 2 ug/ml Susceptible            [1]   Staphylococcus species may develop resistance during prolonged therapy with quinolones.  Isolates that are initially susceptible may become resistant within three to four days after initiation of therapy. Testing of repeat isolates may be warranted.                 Anaerobic Culture - Body Fluid, Pleural Cavity [[id number]]  (Normal) Collected:  01/15/18 1555    Lab Status:  Preliminary result Specimen:  Body Fluid from Pleural Cavity Updated:  01/16/18 1239     Culture No anaerobes isolated          Imaging Results (last 24 hours)     Procedure Component Value Units Date/Time    XR Chest 1 View [[id number]] Collected:  01/21/18 0740     Updated:  01/21/18 1223    Narrative:          EXAMINATION: XR CHEST 1 VW - 01/21/2018     INDICATION:  Z74.09-Other reduced mobility. Follow-up pneumothorax.     COMPARISON: One-day prior.     FINDINGS: Overall aeration similar to prior with scattered postsurgical  changes and scarring within the right hemithorax noted. Increased  lucency within the right lung apex appears to be large bulla formation  as no distinct pleural line is evident. Left hemithorax grossly  unchanged. Cardiac size unchanged.        Impression:       Increased lucency within the right lung apex without  distinct pleural line likely represents improved  visualization/enlargement of large bulla formation. No discrete  pneumothorax identified  however attention on follow-up.     DICTATED:     01/21/2018  EDITED    :     01/21/2018      This report was finalized on 1/21/2018 12:21 PM by Dr. Myles Upton.           Results for orders placed during the hospital encounter of 01/07/18   Adult Transthoracic Echo Complete W/ Cont if Necessary Per Protocol    Narrative Technically very limited study   1) Normal LV systolic function ( EF is about 55%)  2) Normal left atrial size with normal LVedp   3) Trace MR   4) Mild TR with normal PA pressures ( within limitations of study)  5) Severe RV dilation with wall motion abnormality ? Acute on chronic   corpulmonale   6) Severe reduction in RV function        I have reviewed the medications.    Assessment/Plan   Assessment / Plan     Hospital Problem List     * (Principal)Hydropneumothorax, right    Acute on chronic respiratory failure with hypoxia    Pneumonia of both lower lobes    Chronic bullous emphysema (Chronic)    Hypertension    Tobacco use         Brief Hospital Course to date:  Niall Murguia is a 52 y.o. male who was transferred to [facility] on 1/14 from [facility] after presenting there on 1/7 with acute dyspnea, found to have acute hypoxemic respiratory failure and MRSA pneumonia:     Assessment & Plan:    Acute on Chronic Hypoxemic Respiratory Failure, Improving   Right-sided MRSA Pneumonia, improving  Complicated Right Parapneumonic Effusion with MRSA in pleural fluid culture, suspect empyema, unable to rule out bronchopleural fistula  - Continue supplemental O2 (on 3LNC when seen), wean as tolerated, continue pulmonary toilet   -  Unable to rule out bronchopleural fistula.   -  Continue IV vancomycin. Dr. 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   Nazanin Rockwell MD  01/21/18  1:46 PM           Electronically signed by Nazanin Rockwell MD at 1/21/2018  1:54 PM      Rafael Sr MD at 1/21/2018 10:54 AM  Version 1 of 1         [facility] Progress Note        Antibiotics:  Anti-Infectives     Ordered     Dose/Rate Route Frequency Start Stop    01/20/18 1325  Pharmacy to dose vancomycin     Ordering Provider:  Rafael Sr MD     Does not apply Continuous PRN 01/20/18 1325 01/27/18 1324    01/19/18 0917  piperacillin-tazobactam (ZOSYN) 3.375 g in iso-osmotic dextrose 50 ml (premix)     Ordering Provider:  Rafael Sr MD    3.375 g  over 4 Hours Intravenous Every 8 Hours 01/19/18 1600 01/29/18 1559    01/19/18 0917  piperacillin-tazobactam (ZOSYN) 3.375 g in iso-osmotic dextrose 50 ml (premix)     Ordering Provider:  Rafael Sr MD    3.375 g  over 30 Minutes Intravenous Once 01/19/18 1000 01/19/18 1314    01/17/18 1859  vancomycin 1250 mg/250 mL 0.9% NS IVPB ([facility])     Chuy Alanis MD reviewed the order on 01/18/18 0610.   Ordering Provider:  Chuy Alanis MD    1,250 mg  over 90 Minutes Intravenous Every 8 Hours 01/18/18 0200  01/27/18 1759    01/14/18 2103  vancomycin 1250 mg/250 mL 0.9% NS IVPB ([facility])     Ordering Provider:  Jake Tinsley MD    20 mg/kg × 62.4 kg  over 90 Minutes Intravenous Once 01/14/18 2200 01/15/18 0041          CC: sob/cough    HPI:  Patient is a 52 y.o.  Yr old male with history of bullous emphysema with prior right lung surgery in 2010 per patient related to collapsed lung but he did not recall any prior infection and specifics of that are unclear.  He was admitted to [facility] January 7 with 3 days progressive cough/dyspnea and hemoptysis.  Concern for preceding viral prodrome per outside records.  He had acute kidney injury, severe leukocytosis and elevated lactate with diagnosis acute severe sepsis/acute hypoxic respiratory failure and extensive right-sided pneumonia, admitted to ICU; chest imaging apparently had right hydropneumothorax and bilateral pneumonia with transferred to [facility] on January 14.  He was seen by Dr. Ge and underwent right sided chest tube.  Sputum from January 9 has MRSA, pleural fluid from January 15 has staph aureus in culture.     1/21/18 seen early and sleepy;  Per nursing,  less shortness of breath/cough, requiring nasal cannula oxygen but generally feels somewhat better.  No headache photophobia or neck stiffness.  He denies chest pain at present.  Tube remains.  No nausea vomiting diarrhea or abdominal pain.  No skin rash.  No dysuria hematuria or pyuria.    ROS:      1/21/18 No f/c/s. No n/v/d. No rash. No new ADR to Abx.     Constitutional-- No Fever, chills or sweats.  Appetite Diminished and generally fatigued  Heent-- No new vision, hearing or throat complaints.  No epistaxis or oral sores.  Denies odynophagia or dysphagia.  No flashers, floaters or eye pain. No odynophagia or dysphagia. No headache, photophobia or neck stiffness.  CV-- No chest pain, palpitation or syncope  Resp-- as above  GI- No nausea, vomiting, or diarrhea.  No  "hematochezia, melena, or hematemesis. Denies jaundice or chronic liver disease.  -- No dysuria, hematuria, or flank pain.  Denies hesitancy, urgency or flank pain.  Lymph- no swollen lymph nodes in neck/axilla or groin.   Heme- No active bruising or bleeding; no Hx of DVT or PE.  MS-- no swelling or pain in the bones or joints of arms/legs.  No new back pain.  Neuro-- No acute focal weakness or numbness in the arms or legs.  No seizures.     Full 12 point review of systems reviewed and negative otherwise for acute complaints, except for above      PE:   /69  Pulse 86  Temp 97.2 °F (36.2 °C)  Resp 20  Ht 172.7 cm (68\")  Wt 66.7 kg (147 lb)  SpO2 96%  BMI 22.35 kg/m2    GENERAL: sleepy, in no acute distress.  Nasal cannula oxygen  HEENT: Normocephalic, atraumatic.  PERRL. EOMI. No conjunctival injection. No icterus. Oropharynx clear without evidence of thrush or exudate. No evidence of peridontal disease.    NECK: Supple without nuchal rigidity. No mass.  LYMPH: No cervical, axillary or inguinal lymphadenopathy.  HEART: RRR; No murmur, rubs, gallops.   LUNGS: Diminished on right more so than left with scattered rhonchi. Normal respiratory effort. Nonlabored. No dullness.  Chest tube  ABDOMEN: Soft, nontender, nondistended. Positive bowel sounds. No rebound or guarding. NO mass or HSM.  EXT:  No cyanosis, clubbing or edema. No cord.  : Genitalia generally unremarkable.  Without Pedraza catheter.  MSK: FROM without joint effusions noted arms/legs.    SKIN: Warm and dry without cutaneous eruptions on Inspection/palpation.    NEURO: Oriented to PPT. No focal deficits on motor/sensory exam at arms/legs.  PSYCHIATRIC: Normal insight and judgement. Cooperative with PE     No peripheral stigmata/phenomena of endocarditis    Laboratory Data      Results from last 7 days  Lab Units 01/21/18  0435 01/20/18  0447 01/19/18  0407   WBC 10*3/mm3 8.55 8.16 14.14*   HEMOGLOBIN g/dL 10.2* 10.7* 11.2*   HEMATOCRIT % 30.4* " 31.6* 33.7*   PLATELETS 10*3/mm3 256 241 245       Results from last 7 days  Lab Units 01/21/18  0435   SODIUM mmol/L 129*   POTASSIUM mmol/L 4.1   CHLORIDE mmol/L 96*   CO2 mmol/L 26.0   BUN mg/dL 8*   CREATININE mg/dL 0.60   GLUCOSE mg/dL 85   CALCIUM mg/dL 8.5*       Results from last 7 days  Lab Units 01/20/18  0447  01/15/18  0526   ALK PHOS U/L 54  < > 55   BILIRUBIN mg/dL 0.8  < > 0.5   BILIRUBIN DIRECT mg/dL  --   --  0.2   ALT (SGPT) U/L 51*  < > 175*   AST (SGOT) U/L 25  < > 57*   < > = values in this interval not displayed.            Estimated Creatinine Clearance: 137.4 mL/min (by C-G formula based on Cr of 0.6).      Microbiology:      Radiology:  Imaging Results (last 72 hours)     Procedure Component Value Units Date/Time    CT Guided Chest Tube [[id number]] Collected:  01/16/18 0958     Updated:  01/16/18 1105    Narrative:       EXAMINATION: CT GUIDED CHEST TUBE-      INDICATION: Right apical pneumothorax; CT-guided catheter thoracentesis  for therapeutic and diagnostic purposes. Complex pleural effusion right  chest.     TECHNIQUE: Patient has no known allergies.     The clotting profile is acceptable. The PTT is 27.8, PT 12.0, INR 1.10,  platelets 226,000.     The radiation dose reduction device was turned on for each scan per the  ALARA (As Low as Reasonably Achievable) protocol.     COMPARISON: None.     FINDINGS:   1. The procedure, risks, complications and side effects of CT-guided  catheter placement in the right pleural space for therapeutic and  diagnostic drainage of the effusion in the right chest and for  decompression of the patient's right pneumothorax was discussed and  reviewed in detail with the patient including possible risk and  complications which include bleeding, infection, injury or laceration of  the intercostal artery with massive bleeding, puncture of the patient's  known high-grade bulla in the right lung with high-grade pleural leak,  air embolization, and other possible  risk and complications.  Patient  understood and consented.     2. With the patient in the supine position, under sterile technique,  local anesthesia and meticulous CT guidance, from the right posterior  axillary line, a #12 Bangladeshi multi-sidehole drainage catheter was  introduced into the right posterior inferior pleural space. The patient  has loculated pleural fluid and a complex airspace consolidative disease  extensively and therefore the catheter was placed in one of the  loculated compartments of fluid. Hopefully this catheter position will  communicate with other areas of fluid in the right hemithorax.     3. Fluid was extracted and immediately sent to the laboratory for all  diagnostic studies ordered by the attending physician.     A total of 180 mL of fluid was removed from the right pleural space for  diagnostic laboratory purposes.     4. The catheter was then sutured in place with 0 silk. A revolution  fixation device was applied and a sterile dressing was applied.     The catheter will be placed to a Pleur-evac atrium suction device for  decompression of the right chest and the right pneumothorax.       Impression:       1. A #12 Bangladeshi drainage catheter has been placed into the right  posterior inferior pleural space from the right posterior axillary line.  Fluid was obtained for all diagnostic studies ordered by the attending  physician.  2. The catheter was fixed, sutured and dressed. Catheter will be placed  to a Pleur-evac atrium suction device for decompression of free fluid  from the right hemithorax and hopefully compression of the right  pneumothorax.  3. Patient tolerated the procedure well. There were no complications. He  was taken back to his room in satisfactory and stable condition.     D:  01/16/2018  E:  01/16/2018     This report was finalized on 1/16/2018 11:03 AM by Dr. Harlan Fisher MD.       XR Chest 1 View [[id number]] Collected:  01/16/18 0923     Updated:  01/16/18 1405     Narrative:       EXAMINATION: XR CHEST 1 VW- 01/16/2018     INDICATION: pneumohydrothorax      COMPARISON: 01/15/2018     FINDINGS: A right chest tube has been inserted since the previous  examination. The right apical pneumothorax is smaller. There is  persistent airspace disease in a perihilar and bibasilar distribution,  right greater than left.           Impression:       Pneumothorax is slightly smaller following insertion of a  chest tube.     D:  01/16/2018  E:  01/16/2018     This report was finalized on 1/16/2018 2:02 PM by Dr. Desmond Calvin MD.       XR Chest 1 View [[id number]] Collected:  01/17/18 1001     Updated:  01/17/18 1350    Narrative:       EXAMINATION: XR CHEST 1 VW- 01/17/2018     INDICATION: postop      COMPARISON: 01/16/2018     FINDINGS: A right chest tube remains well-positioned. There has been  reduction in the right pneumothorax. There is persistent patchy  bibasilar airspace disease.           Impression:       The pneumothorax has largely resolved. The bibasilar  pulmonary changes are unchanged.     D:  01/17/2018  E:  01/17/2018     This report was finalized on 1/17/2018 1:48 PM by Dr. Desmond Calvin MD.       XR Chest 1 View [[id number]] Collected:  01/18/18 0937     Updated:  01/18/18 1159    Narrative:       EXAMINATION: XR CHEST 1 VW-01/18/2018:      INDICATION: Postop; Z74.09-Other reduced mobility.      COMPARISON: 01/17/2018.     FINDINGS: A right chest tube remains in position. The right lung is  expanded. There is patchy right basilar airspace disease. The cardiac  silhouette is normal.           Impression:       Severe chronic obstructive and postinflammatory changes with  patchy airspace disease in the right upper lobe and with a right chest  tube in position. There is no pneumothorax and there has been no change  since the previous examination.     D:  01/18/2018  E:  01/18/2018     This report was finalized on 1/18/2018 11:57 AM by Dr. Desmond Calvin MD.       XR Chest 1 View  [[id number]] Collected:  01/19/18 0838     Updated:  01/19/18 0838    Narrative:       EXAMINATION: XR CHEST 1 VW-01/19/2018:      INDICATION: Postop; Z74.09-Other reduced mobility.      COMPARISON: 01/18/2018.     FINDINGS: Right pleural drain remains in place. There is advanced  bullous disease of the upper lobes. No pneumothorax is identified. Hazy  opacity of the right base is stable or only minimally increased. Chronic  appearing left basilar interstitial changes appear stable.           Impression:       No new chest disease.     D:  01/19/2018  E:  01/19/2018                     Impression:   --Acute severe sepsis.  Sepsis parameters improved since presentation to [facility].  Gen. resuscitative measures per internal medicine.  Otherwise as below     --Acute bilateral pneumonia with MRSA/usual placido in sputum; complicated by right sided empyema with culture positive pleural fluid and hydropneumothorax by prior imaging with current chest tube per cardiothoracic surgery.  Complex process in this patient with severe bullous emphysema, past lung surgery on the right per patient and ongoing tobacco abuse in addition to other comorbidity.  I have discussed directly with Dr. Ge and Dr. Alanis; Dr. Ge to decide on timing/options/threshold for further intervention such as surgery/decortication versus alternative tube drainage or other intervention regarding empyema and pneumothorax.  Patient understands that antibiotics are not a guarantee for cure and that he runs a risk for persistent/relapse/progressive infection in addition to chronic pulmonary functional restrictions and other serious morbidity/serious sequela etc.  Blood cultures negative with no other obvious metastatic focus of involvement.  Primary options for treatment include vancomycin versus zyvox; Dr. Alanis tapering SSRI to help minimize risk for interaction with Zyvox to help facilitate it an option in future.        --Acute right  "sided empyema with staph aureus and culture associated with pneumonia as above.  Complicated with prior history of surgery, ongoing bullous emphysema etc.  Unclear at present whether he has bronchopleural fistula but any further decision regarding timing/options/threshold for surgical intervention such as decortication, etc. is left to Dr. Ge.  I discussed directly with him.     --Acute kidney injury at presentation to [facility].  Creatinine has trended down; likely initially prerenal associated with sepsis     --Chronic bullous emphysema with prior right thoracotomy per patient.  No operative note available to me.  This would need to be clarified by Dr. Ge     --Chronic tobacco abuse.  I discussed potential benefits of cessation.  It is not clear at present but he is committed to quitting    PLAN:   --IV vancomycin/zosyn     --Monitor IV and IV antibiotic with risk for systemic complication and potential drug interaction     --Check/review labs cultures and scans     --Highly complex set of issues with high risk for further serious morbidity and other serious sequela         Rafael Sr MD  1/21/2018         Electronically signed by Rafael Sr MD at 1/21/2018 10:55 AM      Roland Ge MD at 1/22/2018  7:08 AM  Version 1 of 1         Niall Murguia  [id number]  1965     LOS: 8 days   Patient Care Team:  KEAGAN Rowley as PCP - General (Family Medicine)    Chief Complaint:       Subjective: Alert no complaints    Objective:     Vital Sign Min/Max for last 24 hours  Temp  Min: 97.2 °F (36.2 °C)  Max: 98.7 °F (37.1 °C)   BP  Min: 113/56  Max: 119/69   Pulse  Min: 69  Max: 102   Resp  Min: 18  Max: 20   SpO2  Min: 90 %  Max: 96 %   Flow (L/min)  Min: 3  Max: 3   Weight  Min: 70.6 kg (155 lb 9.6 oz)  Max: 70.6 kg (155 lb 9.6 oz)     Flowsheet Rows         First Filed Value    Admission Height  172.7 cm (68\") Documented at 01/14/2018 1853    Admission Weight  " 66.6 kg (146 lb 14.4 oz) Documented at 01/14/2018 1853          Physical Exam:Breathing easily    Wound:    Pulses:     Mediastinal and Chest Tube Drainage:       Results Review:     Results from last 7 days  Lab Units 01/22/18  0549   WBC 10*3/mm3 7.13   HEMOGLOBIN g/dL 10.2*   HEMATOCRIT % 30.3*   PLATELETS 10*3/mm3 237       Results from last 7 days  Lab Units 01/21/18  1807 01/21/18  0435   SODIUM mmol/L 127* 129*   POTASSIUM mmol/L  --  4.1   CHLORIDE mmol/L  --  96*   CO2 mmol/L  --  26.0   BUN mg/dL  --  8*   CREATININE mg/dL  --  0.60   GLUCOSE mg/dL  --  85   CALCIUM mg/dL  --  8.5*             Assessment    Principal Problem:    Hydropneumothorax, right  Active Problems:    Acute on chronic respiratory failure with hypoxia    Pneumonia of both lower lobes    Chronic bullous emphysema    Hypertension    Tobacco use      Chest x-ray satisfactory, we will sign off case.        Roland Ge MD  01/22/18  7:09 AM      Please note that portions of this note were completed with a voice recognition program. Efforts were made to edit the dictations, but words may be mistranscribed     Electronically signed by Roland Ge MD at 1/22/2018  7:09 AM      Rafael Sr MD at 1/22/2018 12:50 PM  Version 1 of 1         [facility] Progress Note        Antibiotics:  Anti-Infectives     Ordered     Dose/Rate Route Frequency Start Stop    01/20/18 1325  Pharmacy to dose vancomycin     Ordering Provider:  Rafael Sr MD     Does not apply Continuous PRN 01/20/18 1325 01/27/18 1324    01/19/18 0917  piperacillin-tazobactam (ZOSYN) 3.375 g in iso-osmotic dextrose 50 ml (premix)     Ordering Provider:  Rafael Sr MD    3.375 g  over 4 Hours Intravenous Every 8 Hours 01/19/18 1600 01/29/18 1559    01/19/18 0917  piperacillin-tazobactam (ZOSYN) 3.375 g in iso-osmotic dextrose 50 ml (premix)     Ordering Provider:  Rafael Sr MD    3.375 g  over 30 Minutes Intravenous Once 01/19/18 1000 01/19/18  1314    01/17/18 1859  vancomycin 1250 mg/250 mL 0.9% NS IVPB ([facility])     Chuy Alanis MD reviewed the order on 01/18/18 0610.   Ordering Provider:  Chuy Alanis MD    1,250 mg  over 90 Minutes Intravenous Every 8 Hours 01/18/18 0200 01/27/18 1759    01/14/18 2103  vancomycin 1250 mg/250 mL 0.9% NS IVPB ([facility])     Ordering Provider:  Jake Tinsley MD    20 mg/kg × 62.4 kg  over 90 Minutes Intravenous Once 01/14/18 2200 01/15/18 0041          CC: sob/cough    HPI:  Patient is a 52 y.o.  Yr old male with history of bullous emphysema with prior right lung surgery in 2010 per patient related to collapsed lung but he did not recall any prior infection and specifics of that are unclear.  He was admitted to [facility] January 7 with 3 days progressive cough/dyspnea and hemoptysis.  Concern for preceding viral prodrome per outside records.  He had acute kidney injury, severe leukocytosis and elevated lactate with diagnosis acute severe sepsis/acute hypoxic respiratory failure and extensive right-sided pneumonia, admitted to ICU; chest imaging apparently had right hydropneumothorax and bilateral pneumonia with transferred to [facility] on January 14.  He was seen by Dr. Ge and underwent right sided chest tube.  Sputum from January 9 has MRSA, pleural fluid from January 15 has staph aureus in culture.     1/22/18 Generally doing ok; less shortness of breath/cough, requiring nasal cannula oxygen but generally feels somewhat better.  No headache photophobia or neck stiffness.  He denies chest pain at present.  Tube remains.  No nausea vomiting diarrhea or abdominal pain.  No skin rash.  No dysuria hematuria or pyuria.    ROS:      1/22/18 No f/c/s. No n/v/d. No rash. No new ADR to Abx.     Constitutional-- No Fever, chills or sweats.  Appetite Diminished and generally fatigued  Heent-- No new vision, hearing or throat complaints.  No epistaxis or oral sores.  Denies odynophagia or  "dysphagia.  No flashers, floaters or eye pain. No odynophagia or dysphagia. No headache, photophobia or neck stiffness.  CV-- No chest pain, palpitation or syncope  Resp-- as above  GI- No nausea, vomiting, or diarrhea.  No hematochezia, melena, or hematemesis. Denies jaundice or chronic liver disease.  -- No dysuria, hematuria, or flank pain.  Denies hesitancy, urgency or flank pain.  Lymph- no swollen lymph nodes in neck/axilla or groin.   Heme- No active bruising or bleeding; no Hx of DVT or PE.  MS-- no swelling or pain in the bones or joints of arms/legs.  No new back pain.  Neuro-- No acute focal weakness or numbness in the arms or legs.  No seizures.     Full 12 point review of systems reviewed and negative otherwise for acute complaints, except for above      PE:   /69 (BP Location: Left arm, Patient Position: Lying)  Pulse 77  Temp 98.4 °F (36.9 °C) (Oral)   Resp 16  Ht 172.7 cm (68\")  Wt 70.6 kg (155 lb 9.6 oz)  SpO2 94%  BMI 23.66 kg/m2    GENERAL: sleepy, in no acute distress.  Nasal cannula oxygen  HEENT: Normocephalic, atraumatic.  PERRL. EOMI. No conjunctival injection. No icterus. Oropharynx clear without evidence of thrush or exudate. No evidence of peridontal disease.    NECK: Supple without nuchal rigidity. No mass.  LYMPH: No cervical, axillary or inguinal lymphadenopathy.  HEART: RRR; No murmur, rubs, gallops.   LUNGS: Diminished on right more so than left with scattered rhonchi. Normal respiratory effort. Nonlabored. No dullness.  Chest tube  ABDOMEN: Soft, nontender, nondistended. Positive bowel sounds. No rebound or guarding. NO mass or HSM.  EXT:  No cyanosis, clubbing or edema. No cord.  : Genitalia generally unremarkable.  Without Pedraza catheter.  MSK: FROM without joint effusions noted arms/legs.    SKIN: Warm and dry without cutaneous eruptions on Inspection/palpation.    NEURO: Oriented to PPT. No focal deficits on motor/sensory exam at arms/legs.  PSYCHIATRIC: Normal " insight and judgement. Cooperative with PE     No peripheral stigmata/phenomena of endocarditis    Laboratory Data      Results from last 7 days  Lab Units 01/22/18  0549 01/21/18  0435 01/20/18  0447   WBC 10*3/mm3 7.13 8.55 8.16   HEMOGLOBIN g/dL 10.2* 10.2* 10.7*   HEMATOCRIT % 30.3* 30.4* 31.6*   PLATELETS 10*3/mm3 237 256 241       Results from last 7 days  Lab Units 01/22/18  0549   SODIUM mmol/L 132   POTASSIUM mmol/L 3.9   CHLORIDE mmol/L 100   CO2 mmol/L 27.0   BUN mg/dL 10   CREATININE mg/dL 0.90   GLUCOSE mg/dL 100   CALCIUM mg/dL 8.7       Results from last 7 days  Lab Units 01/20/18  0447   ALK PHOS U/L 54   BILIRUBIN mg/dL 0.8   ALT (SGPT) U/L 51*   AST (SGOT) U/L 25               Estimated Creatinine Clearance: 97 mL/min (by C-G formula based on Cr of 0.9).      Microbiology:      Radiology:  Imaging Results (last 72 hours)     Procedure Component Value Units Date/Time    CT Guided Chest Tube [[id number]] Collected:  01/16/18 0958     Updated:  01/16/18 1105    Narrative:       EXAMINATION: CT GUIDED CHEST TUBE-      INDICATION: Right apical pneumothorax; CT-guided catheter thoracentesis  for therapeutic and diagnostic purposes. Complex pleural effusion right  chest.     TECHNIQUE: Patient has no known allergies.     The clotting profile is acceptable. The PTT is 27.8, PT 12.0, INR 1.10,  platelets 226,000.     The radiation dose reduction device was turned on for each scan per the  ALARA (As Low as Reasonably Achievable) protocol.     COMPARISON: None.     FINDINGS:   1. The procedure, risks, complications and side effects of CT-guided  catheter placement in the right pleural space for therapeutic and  diagnostic drainage of the effusion in the right chest and for  decompression of the patient's right pneumothorax was discussed and  reviewed in detail with the patient including possible risk and  complications which include bleeding, infection, injury or laceration of  the intercostal artery with  massive bleeding, puncture of the patient's  known high-grade bulla in the right lung with high-grade pleural leak,  air embolization, and other possible risk and complications.  Patient  understood and consented.     2. With the patient in the supine position, under sterile technique,  local anesthesia and meticulous CT guidance, from the right posterior  axillary line, a #12 Australian multi-sidehole drainage catheter was  introduced into the right posterior inferior pleural space. The patient  has loculated pleural fluid and a complex airspace consolidative disease  extensively and therefore the catheter was placed in one of the  loculated compartments of fluid. Hopefully this catheter position will  communicate with other areas of fluid in the right hemithorax.     3. Fluid was extracted and immediately sent to the laboratory for all  diagnostic studies ordered by the attending physician.     A total of 180 mL of fluid was removed from the right pleural space for  diagnostic laboratory purposes.     4. The catheter was then sutured in place with 0 silk. A revolution  fixation device was applied and a sterile dressing was applied.     The catheter will be placed to a Pleur-evac atrium suction device for  decompression of the right chest and the right pneumothorax.       Impression:       1. A #12 Australian drainage catheter has been placed into the right  posterior inferior pleural space from the right posterior axillary line.  Fluid was obtained for all diagnostic studies ordered by the attending  physician.  2. The catheter was fixed, sutured and dressed. Catheter will be placed  to a Pleur-evac atrium suction device for decompression of free fluid  from the right hemithorax and hopefully compression of the right  pneumothorax.  3. Patient tolerated the procedure well. There were no complications. He  was taken back to his room in satisfactory and stable condition.     D:  01/16/2018  E:  01/16/2018     This report  was finalized on 1/16/2018 11:03 AM by Dr. Harlan Fisher MD.       XR Chest 1 View [[id number]] Collected:  01/16/18 0923     Updated:  01/16/18 1405    Narrative:       EXAMINATION: XR CHEST 1 VW- 01/16/2018     INDICATION: pneumohydrothorax      COMPARISON: 01/15/2018     FINDINGS: A right chest tube has been inserted since the previous  examination. The right apical pneumothorax is smaller. There is  persistent airspace disease in a perihilar and bibasilar distribution,  right greater than left.           Impression:       Pneumothorax is slightly smaller following insertion of a  chest tube.     D:  01/16/2018  E:  01/16/2018     This report was finalized on 1/16/2018 2:02 PM by Dr. Desmond Calvin MD.       XR Chest 1 View [[id number]] Collected:  01/17/18 1001     Updated:  01/17/18 1350    Narrative:       EXAMINATION: XR CHEST 1 VW- 01/17/2018     INDICATION: postop      COMPARISON: 01/16/2018     FINDINGS: A right chest tube remains well-positioned. There has been  reduction in the right pneumothorax. There is persistent patchy  bibasilar airspace disease.           Impression:       The pneumothorax has largely resolved. The bibasilar  pulmonary changes are unchanged.     D:  01/17/2018  E:  01/17/2018     This report was finalized on 1/17/2018 1:48 PM by Dr. Desmond Calvin MD.       XR Chest 1 View [[id number]] Collected:  01/18/18 0937     Updated:  01/18/18 1159    Narrative:       EXAMINATION: XR CHEST 1 VW-01/18/2018:      INDICATION: Postop; Z74.09-Other reduced mobility.      COMPARISON: 01/17/2018.     FINDINGS: A right chest tube remains in position. The right lung is  expanded. There is patchy right basilar airspace disease. The cardiac  silhouette is normal.           Impression:       Severe chronic obstructive and postinflammatory changes with  patchy airspace disease in the right upper lobe and with a right chest  tube in position. There is no pneumothorax and there has been no change  since the  previous examination.     D:  01/18/2018  E:  01/18/2018     This report was finalized on 1/18/2018 11:57 AM by Dr. Desmond Calvin MD.       XR Chest 1 View [[id number]] Collected:  01/19/18 0838     Updated:  01/19/18 0838    Narrative:       EXAMINATION: XR CHEST 1 VW-01/19/2018:      INDICATION: Postop; Z74.09-Other reduced mobility.      COMPARISON: 01/18/2018.     FINDINGS: Right pleural drain remains in place. There is advanced  bullous disease of the upper lobes. No pneumothorax is identified. Hazy  opacity of the right base is stable or only minimally increased. Chronic  appearing left basilar interstitial changes appear stable.           Impression:       No new chest disease.     D:  01/19/2018  E:  01/19/2018                     Impression:   --Acute severe sepsis.  Sepsis parameters improved since presentation to [facility].  Gen. resuscitative measures per internal medicine.  Otherwise as below     --Acute bilateral pneumonia with MRSA/usual placido in sputum; complicated by right sided empyema with culture positive pleural fluid and hydropneumothorax by prior imaging with current chest tube per cardiothoracic surgery.  Complex process in this patient with severe bullous emphysema, past lung surgery on the right per patient and ongoing tobacco abuse in addition to other comorbidity.  I have discussed directly with Dr. Ge and Dr. Alanis; Dr. Ge to decide on timing/options/threshold for further intervention such as surgery/decortication versus alternative tube drainage or other intervention regarding empyema and pneumothorax.  Patient understands that antibiotics are not a guarantee for cure and that he runs a risk for persistent/relapse/progressive infection in addition to chronic pulmonary functional restrictions and other serious morbidity/serious sequela etc.  Blood cultures negative with no other obvious metastatic focus of involvement.  Primary options for treatment include  vancomycin versus zyvox; Dr. Alanis tapering SSRI to help minimize risk for interaction with Zyvox to help facilitate it an option in future.        --Acute right sided empyema with staph aureus and culture associated with pneumonia as above.  Complicated with prior history of surgery, ongoing bullous emphysema etc.  Unclear at present whether he has bronchopleural fistula but any further decision regarding timing/options/threshold for surgical intervention such as decortication, etc. is left to Dr. Ge.  I discussed directly with him.     --Acute kidney injury at presentation to [facility].  Creatinine has trended down; likely initially prerenal associated with sepsis     --Chronic bullous emphysema with prior right thoracotomy per patient.  No operative note available to me.  This would need to be clarified by Dr. Ge     --Chronic tobacco abuse.  I discussed potential benefits of cessation.  It is not clear at present but he is committed to quitting    PLAN:   --IV vancomycin/zosyn     --Monitor IV and IV antibiotic with risk for systemic complication and potential drug interaction     --Check/review labs cultures and scans     --Highly complex set of issues with high risk for further serious morbidity and other serious sequela         Rafael Sr MD  1/22/2018         Electronically signed by Rafael Sr MD at 1/22/2018 12:51 PM        Consult Notes (last 72 hours) (Notes from 1/19/2018 12:58 PM through 1/22/2018 12:58 PM)     No notes of this type exist for this encounter.

## 2018-01-22 NOTE — PLAN OF CARE
Problem: Patient Care Overview (Adult)  Goal: Plan of Care Review  Outcome: Ongoing (interventions implemented as appropriate)   01/22/18 0941   Coping/Psychosocial Response Interventions   Plan Of Care Reviewed With patient   Patient Care Overview   Progress improving   Outcome Evaluation   Outcome Summary/Follow up Plan pt met most goals.try steps next time.ambulat 70o ft,has IV.went over exercises       Problem: Inpatient Physical Therapy  Goal: Transfer Training Goal 1 LTG- PT  Outcome: Outcome(s) achieved Date Met: 01/22/18 01/17/18 1009 01/22/18 0941   Transfer Training PT LTG   Transfer Training PT LTG, Date Established 01/17/18 --    Transfer Training PT LTG, Time to Achieve 5 days --    Transfer Training PT LTG, Activity Type bed to chair /chair to bed;sit to stand/stand to sit;toilet --    Transfer Training PT LTG, [place] Level independent --    Transfer Training PT LTG, Outcome --  goal met     Goal: Gait Training Goal LTG- PT  Outcome: Outcome(s) achieved Date Met: 01/22/18 01/17/18 1009 01/22/18 0941   Gait Training PT LTG   Gait Training Goal PT LTG, Date Established 01/17/18 --    Gait Training Goal PT LTG, Time to Achieve 5 days --    Gait Training Goal PT LTG, [place] Level supervision required  (stable VS; O2 SAT. > 92%) --    Gait Training Goal PT LTG, Distance to Achieve 250 --    Gait Training Goal PT LTG, Outcome --  goal met     Goal: Stair Training Goal LTG- PT  Outcome: Ongoing (interventions implemented as appropriate)   01/17/18 1009 01/22/18 0941   Stair Training PT LTG   Stair Training Goal PT LTG, Date Established 01/17/18 --    Stair Training Goal PT LTG, Time to Achieve 5 days --    Stair Training Goal PT LTG, Number of Steps 4 --    Stair Training Goal PT LTG, [place] Level supervision required  (stable VS; o2 sat >92%) --    Stair Training Goal PT LTG, Assist Device 1 handrail --    Stair Training Goal PT LTG, Outcome --  goal ongoing

## 2018-01-22 NOTE — THERAPY TREATMENT NOTE
Acute Care - Physical Therapy Treatment Note  [facility]     Patient Name: Niall Murguia  : 1965  MRN: [id number]  Today's Date: 2018  Onset of Illness/Injury or Date of Surgery Date: 18  Date of Referral to PT: 18  Referring Physician: MD Tino    Admit Date: 2018    Visit Dx:    ICD-10-CM ICD-9-CM   1. Impaired functional mobility, balance, gait, and endurance Z74.09 V49.89     Patient Active Problem List   Diagnosis   • Acute on chronic respiratory failure with hypoxia   • Pneumonia of both lower lobes   • Chronic bullous emphysema   • Hydropneumothorax, right   • Hypertension   • Tobacco use               Adult Rehabilitation Note       18 0905 18 1420       Rehab Assessment/Intervention    Discipline physical therapy assistant  -UD physical therapy assistant  -UD     Document Type therapy note (daily note)  -UD therapy note (daily note)  -UD     Subjective Information agree to therapy;no complaints  -UD agree to therapy;no complaints  -UD     Patient Effort, Rehab Treatment good  -UD good  -UD     Symptoms Noted During/After Treatment fatigue;shortness of breath  -UD fatigue  -UD     Precautions/Limitations oxygen therapy device and L/min  -UD oxygen therapy device and L/min  -UD     Recorded by [UD] Eleanor Kumar PTA [UD] Eleanor Kumar PTA     Vital Signs    O2 Delivery Post Treatment supplemental O2  -UD supplemental O2  -UD     Pre Patient Position Supine  -UD Supine  -UD     Intra Patient Position Standing  -UD Standing  -UD     Post Patient Position Sitting  -UD Sitting  -UD     Recorded by [UD] Eleanor Kumar PTA [UD] Eleanor Kumar PTA     Pain Assessment    Pain Assessment No/denies pain  -UD No/denies pain  -UD     Recorded by [UD] Eleanor Kumar PTA [UD] Eleanor Kumar PTA     Cognitive Assessment/Intervention    Orientation Status oriented x 4  -UD oriented x 4  -UD     Follows Commands/Answers Questions 100% of the time  -% of the time  -UD      Recorded by [UD] Eleanor Kumar PTA [UD] Eleanor Kumar PTA     Bed Mobility, Assessment/Treatment    Bed Mob, Supine to Sit, [place] independent  -UD conditional independence  -UD     Recorded by [UD] Eleanor Kumar PTA [UD] Eleanor Kumar PTA     Transfer Assessment/Treatment    Transfers, Sit-Stand [place] supervision required  -UD contact guard assist  -UD     Transfers, Stand-Sit [place] supervision required  -UD contact guard assist  -UD     Recorded by [UD] Eleanor Kumar PTA [UD] Eleanor Kumar PTA     Gait Assessment/Treatment    Gait, [place] Level stand by assist  -UD contact guard assist  -UD     Gait, Distance (Feet) 700   1 standing rest  -  -UD     Gait, Gait Deviations johnny decreased  -UD johnny decreased  -UD     Gait, Safety Issues step length decreased  -UD step length decreased  -UD     Gait, Impairments strength decreased  -UD strength decreased  -UD     Recorded by [UD] Eleanor Kumar PTA [UD] Eleanor Kumar PTA     Therapy Exercises    Bilateral Lower Extremities AROM:;10 reps;sitting  -UD AROM:;10 reps;sitting  -UD     Bilateral Upper Extremity AROM:;10 reps;sitting  -UD AROM:;10 reps;sitting  -UD     Recorded by [UD] Eleanor Kumar PTA [UD] Eleanor Kumar PTA     Positioning and Restraints    Pre-Treatment Position in bed  -UD in bed  -UD     Post Treatment Position chair  -UD chair  -UD     In Chair notified nsg;reclined;sitting;call light within reach  -UD notified nsg;reclined;sitting;call light within reach;legs elevated  -UD     Recorded by [UD] Eleanor Kumar PTA [UD] Eleanor Kumar PTA       User Key  (r) = Recorded By, (t) = Taken By, (c) = Cosigned By    Initials Name Effective Dates    UD Eleanor Kumar PTA 06/22/15 -                 IP PT Goals       01/22/18 0941 01/19/18 1452 01/18/18 1329    Bed Mobility PT LTG    Bed Mobility PT LTG, Outcome  goal met  -UD goal ongoing  -UD    Transfer Training PT LTG    Transfer Training PT LTG, Outcome goal met  -UD goal  ongoing  -UD goal ongoing  -UD    Gait Training PT LTG    Gait Training Goal PT LTG, Outcome goal met  -UD goal partially met  -UD goal partially met  -UD    Stair Training PT LTG    Stair Training Goal PT LTG, Outcome goal ongoing  -UD goal ongoing  -UD goal ongoing  -UD      01/17/18 1009 01/14/18 1109 01/14/18 1104    Bed Mobility PT LTG    Bed Mobility PT LTG, Date Established 01/17/18  -DM      Bed Mobility PT LTG, Time to Achieve 5 days  -DM      Bed Mobility PT LTG, Activity Type all bed mobility  -DM      Bed Mobility PT LTG, [place] Level independent  -DM      Transfer Training PT LTG    Transfer Training PT LTG, Date Established 01/17/18  -DM      Transfer Training PT LTG, Time to Achieve 5 days  -DM      Transfer Training PT LTG, Activity Type bed to chair /chair to bed;sit to stand/stand to sit;toilet  -DM      Transfer Training PT LTG, [place] Level independent  -DM      Transfer Training PT LTG, Outcome  goal ongoing  -CC     Gait Training PT LTG    Gait Training Goal PT LTG, Date Established 01/17/18  -DM      Gait Training Goal PT LTG, Time to Achieve 5 days  -DM      Gait Training Goal PT LTG, [place] Level supervision required   stable VS; O2 SAT. > 92%  -DM      Gait Training Goal PT LTG, Distance to Achieve 250  -DM      Gait Training Goal PT LTG, Outcome  goal ongoing  -CC     Stair Training PT LTG    Stair Training Goal PT LTG, Date Established 01/17/18  -DM      Stair Training Goal PT LTG, Time to Achieve 5 days  -DM      Stair Training Goal PT LTG, Number of Steps 4  -DM      Stair Training Goal PT LTG, [place] Level supervision required   stable VS; o2 sat >92%  -DM      Stair Training Goal PT LTG, Assist Device 1 handrail  -DM      Strength Goal PT LTG    Strength Goal PT LTG, Outcome   goal partially met  -CC      01/11/18 1145          Transfer Training PT LTG    Transfer Training PT LTG, Date Established 01/11/18  -LM      Transfer Training PT LTG, Time to Achieve 2  wks  -LM      Transfer Training PT LTG, Activity Type sit to stand/stand to sit;bed to chair /chair to bed  -LM      Transfer Training PT LTG, [place] Level supervision required  -LM      Transfer Training PT LTG, Outcome goal ongoing  -LM      Gait Training PT LTG    Gait Training Goal PT LTG, Date Established 01/11/18  -LM      Gait Training Goal PT LTG, Time to Achieve 2 wks  -LM      Gait Training Goal PT LTG, [place] Level supervision required  -LM      Gait Training Goal PT LTG, Distance to Achieve 400  -LM      Gait Training Goal PT LTG, Additional Goal Maintain O2 sats above 90%.  -LM      Gait Training Goal PT LTG, Outcome goal ongoing  -LM      Strength Goal PT LTG    Strength Goal PT LTG, Date Established 01/11/18  -LM      Strength Goal PT LTG, Time to Achieve 2 wks  -LM      Strength Goal PT LTG, Measure to Achieve Patient will perform B LE ther ex x 15 reps to improve functional strength for mobility.  -LM      Strength Goal PT LTG, Outcome goal ongoing  -LM        User Key  (r) = Recorded By, (t) = Taken By, (c) = Cosigned By    Initials Name Provider Type    SANTOS Kumar, PTA Physical Therapy Assistant    ROBBY Dougherty, PT Physical Therapist    LM Destini Padgett, PT Physical Therapist    CC Sosa Franco, PTA Physical Therapy Assistant          Physical Therapy Education     Title: PT OT SLP Therapies (Done)     Topic: Physical Therapy (Done)     Point: Mobility training (Done)    Learning Progress Summary    Learner Readiness Method Response Comment Documented by Status   Patient MARTHA Hurley DU   01/22/18 0941 Done    MARTHA Hurley,NR   01/19/18 1452 Done    MARTHA Hurley,NR   01/18/18 1329 Done    DARNELL Frazier NR  DM 01/17/18 1008 Active               Point: Home exercise program (Done)    Learning Progress Summary    Learner Readiness Method Response Comment Documented by Status   Patient MARTHA Hurley DU   01/22/18 0941 Done    MARTHA Hurley,NR   01/19/18 1452 Done    Eager  E,D DU,NR   01/18/18 1329 Done    Eager D,E NR   01/17/18 1008 Active               Point: Body mechanics (Done)    Learning Progress Summary    Learner Readiness Method Response Comment Documented by Status   Patient Zarina E,D SUSANDU   01/22/18 0941 Done    Eager E,D DU,NR   01/19/18 1452 Done    Eager E,D DU,NR   01/18/18 1329 Done    Acceptance E VU   01/17/18 1340 Done    Eager D,E NR   01/17/18 1008 Active               Point: Precautions (Done)    Learning Progress Summary    Learner Readiness Method Response Comment Documented by Status   Patient Zarina E,D SUSANDU   01/22/18 0941 Done    Eager E,D DU,NR   01/19/18 1452 Done    Eager E,D DU,NR   01/18/18 1329 Done    Eager D,E NR   01/17/18 1008 Active                      User Key     Initials Effective Dates Name Provider Type Discipline     06/22/15 -  Eleanor Kumar, PTA Physical Therapy Assistant PT     06/19/15 -  Yasmeen Dougherty, PT Physical Therapist PT     06/16/16 -  Heidy Malik, RN Registered Nurse Nurse                    PT Recommendation and Plan  Anticipated Discharge Disposition: home with home health  PT Frequency: daily  Plan of Care Review  Plan Of Care Reviewed With: patient  Progress: improving  Outcome Summary/Follow up Plan: pt met most goals.try steps next time.ambulat 70o ft,has IV.went over exercises          Outcome Measures       01/22/18 0905 01/19/18 1420       How much help from another person do you currently need...    Turning from your back to your side while in flat bed without using bedrails? 4  -UD 4  -UD     Moving from lying on back to sitting on the side of a flat bed without bedrails? 4  -UD 4  -UD     Moving to and from a bed to a chair (including a wheelchair)? 3  -UD 3  -UD     Standing up from a chair using your arms (e.g., wheelchair, bedside chair)? 4  -UD 3  -UD     Climbing 3-5 steps with a railing? 3  -UD 3  -UD     To walk in hospital room? 3  -UD 3  -UD     AM-PAC 6 Clicks Score 21   -UD 20  -UD       User Key  (r) = Recorded By, (t) = Taken By, (c) = Cosigned By    Initials Name Provider Type    SANTOS Kumar PTA Physical Therapy Assistant           Time Calculation:         PT Charges       01/22/18 0943          Time Calculation    PT Received On 01/22/18  -UD      PT Goal Re-Cert Due Date 01/27/18  -UD      Time Calculation- PT    Total Timed Code Minutes- PT 24 minute(s)  -UD        User Key  (r) = Recorded By, (t) = Taken By, (c) = Cosigned By    Initials Name Provider Type    SANTOS Kumar PTA Physical Therapy Assistant          Therapy Charges for Today     Code Description Service Date Service Provider Modifiers Qty    59249888999 HC PT THER PROC EA 15 MIN 1/22/2018 Eleanor Kumar PTA GP 1    57600226062 HC GAIT TRAINING EA 15 MIN 1/22/2018 Eleanor Kumar PTA GP 1          PT G-Codes  Outcome Measure Options: AM-PAC 6 Clicks Basic Mobility (PT)    Eleanor Kumar PTA  1/22/2018

## 2018-01-23 ENCOUNTER — APPOINTMENT (OUTPATIENT)
Dept: GENERAL RADIOLOGY | Facility: HOSPITAL | Age: 53
End: 2018-01-23
Attending: INTERNAL MEDICINE

## 2018-01-23 LAB
ALBUMIN SERPL-MCNC: 2.7 G/DL (ref 3.2–4.8)
ALBUMIN/GLOB SERPL: 0.8 G/DL (ref 1.5–2.5)
ALP SERPL-CCNC: 56 U/L (ref 25–100)
ALT SERPL W P-5'-P-CCNC: 52 U/L (ref 7–40)
ANION GAP SERPL CALCULATED.3IONS-SCNC: 8 MMOL/L (ref 3–11)
AST SERPL-CCNC: 35 U/L (ref 0–33)
BILIRUB SERPL-MCNC: 0.6 MG/DL (ref 0.3–1.2)
BUN BLD-MCNC: 12 MG/DL (ref 9–23)
BUN/CREAT SERPL: 10 (ref 7–25)
CALCIUM SPEC-SCNC: 8.3 MG/DL (ref 8.7–10.4)
CHLORIDE SERPL-SCNC: 99 MMOL/L (ref 99–109)
CO2 SERPL-SCNC: 25 MMOL/L (ref 20–31)
CREAT BLD-MCNC: 1.2 MG/DL (ref 0.6–1.3)
DEPRECATED RDW RBC AUTO: 49 FL (ref 37–54)
ERYTHROCYTE [DISTWIDTH] IN BLOOD BY AUTOMATED COUNT: 12.8 % (ref 11.3–14.5)
GFR SERPL CREATININE-BSD FRML MDRD: 64 ML/MIN/1.73
GLOBULIN UR ELPH-MCNC: 3.5 GM/DL
GLUCOSE BLD-MCNC: 102 MG/DL (ref 70–100)
HCT VFR BLD AUTO: 28.5 % (ref 38.9–50.9)
HGB BLD-MCNC: 9.4 G/DL (ref 13.1–17.5)
MCH RBC QN AUTO: 34.7 PG (ref 27–31)
MCHC RBC AUTO-ENTMCNC: 33 G/DL (ref 32–36)
MCV RBC AUTO: 105.2 FL (ref 80–99)
PLATELET # BLD AUTO: 221 10*3/MM3 (ref 150–450)
PMV BLD AUTO: 9.5 FL (ref 6–12)
POTASSIUM BLD-SCNC: 3.9 MMOL/L (ref 3.5–5.5)
PROT SERPL-MCNC: 6.2 G/DL (ref 5.7–8.2)
RBC # BLD AUTO: 2.71 10*6/MM3 (ref 4.2–5.76)
SODIUM BLD-SCNC: 131 MMOL/L (ref 132–146)
SODIUM BLD-SCNC: 132 MMOL/L (ref 132–146)
WBC NRBC COR # BLD: 6.01 10*3/MM3 (ref 3.5–10.8)

## 2018-01-23 PROCEDURE — 25010000002 HEPARIN (PORCINE) PER 1000 UNITS: Performed by: HOSPITALIST

## 2018-01-23 PROCEDURE — 71046 X-RAY EXAM CHEST 2 VIEWS: CPT

## 2018-01-23 PROCEDURE — 25010000002 VANCOMYCIN: Performed by: HOSPITALIST

## 2018-01-23 PROCEDURE — 94760 N-INVAS EAR/PLS OXIMETRY 1: CPT

## 2018-01-23 PROCEDURE — 94799 UNLISTED PULMONARY SVC/PX: CPT

## 2018-01-23 PROCEDURE — 84295 ASSAY OF SERUM SODIUM: CPT | Performed by: HOSPITALIST

## 2018-01-23 PROCEDURE — 94640 AIRWAY INHALATION TREATMENT: CPT

## 2018-01-23 PROCEDURE — 97116 GAIT TRAINING THERAPY: CPT

## 2018-01-23 PROCEDURE — 25010000002 PIPERACILLIN SOD-TAZOBACTAM PER 1 G: Performed by: INTERNAL MEDICINE

## 2018-01-23 PROCEDURE — 80053 COMPREHEN METABOLIC PANEL: CPT | Performed by: INTERNAL MEDICINE

## 2018-01-23 PROCEDURE — 99233 SBSQ HOSP IP/OBS HIGH 50: CPT | Performed by: INTERNAL MEDICINE

## 2018-01-23 PROCEDURE — 85027 COMPLETE CBC AUTOMATED: CPT | Performed by: INTERNAL MEDICINE

## 2018-01-23 PROCEDURE — 97110 THERAPEUTIC EXERCISES: CPT

## 2018-01-23 RX ORDER — LINEZOLID 600 MG/1
600 TABLET, FILM COATED ORAL EVERY 12 HOURS SCHEDULED
Status: DISCONTINUED | OUTPATIENT
Start: 2018-01-23 | End: 2018-01-24 | Stop reason: HOSPADM

## 2018-01-23 RX ADMIN — HYDROCODONE BITARTATE AND ACETAMINOPHEN 1 TABLET: 5; 325 TABLET ORAL at 09:00

## 2018-01-23 RX ADMIN — IPRATROPIUM BROMIDE AND ALBUTEROL SULFATE 3 ML: .5; 3 SOLUTION RESPIRATORY (INHALATION) at 06:47

## 2018-01-23 RX ADMIN — VANCOMYCIN HYDROCHLORIDE 1250 MG: 10 INJECTION, POWDER, LYOPHILIZED, FOR SOLUTION INTRAVENOUS at 01:07

## 2018-01-23 RX ADMIN — HEPARIN SODIUM 5000 UNITS: 5000 INJECTION, SOLUTION INTRAVENOUS; SUBCUTANEOUS at 05:12

## 2018-01-23 RX ADMIN — BUDESONIDE AND FORMOTEROL FUMARATE DIHYDRATE 2 PUFF: 160; 4.5 AEROSOL RESPIRATORY (INHALATION) at 06:48

## 2018-01-23 RX ADMIN — BUDESONIDE AND FORMOTEROL FUMARATE DIHYDRATE 2 PUFF: 160; 4.5 AEROSOL RESPIRATORY (INHALATION) at 19:24

## 2018-01-23 RX ADMIN — IPRATROPIUM BROMIDE AND ALBUTEROL SULFATE 3 ML: .5; 3 SOLUTION RESPIRATORY (INHALATION) at 19:25

## 2018-01-23 RX ADMIN — HEPARIN SODIUM 5000 UNITS: 5000 INJECTION, SOLUTION INTRAVENOUS; SUBCUTANEOUS at 23:03

## 2018-01-23 RX ADMIN — TAZOBACTAM SODIUM AND PIPERACILLIN SODIUM 3.38 G: 375; 3 INJECTION, SOLUTION INTRAVENOUS at 07:51

## 2018-01-23 RX ADMIN — TAZOBACTAM SODIUM AND PIPERACILLIN SODIUM 3.38 G: 375; 3 INJECTION, SOLUTION INTRAVENOUS at 01:07

## 2018-01-23 RX ADMIN — TAZOBACTAM SODIUM AND PIPERACILLIN SODIUM 3.38 G: 375; 3 INJECTION, SOLUTION INTRAVENOUS at 16:44

## 2018-01-23 RX ADMIN — HYDROCODONE BITARTATE AND ACETAMINOPHEN 1 TABLET: 5; 325 TABLET ORAL at 20:39

## 2018-01-23 RX ADMIN — Medication 250 MG: at 09:00

## 2018-01-23 RX ADMIN — NICOTINE 1 PATCH: 21 PATCH, EXTENDED RELEASE TRANSDERMAL at 20:44

## 2018-01-23 RX ADMIN — Medication 250 MG: at 20:39

## 2018-01-23 RX ADMIN — CITALOPRAM HYDROBROMIDE 10 MG: 20 TABLET ORAL at 11:01

## 2018-01-23 RX ADMIN — HEPARIN SODIUM 5000 UNITS: 5000 INJECTION, SOLUTION INTRAVENOUS; SUBCUTANEOUS at 14:29

## 2018-01-23 RX ADMIN — LIDOCAINE 1 PATCH: 50 PATCH CUTANEOUS at 10:11

## 2018-01-23 RX ADMIN — FOLIC ACID 1 MG: 1 TABLET ORAL at 09:00

## 2018-01-23 RX ADMIN — LINEZOLID 600 MG: 600 TABLET, FILM COATED ORAL at 20:44

## 2018-01-23 RX ADMIN — IPRATROPIUM BROMIDE AND ALBUTEROL SULFATE 3 ML: .5; 3 SOLUTION RESPIRATORY (INHALATION) at 12:38

## 2018-01-23 RX ADMIN — IPRATROPIUM BROMIDE AND ALBUTEROL SULFATE 3 ML: .5; 3 SOLUTION RESPIRATORY (INHALATION) at 01:06

## 2018-01-23 NOTE — PLAN OF CARE
Problem: Patient Care Overview (Adult)  Goal: Plan of Care Review  Outcome: Ongoing (interventions implemented as appropriate)   01/23/18 1040   Coping/Psychosocial Response Interventions   Plan Of Care Reviewed With patient   Patient Care Overview   Progress improving   Outcome Evaluation   Outcome Summary/Follow up Plan pt has met most goals,still fatigues,went up and down steps .reviewed exercises.home soon       Problem: Inpatient Physical Therapy  Goal: Stair Training Goal LTG- PT  Outcome: Outcome(s) achieved Date Met: 01/23/18 01/17/18 1009 01/23/18 1040   Stair Training PT LTG   Stair Training Goal PT LTG, Date Established 01/17/18 --    Stair Training Goal PT LTG, Time to Achieve 5 days --    Stair Training Goal PT LTG, Number of Steps 4 --    Stair Training Goal PT LTG, [place] Level supervision required  (stable VS; o2 sat >92%) --    Stair Training Goal PT LTG, Assist Device 1 handrail --    Stair Training Goal PT LTG, Outcome --  goal met

## 2018-01-23 NOTE — THERAPY TREATMENT NOTE
Acute Care - Physical Therapy Treatment Note  [facility]     Patient Name: Niall Murguia  : 1965  MRN: [id number]  Today's Date: 2018  Onset of Illness/Injury or Date of Surgery Date: 18  Date of Referral to PT: 18  Referring Physician: MD Tino    Admit Date: 2018    Visit Dx:    ICD-10-CM ICD-9-CM   1. Impaired functional mobility, balance, gait, and endurance Z74.09 V49.89     Patient Active Problem List   Diagnosis   • Acute on chronic respiratory failure with hypoxia   • Pneumonia of both lower lobes   • Chronic bullous emphysema   • Hydropneumothorax, right   • Hypertension   • Tobacco use               Adult Rehabilitation Note       18       Rehab Assessment/Intervention    Discipline physical therapy assistant  -UD physical therapy assistant  -UD     Document Type therapy note (daily note)  -UD therapy note (daily note)  -UD     Subjective Information agree to therapy;no complaints  -UD agree to therapy;no complaints  -UD     Patient Effort, Rehab Treatment good  -UD good  -UD     Symptoms Noted During/After Treatment fatigue;shortness of breath  -UD fatigue;shortness of breath  -UD     Precautions/Limitations oxygen therapy device and L/min  -UD oxygen therapy device and L/min  -UD     Recorded by [UD] Eleanor Kumar PTA [UD] Eleanor Kumar PTA     Vital Signs    Post SpO2 (%) 91  -UD      O2 Delivery Post Treatment supplemental O2  -UD supplemental O2  -UD     Pre Patient Position Supine  -UD Supine  -UD     Intra Patient Position Standing  -UD Standing  -UD     Post Patient Position Sitting  -UD Sitting  -UD     Recorded by [UD] Eleanor Kumar PTA [UD] Eleanor Kumar PTA     Pain Assessment    Pain Assessment No/denies pain  -UD No/denies pain  -UD     Recorded by [UD] Eleanor Kumar PTA [UD] Eleanor Kumar PTA     Cognitive Assessment/Intervention    Orientation Status oriented x 4  -UD oriented x 4  -UD     Follows Commands/Answers Questions 100%  of the time  -% of the time  -UD     Recorded by [UD] Eleanor Kumar PTA [UD] Eleanor Kumar PTA     Bed Mobility, Assessment/Treatment    Bed Mob, Supine to Sit, [place] independent  -UD independent  -UD     Recorded by [UD] Eleanor Kumar PTA [UD] Eleanor Kumar PTA     Transfer Assessment/Treatment    Transfers, Sit-Stand [place] supervision required  -UD supervision required  -UD     Transfers, Stand-Sit [place] supervision required  -UD supervision required  -UD     Recorded by [UD] Eleanor Kumar PTA [UD] Eleanor Kumar PTA     Gait Assessment/Treatment    Gait, [place] Level stand by assist  -UD stand by assist  -UD     Gait, Distance (Feet) 400   more tired  -   1 standing rest  -UD     Gait, Gait Deviations johnny decreased  -UD johnny decreased  -UD     Gait, Safety Issues supplemental O2  -UD step length decreased  -UD     Gait, Impairments strength decreased  -UD strength decreased  -UD     Gait, Comment --   limited by fatigue,sob  -UD      Recorded by [UD] Eleanor Kumar PTA [UD] Eleanor Kumar PTA     Therapy Exercises    Bilateral Lower Extremities AROM:;10 reps;sitting  -UD AROM:;10 reps;sitting  -UD     Bilateral Upper Extremity AROM:;10 reps;sitting  -UD AROM:;10 reps;sitting  -UD     Recorded by [UD] Eleanor Kumar PTA [UD] Eleanor Kumar PTA     Positioning and Restraints    Pre-Treatment Position in bed  -UD in bed  -UD     Post Treatment Position chair  -UD chair  -UD     In Chair notified nsg;reclined;sitting;call light within reach;exit alarm on;legs elevated  -UD notified nsg;reclined;sitting;call light within reach  -UD     Recorded by [UD] Eleanor Kumar PTA [UD] Eleanor Kumar PTA       User Key  (r) = Recorded By, (t) = Taken By, (c) = Cosigned By    Initials Name Effective Dates    UD Eleanor Kumar PTA 06/22/15 -                 IP PT Goals       01/23/18 1040 01/22/18 0941 01/19/18 1452    Bed Mobility PT LTG    Bed Mobility PT LTG, Outcome   goal met  -UD     Transfer Training PT LTG    Transfer Training PT LTG, Outcome  goal met  -UD goal ongoing  -UD    Gait Training PT LTG    Gait Training Goal PT LTG, Outcome  goal met  -UD goal partially met  -UD    Stair Training PT LTG    Stair Training Goal PT LTG, Outcome goal met  -UD goal ongoing  -UD goal ongoing  -UD      01/18/18 1329 01/17/18 1009 01/14/18 1109    Bed Mobility PT LTG    Bed Mobility PT LTG, Date Established  01/17/18  -DM     Bed Mobility PT LTG, Time to Achieve  5 days  -DM     Bed Mobility PT LTG, Activity Type  all bed mobility  -DM     Bed Mobility PT LTG, [place] Level  independent  -DM     Bed Mobility PT LTG, Outcome goal ongoing  -UD      Transfer Training PT LTG    Transfer Training PT LTG, Date Established  01/17/18  -DM     Transfer Training PT LTG, Time to Achieve  5 days  -DM     Transfer Training PT LTG, Activity Type  bed to chair /chair to bed;sit to stand/stand to sit;toilet  -DM     Transfer Training PT LTG, [place] Level  independent  -DM     Transfer Training PT LTG, Outcome goal ongoing  -UD  goal ongoing  -CC    Gait Training PT LTG    Gait Training Goal PT LTG, Date Established  01/17/18  -DM     Gait Training Goal PT LTG, Time to Achieve  5 days  -DM     Gait Training Goal PT LTG, [place] Level  supervision required   stable VS; O2 SAT. > 92%  -DM     Gait Training Goal PT LTG, Distance to Achieve  250  -DM     Gait Training Goal PT LTG, Outcome goal partially met  -UD  goal ongoing  -CC    Stair Training PT LTG    Stair Training Goal PT LTG, Date Established  01/17/18  -DM     Stair Training Goal PT LTG, Time to Achieve  5 days  -DM     Stair Training Goal PT LTG, Number of Steps  4  -DM     Stair Training Goal PT LTG, [place] Level  supervision required   stable VS; o2 sat >92%  -DM     Stair Training Goal PT LTG, Assist Device  1 handrail  -DM     Stair Training Goal PT LTG, Outcome goal ongoing  -UD        01/14/18 1104 01/11/18 1145       Transfer Training  PT LTG    Transfer Training PT LTG, Date Established  01/11/18  -LM     Transfer Training PT LTG, Time to Achieve  2 wks  -LM     Transfer Training PT LTG, Activity Type  sit to stand/stand to sit;bed to chair /chair to bed  -LM     Transfer Training PT LTG, [place] Level  supervision required  -LM     Transfer Training PT LTG, Outcome  goal ongoing  -LM     Gait Training PT LTG    Gait Training Goal PT LTG, Date Established  01/11/18  -LM     Gait Training Goal PT LTG, Time to Achieve  2 wks  -LM     Gait Training Goal PT LTG, [place] Level  supervision required  -LM     Gait Training Goal PT LTG, Distance to Achieve  400  -LM     Gait Training Goal PT LTG, Additional Goal  Maintain O2 sats above 90%.  -LM     Gait Training Goal PT LTG, Outcome  goal ongoing  -LM     Strength Goal PT LTG    Strength Goal PT LTG, Date Established  01/11/18  -LM     Strength Goal PT LTG, Time to Achieve  2 wks  -LM     Strength Goal PT LTG, Measure to Achieve  Patient will perform B LE ther ex x 15 reps to improve functional strength for mobility.  -LM     Strength Goal PT LTG, Outcome goal partially met  -CC goal ongoing  -LM       User Key  (r) = Recorded By, (t) = Taken By, (c) = Cosigned By    Initials Name Provider Type    UD Eleanor Kumar, PTA Physical Therapy Assistant    ROBBY Dougherty, PT Physical Therapist    LM Destini Padgett, PT Physical Therapist    CC Sosa Franco, PTA Physical Therapy Assistant          Physical Therapy Education     Title: PT OT SLP Therapies (Done)     Topic: Physical Therapy (Done)     Point: Mobility training (Done)    Learning Progress Summary    Learner Readiness Method Response Comment Documented by Status   Patient MARTHA HurleyNR  SANTOS 01/23/18 1040 Done    MARTHA Hurley DU UD 01/22/18 0941 Done    MARTHA HurleyNR  SANTOS 01/19/18 1452 Done    MARTHA Hurley NR UD 01/18/18 1329 Done    DARNELL Frazier NR  ROBBY 01/17/18 1008 Active               Point: Home exercise program (Done)    Learning  Progress Summary    Learner Readiness Method Response Comment Documented by Status   Patient [place] E,D DU,NR   01/23/18 1040 Done    Eager E,D VU,DU   01/22/18 0941 Done    Eager E,D DU,NR   01/19/18 1452 Done    Eager E,D DU,NR   01/18/18 1329 Done    Eager D,E NR   01/17/18 1008 Active               Point: Body mechanics (Done)    Learning Progress Summary    Learner Readiness Method Response Comment Documented by Status   Patient [place] ED DU,NR   01/23/18 1040 Done    Eager E,D VU,DU   01/22/18 0941 Done    Eager E,D DU,NR   01/19/18 1452 Done    Eager E,D DU,NR   01/18/18 1329 Done    Acceptance E VU   01/17/18 1340 Done    Eager D,E NR   01/17/18 1008 Active               Point: Precautions (Done)    Learning Progress Summary    Learner Readiness Method Response Comment Documented by Status   Patient [place] ED DU,NR   01/23/18 1040 Done    Eager E,D VU,DU   01/22/18 0941 Done    Eager E,D DU,NR   01/19/18 1452 Done    Eager E,D DU,NR   01/18/18 1329 Done    Eager D,E NR   01/17/18 1008 Active                      User Key     Initials Effective Dates Name Provider Type Discipline     06/22/15 -  Eleanor Kumar, NITISH Physical Therapy Assistant PT     06/19/15 -  Yasmeen Dougherty, PT Physical Therapist PT     06/16/16 -  Heidy Malik, RN Registered Nurse Nurse                    PT Recommendation and Plan  Anticipated Discharge Disposition: home with home health  PT Frequency: daily  Plan of Care Review  Plan Of Care Reviewed With: patient  Progress: improving  Outcome Summary/Follow up Plan: pt has met most goals,still fatigues,went up and down steps .reviewed exercises.home soon          Outcome Measures       01/23/18 0905 01/22/18 0905       How much help from another person do you currently need...    Turning from your back to your side while in flat bed without using bedrails? 4  -UD 4  -UD     Moving from lying on back to sitting on the side of a flat bed without  bedrails? 4  -UD 4  -UD     Moving to and from a bed to a chair (including a wheelchair)? 3  -UD 3  -UD     Standing up from a chair using your arms (e.g., wheelchair, bedside chair)? 4  -UD 4  -UD     Climbing 3-5 steps with a railing? 4  -UD 3  -UD     To walk in hospital room? 3  -UD 3  -UD     AM-PAC 6 Clicks Score 22  -UD 21  -UD       User Key  (r) = Recorded By, (t) = Taken By, (c) = Cosigned By    Initials Name Provider Type    SANTOS Kumar PTA Physical Therapy Assistant           Time Calculation:         PT Charges       01/23/18 1042          Time Calculation    PT Received On 01/23/18  -UD      PT Goal Re-Cert Due Date 01/27/18  -UD      Time Calculation- PT    Total Timed Code Minutes- PT 24 minute(s)  -UD        User Key  (r) = Recorded By, (t) = Taken By, (c) = Cosigned By    Initials Name Provider Type    SANTOS Kumar PTA Physical Therapy Assistant          Therapy Charges for Today     Code Description Service Date Service Provider Modifiers Qty    35628368715 HC PT THER PROC EA 15 MIN 1/22/2018 Eleanor Kumar, PTA GP 1    39441985751 HC GAIT TRAINING EA 15 MIN 1/22/2018 Eleanor Kumar PTA GP 1    78059693281 HC PT THER PROC EA 15 MIN 1/23/2018 Eleanor Kumar PTA GP 1    25599272410 HC GAIT TRAINING EA 15 MIN 1/23/2018 Eleanor Kumar, NITISH GP 1          PT G-Codes  Outcome Measure Options: AM-PAC 6 Clicks Basic Mobility (PT)    Eleanor Kumar PTA  1/23/2018

## 2018-01-23 NOTE — PROGRESS NOTES
[facility] Progress Note        Antibiotics:  Anti-Infectives     Ordered     Dose/Rate Route Frequency Start Stop    01/20/18 1325  Pharmacy to dose vancomycin     Ordering Provider:  Rafael Sr MD     Does not apply Continuous PRN 01/20/18 1325 01/27/18 1324    01/19/18 0917  piperacillin-tazobactam (ZOSYN) 3.375 g in iso-osmotic dextrose 50 ml (premix)     Ordering Provider:  Rafael Sr MD    3.375 g  over 4 Hours Intravenous Every 8 Hours 01/19/18 1600 01/29/18 1559    01/19/18 0917  piperacillin-tazobactam (ZOSYN) 3.375 g in iso-osmotic dextrose 50 ml (premix)     Ordering Provider:  Rafael Sr MD    3.375 g  over 30 Minutes Intravenous Once 01/19/18 1000 01/19/18 1314    01/17/18 1859  vancomycin 1250 mg/250 mL 0.9% NS IVPB ([facility])     Chuy Alanis MD reviewed the order on 01/18/18 0610.   Ordering Provider:  Chuy Alanis MD    1,250 mg  over 90 Minutes Intravenous Every 8 Hours 01/18/18 0200 01/27/18 1759    01/14/18 2103  vancomycin 1250 mg/250 mL 0.9% NS IVPB ([facility])     Ordering Provider:  Jake Tinsley MD    20 mg/kg × 62.4 kg  over 90 Minutes Intravenous Once 01/14/18 2200 01/15/18 0041          CC: sob/cough    HPI:  Patient is a 52 y.o.  Yr old male with history of bullous emphysema with prior right lung surgery in 2010 per patient related to collapsed lung but he did not recall any prior infection and specifics of that are unclear.  He was admitted to [facility] January 7 with 3 days progressive cough/dyspnea and hemoptysis.  Concern for preceding viral prodrome per outside records.  He had acute kidney injury, severe leukocytosis and elevated lactate with diagnosis acute severe sepsis/acute hypoxic respiratory failure and extensive right-sided pneumonia, admitted to ICU; chest imaging apparently had right hydropneumothorax and bilateral pneumonia with transferred to [facility] on January 14.  He was seen by Dr. Ge and underwent right sided  "chest tube.  Sputum from January 9 has MRSA, pleural fluid from January 15 has staph aureus in culture.     1/23/18 Generally doing ok; less shortness of breath/cough, requiring nasal cannula oxygen but generally feels somewhat better.  No headache photophobia or neck stiffness.  He denies chest pain at present.  Tube remains.  No nausea vomiting diarrhea or abdominal pain.  No skin rash.  No dysuria hematuria or pyuria.    ROS:      1/23/18 No f/c/s. No n/v/d. No rash. No new ADR to Abx.     Constitutional-- No Fever, chills or sweats.  Appetite Diminished and generally fatigued  Heent-- No new vision, hearing or throat complaints.  No epistaxis or oral sores.  Denies odynophagia or dysphagia.  No flashers, floaters or eye pain. No odynophagia or dysphagia. No headache, photophobia or neck stiffness.  CV-- No chest pain, palpitation or syncope  Resp-- as above  GI- No nausea, vomiting, or diarrhea.  No hematochezia, melena, or hematemesis. Denies jaundice or chronic liver disease.  -- No dysuria, hematuria, or flank pain.  Denies hesitancy, urgency or flank pain.  Lymph- no swollen lymph nodes in neck/axilla or groin.   Heme- No active bruising or bleeding; no Hx of DVT or PE.  MS-- no swelling or pain in the bones or joints of arms/legs.  No new back pain.  Neuro-- No acute focal weakness or numbness in the arms or legs.  No seizures.     Full 12 point review of systems reviewed and negative otherwise for acute complaints, except for above      PE:   /66  Pulse 81  Temp 98.9 °F (37.2 °C) (Oral)   Resp 17  Ht 172.7 cm (68\")  Wt 74.8 kg (165 lb)  SpO2 91%  BMI 25.09 kg/m2    GENERAL: awake, in no acute distress.  Nasal cannula oxygen  HEENT: Normocephalic, atraumatic.  PERRL. EOMI. No conjunctival injection. No icterus. Oropharynx clear without evidence of thrush or exudate. No evidence of peridontal disease.    NECK: Supple without nuchal rigidity. No mass.  LYMPH: No cervical, axillary or inguinal " lymphadenopathy.  HEART: RRR; No murmur, rubs, gallops.   LUNGS: Diminished on right more so than left with scattered rhonchi. Normal respiratory effort. Nonlabored. No dullness.  Chest tube  ABDOMEN: Soft, nontender, nondistended. Positive bowel sounds. No rebound or guarding. NO mass or HSM.  EXT:  No cyanosis, clubbing or edema. No cord.  : Genitalia generally unremarkable.  Without Pedraza catheter.  MSK: FROM without joint effusions noted arms/legs.    SKIN: Warm and dry without cutaneous eruptions on Inspection/palpation.    NEURO: Oriented to PPT. No focal deficits on motor/sensory exam at arms/legs.  PSYCHIATRIC: Normal insight and judgement. Cooperative with PE     No peripheral stigmata/phenomena of endocarditis    Laboratory Data      Results from last 7 days  Lab Units 01/23/18  0503 01/22/18  0549 01/21/18  0435   WBC 10*3/mm3 6.01 7.13 8.55   HEMOGLOBIN g/dL 9.4* 10.2* 10.2*   HEMATOCRIT % 28.5* 30.3* 30.4*   PLATELETS 10*3/mm3 221 237 256       Results from last 7 days  Lab Units 01/23/18  0503   SODIUM mmol/L 132   POTASSIUM mmol/L 3.9   CHLORIDE mmol/L 99   CO2 mmol/L 25.0   BUN mg/dL 12   CREATININE mg/dL 1.20   GLUCOSE mg/dL 102*   CALCIUM mg/dL 8.3*       Results from last 7 days  Lab Units 01/23/18  0503   ALK PHOS U/L 56   BILIRUBIN mg/dL 0.6   ALT (SGPT) U/L 52*   AST (SGOT) U/L 35*               Estimated Creatinine Clearance: 77.1 mL/min (by C-G formula based on Cr of 1.2).      Microbiology:      Radiology:  Imaging Results (last 72 hours)     Procedure Component Value Units Date/Time    CT Guided Chest Tube [[id number]] Collected:  01/16/18 0958     Updated:  01/16/18 1105    Narrative:       EXAMINATION: CT GUIDED CHEST TUBE-      INDICATION: Right apical pneumothorax; CT-guided catheter thoracentesis  for therapeutic and diagnostic purposes. Complex pleural effusion right  chest.     TECHNIQUE: Patient has no known allergies.     The clotting profile is acceptable. The PTT is 27.8, PT  12.0, INR 1.10,  platelets 226,000.     The radiation dose reduction device was turned on for each scan per the  ALARA (As Low as Reasonably Achievable) protocol.     COMPARISON: None.     FINDINGS:   1. The procedure, risks, complications and side effects of CT-guided  catheter placement in the right pleural space for therapeutic and  diagnostic drainage of the effusion in the right chest and for  decompression of the patient's right pneumothorax was discussed and  reviewed in detail with the patient including possible risk and  complications which include bleeding, infection, injury or laceration of  the intercostal artery with massive bleeding, puncture of the patient's  known high-grade bulla in the right lung with high-grade pleural leak,  air embolization, and other possible risk and complications.  Patient  understood and consented.     2. With the patient in the supine position, under sterile technique,  local anesthesia and meticulous CT guidance, from the right posterior  axillary line, a #12 Hungarian multi-sidehole drainage catheter was  introduced into the right posterior inferior pleural space. The patient  has loculated pleural fluid and a complex airspace consolidative disease  extensively and therefore the catheter was placed in one of the  loculated compartments of fluid. Hopefully this catheter position will  communicate with other areas of fluid in the right hemithorax.     3. Fluid was extracted and immediately sent to the laboratory for all  diagnostic studies ordered by the attending physician.     A total of 180 mL of fluid was removed from the right pleural space for  diagnostic laboratory purposes.     4. The catheter was then sutured in place with 0 silk. A revolution  fixation device was applied and a sterile dressing was applied.     The catheter will be placed to a Pleur-evac atrium suction device for  decompression of the right chest and the right pneumothorax.       Impression:       1.  A #12 Rwandan drainage catheter has been placed into the right  posterior inferior pleural space from the right posterior axillary line.  Fluid was obtained for all diagnostic studies ordered by the attending  physician.  2. The catheter was fixed, sutured and dressed. Catheter will be placed  to a Pleur-evac atrium suction device for decompression of free fluid  from the right hemithorax and hopefully compression of the right  pneumothorax.  3. Patient tolerated the procedure well. There were no complications. He  was taken back to his room in satisfactory and stable condition.     D:  01/16/2018  E:  01/16/2018     This report was finalized on 1/16/2018 11:03 AM by Dr. Harlan Fisher MD.       XR Chest 1 View [[id number]] Collected:  01/16/18 0923     Updated:  01/16/18 1405    Narrative:       EXAMINATION: XR CHEST 1 VW- 01/16/2018     INDICATION: pneumohydrothorax      COMPARISON: 01/15/2018     FINDINGS: A right chest tube has been inserted since the previous  examination. The right apical pneumothorax is smaller. There is  persistent airspace disease in a perihilar and bibasilar distribution,  right greater than left.           Impression:       Pneumothorax is slightly smaller following insertion of a  chest tube.     D:  01/16/2018  E:  01/16/2018     This report was finalized on 1/16/2018 2:02 PM by Dr. Desmond Calvin MD.       XR Chest 1 View [[id number]] Collected:  01/17/18 1001     Updated:  01/17/18 1350    Narrative:       EXAMINATION: XR CHEST 1 VW- 01/17/2018     INDICATION: postop      COMPARISON: 01/16/2018     FINDINGS: A right chest tube remains well-positioned. There has been  reduction in the right pneumothorax. There is persistent patchy  bibasilar airspace disease.           Impression:       The pneumothorax has largely resolved. The bibasilar  pulmonary changes are unchanged.     D:  01/17/2018  E:  01/17/2018     This report was finalized on 1/17/2018 1:48 PM by Dr. Desmond Calvin MD.       XR  Chest 1 View [[id number]] Collected:  01/18/18 0937     Updated:  01/18/18 1159    Narrative:       EXAMINATION: XR CHEST 1 VW-01/18/2018:      INDICATION: Postop; Z74.09-Other reduced mobility.      COMPARISON: 01/17/2018.     FINDINGS: A right chest tube remains in position. The right lung is  expanded. There is patchy right basilar airspace disease. The cardiac  silhouette is normal.           Impression:       Severe chronic obstructive and postinflammatory changes with  patchy airspace disease in the right upper lobe and with a right chest  tube in position. There is no pneumothorax and there has been no change  since the previous examination.     D:  01/18/2018  E:  01/18/2018     This report was finalized on 1/18/2018 11:57 AM by Dr. Desmond Calvin MD.       XR Chest 1 View [[id number]] Collected:  01/19/18 0838     Updated:  01/19/18 0838    Narrative:       EXAMINATION: XR CHEST 1 VW-01/19/2018:      INDICATION: Postop; Z74.09-Other reduced mobility.      COMPARISON: 01/18/2018.     FINDINGS: Right pleural drain remains in place. There is advanced  bullous disease of the upper lobes. No pneumothorax is identified. Hazy  opacity of the right base is stable or only minimally increased. Chronic  appearing left basilar interstitial changes appear stable.           Impression:       No new chest disease.     D:  01/19/2018  E:  01/19/2018                     Impression:   --Acute severe sepsis.  Sepsis parameters improved since presentation to [facility].  Gen. resuscitative measures per internal medicine.  Otherwise as below     --Acute bilateral pneumonia with MRSA/usual placido in sputum; complicated by right sided empyema with culture positive pleural fluid and hydropneumothorax by prior imaging with current chest tube per cardiothoracic surgery.  Complex process in this patient with severe bullous emphysema, past lung surgery on the right per patient and ongoing tobacco abuse in addition to other  comorbidity.  I have discussed directly with Dr. Ge and Dr. Alanis; Dr. Ge to decide on timing/options/threshold for further intervention such as surgery/decortication versus alternative tube drainage or other intervention regarding empyema and pneumothorax.  Patient understands that antibiotics are not a guarantee for cure and that he runs a risk for persistent/relapse/progressive infection in addition to chronic pulmonary functional restrictions and other serious morbidity/serious sequela etc.  Blood cultures negative with no other obvious metastatic focus of involvement.  Primary options for treatment include vancomycin versus zyvox; Dr. Alanis tapering SSRI to help minimize risk for interaction with Zyvox to help facilitate it an option in future.        --Acute right sided empyema with staph aureus and culture associated with pneumonia as above.  Complicated with prior history of surgery, ongoing bullous emphysema etc.  Unclear at present whether he has bronchopleural fistula but any further decision regarding timing/options/threshold for surgical intervention such as decortication, etc. is left to Dr. Ge.  I discussed directly with him.     --Acute kidney injury at presentation to [facility].  Creatinine has trended down; likely initially prerenal associated with sepsis     --Chronic bullous emphysema with prior right thoracotomy per patient.  No operative note available to me.  This would need to be clarified by Dr. Ge     --Chronic tobacco abuse.  I discussed potential benefits of cessation.  It is not clear at present but he is committed to quitting    PLAN:   --IV vancomycin/zosyn     --Monitor IV and IV antibiotic with risk for systemic complication and potential drug interaction     --Check/review labs cultures and scans     --Highly complex set of issues with high risk for further serious morbidity and other serious sequela    **possible transition to oral zyvox/augmentin  next 24 hours if all is stable;  Repeat CXR planned         Rafael Sr MD  1/23/2018

## 2018-01-23 NOTE — PROGRESS NOTES
[facility] Medicine Services  PROGRESS NOTE    Patient Name: Niall Murguia  : 1965  MRN: [id number]    Date of Admission: 2018  Length of Stay: 9  Primary Care Physician: KEAGAN Rowley    Subjective   Subjective     CC:  F/u pneumonia    HPI:  No issues overnight.  Doing well.     Review of Systems  Gen- No fevers, chills  CV- No palpitations  Resp- Improving cough and dyspnea  GI- No N/V/D, abd pain    Otherwise ROS is negative except as mentioned in the HPI.    Objective   Objective     Vital Signs:   Temp:  [98.2 °F (36.8 °C)-99.1 °F (37.3 °C)] 98.9 °F (37.2 °C)  Heart Rate:  [73-89] 73  Resp:  [15-20] 17  BP: (113-118)/(66-71) 113/66     Physical Exam:  Constitutional: Ill appearing but nontoxic, awake, alert  HENT: NCAT, mucous membranes moist  Respiratory: Diminished throughout with mild crackles to right base, no crepitus at bandage overlying former thoracostomy tube site  Cardiovascular: RRR, no murmurs, rubs, or gallops, palpable pedal pulses bilaterally  Gastrointestinal: Positive bowel sounds, soft, nontender, nondistended  Musculoskeletal: No bilateral ankle edema  Psychiatric: Appropriate affect, cooperative  Neurologic: Oriented x 3, strength symmetric in all extremities, Cranial Nerves grossly intact to confrontation, speech clear  Skin: No rashes    Results Reviewed:  I have personally reviewed current lab, radiology, and data and agree.      Results from last 7 days  Lab Units 18  0503 18  0549 18  0435   WBC 10*3/mm3 6.01 7.13 8.55   HEMOGLOBIN g/dL 9.4* 10.2* 10.2*   HEMATOCRIT % 28.5* 30.3* 30.4*   PLATELETS 10*3/mm3 221 237 256       Results from last 7 days  Lab Units 18  0503 18  1755 18  0549  18  0435  18  0447  18  0407   SODIUM mmol/L 132 131* 132  < > 129*  < > 129*  < > 126*   POTASSIUM mmol/L 3.9  --  3.9  --  4.1  --  4.1  --  4.1   CHLORIDE mmol/L 99  --  100  --  96*  --  95*   --  94*   CO2 mmol/L 25.0  --  27.0  --  26.0  --  29.0  --  27.0   BUN mg/dL 12  --  10  --  8*  --  9  --  8*   CREATININE mg/dL 1.20  --  0.90  --  0.60  --  0.50*  --  0.60   GLUCOSE mg/dL 102*  --  100  --  85  --  87  --  106*   CALCIUM mg/dL 8.3*  --  8.7  --  8.5*  --  8.2*  --  8.3*   ALT (SGPT) U/L 52*  --   --   --   --   --  51*  --  55*   AST (SGOT) U/L 35*  --   --   --   --   --  25  --  22   < > = values in this interval not displayed.  No results found for: BNP  No results found for: PHART    Microbiology Results Abnormal     Procedure Component Value - Date/Time    Fungus Culture - Body Fluid, Pleural Cavity [[id number]] Collected:  01/15/18 1555    Lab Status:  Preliminary result Specimen:  Body Fluid from Pleural Cavity Updated:  01/22/18 1631     Fungus Culture No fungus isolated at 1 week    AFB Culture - Body Fluid, Pleural Cavity [[id number]]  (Normal) Collected:  01/15/18 1555    Lab Status:  Preliminary result Specimen:  Body Fluid from Pleural Cavity Updated:  01/22/18 1631     AFB Culture No AFB isolated at 1 week     AFB Stain No acid fast bacilli seen on concentrated smear    Anaerobic Culture - Body Fluid, Pleural Cavity [[id number]]  (Normal) Collected:  01/15/18 1555    Lab Status:  Final result Specimen:  Body Fluid from Pleural Cavity Updated:  01/22/18 1330     Culture No anaerobes isolated    Respiratory Culture - Sputum, Cough [[id number]] Collected:  01/18/18 1428    Lab Status:  Final result Specimen:  Sputum from Cough Updated:  01/20/18 1004     Respiratory Culture --      Moderate growth (3+) Normal Respiratory Jacqueline     Gram Stain Result Many (4+) WBCs per low power field      Few (2+) Epithelial cells per low power field      Few (2+) Gram positive cocci in pairs      Occasional Gram positive bacilli    Body Fluid Culture - Body Fluid, Pleural Cavity [[id number]]  (Abnormal)  (Susceptibility) Collected:  01/15/18 1555    Lab Status:  Final result Specimen:  Body Fluid from  Pleural Cavity Updated:  01/18/18 1221     BF Culture --      Scant growth (1+) Staphylococcus aureus, MRSA (A)        Methicillin resistant Staphylococcus aureus, Patient may be an isolation risk.  This isolate does not demonstrate inducible clindamycin resistance in vitro.          Gram Stain Result Moderate (3+) WBCs seen      No organisms seen    Susceptibility      Staphylococcus aureus, MRSA     JASON     Clindamycin 2 ug/ml Intermediate     Daptomycin 1 ug/ml Susceptible     Erythromycin >4 ug/ml Resistant     Gentamicin <=4 ug/ml Susceptible     Levofloxacin <=1 ug/ml Susceptible  [1]      Linezolid 2 ug/ml Susceptible     Oxacillin >2 ug/ml Resistant     Penicillin G >8 ug/ml Resistant     Quinupristin + Dalfopristin <=0.5 ug/ml Susceptible     Rifampin <=1 ug/ml Susceptible     Tetracycline <=4 ug/ml Susceptible     Trimethoprim + Sulfamethoxazole <=0.5/9.5 ug/ml Susceptible     Vancomycin 2 ug/ml Susceptible            [1]   Staphylococcus species may develop resistance during prolonged therapy with quinolones.  Isolates that are initially susceptible may become resistant within three to four days after initiation of therapy. Testing of repeat isolates may be warranted.                       Imaging Results (last 24 hours)     Procedure Component Value Units Date/Time    XR Chest PA & Lateral [[id number]] Collected:  01/23/18 1153     Updated:  01/23/18 1309    Narrative:       EXAMINATION: XR CHEST, PA AND LATERAL-01/23/2018:      INDICATION: F/U CT; Z74.09-Other reduced mobility.      COMPARISON: NONE.     FINDINGS:   1.  Bullous emphysema and centrilobular disease is seen in the mid and  upper lung zones.  2.  There is airspace consolidative opacities and ill-defined densities  in the mid and lower lung zones. There is a right base effusion with a  trace effusion left base.           Impression:       Compared to previous studies of 2 days ago, the right base  effusion may be slightly worse. The airspace  opacity right mid and lower  lung is slightly worse. The airspace opacities in the left mid and lower  lung zone are stable. There is marked bullous disease in the upper lung  zones.     D:  01/23/2018  E:  01/23/2018     This report was finalized on 1/23/2018 1:07 PM by Dr. Harlan Fisher MD.           Results for orders placed during the hospital encounter of 01/07/18   Adult Transthoracic Echo Complete W/ Cont if Necessary Per Protocol    Narrative Technically very limited study   1) Normal LV systolic function ( EF is about 55%)  2) Normal left atrial size with normal LVedp   3) Trace MR   4) Mild TR with normal PA pressures ( within limitations of study)  5) Severe RV dilation with wall motion abnormality ? Acute on chronic   corpulmonale   6) Severe reduction in RV function        I have reviewed the medications.    Assessment/Plan   Assessment / Plan     Hospital Problem List     * (Principal)Hydropneumothorax, right    Acute on chronic respiratory failure with hypoxia    Pneumonia of both lower lobes    Chronic bullous emphysema (Chronic)    Hypertension    Tobacco use         Brief Hospital Course to date:  Niall Murguia is a 52 y.o. male who was transferred to [facility] on 1/14 from [facility] after presenting there on 1/7 with acute dyspnea, found to have acute hypoxemic respiratory failure and MRSA pneumonia:     Assessment & Plan:    Acute on Chronic Hypoxemic Respiratory Failure, Improving   Right-sided MRSA Pneumonia, improving  Complicated Right Parapneumonic Effusion with MRSA in pleural fluid culture, suspect empyema, unable to rule out bronchopleural fistula  - Continue supplemental O2 (on 3LNC when seen), wean as tolerated, continue pulmonary toilet   -  Unable to rule out bronchopleural fistula.   -  Continue IV vancomycin. Dr. Sr added zosyn to cover for gram negative organisms with subsequent improvement in WBC. Plan for PO Zyvox on d/c.  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  Will stop salt tabs    Hypertension   - BP normal, on no meds at present, will monitor     Macrocytic Anemia   - Stable    DVT Prophylaxis:  SQ heparin  CODE STATUS: Full Code    Disposition: I expect the patient to be discharged home with home health in 1 day on PO Zyvox.  Nazanin Rockwell MD  01/23/18  2:50 PM

## 2018-01-23 NOTE — PLAN OF CARE
Problem: Patient Care Overview (Adult)  Goal: Plan of Care Review  Outcome: Ongoing (interventions implemented as appropriate)   01/23/18 0443   Coping/Psychosocial Response Interventions   Plan Of Care Reviewed With patient   Patient Care Overview   Progress improving   Outcome Evaluation   Outcome Summary/Follow up Plan Pt appeared to rest well. Pain control with Lortab. No new complaints. VSS. Hopes to go home soon.      Goal: Adult Individualization and Mutuality  Outcome: Ongoing (interventions implemented as appropriate)    Goal: Discharge Needs Assessment  Outcome: Ongoing (interventions implemented as appropriate)      Problem: Respiratory Insufficiency (Adult)  Goal: Acid/Base Balance  Outcome: Ongoing (interventions implemented as appropriate)    Goal: Effective Ventilation  Outcome: Ongoing (interventions implemented as appropriate)      Problem: Pneumonia (Adult)  Goal: Signs and Symptoms of Listed Potential Problems Will be Absent or Manageable (Pneumonia)  Outcome: Ongoing (interventions implemented as appropriate)

## 2018-01-23 NOTE — PLAN OF CARE
Problem: Patient Care Overview (Adult)  Goal: Plan of Care Review  Outcome: Ongoing (interventions implemented as appropriate)   01/23/18 1040 01/23/18 1703   Coping/Psychosocial Response Interventions   Plan Of Care Reviewed With patient --    Patient Care Overview   Progress --  progress toward functional goals as expected   Outcome Evaluation   Outcome Summary/Follow up Plan --  vital signs wnl. Oxgyen level greater than 90% on 2 liters. Patient has walked twice today in hallways with assistance. Pain controllled.        Problem: Respiratory Insufficiency (Adult)  Goal: Acid/Base Balance  Outcome: Ongoing (interventions implemented as appropriate)    Goal: Effective Ventilation  Outcome: Ongoing (interventions implemented as appropriate)      Problem: Pneumonia (Adult)  Goal: Signs and Symptoms of Listed Potential Problems Will be Absent or Manageable (Pneumonia)  Outcome: Ongoing (interventions implemented as appropriate)

## 2018-01-24 VITALS
RESPIRATION RATE: 16 BRPM | HEIGHT: 68 IN | DIASTOLIC BLOOD PRESSURE: 71 MMHG | TEMPERATURE: 98.2 F | BODY MASS INDEX: 24.38 KG/M2 | HEART RATE: 79 BPM | OXYGEN SATURATION: 92 % | SYSTOLIC BLOOD PRESSURE: 116 MMHG | WEIGHT: 160.9 LBS

## 2018-01-24 PROBLEM — J96.21 ACUTE ON CHRONIC RESPIRATORY FAILURE WITH HYPOXIA (HCC): Status: RESOLVED | Noted: 2018-01-10 | Resolved: 2018-01-24

## 2018-01-24 LAB
ANION GAP SERPL CALCULATED.3IONS-SCNC: 8 MMOL/L (ref 3–11)
BUN BLD-MCNC: 13 MG/DL (ref 9–23)
BUN/CREAT SERPL: 10 (ref 7–25)
CALCIUM SPEC-SCNC: 8.6 MG/DL (ref 8.7–10.4)
CHLORIDE SERPL-SCNC: 97 MMOL/L (ref 99–109)
CO2 SERPL-SCNC: 28 MMOL/L (ref 20–31)
CREAT BLD-MCNC: 1.3 MG/DL (ref 0.6–1.3)
GFR SERPL CREATININE-BSD FRML MDRD: 58 ML/MIN/1.73
GLUCOSE BLD-MCNC: 92 MG/DL (ref 70–100)
POTASSIUM BLD-SCNC: 4.1 MMOL/L (ref 3.5–5.5)
SODIUM BLD-SCNC: 130 MMOL/L (ref 132–146)
SODIUM BLD-SCNC: 133 MMOL/L (ref 132–146)

## 2018-01-24 PROCEDURE — 94760 N-INVAS EAR/PLS OXIMETRY 1: CPT

## 2018-01-24 PROCEDURE — 25010000002 PIPERACILLIN SOD-TAZOBACTAM PER 1 G: Performed by: INTERNAL MEDICINE

## 2018-01-24 PROCEDURE — 80048 BASIC METABOLIC PNL TOTAL CA: CPT | Performed by: INTERNAL MEDICINE

## 2018-01-24 PROCEDURE — 84295 ASSAY OF SERUM SODIUM: CPT | Performed by: HOSPITALIST

## 2018-01-24 PROCEDURE — 94640 AIRWAY INHALATION TREATMENT: CPT

## 2018-01-24 PROCEDURE — 94799 UNLISTED PULMONARY SVC/PX: CPT

## 2018-01-24 PROCEDURE — 97110 THERAPEUTIC EXERCISES: CPT

## 2018-01-24 PROCEDURE — 97116 GAIT TRAINING THERAPY: CPT

## 2018-01-24 PROCEDURE — 99239 HOSP IP/OBS DSCHRG MGMT >30: CPT | Performed by: NURSE PRACTITIONER

## 2018-01-24 PROCEDURE — 25010000002 HEPARIN (PORCINE) PER 1000 UNITS: Performed by: HOSPITALIST

## 2018-01-24 RX ORDER — LINEZOLID 600 MG/1
600 TABLET, FILM COATED ORAL EVERY 12 HOURS SCHEDULED
Qty: 42 TABLET | Refills: 0 | Status: SHIPPED | OUTPATIENT
Start: 2018-01-24 | End: 2018-02-14

## 2018-01-24 RX ORDER — AMOXICILLIN AND CLAVULANATE POTASSIUM 875; 125 MG/1; MG/1
1 TABLET, FILM COATED ORAL EVERY 12 HOURS SCHEDULED
Qty: 10 TABLET | Refills: 0 | Status: SHIPPED | OUTPATIENT
Start: 2018-01-24 | End: 2018-01-29

## 2018-01-24 RX ADMIN — IPRATROPIUM BROMIDE AND ALBUTEROL SULFATE 3 ML: .5; 3 SOLUTION RESPIRATORY (INHALATION) at 00:33

## 2018-01-24 RX ADMIN — IPRATROPIUM BROMIDE AND ALBUTEROL SULFATE 3 ML: .5; 3 SOLUTION RESPIRATORY (INHALATION) at 08:57

## 2018-01-24 RX ADMIN — TAZOBACTAM SODIUM AND PIPERACILLIN SODIUM 3.38 G: 375; 3 INJECTION, SOLUTION INTRAVENOUS at 08:12

## 2018-01-24 RX ADMIN — Medication 250 MG: at 09:11

## 2018-01-24 RX ADMIN — HEPARIN SODIUM 5000 UNITS: 5000 INJECTION, SOLUTION INTRAVENOUS; SUBCUTANEOUS at 13:42

## 2018-01-24 RX ADMIN — BUDESONIDE AND FORMOTEROL FUMARATE DIHYDRATE 2 PUFF: 160; 4.5 AEROSOL RESPIRATORY (INHALATION) at 08:58

## 2018-01-24 RX ADMIN — HEPARIN SODIUM 5000 UNITS: 5000 INJECTION, SOLUTION INTRAVENOUS; SUBCUTANEOUS at 05:21

## 2018-01-24 RX ADMIN — LIDOCAINE 1 PATCH: 50 PATCH CUTANEOUS at 09:11

## 2018-01-24 RX ADMIN — LINEZOLID 600 MG: 600 TABLET, FILM COATED ORAL at 09:11

## 2018-01-24 RX ADMIN — IPRATROPIUM BROMIDE AND ALBUTEROL SULFATE 3 ML: .5; 3 SOLUTION RESPIRATORY (INHALATION) at 13:24

## 2018-01-24 RX ADMIN — FOLIC ACID 1 MG: 1 TABLET ORAL at 09:11

## 2018-01-24 RX ADMIN — TAZOBACTAM SODIUM AND PIPERACILLIN SODIUM 3.38 G: 375; 3 INJECTION, SOLUTION INTRAVENOUS at 00:35

## 2018-01-24 NOTE — DISCHARGE SUMMARY
[facility] Hospital Medicine Services  DISCHARGE SUMMARY    Patient Name: Niall Murguia  : 1965  MRN: [id number]    Date of Admission: 2018  Date of Discharge:  2018  Primary Care Physician: KEAGAN Rowley    Consults     Date and Time Order Name Status Description    2018 1629 Inpatient Consult to Infectious Diseases Completed     1/15/2018 0312 Inpatient Consult to Cardiothoracic Surgery Completed         Hospital Course     Presenting Problem:   Hydropneumothorax [J94.8]    Active Hospital Problems (** Indicates Principal Problem)    Diagnosis Date Noted   • **Hydropneumothorax, right [J94.8] 2018   • Hypertension [I10] 2018   • Tobacco use [Z72.0] 2018   • Chronic bullous emphysema [J43.9] 01/10/2018   • Pneumonia of both lower lobes [J18.9] 01/10/2018      Resolved Hospital Problems    Diagnosis Date Noted Date Resolved   • Acute on chronic respiratory failure with hypoxia [J96.21] 01/10/2018 2018      Hospital Course:  Niall Murguia is a 52 y.o. male with history of tobacco abuse, COPD and a spontaneous pneumothorax several years ago which required a chest tube and eventually decortication.  He has recently been treated for hypertension.  He presented to [facility] on 18 with difficulty breathing.  He had acute kidney injury, severe leukocytosis and elevated lactate with diagnosis of acute severe sepsis/acute hypoxic respiratory failure and extensive right-sided pneumonia and admitted to ICU.  Chest imaging apparently showed right hydropneumothorax and bilateral pneumonia.  Patient was transferred to [facility] on 18.  He was admitted by hospital medicine service.  CT surgery was consulted and placed a CT on 1/15/2018.  Sputum from 18 was positive for MRSA and pleural fluid from 1/15/18 was positive for staph aureus.  Infectious disease was consulted and patient has been on Zyvox and Zosyn.   He will transition to oral Zyvox and Augmentin at discharge.  Chest tube was removed on 1/20/18.  Patient noted to have mild hyponatremia due to SIADH, likely from acute pulmonary infection.  Urine and serum osmolalities consistent with SIADH, he was treated with salt tabs and fluid restriction and it has resolved.  Patient has improved clinically and is ready for discharge home today.  He will follow up with Dr. Sr on Friday 1/26/18.  Dr. Sr to determine length of antibiotics.  Patient continues to require oxygen intermittently.  Home oxygen has been arranged prior to discharge.  Discharge plans and instructions were reviewed with patient and he verbalized an understanding.          Day of Discharge     HPI:   Patient sitting up in chair without any new complaints or issues.  Feels ready to go home.  Off oxygen and saturations 92%.  Oxygen saturation dropped to 88% with ambulation.      Review of Systems  Gen- No fevers, chills  CV- No chest pain, palpitations  Resp- No cough, dyspnea  GI- No N/V/D, abd pain    Otherwise ROS is negative except as mentioned in the HPI.    Vital Signs:   Temp:  [97.4 °F (36.3 °C)-98.5 °F (36.9 °C)] 98.2 °F (36.8 °C)  Heart Rate:  [58-92] 71  Resp:  [16-20] 16  BP: (109-116)/(71-72) 116/71     Physical Exam:  Constitutional: No acute distress, awake, alert  Respiratory: Diminished in bases bilaterally, nonlabored respirations   Cardiovascular: RRR, no murmurs, rubs, or gallops, palpable pedal pulses bilaterally  Gastrointestinal: Positive bowel sounds, soft, nontender, nondistended  Musculoskeletal: No bilateral ankle edema, no clubbing or cyanosis to extremities  Psychiatric: Appropriate affect, cooperative  Neurologic: Oriented x 3, strength symmetric in all extremities, Cranial Nerves grossly intact to confrontation, speech clear  Skin: No rashes    Pertinent  and/or Most Recent Results       Results from last 7 days  Lab Units 01/24/18  2236 01/24/18  0524  01/23/18  1820 01/23/18  0503 01/22/18  1755 01/22/18  0549 01/21/18  1807 01/21/18  0435  01/20/18 0447  01/19/18 0407  01/18/18 0420   WBC 10*3/mm3  --   --   --  6.01  --  7.13  --  8.55  --  8.16  --  14.14*  --  16.75*   HEMOGLOBIN g/dL  --   --   --  9.4*  --  10.2*  --  10.2*  --  10.7*  --  11.2*  --  12.1*   HEMATOCRIT %  --   --   --  28.5*  --  30.3*  --  30.4*  --  31.6*  --  33.7*  --  35.5*   PLATELETS 10*3/mm3  --   --   --  221  --  237  --  256  --  241  --  245  --  234   SODIUM mmol/L 133 130* 131* 132 131* 132 127* 129*  < > 129*  < > 126*  < > 125*   POTASSIUM mmol/L 4.1  --   --  3.9  --  3.9  --  4.1  --  4.1  --  4.1  --  3.8   CHLORIDE mmol/L 97*  --   --  99  --  100  --  96*  --  95*  --  94*  --  98*   CO2 mmol/L 28.0  --   --  25.0  --  27.0  --  26.0  --  29.0  --  27.0  --  27.0   BUN mg/dL 13  --   --  12  --  10  --  8*  --  9  --  8*  --  9   CREATININE mg/dL 1.30  --   --  1.20  --  0.90  --  0.60  --  0.50*  --  0.60  --  0.50*   GLUCOSE mg/dL 92  --   --  102*  --  100  --  85  --  87  --  106*  --  146*   CALCIUM mg/dL 8.6*  --   --  8.3*  --  8.7  --  8.5*  --  8.2*  --  8.3*  --  8.0*   < > = values in this interval not displayed.    Results from last 7 days  Lab Units 01/23/18  0503 01/20/18  0447 01/19/18  0407 01/18/18  0420   BILIRUBIN mg/dL 0.6 0.8 0.8 0.7   ALK PHOS U/L 56 54 60 58   ALT (SGPT) U/L 52* 51* 55* 65*   AST (SGOT) U/L 35* 25 22 15         Brief Urine Lab Results  (Last result in the past 365 days)      Color   Clarity   Blood   Leuk Est   Nitrite   Protein   CREAT   Urine HCG        01/16/18 1114             42.6             Microbiology Results Abnormal     Procedure Component Value - Date/Time    Fungus Culture - Body Fluid, Pleural Cavity [[id number]] Collected:  01/15/18 1555    Lab Status:  Preliminary result Specimen:  Body Fluid from Pleural Cavity Updated:  01/22/18 1631     Fungus Culture No fungus isolated at 1 week    AFB Culture - Body Fluid,  Pleural Cavity [[id number]]  (Normal) Collected:  01/15/18 1555    Lab Status:  Preliminary result Specimen:  Body Fluid from Pleural Cavity Updated:  01/22/18 1631     AFB Culture No AFB isolated at 1 week     AFB Stain No acid fast bacilli seen on concentrated smear    Anaerobic Culture - Body Fluid, Pleural Cavity [[id number]]  (Normal) Collected:  01/15/18 1555    Lab Status:  Final result Specimen:  Body Fluid from Pleural Cavity Updated:  01/22/18 1330     Culture No anaerobes isolated    Respiratory Culture - Sputum, Cough [[id number]] Collected:  01/18/18 1428    Lab Status:  Final result Specimen:  Sputum from Cough Updated:  01/20/18 1004     Respiratory Culture --      Moderate growth (3+) Normal Respiratory Jacqueline     Gram Stain Result Many (4+) WBCs per low power field      Few (2+) Epithelial cells per low power field      Few (2+) Gram positive cocci in pairs      Occasional Gram positive bacilli    Body Fluid Culture - Body Fluid, Pleural Cavity [[id number]]  (Abnormal)  (Susceptibility) Collected:  01/15/18 1555    Lab Status:  Final result Specimen:  Body Fluid from Pleural Cavity Updated:  01/18/18 1221     BF Culture --      Scant growth (1+) Staphylococcus aureus, MRSA (A)        Methicillin resistant Staphylococcus aureus, Patient may be an isolation risk.  This isolate does not demonstrate inducible clindamycin resistance in vitro.          Gram Stain Result Moderate (3+) WBCs seen      No organisms seen    Susceptibility      Staphylococcus aureus, MRSA     JASON     Clindamycin 2 ug/ml Intermediate     Daptomycin 1 ug/ml Susceptible     Erythromycin >4 ug/ml Resistant     Gentamicin <=4 ug/ml Susceptible     Levofloxacin <=1 ug/ml Susceptible  [1]      Linezolid 2 ug/ml Susceptible     Oxacillin >2 ug/ml Resistant     Penicillin G >8 ug/ml Resistant     Quinupristin + Dalfopristin <=0.5 ug/ml Susceptible     Rifampin <=1 ug/ml Susceptible     Tetracycline <=4 ug/ml Susceptible     Trimethoprim +  Sulfamethoxazole <=0.5/9.5 ug/ml Susceptible     Vancomycin 2 ug/ml Susceptible            [1]   Staphylococcus species may develop resistance during prolonged therapy with quinolones.  Isolates that are initially susceptible may become resistant within three to four days after initiation of therapy. Testing of repeat isolates may be warranted.                       Imaging Results (all)     Procedure Component Value Units Date/Time    XR Chest 1 View [[id number]] Collected:  01/15/18 0837     Updated:  01/15/18 1348    Narrative:       EXAMINATION: XR CHEST 1 VW-01/15/2018:      INDICATION: Repeat, respiratory distress.     COMPARISON: 04/15/2010.     FINDINGS: Portable chest reveals evidence of an apical pneumothorax. The  pneumothorax is small in size. Basilar component is likely. Ill-defined  opacification seen within the right mid and lower lung field. Surgical  staple line identified within the right upper lobe. The left lung is  grossly clear.           Impression:       Apical pneumothorax is identified on the right. Ill-defined  opacification seen within the right mid and lower lung field. Surgical  staple line identified in the right upper lobe. Chronic changes seen  within the left mid and lower lung field.     D:  01/15/2018  E:  01/15/2018     This report was finalized on 1/15/2018 1:46 PM by Dr. Yanci Jolley MD.       CT Guided Chest Tube [[id number]] Collected:  01/16/18 0958     Updated:  01/16/18 1105    Narrative:       EXAMINATION: CT GUIDED CHEST TUBE-      INDICATION: Right apical pneumothorax; CT-guided catheter thoracentesis  for therapeutic and diagnostic purposes. Complex pleural effusion right  chest.     TECHNIQUE: Patient has no known allergies.     The clotting profile is acceptable. The PTT is 27.8, PT 12.0, INR 1.10,  platelets 226,000.     The radiation dose reduction device was turned on for each scan per the  ALARA (As Low as Reasonably Achievable) protocol.     COMPARISON:  None.     FINDINGS:   1. The procedure, risks, complications and side effects of CT-guided  catheter placement in the right pleural space for therapeutic and  diagnostic drainage of the effusion in the right chest and for  decompression of the patient's right pneumothorax was discussed and  reviewed in detail with the patient including possible risk and  complications which include bleeding, infection, injury or laceration of  the intercostal artery with massive bleeding, puncture of the patient's  known high-grade bulla in the right lung with high-grade pleural leak,  air embolization, and other possible risk and complications.  Patient  understood and consented.     2. With the patient in the supine position, under sterile technique,  local anesthesia and meticulous CT guidance, from the right posterior  axillary line, a #12 [place] multi-sidehole drainage catheter was  introduced into the right posterior inferior pleural space. The patient  has loculated pleural fluid and a complex airspace consolidative disease  extensively and therefore the catheter was placed in one of the  loculated compartments of fluid. Hopefully this catheter position will  communicate with other areas of fluid in the right hemithorax.     3. Fluid was extracted and immediately sent to the laboratory for all  diagnostic studies ordered by the attending physician.     A total of 180 mL of fluid was removed from the right pleural space for  diagnostic laboratory purposes.     4. The catheter was then sutured in place with 0 silk. A revolution  fixation device was applied and a sterile dressing was applied.     The catheter will be placed to a Pleur-evac atrium suction device for  decompression of the right chest and the right pneumothorax.       Impression:       1. A #12 [place]er drainage catheter has been placed into the right  posterior inferior pleural space from the right posterior axillary line.  Fluid was obtained for all diagnostic  studies ordered by the attending  physician.  2. The catheter was fixed, sutured and dressed. Catheter will be placed  to a Pleur-evac atrium suction device for decompression of free fluid  from the right hemithorax and hopefully compression of the right  pneumothorax.  3. Patient tolerated the procedure well. There were no complications. He  was taken back to his room in satisfactory and stable condition.     D:  01/16/2018  E:  01/16/2018     This report was finalized on 1/16/2018 11:03 AM by Dr. Harlan Fisher MD.       XR Chest 1 View [[id number]] Collected:  01/16/18 0923     Updated:  01/16/18 1405    Narrative:       EXAMINATION: XR CHEST 1 VW- 01/16/2018     INDICATION: pneumohydrothorax      COMPARISON: 01/15/2018     FINDINGS: A right chest tube has been inserted since the previous  examination. The right apical pneumothorax is smaller. There is  persistent airspace disease in a perihilar and bibasilar distribution,  right greater than left.           Impression:       Pneumothorax is slightly smaller following insertion of a  chest tube.     D:  01/16/2018  E:  01/16/2018     This report was finalized on 1/16/2018 2:02 PM by Dr. Desmond Calvin MD.       XR Chest 1 View [[id number]] Collected:  01/17/18 1001     Updated:  01/17/18 1350    Narrative:       EXAMINATION: XR CHEST 1 VW- 01/17/2018     INDICATION: postop      COMPARISON: 01/16/2018     FINDINGS: A right chest tube remains well-positioned. There has been  reduction in the right pneumothorax. There is persistent patchy  bibasilar airspace disease.           Impression:       The pneumothorax has largely resolved. The bibasilar  pulmonary changes are unchanged.     D:  01/17/2018  E:  01/17/2018     This report was finalized on 1/17/2018 1:48 PM by Dr. Desmond Calvin MD.       XR Chest 1 View [[id number]] Collected:  01/18/18 0937     Updated:  01/18/18 1159    Narrative:       EXAMINATION: XR CHEST 1 VW-01/18/2018:      INDICATION: Postop; Z74.09-Other  reduced mobility.      COMPARISON: 01/17/2018.     FINDINGS: A right chest tube remains in position. The right lung is  expanded. There is patchy right basilar airspace disease. The cardiac  silhouette is normal.           Impression:       Severe chronic obstructive and postinflammatory changes with  patchy airspace disease in the right upper lobe and with a right chest  tube in position. There is no pneumothorax and there has been no change  since the previous examination.     D:  01/18/2018  E:  01/18/2018     This report was finalized on 1/18/2018 11:57 AM by Dr. Desmond Calvin MD.       XR Chest 1 View [[id number]] Collected:  01/19/18 0838     Updated:  01/20/18 0949    Narrative:       EXAMINATION: XR CHEST 1 VW-01/19/2018:      INDICATION: Postop; Z74.09-Other reduced mobility.      COMPARISON: 01/18/2018.     FINDINGS: Right pleural drain remains in place. There is advanced  bullous disease of the upper lobes. No pneumothorax is identified. Hazy  opacity of the right base is stable or only minimally increased. Chronic  appearing left basilar interstitial changes appear stable.           Impression:       No new chest disease.     D:  01/19/2018  E:  01/19/2018     This report was finalized on 1/20/2018 9:47 AM by DR. Kamran Warren MD.       XR Chest 1 View [[id number]] Collected:  01/20/18 0756     Updated:  01/20/18 1342    Narrative:          EXAMINATION: XR CHEST 1 VW - 01/20/2018     INDICATION: Chest tube pull; Z74.09-Other reduced mobility.      COMPARISON: 01/19/2018.     FINDINGS: Interval removal of right pigtail chest tube with preserved  ventilation and unchanged aeration of the right hemithorax with  persistent scarring and mid lung opacifications from prior. Trace right  pleural effusion. No discrete pneumothorax.           Impression:       Removal of right pigtail chest tube without significant  change in overall aeration or midlung opacifications.     DICTATED:     01/20/2018  EDITED    :      01/20/2018      This report was finalized on 1/20/2018 1:40 PM by Dr. Myles Upton.       XR Chest 1 View [[id number]] Collected:  01/21/18 0740     Updated:  01/21/18 1223    Narrative:          EXAMINATION: XR CHEST 1 VW - 01/21/2018     INDICATION:  Z74.09-Other reduced mobility. Follow-up pneumothorax.     COMPARISON: One-day prior.     FINDINGS: Overall aeration similar to prior with scattered postsurgical  changes and scarring within the right hemithorax noted. Increased  lucency within the right lung apex appears to be large bulla formation  as no distinct pleural line is evident. Left hemithorax grossly  unchanged. Cardiac size unchanged.        Impression:       Increased lucency within the right lung apex without  distinct pleural line likely represents improved  visualization/enlargement of large bulla formation. No discrete  pneumothorax identified however attention on follow-up.     DICTATED:     01/21/2018  EDITED    :     01/21/2018      This report was finalized on 1/21/2018 12:21 PM by Dr. Myles Upton.       XR Chest PA & Lateral [[id number]] Collected:  01/23/18 1153     Updated:  01/23/18 1309    Narrative:       EXAMINATION: XR CHEST, PA AND LATERAL-01/23/2018:      INDICATION: F/U CT; Z74.09-Other reduced mobility.      COMPARISON: NONE.     FINDINGS:   1.  Bullous emphysema and centrilobular disease is seen in the mid and  upper lung zones.  2.  There is airspace consolidative opacities and ill-defined densities  in the mid and lower lung zones. There is a right base effusion with a  trace effusion left base.           Impression:       Compared to previous studies of 2 days ago, the right base  effusion may be slightly worse. The airspace opacity right mid and lower  lung is slightly worse. The airspace opacities in the left mid and lower  lung zone are stable. There is marked bullous disease in the upper lung  zones.     D:  01/23/2018  E:  01/23/2018     This report was finalized on 1/23/2018  1:07 PM by Dr. Harlan Fisher MD.           Results for orders placed during the hospital encounter of 01/07/18   Adult Transthoracic Echo Complete W/ Cont if Necessary Per Protocol    Narrative Technically very limited study   1) Normal LV systolic function ( EF is about 55%)  2) Normal left atrial size with normal LVedp   3) Trace MR   4) Mild TR with normal PA pressures ( within limitations of study)  5) Severe RV dilation with wall motion abnormality ? Acute on chronic   corpulmonale   6) Severe reduction in RV function         Order Current Status    AFB Culture - Body Fluid, Pleural Cavity Preliminary result    Fungus Culture - Body Fluid, Pleural Cavity Preliminary result        Discharge Details      Niall Murguia   Home Medication Instructions STEPHANIE:[id number]    Printed on:01/24/18 6508   Medication Information                      acetaminophen (TYLENOL) 325 MG tablet  Take 2 tablets by mouth Every 4 (Four) Hours As Needed for Mild Pain .             amoxicillin-clavulanate (AUGMENTIN) 875-125 MG per tablet  Take 1 tablet by mouth Every 12 (Twelve) Hours for 5 days.             budesonide-formoterol (SYMBICORT) 160-4.5 MCG/ACT inhaler  Inhale 2 puffs 2 (Two) Times a Day.             ipratropium-albuterol (DUO-NEB) 0.5-2.5 mg/mL nebulizer  Take 3 mL by nebulization Every 4 (Four) Hours As Needed for Wheezing or Shortness of Air.             linezolid (ZYVOX) 600 MG tablet  Take 1 tablet by mouth Every 12 (Twelve) Hours for 21 days.             nicotine (NICODERM CQ) 21 MG/24HR patch  Place 1 patch on the skin Daily.             ondansetron (ZOFRAN) 4 MG/2ML injection  Infuse 2 mL into a venous catheter Every 6 (Six) Hours As Needed for Nausea or Vomiting.             saccharomyces boulardii (FLORASTOR) 250 MG capsule  Take 1 capsule by mouth 2 (Two) Times a Day.                 Discharge Disposition:  Home-Health Care Svc    Discharge Diet:  Diet Instructions     Diet: Regular       Discharge Diet:   Regular               Discharge Activity:   Activity Instructions     Activity as Tolerated                   Special Instructions:    Future Appointments  Date Time Provider Department Center   2/12/2018 1:15 PM DUSTIN Alexander [facility] None       Additional Instructions for the Follow-ups that You Need to Schedule     Discharge Follow-up with PCP    As directed    Follow Up Details:  within 1 week for hospital follow up           Discharge Follow-up with Specified Provider: Dr. Sr    As directed    To:  Dr. Sr    Follow Up Details:  1/26/2018 at 0930                     Time Spent on Discharge:  45 minutes    Naz Gutierres, APRN  01/24/18  12:50 PM

## 2018-01-24 NOTE — PROGRESS NOTES
Continued Stay Note  [facility]     Patient Name: Niall Murguia  MRN: [id number]  Today's Date: 1/24/2018    Admit Date: 1/14/2018          Discharge Plan       01/24/18 1141    Case Management/Social Work Plan    Plan Discharge Planning    Patient/Family In Agreement With Plan yes    Additional Comments CM received consult regarding patient's home PO Zyvox. Checked with [facility] and cash price is $211.27 for three weeks worth. Insurance will not cover any of this as it is non-formulary. Checked with Shannan/Social Work and she discussed cox with supervisor. It cannot be covered through indigent patient fund because the cost is too high for that fund to cover. CM discussed this with patient and he contacted his significant other to discuss further. After speaking with his SO, he notified CM that they will be able to pay for the Zyvox. CM notified Naz Gutierres NP.     Home oxygen may still be needed. Bedside RN to check sats on room air.     Discussed home health with patient and he does not feel that this is something he needs at this time.    Arcelia Rockwell RN [phone]              Discharge Codes     None        Expected Discharge Date and Time     Expected Discharge Date Expected Discharge Time    Jan 24, 2018          O2 saturation was 88% at rest on room air. Called referral for home oxygen to Belle with [facility] and she will deliver portable O2 tank to patient's room prior to discharge today.     Arcelia Rockwell RN  [phone]    Update: [facility] does not take [employer], but [facility] does. Referral called to Betsy with [facility] and they will deliver portable O2 tank to patient's room prior to discharge today. Arcelia Rockwell RN [phone]

## 2018-01-24 NOTE — PROGRESS NOTES
[facility] Progress Note        Antibiotics:  Anti-Infectives     Ordered     Dose/Rate Route Frequency Start Stop    01/23/18 1307  linezolid (ZYVOX) tablet 600 mg     Ordering Provider:  Rafael Sr MD    600 mg Oral Every 12 Hours Scheduled 01/23/18 2100 01/27/18 2059    01/19/18 0917  piperacillin-tazobactam (ZOSYN) 3.375 g in iso-osmotic dextrose 50 ml (premix)     Ordering Provider:  Rafael Sr MD    3.375 g  over 4 Hours Intravenous Every 8 Hours 01/19/18 1600 01/29/18 1559    01/19/18 0917  piperacillin-tazobactam (ZOSYN) 3.375 g in iso-osmotic dextrose 50 ml (premix)     Ordering Provider:  Rafael Sr MD    3.375 g  over 30 Minutes Intravenous Once 01/19/18 1000 01/19/18 1314    01/14/18 2103  vancomycin 1250 mg/250 mL 0.9% NS IVPB ([facility])     Ordering Provider:  Jake Tinsley MD    20 mg/kg × 62.4 kg  over 90 Minutes Intravenous Once 01/14/18 2200 01/15/18 0041          CC: sob/cough    HPI:  Patient is a 52 y.o.  Yr old male with history of bullous emphysema with prior right lung surgery in 2010 per patient related to collapsed lung but he did not recall any prior infection and specifics of that are unclear.  He was admitted to [facility] January 7 with 3 days progressive cough/dyspnea and hemoptysis.  Concern for preceding viral prodrome per outside records.  He had acute kidney injury, severe leukocytosis and elevated lactate with diagnosis acute severe sepsis/acute hypoxic respiratory failure and extensive right-sided pneumonia, admitted to ICU; chest imaging apparently had right hydropneumothorax and bilateral pneumonia with transferred to [facility] on January 14.  He was seen by Dr. Ge and underwent right sided chest tube.  Sputum from January 9 has MRSA, pleural fluid from January 15 has staph aureus in culture.     1/24/18 Generally doing better; less shortness of breath/cough, requiring nasal cannula oxygen but generally feels somewhat better.  No  "headache photophobia or neck stiffness.  He denies chest pain at present.  Tube remains.  No nausea vomiting diarrhea or abdominal pain.  No skin rash.  No dysuria hematuria or pyuria.    ROS:      1/24/18 No f/c/s. No n/v/d. No rash. No new ADR to Abx.     Constitutional-- No Fever, chills or sweats.  Appetite Diminished and generally fatigued  Heent-- No new vision, hearing or throat complaints.  No epistaxis or oral sores.  Denies odynophagia or dysphagia.  No flashers, floaters or eye pain. No odynophagia or dysphagia. No headache, photophobia or neck stiffness.  CV-- No chest pain, palpitation or syncope  Resp-- as above  GI- No nausea, vomiting, or diarrhea.  No hematochezia, melena, or hematemesis. Denies jaundice or chronic liver disease.  -- No dysuria, hematuria, or flank pain.  Denies hesitancy, urgency or flank pain.  Lymph- no swollen lymph nodes in neck/axilla or groin.   Heme- No active bruising or bleeding; no Hx of DVT or PE.  MS-- no swelling or pain in the bones or joints of arms/legs.  No new back pain.  Neuro-- No acute focal weakness or numbness in the arms or legs.  No seizures.     Full 12 point review of systems reviewed and negative otherwise for acute complaints, except for above      PE:   /71  Pulse 85  Temp 98.2 °F (36.8 °C) (Oral)   Resp 16  Ht 172.7 cm (68\")  Wt 73 kg (160 lb 14.4 oz)  SpO2 95%  BMI 24.46 kg/m2    GENERAL: awake, in no acute distress.  Nasal cannula oxygen  HEENT: Normocephalic, atraumatic.  PERRL. EOMI. No conjunctival injection. No icterus. Oropharynx clear without evidence of thrush or exudate. No evidence of peridontal disease.    NECK: Supple without nuchal rigidity. No mass.  LYMPH: No cervical, axillary or inguinal lymphadenopathy.  HEART: RRR; No murmur, rubs, gallops.   LUNGS: Diminished on right more so than left with scattered rhonchi. Normal respiratory effort. Nonlabored. No dullness.  Chest tube  ABDOMEN: Soft, nontender, nondistended. " Positive bowel sounds. No rebound or guarding. NO mass or HSM.  EXT:  No cyanosis, clubbing or edema. No cord.  : Genitalia generally unremarkable.  Without Pedraza catheter.  MSK: FROM without joint effusions noted arms/legs.    SKIN: Warm and dry without cutaneous eruptions on Inspection/palpation.    NEURO: Oriented to PPT. No focal deficits on motor/sensory exam at arms/legs.  PSYCHIATRIC: Normal insight and judgement. Cooperative with PE     No peripheral stigmata/phenomena of endocarditis    Laboratory Data      Results from last 7 days  Lab Units 01/23/18  0503 01/22/18  0549 01/21/18  0435   WBC 10*3/mm3 6.01 7.13 8.55   HEMOGLOBIN g/dL 9.4* 10.2* 10.2*   HEMATOCRIT % 28.5* 30.3* 30.4*   PLATELETS 10*3/mm3 221 237 256       Results from last 7 days  Lab Units 01/24/18  0825   SODIUM mmol/L 133   POTASSIUM mmol/L 4.1   CHLORIDE mmol/L 97*   CO2 mmol/L 28.0   BUN mg/dL 13   CREATININE mg/dL 1.30   GLUCOSE mg/dL 92   CALCIUM mg/dL 8.6*       Results from last 7 days  Lab Units 01/23/18  0503   ALK PHOS U/L 56   BILIRUBIN mg/dL 0.6   ALT (SGPT) U/L 52*   AST (SGOT) U/L 35*               Estimated Creatinine Clearance: 69.4 mL/min (by C-G formula based on Cr of 1.3).      Microbiology:      Radiology:  Imaging Results (last 72 hours)     Procedure Component Value Units Date/Time    CT Guided Chest Tube [[id number]] Collected:  01/16/18 0958     Updated:  01/16/18 1105    Narrative:       EXAMINATION: CT GUIDED CHEST TUBE-      INDICATION: Right apical pneumothorax; CT-guided catheter thoracentesis  for therapeutic and diagnostic purposes. Complex pleural effusion right  chest.     TECHNIQUE: Patient has no known allergies.     The clotting profile is acceptable. The PTT is 27.8, PT 12.0, INR 1.10,  platelets 226,000.     The radiation dose reduction device was turned on for each scan per the  ALARA (As Low as Reasonably Achievable) protocol.     COMPARISON: None.     FINDINGS:   1. The procedure, risks,  complications and side effects of CT-guided  catheter placement in the right pleural space for therapeutic and  diagnostic drainage of the effusion in the right chest and for  decompression of the patient's right pneumothorax was discussed and  reviewed in detail with the patient including possible risk and  complications which include bleeding, infection, injury or laceration of  the intercostal artery with massive bleeding, puncture of the patient's  known high-grade bulla in the right lung with high-grade pleural leak,  air embolization, and other possible risk and complications.  Patient  understood and consented.     2. With the patient in the supine position, under sterile technique,  local anesthesia and meticulous CT guidance, from the right posterior  axillary line, a #12 Filipino multi-sidehole drainage catheter was  introduced into the right posterior inferior pleural space. The patient  has loculated pleural fluid and a complex airspace consolidative disease  extensively and therefore the catheter was placed in one of the  loculated compartments of fluid. Hopefully this catheter position will  communicate with other areas of fluid in the right hemithorax.     3. Fluid was extracted and immediately sent to the laboratory for all  diagnostic studies ordered by the attending physician.     A total of 180 mL of fluid was removed from the right pleural space for  diagnostic laboratory purposes.     4. The catheter was then sutured in place with 0 silk. A revolution  fixation device was applied and a sterile dressing was applied.     The catheter will be placed to a Pleur-evac atrium suction device for  decompression of the right chest and the right pneumothorax.       Impression:       1. A #12 Filipino drainage catheter has been placed into the right  posterior inferior pleural space from the right posterior axillary line.  Fluid was obtained for all diagnostic studies ordered by the attending  physician.  2.  The catheter was fixed, sutured and dressed. Catheter will be placed  to a Pleur-evac atrium suction device for decompression of free fluid  from the right hemithorax and hopefully compression of the right  pneumothorax.  3. Patient tolerated the procedure well. There were no complications. He  was taken back to his room in satisfactory and stable condition.     D:  01/16/2018  E:  01/16/2018     This report was finalized on 1/16/2018 11:03 AM by Dr. Harlan Fisher MD.       XR Chest 1 View [[id number]] Collected:  01/16/18 0923     Updated:  01/16/18 1405    Narrative:       EXAMINATION: XR CHEST 1 VW- 01/16/2018     INDICATION: pneumohydrothorax      COMPARISON: 01/15/2018     FINDINGS: A right chest tube has been inserted since the previous  examination. The right apical pneumothorax is smaller. There is  persistent airspace disease in a perihilar and bibasilar distribution,  right greater than left.           Impression:       Pneumothorax is slightly smaller following insertion of a  chest tube.     D:  01/16/2018  E:  01/16/2018     This report was finalized on 1/16/2018 2:02 PM by Dr. Desmond Calvin MD.       XR Chest 1 View [[id number]] Collected:  01/17/18 1001     Updated:  01/17/18 1350    Narrative:       EXAMINATION: XR CHEST 1 VW- 01/17/2018     INDICATION: postop      COMPARISON: 01/16/2018     FINDINGS: A right chest tube remains well-positioned. There has been  reduction in the right pneumothorax. There is persistent patchy  bibasilar airspace disease.           Impression:       The pneumothorax has largely resolved. The bibasilar  pulmonary changes are unchanged.     D:  01/17/2018  E:  01/17/2018     This report was finalized on 1/17/2018 1:48 PM by Dr. Desmond Calvin MD.       XR Chest 1 View [[id number]] Collected:  01/18/18 0937     Updated:  01/18/18 1159    Narrative:       EXAMINATION: XR CHEST 1 VW-01/18/2018:      INDICATION: Postop; Z74.09-Other reduced mobility.      COMPARISON: 01/17/2018.      FINDINGS: A right chest tube remains in position. The right lung is  expanded. There is patchy right basilar airspace disease. The cardiac  silhouette is normal.           Impression:       Severe chronic obstructive and postinflammatory changes with  patchy airspace disease in the right upper lobe and with a right chest  tube in position. There is no pneumothorax and there has been no change  since the previous examination.     D:  01/18/2018  E:  01/18/2018     This report was finalized on 1/18/2018 11:57 AM by Dr. Desmond Calvin MD.       XR Chest 1 View [[id number]] Collected:  01/19/18 0838     Updated:  01/19/18 0838    Narrative:       EXAMINATION: XR CHEST 1 VW-01/19/2018:      INDICATION: Postop; Z74.09-Other reduced mobility.      COMPARISON: 01/18/2018.     FINDINGS: Right pleural drain remains in place. There is advanced  bullous disease of the upper lobes. No pneumothorax is identified. Hazy  opacity of the right base is stable or only minimally increased. Chronic  appearing left basilar interstitial changes appear stable.           Impression:       No new chest disease.     D:  01/19/2018  E:  01/19/2018                     Impression:   --Acute severe sepsis.  Sepsis parameters improved since presentation to [facility].  Gen. resuscitative measures per internal medicine.  Otherwise as below     --Acute bilateral pneumonia with MRSA/usual placido in sputum; complicated by right sided empyema with culture positive pleural fluid and hydropneumothorax by prior imaging with current chest tube per cardiothoracic surgery.  Complex process in this patient with severe bullous emphysema, past lung surgery on the right per patient and ongoing tobacco abuse in addition to other comorbidity.  I have discussed directly with Dr. Ge and Dr. Rockwell on 1/24; Dr. Ge Reviewed chest x-ray from January 23 and he does not see significant increase in pleural fluid and he reiterates plan for ongoing  antimicrobials unless more significant clinical decline or more significant accumulation and fluid.  Patient has discussed this previously with Dr. Ge and he prefers that approach as well.   Patient understands that antibiotics are not a guarantee for cure and that he runs a risk for persistent/relapse/progressive infection in addition to chronic pulmonary functional restrictions and other serious morbidity/serious sequela etc.  Blood cultures negative with no other obvious metastatic focus of involvement.   transition to Zyvox/Augmentin        --Acute right sided empyema with staph aureus and culture associated with pneumonia as above.  Complicated with prior history of surgery, ongoing bullous emphysema etc.  Unclear at present whether he has bronchopleural fistula but any further decision regarding timing/options/threshold for surgical intervention such as decortication, etc. is left to Dr. Ge.  I discussed directly with him.     --Acute kidney injury at presentation to [facility].  Creatinine has trended down; likely initially prerenal associated with sepsis     --Chronic bullous emphysema with prior right thoracotomy per patient.  No operative note available to me.  This would need to be clarified by Dr. Ge     --Chronic tobacco abuse.  I discussed potential benefits of cessation.  It is not clear at present but he is committed to quitting    PLAN:   --zyvox/zosyn here with anticipation of transition to Zyvox/Augmentin as outpatient.     --Monitor IV and IV antibiotic with risk for systemic complication and potential drug interaction     --Check/review labs cultures and scans     --Highly complex set of issues with high risk for further serious morbidity and other serious sequela    **transition to oral zyvox/augmentin; if not steadily better, then consideration would need to be given to further radiographic workup and possible surgical intervention by Dr. Ge.  Follow-up with me on  Friday.         Rafael Sr MD  1/24/2018

## 2018-01-24 NOTE — THERAPY TREATMENT NOTE
Acute Care - Physical Therapy Treatment Note  [facility]     Patient Name: Niall Murguia  : 1965  MRN: [id number]  Today's Date: 2018  Onset of Illness/Injury or Date of Surgery Date: 18  Date of Referral to PT: 18  Referring Physician: MD Tino    Admit Date: 2018    Visit Dx:    ICD-10-CM ICD-9-CM   1. Impaired functional mobility, balance, gait, and endurance Z74.09 V49.89     Patient Active Problem List   Diagnosis   • Acute on chronic respiratory failure with hypoxia   • Pneumonia of both lower lobes   • Chronic bullous emphysema   • Hydropneumothorax, right   • Hypertension   • Tobacco use               Adult Rehabilitation Note       18 1030 18 0905 18 0905    Rehab Assessment/Intervention    Discipline physical therapy assistant  -UD physical therapy assistant  -UD physical therapy assistant  -UD    Document Type therapy note (daily note)  -UD therapy note (daily note)  -UD therapy note (daily note)  -UD    Subjective Information agree to therapy;no complaints  -UD agree to therapy;no complaints  -UD agree to therapy;no complaints  -UD    Patient Effort, Rehab Treatment good  -UD good  -UD good  -UD    Symptoms Noted During/After Treatment shortness of breath  -UD fatigue;shortness of breath  -UD fatigue;shortness of breath  -UD    Precautions/Limitations oxygen therapy device and L/min  -UD oxygen therapy device and L/min  -UD oxygen therapy device and L/min  -UD    Recorded by [UD] Eleanor Kumar, NITISH [UD] Eleanor Kumar, PTA [UD] Eleanor Kumar PTA    Vital Signs    Post SpO2 (%) 94  -UD 91  -UD     O2 Delivery Post Treatment supplemental O2  -UD supplemental O2  -UD supplemental O2  -UD    Pre Patient Position Supine  -UD Supine  -UD Supine  -UD    Intra Patient Position Standing  -UD Standing  -UD Standing  -UD    Post Patient Position Sitting  -UD Sitting  -UD Sitting  -UD    Recorded by [UD] Eleanor Kumar, NITISH [UD] Eleanor Kumar, NITISH [UD] Eleanor Kumar,  PTA    Pain Assessment    Pain Assessment No/denies pain  -UD No/denies pain  -UD No/denies pain  -UD    Recorded by [UD] Eleanor Kumar PTA [UD] Eleanor Kumar, NITISH [UD] Eleanor Kumar PTA    Cognitive Assessment/Intervention    Orientation Status oriented x 4  -UD oriented x 4  -UD oriented x 4  -UD    Follows Commands/Answers Questions 100% of the time  -% of the time  -% of the time  -UD    Recorded by [UD] Eleanor Kumar PTA [UD] Eleanor Kumar, NITISH [UD] Eleanor Kumar PTA    Bed Mobility, Assessment/Treatment    Bed Mob, Supine to Sit, [place] independent  -UD independent  -UD independent  -UD    Recorded by [UD] Eleanor Kumar PTA [UD] Eleanor Kumar, NITISH [UD] Eleanor Kumar PTA    Transfer Assessment/Treatment    Transfers, Sit-Stand [place] independent  -UD supervision required  -UD supervision required  -UD    Transfers, Stand-Sit [place] independent  -UD supervision required  -UD supervision required  -UD    Toilet Transfer, [place] independent  -UD      Recorded by [UD] Eleanor Kumar PTA [UD] Eleanor Kumar, NITISH [UD] Eleanor Kumar PTA    Gait Assessment/Treatment    Gait, [place] Level stand by assist  -UD stand by assist  -UD stand by assist  -UD    Gait, Distance (Feet) 400  -   more tired  -   1 standing rest  -UD    Gait, Gait Deviations johnny decreased  -UD johnny decreased  -UD johnny decreased  -UD    Gait, Safety Issues supplemental O2  -UD supplemental O2  -UD step length decreased  -UD    Gait, Impairments strength decreased  -UD strength decreased  -UD strength decreased  -UD    Gait, Comment  --   limited by fatigue,sob  -UD     Recorded by [UD] Eleanor Kumar, NITISH [UD] Eleanor Kumar, PTA [UD] Eleanor Kumar PTA    Therapy Exercises    Bilateral Lower Extremities AROM:;10 reps;sitting  -UD AROM:;10 reps;sitting  -UD AROM:;10 reps;sitting  -UD    Bilateral Upper Extremity AROM:;10 reps;sitting  -UD AROM:;10 reps;sitting  -UD AROM:;10 reps;sitting  -UD     Recorded by [UD] Eleanor Kumar PTA [UD] Eleanor Kumar PTA [UD] Eleanor Kumar PTA    Positioning and Restraints    Pre-Treatment Position in bed  -UD in bed  -UD in bed  -UD    Post Treatment Position chair  -UD chair  -UD chair  -UD    In Chair notified nsg;reclined;sitting;call light within reach;legs elevated  -UD notified nsg;reclined;sitting;call light within reach;exit alarm on;legs elevated  -UD notified nsg;reclined;sitting;call light within reach  -UD    Recorded by [UD] Eleanor Kumar PTA [UD] Eleanor Kumar PTA [UD] Eleanor Kumar PTA      User Key  (r) = Recorded By, (t) = Taken By, (c) = Cosigned By    Initials Name Effective Dates    UD Eleanor Kumar PTA 06/22/15 -                 IP PT Goals       01/23/18 1040 01/22/18 0941 01/19/18 1452    Bed Mobility PT LTG    Bed Mobility PT LTG, Outcome   goal met  -UD    Transfer Training PT LTG    Transfer Training PT LTG, Outcome  goal met  -UD goal ongoing  -UD    Gait Training PT LTG    Gait Training Goal PT LTG, Outcome  goal met  -UD goal partially met  -UD    Stair Training PT LTG    Stair Training Goal PT LTG, Outcome goal met  -UD goal ongoing  -UD goal ongoing  -UD      01/18/18 1329 01/17/18 1009 01/14/18 1109    Bed Mobility PT LTG    Bed Mobility PT LTG, Date Established  01/17/18  -DM     Bed Mobility PT LTG, Time to Achieve  5 days  -DM     Bed Mobility PT LTG, Activity Type  all bed mobility  -DM     Bed Mobility PT LTG, [place] Level  independent  -DM     Bed Mobility PT LTG, Outcome goal ongoing  -UD      Transfer Training PT LTG    Transfer Training PT LTG, Date Established  01/17/18  -DM     Transfer Training PT LTG, Time to Achieve  5 days  -DM     Transfer Training PT LTG, Activity Type  bed to chair /chair to bed;sit to stand/stand to sit;toilet  -DM     Transfer Training PT LTG, [place] Level  independent  -DM     Transfer Training PT LTG, Outcome goal ongoing  -UD  goal ongoing  -CC    Gait Training PT LTG    Gait Training Goal PT  LTG, Date Established  01/17/18  -DM     Gait Training Goal PT LTG, Time to Achieve  5 days  -DM     Gait Training Goal PT LTG, [place] Level  supervision required   stable VS; O2 SAT. > 92%  -DM     Gait Training Goal PT LTG, Distance to Achieve  250  -DM     Gait Training Goal PT LTG, Outcome goal partially met  -UD  goal ongoing  -CC    Stair Training PT LTG    Stair Training Goal PT LTG, Date Established  01/17/18  -DM     Stair Training Goal PT LTG, Time to Achieve  5 days  -DM     Stair Training Goal PT LTG, Number of Steps  4  -DM     Stair Training Goal PT LTG, [place] Level  supervision required   stable VS; o2 sat >92%  -DM     Stair Training Goal PT LTG, Assist Device  1 handrail  -DM     Stair Training Goal PT LTG, Outcome goal ongoing  -UD        01/14/18 1104 01/11/18 1145       Transfer Training PT LTG    Transfer Training PT LTG, Date Established  01/11/18  -LM     Transfer Training PT LTG, Time to Achieve  2 wks  -LM     Transfer Training PT LTG, Activity Type  sit to stand/stand to sit;bed to chair /chair to bed  -LM     Transfer Training PT LTG, [place] Level  supervision required  -LM     Transfer Training PT LTG, Outcome  goal ongoing  -LM     Gait Training PT LTG    Gait Training Goal PT LTG, Date Established  01/11/18  -LM     Gait Training Goal PT LTG, Time to Achieve  2 wks  -LM     Gait Training Goal PT LTG, [place] Level  supervision required  -LM     Gait Training Goal PT LTG, Distance to Achieve  400  -LM     Gait Training Goal PT LTG, Additional Goal  Maintain O2 sats above 90%.  -LM     Gait Training Goal PT LTG, Outcome  goal ongoing  -LM     Strength Goal PT LTG    Strength Goal PT LTG, Date Established  01/11/18  -LM     Strength Goal PT LTG, Time to Achieve  2 wks  -LM     Strength Goal PT LTG, Measure to Achieve  Patient will perform B LE ther ex x 15 reps to improve functional strength for mobility.  -LM     Strength Goal PT LTG, Outcome goal partially met   -CC goal ongoing  -LM       User Key  (r) = Recorded By, (t) = Taken By, (c) = Cosigned By    Initials Name Provider Type    UD Eleanor Kumar, PTA Physical Therapy Assistant    ROBBY Dougherty, PT Physical Therapist    LM Destini Padgett, PT Physical Therapist    CC Sosa Franco, PTA Physical Therapy Assistant          Physical Therapy Education     Title: PT OT SLP Therapies (Done)     Topic: Physical Therapy (Done)     Point: Mobility training (Done)    Learning Progress Summary    Learner Readiness Method Response Comment Documented by Status   Patient Eager E,D DU,VU   01/24/18 1125 Done    Eager E,D DU,NR   01/23/18 1040 Done    Eager E,D VU,DU   01/22/18 0941 Done    Eager E,D DU,NR   01/19/18 1452 Done    Eager E,D DU,NR   01/18/18 1329 Done    Eager D,E NR   01/17/18 1008 Active               Point: Home exercise program (Done)    Learning Progress Summary    Learner Readiness Method Response Comment Documented by Status   Patient Eager E,D DU,VU   01/24/18 1125 Done    Eager E,D DU,NR   01/23/18 1040 Done    Eager E,D VU,DU   01/22/18 0941 Done    Eager E,D DU,NR   01/19/18 1452 Done    Eager E,D DU,NR   01/18/18 1329 Done    Eager D,E NR   01/17/18 1008 Active               Point: Body mechanics (Done)    Learning Progress Summary    Learner Readiness Method Response Comment Documented by Status   Patient Eager E,D DU,VU   01/24/18 1125 Done    Eager E,D DU,NR   01/23/18 1040 Done    Eager E,D VU,DU   01/22/18 0941 Done    Eager E,D DU,NR   01/19/18 1452 Done    Eager E,D DU,NR   01/18/18 1329 Done    Acceptance E VU   01/17/18 1340 Done    Eager D,E NR   01/17/18 1008 Active               Point: Precautions (Done)    Learning Progress Summary    Learner Readiness Method Response Comment Documented by Status   Patient Eager E,D DU,VU   01/24/18 1125 Done    Eager E,D DU,NR  UD 01/23/18 1040 Done    MARTHA Hurley DU UD 01/22/18 0941 Done    MARTHA Hurley NR  UD  01/19/18 1452 Done    MARTHA Hurley DU,NR  UD 01/18/18 1329 Done    DARNELL Frazier NR  DM 01/17/18 1008 Active                      User Key     Initials Effective Dates Name Provider Type Discipline    UD 06/22/15 -  Eleanor Kumar PTA Physical Therapy Assistant PT    DM 06/19/15 -  Yasmeen Dougherty, PT Physical Therapist PT    CC 06/16/16 -  Heidy Malik, RN Registered Nurse Nurse                    PT Recommendation and Plan  Anticipated Discharge Disposition: home with home health  PT Frequency: daily  Plan of Care Review  Plan Of Care Reviewed With: patient  Progress: progress toward functional goals is gradual  Outcome Summary/Follow up Plan: pt limited gait due to sob,otherwise doing well.ambulat 400 ft,needs 02,has iv.          Outcome Measures       01/24/18 1030 01/23/18 0905 01/22/18 0905    How much help from another person do you currently need...    Turning from your back to your side while in flat bed without using bedrails? 4  -UD 4  -UD 4  -UD    Moving from lying on back to sitting on the side of a flat bed without bedrails? 4  -UD 4  -UD 4  -UD    Moving to and from a bed to a chair (including a wheelchair)? 3  -UD 3  -UD 3  -UD    Standing up from a chair using your arms (e.g., wheelchair, bedside chair)? 4  -UD 4  -UD 4  -UD    Climbing 3-5 steps with a railing? 4  -UD 4  -UD 3  -UD    To walk in hospital room? 3  -UD 3  -UD 3  -UD    AM-PAC 6 Clicks Score 22  -UD 22  -UD 21  -UD      User Key  (r) = Recorded By, (t) = Taken By, (c) = Cosigned By    Initials Name Provider Type    UD Eleanor Kumar PTA Physical Therapy Assistant           Time Calculation:         PT Charges       01/24/18 1127          Time Calculation    PT Received On 01/24/18  -UD      PT Goal Re-Cert Due Date 01/27/18  -UD      Time Calculation- PT    Total Timed Code Minutes- PT 24 minute(s)  -UD        User Key  (r) = Recorded By, (t) = Taken By, (c) = Cosigned By    Initials Name Provider Type    UD Eleanor Kuamr PTA Physical  Therapy Assistant          Therapy Charges for Today     Code Description Service Date Service Provider Modifiers Qty    60867946096 HC PT THER PROC EA 15 MIN 1/23/2018 Eleanor Kumar, PTA GP 1    91984012671 HC GAIT TRAINING EA 15 MIN 1/23/2018 Eleanor Kumar, PTA GP 1    45653262447 HC PT THER PROC EA 15 MIN 1/24/2018 Eleanor Kumar, PTA GP 1    02191370233 HC GAIT TRAINING EA 15 MIN 1/24/2018 Eleanor Kumar, PTA GP 1          PT G-Codes  Outcome Measure Options: AM-PAC 6 Clicks Basic Mobility (PT)    Eleanor Kumar PTA  1/24/2018

## 2018-01-24 NOTE — PROGRESS NOTES
Adult Nutrition  Assessment/PES    Patient Name:  Niall Murguia  YOB: 1965  MRN: [id number]  Admit Date:  1/14/2018    Assessment Date:  1/24/2018    Comments:            Reason for Assessment       01/24/18 0802    Reason for Assessment    Reason For Assessment/Visit follow up protocol    Time Spent (min) 5                              Problem/Interventions:        Problem 1       01/24/18 0802    Nutrition Diagnoses Problem 1    Problem 1 Nutrition Appropriate for Condition at this Time    Signs/Symptoms (evidenced by) PO Intake                          Education/Evaluation       01/24/18 0802    Monitor/Evaluation    Monitor Per protocol        Electronically signed by:  Anita Rausch RD  01/24/18 8:02 AM

## 2018-01-24 NOTE — PROGRESS NOTES
Continued Stay Note  [facility]     Patient Name: Niall Murguia  MRN: [id number]  Today's Date: 1/24/2018    Admit Date: 1/14/2018          Discharge Plan       01/24/18 1141    Case Management/Social Work Plan    Plan Home Oxygen    Patient/Family In Agreement With Plan yes    Additional Comments O2 saturation was 88% at rest on room air. Called referral for home oxygen to Belle with [employer] and she will deliver portable O2 tank to patient's room prior to discharge today. Arcelia Rockwell RN [phone]    UPDATE: [employer] [facility] does not take [employer], but [facility] does. Referral called to Betsy with [employer] and they will deliver portable O2 tank to patient's room prior to discharge today. Arcelia Rockwell RN [phone]              Discharge Codes     None        Expected Discharge Date and Time     Expected Discharge Date Expected Discharge Time    Jan 24, 2018             Arcelia Rockwell RN

## 2018-01-24 NOTE — PLAN OF CARE
Problem: Patient Care Overview (Adult)  Goal: Plan of Care Review  Outcome: Ongoing (interventions implemented as appropriate)   01/24/18 0233   Coping/Psychosocial Response Interventions   Plan Of Care Reviewed With patient   Patient Care Overview   Progress improving   Outcome Evaluation   Outcome Summary/Follow up Plan Pt appeared to rest well, no new complaints. VSS. Hopes to go home today.      Goal: Adult Individualization and Mutuality  Outcome: Ongoing (interventions implemented as appropriate)    Goal: Discharge Needs Assessment  Outcome: Ongoing (interventions implemented as appropriate)      Problem: Respiratory Insufficiency (Adult)  Goal: Acid/Base Balance  Outcome: Ongoing (interventions implemented as appropriate)    Goal: Effective Ventilation  Outcome: Ongoing (interventions implemented as appropriate)      Problem: Pneumonia (Adult)  Goal: Signs and Symptoms of Listed Potential Problems Will be Absent or Manageable (Pneumonia)  Outcome: Ongoing (interventions implemented as appropriate)

## 2018-01-24 NOTE — DISCHARGE PLACEMENT REQUEST
"Arcelia Rockwell RN  Case Management  P: [phone]      Alyssa Corea (52 y.o. Male)     Date of Birth Social Security Number Address Home Phone MRN    1965  [address] [place] [facility] [address] [phone] [id number]    Jain Marital Status          None Single       Admission Date Admission Type Admitting Provider Attending Provider Department, Room/Bed    18 Elective Nazanin Rockwell MD Howard, Gabriela Kirk, MD [facility] 4H, S466/1    Discharge Date Discharge Disposition Discharge Destination         Home-Health Care [facility]             Attending Provider: Nazanin Rockwell MD     Allergies:  No Known Allergies    Isolation:  None   Infection:  MRSA (18)   Code Status:  FULL    Ht:  172.7 cm (68\")   Wt:  73 kg (160 lb 14.4 oz)    Admission Cmt:  None   Principal Problem:  Hydropneumothorax, right [J94.8]                 Active Insurance as of 2018     Primary Coverage     Payor Plan Insurance Group Employer/Plan Group    [employer] MED[employer] [facility]     Payor Plan Address Payor Plan Phone Number Effective From Effective To    PO  [phone] 2018     [place] [address]       Subscriber Name Subscriber Birth Date Member ID       ALYSSA COREA 1965 [id number]                 Emergency Contacts      (Rel.) Home Phone Work Phone Mobile Phone    Lakeisha Rehman (Significant Other) [phone] -- --    Krissy Macias (Sister) [phone] -- --    Caleb Corea (Son) [phone] -- --          18 1201  --  75  --  at rest  room air   88% O2 saturation              History & Physical      Jake Tinsley MD at 2018  7:48 PM              [facility] Medicine Services  HISTORY AND PHYSICAL    Patient Name: Alyssa Corea  : 1965  MRN: [id number]  Primary Care Physician: KEAGAN Rowley    Subjective   Subjective     Chief Complaint: Difficultly Breathing/Right " hydropneumothorax    HPI:  Niall Murguia is a 52 y.o. male who presents to [facility] from [facility] for a higher level of care. Patient presented to [facility] on 1/7/18 with difficulty breathing. He complained of intermittent shortness of breath and cough for one week. He states that the day prior to his evaluation in the ED at [facility] his sputum turned from a yellowish color to red-brown in color. Patient also reports that he became severely short of breath, nothing improved it and movement made it worse. He had been placed on prednisone and augmentin.     Patient reports that he has a history of tobacco use, and a spontaneous pneumothorax several years ago which required a chest tube and eventually decortication. He has recently been treated for hypertension. Otherwise patient has no other significant medical history.     While in the emergency department patient was given 2L IV fluid, lasix, and diagnosed with sepsis. Patient has responded well to antibiotics and IV hydration. Chest imaging revealed a right hydropneumothorax, and bilateral pneumonia. Patient was transferred for a higher level of care due to [facility] not having CTS. Patient will be admitted to the hospital service for further evaluation and treatment.     Review of Systems   Constitutional: Positive for fever. Negative for chills, diaphoresis and fatigue.   HENT: Positive for congestion.    Respiratory: Positive for cough and shortness of breath. Negative for wheezing.    Cardiovascular: Negative for chest pain, palpitations and leg swelling.   Gastrointestinal: Negative for abdominal pain, nausea and vomiting.   Genitourinary: Negative for dysuria, frequency and urgency.   Musculoskeletal: Negative for arthralgias and myalgias.   Skin: Negative for color change, pallor, rash and wound.   Neurological: Negative for dizziness, syncope, weakness and light-headedness.   Psychiatric/Behavioral: Negative for agitation and confusion.    All other systems reviewed and are negative.     Otherwise 10-system ROS reviewed and is negative except as mentioned in the HPI.    Personal History     Past Medical History:   Diagnosis Date   • COPD (chronic obstructive pulmonary disease)        Past Surgical History:   Procedure Laterality Date   • CARDIAC SURGERY         Family History: family history includes Heart disease in his father; No Known Problems in his brother, daughter, mother, sister, and son.     Social History:  reports that he has been smoking Cigarettes.  He has a 40.00 pack-year smoking history. He has never used smokeless tobacco. He reports that he drinks about 1.2 oz of alcohol per week  He reports that he does not use illicit drugs.  Social History     Social History Narrative   • No narrative on file       Medications:  Prescriptions Prior to Admission   Medication Sig Dispense Refill Last Dose   • acetaminophen (TYLENOL) 325 MG tablet Take 2 tablets by mouth Every 4 (Four) Hours As Needed for Mild Pain .      • budesonide-formoterol (SYMBICORT) 160-4.5 MCG/ACT inhaler Inhale 2 puffs 2 (Two) Times a Day.  12    • citalopram (CeleXA) 40 MG tablet Take 40 mg by mouth Daily.   1/6/2018 at 0800   • enoxaparin (LOVENOX) 40 MG/0.4ML solution syringe Inject 0.4 mL under the skin Daily. 11.2 mL     • ipratropium-albuterol (DUO-NEB) 0.5-2.5 mg/mL nebulizer Take 3 mL by nebulization Every 4 (Four) Hours As Needed for Wheezing or Shortness of Air. 360 mL     • [START ON 1/15/2018] levoFLOXacin (LEVAQUIN) 500 MG tablet Take 1 tablet by mouth Daily for 3 doses. Indications: Pneumonia 3 tablet 0    • methylPREDNISolone sodium succinate (SOLU-Medrol) 40 MG injection Infuse 1 mL into a venous catheter Every 8 (Eight) Hours.      • nicotine (NICODERM CQ) 21 MG/24HR patch Place 1 patch on the skin Daily.      • ondansetron (ZOFRAN) 4 MG/2ML injection Infuse 2 mL into a venous catheter Every 6 (Six) Hours As Needed for Nausea or Vomiting.      •  saccharomyces boulardii (FLORASTOR) 250 MG capsule Take 1 capsule by mouth 2 (Two) Times a Day.      • sodium chloride 0.9 % flush Infuse 10 mL into a venous catheter As Needed for Line Care.      • sodium chloride 0.9 % flush Infuse 1-10 mL into a venous catheter As Needed for Line Care.      • Sunscreens (CHAPSTICK) stick Apply 1 each topically 5 (Five) Times a Day As Needed (chapped lips).          No Known Allergies    Objective   Objective     Vital Signs:   Temp:  [98.3 °F (36.8 °C)-98.5 °F (36.9 °C)] 98.5 °F (36.9 °C)  Heart Rate:  [60-97] 97  Resp:  [18] 18  BP: (129-136)/(78-84) 129/79        Physical Exam   Constitutional: He is oriented to person, place, and time. He appears well-developed and well-nourished.   HENT:   Head: Normocephalic and atraumatic.   Eyes: EOM are normal. Pupils are equal, round, and reactive to light. No scleral icterus.   Neck: Normal range of motion. Neck supple. No JVD present.   Cardiovascular: Normal rate, regular rhythm, normal heart sounds and intact distal pulses.  Exam reveals no gallop and no friction rub.    No murmur heard.  Pulmonary/Chest: Effort normal. No respiratory distress. He has decreased breath sounds in the right middle field and the right lower field. He has wheezes. He has no rales. He exhibits no tenderness.   Abdominal: Soft. Bowel sounds are normal. He exhibits no distension and no mass. There is no tenderness. There is no rebound and no guarding. No hernia.   Musculoskeletal: Normal range of motion. He exhibits no edema, tenderness or deformity.   Neurological: He is alert and oriented to person, place, and time.   Skin: Skin is warm and dry. No rash noted. No erythema. No pallor.   Psychiatric: He has a normal mood and affect. His behavior is normal. Judgment and thought content normal.   Nursing note and vitals reviewed.     Results Reviewed:  I have personally reviewed current lab, radiology, and data and agree.      Results from last 7 days  Lab  Units 01/14/18  1300   WBC 10*3/mm3 18.12*   HEMOGLOBIN g/dL 12.9*   HEMATOCRIT % 38.1*   PLATELETS 10*3/mm3 208       Results from last 7 days  Lab Units 01/14/18  1300   SODIUM mmol/L 137   POTASSIUM mmol/L 3.9   CHLORIDE mmol/L 98   CO2 mmol/L 35.0*   BUN mg/dL 15   CREATININE mg/dL 0.60   GLUCOSE mg/dL 142*   CALCIUM mg/dL 8.6   ALT (SGPT) U/L 222*   AST (SGOT) U/L 70*     Brief Urine Lab Results  (Last result in the past 365 days)      Color   Clarity   Blood   Leuk Est   Nitrite   Protein   CREAT   Urine HCG        01/07/18 1748 Yellow Slightly Cloudy(A) Small (1+)(A) Negative Negative 100 mg/dL (2+)(A)             No results found for: BNP  No results found for: PHART  Imaging Results (last 24 hours)     ** No results found for the last 24 hours. **        Results for orders placed during the hospital encounter of 01/07/18   Adult Transthoracic Echo Complete W/ Cont if Necessary Per Protocol    Narrative Technically very limited study   1) Normal LV systolic function ( EF is about 55%)  2) Normal left atrial size with normal LVedp   3) Trace MR   4) Mild TR with normal PA pressures ( within limitations of study)  5) Severe RV dilation with wall motion abnormality ? Acute on chronic   corpulmonale   6) Severe reduction in RV function        Assessment/Plan   Assessment / Plan     Hospital Problem List     * (Principal)Hydropneumothorax, right    Acute on chronic respiratory failure with hypoxia    Pneumonia of both lower lobes    Chronic bullous emphysema (Chronic)    Hypertension    Tobacco use        Assessment & Plan:  - Admit to telemetry  - VS q4h  - Consult to CT Surgery   - Right hydropneumothorax  - Sputum + MRSA at [facility]  - Continue inhalers/nebs  - Transition Solumedrol to Oral prednisone   - Will taper   - Follow results from [facility]  - Will broaden abx coverage from Levaquin to vancomycin/rocephin/doxy  - AM Labs  - AM EKG  - AM CXR  - Nicotine patch  - Tobacco cessation education    Tobacco Cessation  Counselin minutes of tobacco cessation counseling provided, including but not limited to, risks of ongoing tobacco use, pertinence to current or future health status and availability/examples of cessation resources.  Patient expresses desire to quit.        DVT prophylaxis: TEDs/SCDs/HOLD PHARMACOLOGIC PENDING CT SURGERY EVAL    CODE STATUS: FULL      Admission Status:  I believe this patient meets INPATIENT status due to the need for care which can only be reasonably provided in an hospital setting such as aggressive/expedited ancillary services and/or consultation services, the necessity for IV medications, close physician monitoring and/or the possible need for procedures.  In such, I feel patient’s risk for adverse outcomes and need for care warrant INPATIENT evaluation and predict the patient’s care encounter to likely last beyond 2 midnights.      Maddy Hernandez, APRN   18   7:48 PM      Patient seen and examined at the bedside.  Patient is a 52-year-old  male with past medical history significant for COPD, hypertension, ongoing tobacco abuse.  It seems that patient was in his usual state of health until about 2 weeks ago.  At that time patient started having episodes of fever and cough and felt some respiratory difficulties which waxed and waned.  About a week ago patient started having significant shortness of breath and went to the local hospital and was admitted there.  Patient was treated for pneumonia however now patient is noted to have significant right-sided pleural effusion.  Sputum culture also was positive for MRSA.  Patient was on Levaquin for that he tells me that since admission to the other hospital his symptoms improved significantly.  On physical exam patient is resting in bed in no acute distress.  Pupils are equally reactive to light and accommodation.  Neck is supple and there is no palpable lymphadenopathy present.  There are no wheezing or crackles on lung exam but  "breath sounds are decreased on the right side compared to the left.  Cough on deep inspiration.  Regular rate and rhythm and normal murmur gallop or rub was audible.  Abdomen was soft, nontender, not distended and bowel sounds were present.  No edema lower extremities were present.    Spent an plan:  *Pneumonia with MRSA and also pleural effusion.  Patient has history of decortication on the same side some years ago done by Dr. Ge.  We will admit the patient and continue antibiotic with IV Rocephin, doxycycline, and vancomycin.  We will also ask thoracic surgery to see the patient in a.m.  Please see the above for more details.  Other long conversation with the patient and explained the findings and plan of care to him at the bedside.  This patient needs in-hospital further evaluation, workup, and treatment.  Patient will be admitted as inpatient.     Electronically signed by Jake Tinsley MD at 2018 10:54 PM          [address] [address]  Phone:  [phone]  Fax:   Date Ordered: 2018         Patient:  Niall Murguia MRN:  [id number]   [address] [facility] [address] :  1965  SSN:    Phone: [phone] Sex:  M     Weight: 73 kg (160 lb 14.4 oz)         Ht Readings from Last 1 Encounters:   18 172.7 cm (68\")         Oxygen Therapy                   (Order ID: [id number])    Diagnosis:  Acute on chronic respiratory failure with hypoxia (J96.21 [ICD-10-CM] 518.84,799.02 [ICD-9-CM])  Chronic bullous emphysema (J43.9 [ICD-10-CM] 492.0 [ICD-9-CM])   Quantity:  1     Delivery Modality: Nasal Cannula  Liters Per Minute: 2  Duration: Continuous  Equipment:  Oxygen Concentrator &  &  Portable Gaseous Oxygen System & Portable Oxygen Contents Gaseous  Length of Need (99 Months = Lifetime): 99 Months = Lifetime            Verbal Order Mode: Telephone with readback   Authorizing Provider: Nazanin Vanegas" MD Moiz  Authorizing Provider's NPI: [id number]     Order Entered By: Arcelia Rockwell RN 1/24/2018  1:11 PM

## 2018-01-24 NOTE — PLAN OF CARE
Problem: Patient Care Overview (Adult)  Goal: Plan of Care Review  Outcome: Ongoing (interventions implemented as appropriate)   01/24/18 1125   Coping/Psychosocial Response Interventions   Plan Of Care Reviewed With patient   Patient Care Overview   Progress progress toward functional goals is gradual   Outcome Evaluation   Outcome Summary/Follow up Plan pt limited gait due to sob,otherwise doing well.ambulat 400 ft,needs 02,has iv.

## 2018-01-24 NOTE — DISCHARGE PLACEMENT REQUEST
"Arcelia Rockwell RN    P: [phone]    Please note that patient will be staying with his significant other, Lakeisha Rehman, for about a week at discharge. Her address is: [address] [place] [address].      Alyssa Corea (52 y.o. Male)     Date of Birth Social Security Number Address Home Phone MRN    1965  [address] [place] [id number]    Mu-ism Marital Status          None Single       Admission Date Admission Type Admitting Provider Attending Provider Department, Room/Bed    18 Elective Nazanin Rockwell MD Howard, Nazanin Vanegas MD [address], [address]/1    Discharge Date Discharge Disposition Discharge Destination         Home-Health Care [facility] [place]             Attending Provider: Nazanin Rockwell MD     Allergies:  No Known Allergies    Isolation:  None   Infection:  MRSA (18)   Code Status:  FULL    Ht:  172.7 cm (68\")   Wt:  73 kg (160 lb 14.4 oz)    Admission Cmt:  None   Principal Problem:  Hydropneumothorax, right [J94.8]                 Active Insurance as of 2018     Primary Coverage     Payor Plan Insurance Group Employer/Plan Group    HU[employer]NA MEDICAID HU[employer]NA [employer] [facility]     Payor Plan Address Payor Plan Phone Number Effective From Effective To    PO  [phone] 2018     [place] [address]       Subscriber Name Subscriber Birth Date Member ID       ALYSSA COREA 1965 [id number]                 Emergency Contacts      (Rel.) Home Phone Work Phone Mobile Phone    OriLakeisha (Significant Other) [phone] -- --    Krissy Macias (Sister) [phone] -- --    Caleb Corea (Son) [phone] -- --          [address] [address]  Phone:  [phone]  Fax:   Date Ordered: 2018         Patient:  Alyssa Corea MRN:  [id number]   [address] [place] [id number] :  1965  SSN: " "   Phone: [phone] Sex:  M     Weight: 73 kg (160 lb 14.4 oz)         Ht Readings from Last 1 Encounters:   18 172.7 cm (68\")         Oxygen Therapy                   (Order ID: [id number])    Diagnosis:  Acute on chronic respiratory failure with hypoxia (J96.21 [ICD-10-CM] 518.84,799.02 [ICD-9-CM])  Chronic bullous emphysema (J43.9 [ICD-10-CM] 492.0 [ICD-9-CM])   Quantity:  1     Delivery Modality: Nasal Cannula  Liters Per Minute: 2  Duration: Continuous  Equipment:  Oxygen Concentrator &  &  Portable Gaseous Oxygen System & Portable Oxygen Contents Gaseous  Length of Need (99 Months = Lifetime): 99 Months = Lifetime            Verbal Order Mode: Telephone with readback   Authorizing Provider: Nazanin Rockwell MD  Authorizing Provider's NPI: [id number]     Order Entered By: Arcelia Rockwell RN 2018  1:11 PM                     History & Physical      Jake Tinsley MD at 2018  7:48 PM              [facility] Medicine Services  HISTORY AND PHYSICAL    Patient Name: Niall Murguia  : 1965  MRN: [id number]  Primary Care Physician: KEAGAN Rowley    Subjective   Subjective     Chief Complaint: Difficultly Breathing/Right hydropneumothorax    HPI:  Niall Murguia is a 52 y.o. male who presents to [facility] from [facility] for a higher level of care. Patient presented to [facility] on 18 with difficulty breathing. He complained of intermittent shortness of breath and cough for one week. He states that the day prior to his evaluation in the ED at [facility] his sputum turned from a yellowish color to red-brown in color. Patient also reports that he became severely short of breath, nothing improved it and movement made it worse. He had been placed on prednisone and augmentin.     Patient reports that he has a history of tobacco use, and a spontaneous pneumothorax several years ago which required a chest tube " and eventually decortication. He has recently been treated for hypertension. Otherwise patient has no other significant medical history.     While in the emergency department patient was given 2L IV fluid, lasix, and diagnosed with sepsis. Patient has responded well to antibiotics and IV hydration. Chest imaging revealed a right hydropneumothorax, and bilateral pneumonia. Patient was transferred for a higher level of care due to [facility] not having CTS. Patient will be admitted to the hospital service for further evaluation and treatment.     Review of Systems   Constitutional: Positive for fever. Negative for chills, diaphoresis and fatigue.   HENT: Positive for congestion.    Respiratory: Positive for cough and shortness of breath. Negative for wheezing.    Cardiovascular: Negative for chest pain, palpitations and leg swelling.   Gastrointestinal: Negative for abdominal pain, nausea and vomiting.   Genitourinary: Negative for dysuria, frequency and urgency.   Musculoskeletal: Negative for arthralgias and myalgias.   Skin: Negative for color change, pallor, rash and wound.   Neurological: Negative for dizziness, syncope, weakness and light-headedness.   Psychiatric/Behavioral: Negative for agitation and confusion.   All other systems reviewed and are negative.     Otherwise 10-system ROS reviewed and is negative except as mentioned in the HPI.    Personal History     Past Medical History:   Diagnosis Date   • COPD (chronic obstructive pulmonary disease)        Past Surgical History:   Procedure Laterality Date   • CARDIAC SURGERY         Family History: family history includes Heart disease in his father; No Known Problems in his brother, daughter, mother, sister, and son.     Social History:  reports that he has been smoking Cigarettes.  He has a 40.00 pack-year smoking history. He has never used smokeless tobacco. He reports that he drinks about 1.2 oz of alcohol per week  He reports that he does not use illicit  drugs.  Social History     Social History Narrative   • No narrative on file       Medications:  Prescriptions Prior to Admission   Medication Sig Dispense Refill Last Dose   • acetaminophen (TYLENOL) 325 MG tablet Take 2 tablets by mouth Every 4 (Four) Hours As Needed for Mild Pain .      • budesonide-formoterol (SYMBICORT) 160-4.5 MCG/ACT inhaler Inhale 2 puffs 2 (Two) Times a Day.  12    • citalopram (CeleXA) 40 MG tablet Take 40 mg by mouth Daily.   1/6/2018 at 0800   • enoxaparin (LOVENOX) 40 MG/0.4ML solution syringe Inject 0.4 mL under the skin Daily. 11.2 mL     • ipratropium-albuterol (DUO-NEB) 0.5-2.5 mg/mL nebulizer Take 3 mL by nebulization Every 4 (Four) Hours As Needed for Wheezing or Shortness of Air. 360 mL     • [START ON 1/15/2018] levoFLOXacin (LEVAQUIN) 500 MG tablet Take 1 tablet by mouth Daily for 3 doses. Indications: Pneumonia 3 tablet 0    • methylPREDNISolone sodium succinate (SOLU-Medrol) 40 MG injection Infuse 1 mL into a venous catheter Every 8 (Eight) Hours.      • nicotine (NICODERM CQ) 21 MG/24HR patch Place 1 patch on the skin Daily.      • ondansetron (ZOFRAN) 4 MG/2ML injection Infuse 2 mL into a venous catheter Every 6 (Six) Hours As Needed for Nausea or Vomiting.      • saccharomyces boulardii (FLORASTOR) 250 MG capsule Take 1 capsule by mouth 2 (Two) Times a Day.      • sodium chloride 0.9 % flush Infuse 10 mL into a venous catheter As Needed for Line Care.      • sodium chloride 0.9 % flush Infuse 1-10 mL into a venous catheter As Needed for Line Care.      • Sunscreens (CHAPSTICK) stick Apply 1 each topically 5 (Five) Times a Day As Needed (chapped lips).          No Known Allergies    Objective   Objective     Vital Signs:   Temp:  [98.3 °F (36.8 °C)-98.5 °F (36.9 °C)] 98.5 °F (36.9 °C)  Heart Rate:  [60-97] 97  Resp:  [18] 18  BP: (129-136)/(78-84) 129/79        Physical Exam   Constitutional: He is oriented to person, place, and time. He appears well-developed and  well-nourished.   HENT:   Head: Normocephalic and atraumatic.   Eyes: EOM are normal. Pupils are equal, round, and reactive to light. No scleral icterus.   Neck: Normal range of motion. Neck supple. No JVD present.   Cardiovascular: Normal rate, regular rhythm, normal heart sounds and intact distal pulses.  Exam reveals no gallop and no friction rub.    No murmur heard.  Pulmonary/Chest: Effort normal. No respiratory distress. He has decreased breath sounds in the right middle field and the right lower field. He has wheezes. He has no rales. He exhibits no tenderness.   Abdominal: Soft. Bowel sounds are normal. He exhibits no distension and no mass. There is no tenderness. There is no rebound and no guarding. No hernia.   Musculoskeletal: Normal range of motion. He exhibits no edema, tenderness or deformity.   Neurological: He is alert and oriented to person, place, and time.   Skin: Skin is warm and dry. No rash noted. No erythema. No pallor.   Psychiatric: He has a normal mood and affect. His behavior is normal. Judgment and thought content normal.   Nursing note and vitals reviewed.     Results Reviewed:  I have personally reviewed current lab, radiology, and data and agree.      Results from last 7 days  Lab Units 01/14/18  1300   WBC 10*3/mm3 18.12*   HEMOGLOBIN g/dL 12.9*   HEMATOCRIT % 38.1*   PLATELETS 10*3/mm3 208       Results from last 7 days  Lab Units 01/14/18  1300   SODIUM mmol/L 137   POTASSIUM mmol/L 3.9   CHLORIDE mmol/L 98   CO2 mmol/L 35.0*   BUN mg/dL 15   CREATININE mg/dL 0.60   GLUCOSE mg/dL 142*   CALCIUM mg/dL 8.6   ALT (SGPT) U/L 222*   AST (SGOT) U/L 70*     Brief Urine Lab Results  (Last result in the past 365 days)      Color   Clarity   Blood   Leuk Est   Nitrite   Protein   CREAT   Urine HCG        01/07/18 1748 Yellow Slightly Cloudy(A) Small (1+)(A) Negative Negative 100 mg/dL (2+)(A)             No results found for: BNP  No results found for: PHART  Imaging Results (last 24 hours)      ** No results found for the last 24 hours. **        Results for orders placed during the hospital encounter of 18   Adult Transthoracic Echo Complete W/ Cont if Necessary Per Protocol    Narrative Technically very limited study   1) Normal LV systolic function ( EF is about 55%)  2) Normal left atrial size with normal LVedp   3) Trace MR   4) Mild TR with normal PA pressures ( within limitations of study)  5) Severe RV dilation with wall motion abnormality ? Acute on chronic   corpulmonale   6) Severe reduction in RV function        Assessment/Plan   Assessment / Plan     Hospital Problem List     * (Principal)Hydropneumothorax, right    Acute on chronic respiratory failure with hypoxia    Pneumonia of both lower lobes    Chronic bullous emphysema (Chronic)    Hypertension    Tobacco use        Assessment & Plan:  - Admit to telemetry  - VS q4h  - Consult to CT Surgery   - Right hydropneumothorax  - Sputum + MRSA at [facility]  - Continue inhalers/nebs  - Transition Solumedrol to Oral prednisone   - Will taper   - Follow results from [facility]  - Will broaden abx coverage from Levaquin to vancomycin/rocephin/doxy  - AM Labs  - AM EKG  - AM CXR  - Nicotine patch  - Tobacco cessation education    Tobacco Cessation Counselin minutes of tobacco cessation counseling provided, including but not limited to, risks of ongoing tobacco use, pertinence to current or future health status and availability/examples of cessation resources.  Patient expresses desire to quit.        DVT prophylaxis: TEDs/SCDs/HOLD PHARMACOLOGIC PENDING CT SURGERY EVAL    CODE STATUS: FULL      Admission Status:  I believe this patient meets INPATIENT status due to the need for care which can only be reasonably provided in an hospital setting such as aggressive/expedited ancillary services and/or consultation services, the necessity for IV medications, close physician monitoring and/or the possible need for procedures.  In such, I feel patient’s  risk for adverse outcomes and need for care warrant INPATIENT evaluation and predict the patient’s care encounter to likely last beyond 2 midnights.      Maddy Karimial, APRN   01/14/18   7:48 PM      Patient seen and examined at the bedside.  Patient is a 52-year-old  male with past medical history significant for COPD, hypertension, ongoing tobacco abuse.  It seems that patient was in his usual state of health until about 2 weeks ago.  At that time patient started having episodes of fever and cough and felt some respiratory difficulties which waxed and waned.  About a week ago patient started having significant shortness of breath and went to the local hospital and was admitted there.  Patient was treated for pneumonia however now patient is noted to have significant right-sided pleural effusion.  Sputum culture also was positive for MRSA.  Patient was on Levaquin for that he tells me that since admission to the other hospital his symptoms improved significantly.  On physical exam patient is resting in bed in no acute distress.  Pupils are equally reactive to light and accommodation.  Neck is supple and there is no palpable lymphadenopathy present.  There are no wheezing or crackles on lung exam but breath sounds are decreased on the right side compared to the left.  Cough on deep inspiration.  Regular rate and rhythm and normal murmur gallop or rub was audible.  Abdomen was soft, nontender, not distended and bowel sounds were present.  No edema lower extremities were present.    Spent an plan:  *Pneumonia with MRSA and also pleural effusion.  Patient has history of decortication on the same side some years ago done by Dr. Ge.  We will admit the patient and continue antibiotic with IV Rocephin, doxycycline, and vancomycin.  We will also ask thoracic surgery to see the patient in a.m.  Please see the above for more details.  Other long conversation with the patient and explained the findings and  plan of care to him at the bedside.  This patient needs in-hospital further evaluation, workup, and treatment.  Patient will be admitted as inpatient.     Electronically signed by Jake Tinsley MD at 1/14/2018 10:54 PM            01/24/18 1201  --  75  --  at rest  room air   88% O2 sat

## 2018-01-26 ENCOUNTER — LAB REQUISITION (OUTPATIENT)
Dept: LAB | Facility: HOSPITAL | Age: 53
End: 2018-01-26

## 2018-01-26 DIAGNOSIS — Z00.00 ROUTINE GENERAL MEDICAL EXAMINATION AT A HEALTH CARE FACILITY: ICD-10-CM

## 2018-01-26 LAB
ALBUMIN SERPL-MCNC: 3.1 G/DL (ref 3.2–4.8)
ALBUMIN/GLOB SERPL: 0.8 G/DL (ref 1.5–2.5)
ALP SERPL-CCNC: 68 U/L (ref 25–100)
ALT SERPL W P-5'-P-CCNC: 63 U/L (ref 7–40)
ANION GAP SERPL CALCULATED.3IONS-SCNC: 8 MMOL/L (ref 3–11)
AST SERPL-CCNC: 39 U/L (ref 0–33)
BASOPHILS # BLD AUTO: 0.03 10*3/MM3 (ref 0–0.2)
BASOPHILS NFR BLD AUTO: 0.4 % (ref 0–1)
BILIRUB SERPL-MCNC: 0.5 MG/DL (ref 0.3–1.2)
BUN BLD-MCNC: 13 MG/DL (ref 9–23)
BUN/CREAT SERPL: 10.8 (ref 7–25)
CALCIUM SPEC-SCNC: 8.2 MG/DL (ref 8.7–10.4)
CHLORIDE SERPL-SCNC: 95 MMOL/L (ref 99–109)
CO2 SERPL-SCNC: 26 MMOL/L (ref 20–31)
CREAT BLD-MCNC: 1.2 MG/DL (ref 0.6–1.3)
DEPRECATED RDW RBC AUTO: 46.5 FL (ref 37–54)
EOSINOPHIL # BLD AUTO: 0.31 10*3/MM3 (ref 0–0.3)
EOSINOPHIL NFR BLD AUTO: 3.7 % (ref 0–3)
ERYTHROCYTE [DISTWIDTH] IN BLOOD BY AUTOMATED COUNT: 12.5 % (ref 11.3–14.5)
GFR SERPL CREATININE-BSD FRML MDRD: 64 ML/MIN/1.73
GLOBULIN UR ELPH-MCNC: 4.1 GM/DL
GLUCOSE BLD-MCNC: 102 MG/DL (ref 70–100)
HCT VFR BLD AUTO: 31 % (ref 38.9–50.9)
HGB BLD-MCNC: 10.3 G/DL (ref 13.1–17.5)
IMM GRANULOCYTES # BLD: 0.02 10*3/MM3 (ref 0–0.03)
IMM GRANULOCYTES NFR BLD: 0.2 % (ref 0–0.6)
LYMPHOCYTES # BLD AUTO: 1.09 10*3/MM3 (ref 0.6–4.8)
LYMPHOCYTES NFR BLD AUTO: 13.1 % (ref 24–44)
MCH RBC QN AUTO: 34.2 PG (ref 27–31)
MCHC RBC AUTO-ENTMCNC: 33.2 G/DL (ref 32–36)
MCV RBC AUTO: 103 FL (ref 80–99)
MONOCYTES # BLD AUTO: 0.81 10*3/MM3 (ref 0–1)
MONOCYTES NFR BLD AUTO: 9.7 % (ref 0–12)
NEUTROPHILS # BLD AUTO: 6.07 10*3/MM3 (ref 1.5–8.3)
NEUTROPHILS NFR BLD AUTO: 72.9 % (ref 41–71)
PLATELET # BLD AUTO: 228 10*3/MM3 (ref 150–450)
PMV BLD AUTO: 8.9 FL (ref 6–12)
POTASSIUM BLD-SCNC: 3.8 MMOL/L (ref 3.5–5.5)
PROT SERPL-MCNC: 7.2 G/DL (ref 5.7–8.2)
RBC # BLD AUTO: 3.01 10*6/MM3 (ref 4.2–5.76)
SODIUM BLD-SCNC: 129 MMOL/L (ref 132–146)
WBC NRBC COR # BLD: 8.33 10*3/MM3 (ref 3.5–10.8)

## 2018-01-26 PROCEDURE — 80053 COMPREHEN METABOLIC PANEL: CPT | Performed by: INTERNAL MEDICINE

## 2018-01-26 PROCEDURE — 85025 COMPLETE CBC W/AUTO DIFF WBC: CPT | Performed by: INTERNAL MEDICINE

## 2018-02-05 ENCOUNTER — LAB REQUISITION (OUTPATIENT)
Dept: LAB | Facility: HOSPITAL | Age: 53
End: 2018-02-05

## 2018-02-05 DIAGNOSIS — Z00.00 ROUTINE GENERAL MEDICAL EXAMINATION AT A HEALTH CARE FACILITY: ICD-10-CM

## 2018-02-05 LAB
ALBUMIN SERPL-MCNC: 3.4 G/DL (ref 3.2–4.8)
ALBUMIN/GLOB SERPL: 0.9 G/DL (ref 1.5–2.5)
ALP SERPL-CCNC: 68 U/L (ref 25–100)
ALT SERPL W P-5'-P-CCNC: 29 U/L (ref 7–40)
ANION GAP SERPL CALCULATED.3IONS-SCNC: 8 MMOL/L (ref 3–11)
AST SERPL-CCNC: 21 U/L (ref 0–33)
BASOPHILS # BLD AUTO: 0.02 10*3/MM3 (ref 0–0.2)
BASOPHILS NFR BLD AUTO: 0.2 % (ref 0–1)
BILIRUB SERPL-MCNC: 0.3 MG/DL (ref 0.3–1.2)
BUN BLD-MCNC: 8 MG/DL (ref 9–23)
BUN/CREAT SERPL: 8 (ref 7–25)
CALCIUM SPEC-SCNC: 8.4 MG/DL (ref 8.7–10.4)
CHLORIDE SERPL-SCNC: 98 MMOL/L (ref 99–109)
CO2 SERPL-SCNC: 30 MMOL/L (ref 20–31)
CREAT BLD-MCNC: 1 MG/DL (ref 0.6–1.3)
DEPRECATED RDW RBC AUTO: 47.3 FL (ref 37–54)
EOSINOPHIL # BLD AUTO: 0.05 10*3/MM3 (ref 0–0.3)
EOSINOPHIL NFR BLD AUTO: 0.6 % (ref 0–3)
ERYTHROCYTE [DISTWIDTH] IN BLOOD BY AUTOMATED COUNT: 13 % (ref 11.3–14.5)
GFR SERPL CREATININE-BSD FRML MDRD: 79 ML/MIN/1.73
GLOBULIN UR ELPH-MCNC: 3.6 GM/DL
GLUCOSE BLD-MCNC: 73 MG/DL (ref 70–100)
HCT VFR BLD AUTO: 29.2 % (ref 38.9–50.9)
HGB BLD-MCNC: 9.8 G/DL (ref 13.1–17.5)
IMM GRANULOCYTES # BLD: 0.02 10*3/MM3 (ref 0–0.03)
IMM GRANULOCYTES NFR BLD: 0.2 % (ref 0–0.6)
LYMPHOCYTES # BLD AUTO: 1.94 10*3/MM3 (ref 0.6–4.8)
LYMPHOCYTES NFR BLD AUTO: 24.1 % (ref 24–44)
MCH RBC QN AUTO: 34.4 PG (ref 27–31)
MCHC RBC AUTO-ENTMCNC: 33.6 G/DL (ref 32–36)
MCV RBC AUTO: 102.5 FL (ref 80–99)
MONOCYTES # BLD AUTO: 0.91 10*3/MM3 (ref 0–1)
MONOCYTES NFR BLD AUTO: 11.3 % (ref 0–12)
NEUTROPHILS # BLD AUTO: 5.11 10*3/MM3 (ref 1.5–8.3)
NEUTROPHILS NFR BLD AUTO: 63.6 % (ref 41–71)
PLATELET # BLD AUTO: 131 10*3/MM3 (ref 150–450)
PMV BLD AUTO: 9 FL (ref 6–12)
POTASSIUM BLD-SCNC: 3.6 MMOL/L (ref 3.5–5.5)
PROT SERPL-MCNC: 7 G/DL (ref 5.7–8.2)
RBC # BLD AUTO: 2.85 10*6/MM3 (ref 4.2–5.76)
SODIUM BLD-SCNC: 136 MMOL/L (ref 132–146)
WBC NRBC COR # BLD: 8.05 10*3/MM3 (ref 3.5–10.8)

## 2018-02-05 PROCEDURE — 80053 COMPREHEN METABOLIC PANEL: CPT | Performed by: INTERNAL MEDICINE

## 2018-02-05 PROCEDURE — 85025 COMPLETE CBC W/AUTO DIFF WBC: CPT | Performed by: INTERNAL MEDICINE

## 2018-02-05 PROCEDURE — 36415 COLL VENOUS BLD VENIPUNCTURE: CPT | Performed by: INTERNAL MEDICINE

## 2018-02-12 ENCOUNTER — LAB REQUISITION (OUTPATIENT)
Dept: LAB | Facility: HOSPITAL | Age: 53
End: 2018-02-12

## 2018-02-12 ENCOUNTER — TRANSCRIBE ORDERS (OUTPATIENT)
Dept: LAB | Facility: HOSPITAL | Age: 53
End: 2018-02-12

## 2018-02-12 ENCOUNTER — OFFICE VISIT (OUTPATIENT)
Dept: CARDIAC SURGERY | Facility: CLINIC | Age: 53
End: 2018-02-12

## 2018-02-12 VITALS
HEIGHT: 68 IN | WEIGHT: 149.4 LBS | HEART RATE: 89 BPM | SYSTOLIC BLOOD PRESSURE: 140 MMHG | DIASTOLIC BLOOD PRESSURE: 77 MMHG | BODY MASS INDEX: 22.64 KG/M2 | OXYGEN SATURATION: 97 % | TEMPERATURE: 97.6 F

## 2018-02-12 DIAGNOSIS — Z00.00 ROUTINE GENERAL MEDICAL EXAMINATION AT A HEALTH CARE FACILITY: ICD-10-CM

## 2018-02-12 DIAGNOSIS — Z93.8 S/P CHEST TUBE PLACEMENT (HCC): Primary | ICD-10-CM

## 2018-02-12 DIAGNOSIS — J86.9 EMPYEMA OF PLEURA (HCC): ICD-10-CM

## 2018-02-12 DIAGNOSIS — J94.8 HYDROPNEUMOTHORAX: ICD-10-CM

## 2018-02-12 DIAGNOSIS — D69.9 FAMILIAL HEMORRHAGIC DIATHESIS (HCC): Primary | ICD-10-CM

## 2018-02-12 DIAGNOSIS — J15.212 PNEUMONIA DUE TO METHICILLIN RESISTANT STAPHYLOCOCCUS AUREUS, UNSPECIFIED LATERALITY, UNSPECIFIED PART OF LUNG: ICD-10-CM

## 2018-02-12 DIAGNOSIS — J43.8 OTHER EMPHYSEMA (HCC): ICD-10-CM

## 2018-02-12 LAB
ALBUMIN SERPL-MCNC: 3.8 G/DL (ref 3.2–4.8)
ALBUMIN/GLOB SERPL: 1.1 G/DL (ref 1.5–2.5)
ALP SERPL-CCNC: 74 U/L (ref 25–100)
ALT SERPL W P-5'-P-CCNC: 19 U/L (ref 7–40)
ANION GAP SERPL CALCULATED.3IONS-SCNC: 4 MMOL/L (ref 3–11)
AST SERPL-CCNC: 15 U/L (ref 0–33)
BASOPHILS # BLD AUTO: 0.04 10*3/MM3 (ref 0–0.2)
BASOPHILS NFR BLD AUTO: 0.4 % (ref 0–1)
BILIRUB SERPL-MCNC: 0.5 MG/DL (ref 0.3–1.2)
BUN BLD-MCNC: 14 MG/DL (ref 9–23)
BUN/CREAT SERPL: 14 (ref 7–25)
CALCIUM SPEC-SCNC: 8.8 MG/DL (ref 8.7–10.4)
CHLORIDE SERPL-SCNC: 98 MMOL/L (ref 99–109)
CO2 SERPL-SCNC: 30 MMOL/L (ref 20–31)
CREAT BLD-MCNC: 1 MG/DL (ref 0.6–1.3)
DEPRECATED RDW RBC AUTO: 49.1 FL (ref 37–54)
EOSINOPHIL # BLD AUTO: 0.06 10*3/MM3 (ref 0–0.3)
EOSINOPHIL NFR BLD AUTO: 0.7 % (ref 0–3)
ERYTHROCYTE [DISTWIDTH] IN BLOOD BY AUTOMATED COUNT: 13.8 % (ref 11.3–14.5)
GFR SERPL CREATININE-BSD FRML MDRD: 79 ML/MIN/1.73
GLOBULIN UR ELPH-MCNC: 3.5 GM/DL
GLUCOSE BLD-MCNC: 87 MG/DL (ref 70–100)
HCT VFR BLD AUTO: 29.9 % (ref 38.9–50.9)
HGB BLD-MCNC: 10 G/DL (ref 13.1–17.5)
IMM GRANULOCYTES # BLD: 0.06 10*3/MM3 (ref 0–0.03)
IMM GRANULOCYTES NFR BLD: 0.7 % (ref 0–0.6)
LYMPHOCYTES # BLD AUTO: 2.45 10*3/MM3 (ref 0.6–4.8)
LYMPHOCYTES NFR BLD AUTO: 26.6 % (ref 24–44)
MCH RBC QN AUTO: 35.1 PG (ref 27–31)
MCHC RBC AUTO-ENTMCNC: 33.4 G/DL (ref 32–36)
MCV RBC AUTO: 104.9 FL (ref 80–99)
MONOCYTES # BLD AUTO: 1.22 10*3/MM3 (ref 0–1)
MONOCYTES NFR BLD AUTO: 13.2 % (ref 0–12)
NEUTROPHILS # BLD AUTO: 5.39 10*3/MM3 (ref 1.5–8.3)
NEUTROPHILS NFR BLD AUTO: 58.4 % (ref 41–71)
PLATELET # BLD AUTO: 130 10*3/MM3 (ref 150–450)
PMV BLD AUTO: 9.3 FL (ref 6–12)
POTASSIUM BLD-SCNC: 3.9 MMOL/L (ref 3.5–5.5)
PROT SERPL-MCNC: 7.3 G/DL (ref 5.7–8.2)
RBC # BLD AUTO: 2.85 10*6/MM3 (ref 4.2–5.76)
SODIUM BLD-SCNC: 132 MMOL/L (ref 132–146)
WBC NRBC COR # BLD: 9.22 10*3/MM3 (ref 3.5–10.8)

## 2018-02-12 PROCEDURE — 85025 COMPLETE CBC W/AUTO DIFF WBC: CPT | Performed by: INTERNAL MEDICINE

## 2018-02-12 PROCEDURE — 99212 OFFICE O/P EST SF 10 MIN: CPT | Performed by: PHYSICIAN ASSISTANT

## 2018-02-12 PROCEDURE — 36415 COLL VENOUS BLD VENIPUNCTURE: CPT | Performed by: INTERNAL MEDICINE

## 2018-02-12 PROCEDURE — 71046 X-RAY EXAM CHEST 2 VIEWS: CPT | Performed by: THORACIC SURGERY (CARDIOTHORACIC VASCULAR SURGERY)

## 2018-02-12 PROCEDURE — 80053 COMPREHEN METABOLIC PANEL: CPT | Performed by: INTERNAL MEDICINE

## 2018-02-12 NOTE — PROGRESS NOTES
02/12/2018  Patient Information  [place] [address]   1965  'PCP/Referring Physician'  Rebecca Ravenmaria del carmen Lucas, APRN  [phone]  No ref. provider found    Chief Complaint   Patient presents with   • Follow-up     Hospital follow up after chest tube placement. Patient has no complaints today        History of Present Illness:  Patient presents to office today for hospital follow-up of chest tube placement secondary to hydropneumothorax.  Patient is without cough, chest pain or shortness of breath.  Patient remains on antibiotics, without incident and is followed by infectious disease, by Dr. Sr.  Patient is otherwise doing well without difficulty.    Patient Active Problem List   Diagnosis   • Pneumonia of both lower lobes   • Chronic bullous emphysema   • Hydropneumothorax, right   • Hypertension   • Tobacco use     Past Medical History:   Diagnosis Date   • COPD (chronic obstructive pulmonary disease)      Past Surgical History:   Procedure Laterality Date   • CARDIAC SURGERY         Current Outpatient Prescriptions:   •  acetaminophen (TYLENOL) 325 MG tablet, Take 2 tablets by mouth Every 4 (Four) Hours As Needed for Mild Pain ., Disp: , Rfl:   •  budesonide-formoterol (SYMBICORT) 160-4.5 MCG/ACT inhaler, Inhale 2 puffs 2 (Two) Times a Day., Disp: , Rfl: 12  •  ipratropium-albuterol (DUO-NEB) 0.5-2.5 mg/mL nebulizer, Take 3 mL by nebulization Every 4 (Four) Hours As Needed for Wheezing or Shortness of Air., Disp: 360 mL, Rfl:   •  linezolid (ZYVOX) 600 MG tablet, Take 1 tablet by mouth Every 12 (Twelve) Hours for 21 days., Disp: 42 tablet, Rfl: 0  •  nicotine (NICODERM CQ) 21 MG/24HR patch, Place 1 patch on the skin Daily., Disp: , Rfl:   •  ondansetron (ZOFRAN) 4 MG/2ML injection, Infuse 2 mL into a venous catheter Every 6 (Six) Hours As Needed for Nausea or Vomiting., Disp: , Rfl:  "  •  saccharomyces boulardii (FLORASTOR) 250 MG capsule, Take 1 capsule by mouth 2 (Two) Times a Day., Disp: , Rfl:   No Known Allergies  Social History     Social History   • Marital status: Single     Spouse name: N/A   • Number of children: N/A   • Years of education: N/A     Occupational History   • Not on file.     Social History Main Topics   • Smoking status: Current Every Day Smoker     Packs/day: 1.00     Years: 40.00     Types: Cigarettes   • Smokeless tobacco: Never Used   • Alcohol use 1.2 oz/week     2 Cans of beer per week      Comment: occasionally   • Drug use: No   • Sexual activity: Defer     Other Topics Concern   • Not on file     Social History Narrative     Family History   Problem Relation Age of Onset   • No Known Problems Mother    • Heart disease Father    • No Known Problems Sister    • No Known Problems Brother    • No Known Problems Daughter    • No Known Problems Son      ROS: As per history of present illness, otherwise negative  Vitals:    02/12/18 1340   BP: 140/77   BP Location: Right arm   Patient Position: Sitting   Pulse: 89   Temp: 97.6 °F (36.4 °C)   TempSrc: Temporal Artery    SpO2: 97%   Weight: 67.8 kg (149 lb 6.4 oz)   Height: 172.7 cm (68\")      Physical Exam   Gen - NAD, pleasant, cooperative  CV - Regular rate and rhythm, no murmur gallop or rub  Pulm - Lungs clear to auscultation without wheeze or rhonchi   GI - Soft, normoactive bowel sounds, non-tender  Ext - Without edema    Labs/Imaging : 2/12/18 CXR  Good quality chest radiograph. Bony structures are within normal limits. Trachea is midline. Left and right lung fields clear.  Mild right lower lobe atelectasis with right costophrenic angle blunting.  No evidence of pneumothorax. Compared to most recent chest xray (1/23/18) there is no acute cardiopulmonary process.    Assessment/Plan:  Patient is a 52-year-old  male who was evaluated in the hospital with hydropneumothorax with subsequent chest tube " placement.  Patient denies chest pain cough or shortness of breath.  His hydropneumothorax appears to have resolved and his chest x-ray is with only small amount of atelectasis with right basilar blunting of the costophrenic angle.  Patient is followed by Dr. Sr, with infectious disease, and is currently receiving antibiotic therapy without incident.  Patient is otherwise doing well to follow up with our office as needed.  He should call with any questions or concerns, should any arise.      Patient Active Problem List   Diagnosis   • Pneumonia of both lower lobes   • Chronic bullous emphysema   • Hydropneumothorax, right   • Hypertension   • Tobacco use     Signed by:

## 2018-02-26 LAB
FUNGUS WND CULT: NORMAL
MYCOBACTERIUM SPEC CULT: NORMAL
NIGHT BLUE STAIN TISS: NORMAL

## 2018-03-16 ENCOUNTER — TELEPHONE (OUTPATIENT)
Dept: SURGERY | Facility: CLINIC | Age: 53
End: 2018-03-16

## 2018-03-16 NOTE — TELEPHONE ENCOUNTER
Patient no showed for appointment with Dr Hilliard. Unable to reach patient by phone.No show letter being mailed to patient. Called PCP spoke with Kylie told her patient no showed for appointment.

## 2018-04-05 ENCOUNTER — OFFICE VISIT (OUTPATIENT)
Dept: SURGERY | Facility: CLINIC | Age: 53
End: 2018-04-05

## 2018-04-05 VITALS
TEMPERATURE: 97.8 F | HEIGHT: 68 IN | SYSTOLIC BLOOD PRESSURE: 140 MMHG | WEIGHT: 148.2 LBS | HEART RATE: 82 BPM | BODY MASS INDEX: 22.46 KG/M2 | DIASTOLIC BLOOD PRESSURE: 82 MMHG | OXYGEN SATURATION: 97 %

## 2018-04-05 DIAGNOSIS — Z12.11 SCREENING FOR COLON CANCER: Primary | ICD-10-CM

## 2018-04-05 DIAGNOSIS — K59.04 CHRONIC IDIOPATHIC CONSTIPATION: ICD-10-CM

## 2018-04-05 PROCEDURE — 99202 OFFICE O/P NEW SF 15 MIN: CPT | Performed by: SURGERY

## 2018-04-05 RX ORDER — ALBUTEROL SULFATE 90 UG/1
2 AEROSOL, METERED RESPIRATORY (INHALATION) EVERY 4 HOURS PRN
COMMUNITY
Start: 2018-03-05

## 2018-04-05 RX ORDER — ALBUTEROL SULFATE 2.5 MG/3ML
2.5 SOLUTION RESPIRATORY (INHALATION) EVERY 4 HOURS PRN
COMMUNITY
Start: 2018-03-05

## 2018-04-05 RX ORDER — POLYETHYLENE GLYCOL 3350 17 G/17G
238 POWDER, FOR SOLUTION ORAL ONCE
Qty: 14 PACKET | Refills: 0 | Status: SHIPPED | OUTPATIENT
Start: 2018-04-05 | End: 2018-04-05

## 2018-04-05 RX ORDER — CITALOPRAM 40 MG/1
40 TABLET ORAL EVERY 24 HOURS
Status: ON HOLD | COMMUNITY
Start: 2018-03-05 | End: 2020-06-26

## 2018-04-05 RX ORDER — BACLOFEN 10 MG/1
10 TABLET ORAL 2 TIMES DAILY
Status: ON HOLD | COMMUNITY
Start: 2018-03-05 | End: 2020-06-26

## 2018-04-05 RX ORDER — LISINOPRIL 20 MG/1
20 TABLET ORAL DAILY
Status: ON HOLD | COMMUNITY
Start: 2018-03-05 | End: 2020-06-26

## 2018-04-05 RX ORDER — BISACODYL 5 MG/1
5 TABLET, DELAYED RELEASE ORAL 4 TIMES DAILY
Qty: 4 TABLET | Refills: 0 | Status: SHIPPED | OUTPATIENT
Start: 2018-04-05 | End: 2020-06-27 | Stop reason: HOSPADM

## 2018-04-05 NOTE — PROGRESS NOTES
Patient: Niall Murguia    YOB: 1965    Date: 04/05/2018    Primary Care Provider: KEAGAN Looney    Chief Complaint   Patient presents with   • Colonoscopy     Colonoscopy with health issues       Subjective .     History of present illness:  I saw the patient in the office today as a consultation for evaluation for a colonoscopy.  He has never had a colonoscopy and denies family history of colon cancer.  He denies diarrhea but says his stool is a little loose.  He denies constipation, rectal bleeding, abdominal or rectal pain.  He does have a history of open heart surgery as a baby and COPD.    The following portions of the patient's history were reviewed and updated as appropriate: allergies, current medications, past family history, past medical history, past social history, past surgical history and problem list.      Review of Systems   Constitutional: Negative for chills, fever and unexpected weight change.   HENT: Negative for trouble swallowing and voice change.    Eyes: Negative for visual disturbance.   Respiratory: Negative for apnea, cough, chest tightness, shortness of breath and wheezing.    Cardiovascular: Negative for chest pain, palpitations and leg swelling.   Gastrointestinal: Negative for abdominal distention, abdominal pain, anal bleeding, blood in stool, constipation, diarrhea, nausea, rectal pain and vomiting.   Endocrine: Negative for cold intolerance and heat intolerance.   Genitourinary: Negative for difficulty urinating, dysuria, flank pain, scrotal swelling and testicular pain.   Musculoskeletal: Negative for back pain, gait problem and joint swelling.   Skin: Negative for color change, rash and wound.   Neurological: Negative for dizziness, syncope, speech difficulty, weakness, numbness and headaches.   Hematological: Negative for adenopathy. Does not bruise/bleed easily.   Psychiatric/Behavioral: Negative for confusion. The patient is not nervous/anxious.         History:  Past Medical History:   Diagnosis Date   • COPD (chronic obstructive pulmonary disease)        Past Surgical History:   Procedure Laterality Date   • CARDIAC SURGERY     • LUNG SURGERY         Family History   Problem Relation Age of Onset   • No Known Problems Mother    • Heart disease Father    • No Known Problems Sister    • No Known Problems Brother    • No Known Problems Daughter    • No Known Problems Son        Social History   Substance Use Topics   • Smoking status: Former Smoker     Packs/day: 1.00     Years: 40.00     Types: Cigarettes   • Smokeless tobacco: Never Used   • Alcohol use 1.2 oz/week     2 Cans of beer per week      Comment: occasionally       Medications:   Current Outpatient Prescriptions:   •  citalopram (CELEXA) 40 MG tablet, Take  by mouth Daily., Disp: , Rfl:   •  mometasone-formoterol (DULERA) 200-5 MCG/ACT inhaler, Inhale Every 12 (Twelve) Hours., Disp: , Rfl:   •  acetaminophen (TYLENOL) 325 MG tablet, Take 2 tablets by mouth Every 4 (Four) Hours As Needed for Mild Pain ., Disp: , Rfl:   •  albuterol (PROVENTIL) (2.5 MG/3ML) 0.083% nebulizer solution, , Disp: , Rfl:   •  baclofen (LIORESAL) 10 MG tablet, , Disp: , Rfl:   •  bisacodyl (DULCOLAX) 5 MG EC tablet, Take 1 tablet by mouth 4 (Four) Times a Day. Take as directed for colonoscopy prep, Disp: 4 tablet, Rfl: 0  •  lisinopril (PRINIVIL,ZESTRIL) 20 MG tablet, , Disp: , Rfl:   •  polyethylene glycol (MIRALAX) packet, Take 238 g by mouth 1 (One) Time for 1 dose., Disp: 14 packet, Rfl: 0  •  VENTOLIN  (90 Base) MCG/ACT inhaler, , Disp: , Rfl:        Allergies: No Known Allergies    Objective     Vital Signs:  Temp:  [97.8 °F (36.6 °C)] 97.8 °F (36.6 °C)  Heart Rate:  [82] 82  BP: (140)/(82) 140/82    Physical Exam:   General Appearance:    Alert, cooperative, in no acute distress   Head:    Normocephalic, without obvious abnormality, atraumatic   Eyes:            Lids and lashes normal, conjunctivae and sclerae  normal, no   icterus, no pallor, corneas clear, PERRL   Ears:    Ears appear intact with no abnormalities noted   Throat:   No oral lesions, no thrush, oral mucosa moist   Neck:   No adenopathy, supple, trachea midline, no thyromegaly,  no JVD   Lungs:     Clear to auscultation,respirations regular, even and                  unlabored    Heart:    Regular rhythm and normal rate, normal S1 and S2, no            murmur   Abdomen:     no masses, no organomegaly, soft non-tender, non-distended, no guarding   Extremities:   Moves all extremities well, no edema, no cyanosis, no             redness   Pulses:   Pulses palpable and equal bilaterally   Skin:   No bleeding, bruising or rash   Lymph nodes:   No palpable adenopathy   Neurologic:   Cranial nerves 2 - 12 grossly intact, sensation intact  Psychiatric: No evidence of depression or anxiety   Results Review:   I reviewed the patient's new clinical results.    Review of Systems was reviewed and confirmed as accurate today.    Assessment/Plan :    1. Screening for colon cancer    2. Chronic idiopathic constipation        I recommend a colonoscopy for further evaluation. The procedure was explained as well as the risks which include but are not limited to bleeding, infection, perforation, abdominal pain etc. The patient understands these risks and the procedure and wishes to proceed. Continue a high fiber diet and water intake to help alleviate some constipation symptoms.     Electronically signed by Stanley Hilliard MD  04/05/18  9:38 AM    Scribed for Stanley Hilliard MD by Michelle Milligan. 4/5/2018  10:34 AM

## 2018-04-06 PROBLEM — K59.04 CHRONIC IDIOPATHIC CONSTIPATION: Status: ACTIVE | Noted: 2018-04-06

## 2018-04-06 PROBLEM — Z12.11 SCREENING FOR COLON CANCER: Status: ACTIVE | Noted: 2018-04-06

## 2018-04-25 RX ORDER — FERROUS SULFATE 325(65) MG
325 TABLET ORAL EVERY OTHER DAY
COMMUNITY
End: 2023-02-07

## 2018-04-25 RX ORDER — LANOLIN ALCOHOL/MO/W.PET/CERES
1000 CREAM (GRAM) TOPICAL DAILY
Status: ON HOLD | COMMUNITY
End: 2020-06-26

## 2018-04-27 ENCOUNTER — ANESTHESIA EVENT (OUTPATIENT)
Dept: GASTROENTEROLOGY | Facility: HOSPITAL | Age: 53
End: 2018-04-27

## 2018-04-27 ENCOUNTER — ANESTHESIA (OUTPATIENT)
Dept: GASTROENTEROLOGY | Facility: HOSPITAL | Age: 53
End: 2018-04-27

## 2018-04-27 ENCOUNTER — HOSPITAL ENCOUNTER (OUTPATIENT)
Facility: HOSPITAL | Age: 53
Setting detail: HOSPITAL OUTPATIENT SURGERY
Discharge: HOME | End: 2018-04-27
Attending: SURGERY | Admitting: SURGERY

## 2018-04-27 VITALS
SYSTOLIC BLOOD PRESSURE: 123 MMHG | HEART RATE: 50 BPM | WEIGHT: 148 LBS | TEMPERATURE: 98.3 F | OXYGEN SATURATION: 98 % | BODY MASS INDEX: 22.43 KG/M2 | HEIGHT: 68 IN | DIASTOLIC BLOOD PRESSURE: 66 MMHG | RESPIRATION RATE: 18 BRPM

## 2018-04-27 DIAGNOSIS — Z12.11 SCREENING FOR COLON CANCER: ICD-10-CM

## 2018-04-27 DIAGNOSIS — K59.04 CHRONIC IDIOPATHIC CONSTIPATION: ICD-10-CM

## 2018-04-27 PROCEDURE — 25010000002 PROPOFOL 10 MG/ML EMULSION: Performed by: NURSE ANESTHETIST, CERTIFIED REGISTERED

## 2018-04-27 RX ORDER — ONDANSETRON 2 MG/ML
4 INJECTION INTRAMUSCULAR; INTRAVENOUS ONCE AS NEEDED
Status: DISCONTINUED | OUTPATIENT
Start: 2018-04-27 | End: 2018-04-27 | Stop reason: HOSPADM

## 2018-04-27 RX ORDER — PROPOFOL 10 MG/ML
VIAL (ML) INTRAVENOUS AS NEEDED
Status: DISCONTINUED | OUTPATIENT
Start: 2018-04-27 | End: 2018-04-27 | Stop reason: SURG

## 2018-04-27 RX ORDER — LIDOCAINE HYDROCHLORIDE 20 MG/ML
INJECTION, SOLUTION INFILTRATION; PERINEURAL AS NEEDED
Status: DISCONTINUED | OUTPATIENT
Start: 2018-04-27 | End: 2018-04-27 | Stop reason: SURG

## 2018-04-27 RX ORDER — SODIUM CHLORIDE 0.9 % (FLUSH) 0.9 %
3 SYRINGE (ML) INJECTION AS NEEDED
Status: DISCONTINUED | OUTPATIENT
Start: 2018-04-27 | End: 2018-04-27 | Stop reason: HOSPADM

## 2018-04-27 RX ORDER — SODIUM CHLORIDE, SODIUM LACTATE, POTASSIUM CHLORIDE, CALCIUM CHLORIDE 600; 310; 30; 20 MG/100ML; MG/100ML; MG/100ML; MG/100ML
1000 INJECTION, SOLUTION INTRAVENOUS CONTINUOUS
Status: DISCONTINUED | OUTPATIENT
Start: 2018-04-27 | End: 2018-04-27 | Stop reason: HOSPADM

## 2018-04-27 RX ADMIN — PROPOFOL 150 MG: 10 INJECTION, EMULSION INTRAVENOUS at 10:35

## 2018-04-27 RX ADMIN — SODIUM CHLORIDE, POTASSIUM CHLORIDE, SODIUM LACTATE AND CALCIUM CHLORIDE 1000 ML: 600; 310; 30; 20 INJECTION, SOLUTION INTRAVENOUS at 09:50

## 2018-04-27 RX ADMIN — LIDOCAINE HYDROCHLORIDE 100 MG: 20 INJECTION, SOLUTION INFILTRATION; PERINEURAL at 10:24

## 2018-04-27 NOTE — ANESTHESIA PREPROCEDURE EVALUATION
Anesthesia Evaluation     Patient summary reviewed and Nursing notes reviewed   no history of anesthetic complications:  NPO Solid Status: > 8 hours  NPO Liquid Status: > 8 hours           Airway   Mallampati: II  TM distance: >3 FB  Neck ROM: full  no difficulty expected  Dental - normal exam     Pulmonary - normal exam   (+) pneumonia resolved , pleural effusion, a smoker Former, COPD mild, asthma,   Cardiovascular - normal exam    Rhythm: regular  Rate: normal    (+) hypertension well controlled,       Neuro/Psych  (+) psychiatric history Anxiety and Depression,     GI/Hepatic/Renal/Endo - negative ROS     Musculoskeletal (-) negative ROS    Abdominal    Substance History - negative use     OB/GYN negative ob/gyn ROS         Other - negative ROS       ROS/Med Hx Other: Chronic Anemia                Anesthesia Plan    ASA 2     MAC   (Pt told that intravenous sedation will be used as the primary anesthetic along with local anesthesia if necessary. Every effort will be made to make sure the patient is comfortable.     The patient was told they may or may not have recall for the procedure. It was further explained that if the MAC was not adequate that a general anesthetic with either an LMA or endotracheal tube would be required.     Will proceed with the plan of care.)  intravenous induction   Anesthetic plan and risks discussed with patient.

## 2018-04-27 NOTE — ANESTHESIA POSTPROCEDURE EVALUATION
Patient: Niall Murguia    Procedure Summary     Date:  04/27/18 Room / Location:  [facility] ENDOSCOPY 1 / [facility] ENDOSCOPY    Anesthesia Start:  1020 Anesthesia Stop:  1039    Procedure:  COLONOSCOPY with biopsy (N/A ) Diagnosis:       Chronic idiopathic constipation      Screening for colon cancer      (Chronic idiopathic constipation [K59.04])      (Screening for colon cancer [Z12.11])    Surgeon:  Stanley Hilliard MD Provider:  Jorge Nunez CRNA    Anesthesia Type:  MAC ASA Status:  2          Anesthesia Type: MAC  Last vitals  BP   123/66 (04/27/18 1110)   Temp   98.3 °F (36.8 °C) (04/27/18 1045)   Pulse   50 (04/27/18 1110)   Resp   18 (04/27/18 1110)     SpO2   98 % (04/27/18 1110)     Post Anesthesia Care and Evaluation    Patient location during evaluation: PHASE II  Patient participation: complete - patient participated  Level of consciousness: awake  Pain score: 1  Pain management: adequate  Airway patency: patent  Anesthetic complications: No anesthetic complications  PONV Status: controlled  Cardiovascular status: acceptable and stable  Respiratory status: acceptable and room air  Hydration status: acceptable

## 2018-05-01 LAB
LAB AP CASE REPORT: NORMAL
Lab: NORMAL
PATH REPORT.FINAL DX SPEC: NORMAL

## 2018-05-07 ENCOUNTER — TELEPHONE (OUTPATIENT)
Dept: SURGERY | Facility: CLINIC | Age: 53
End: 2018-05-07

## 2018-05-07 NOTE — TELEPHONE ENCOUNTER
Patient no showed for appointment with Dr Hilliard 05/03/18. I called patient no answer, left voice message for patient to call office.No show letter being mailed to patient.

## 2019-05-21 ENCOUNTER — HOSPITAL ENCOUNTER (OUTPATIENT)
Dept: CT IMAGING | Facility: HOSPITAL | Age: 54
Discharge: HOME | DRG: 195 | End: 2019-05-21
Payer: COMMERCIAL

## 2019-05-21 DIAGNOSIS — R10.9 ACUTE ABDOMINAL PAIN: ICD-10-CM

## 2019-05-21 PROCEDURE — 74176 CT ABD & PELVIS W/O CONTRAST: CPT

## 2019-05-22 ENCOUNTER — HOSPITAL ENCOUNTER (OUTPATIENT)
Facility: HOSPITAL | Age: 54
Setting detail: OBSERVATION
Discharge: HOME | DRG: 195 | End: 2019-05-23
Attending: EMERGENCY MEDICINE | Admitting: PEDIATRICS
Payer: COMMERCIAL

## 2019-05-22 ENCOUNTER — APPOINTMENT (OUTPATIENT)
Dept: GENERAL RADIOLOGY | Facility: HOSPITAL | Age: 54
DRG: 195 | End: 2019-05-22
Payer: COMMERCIAL

## 2019-05-22 ENCOUNTER — APPOINTMENT (OUTPATIENT)
Dept: CT IMAGING | Facility: HOSPITAL | Age: 54
DRG: 195 | End: 2019-05-22
Payer: COMMERCIAL

## 2019-05-22 DIAGNOSIS — J43.9 BULLOUS EMPHYSEMA (HCC): ICD-10-CM

## 2019-05-22 DIAGNOSIS — J18.9 PNEUMONIA DUE TO ORGANISM: Primary | ICD-10-CM

## 2019-05-22 DIAGNOSIS — R55 SYNCOPE AND COLLAPSE: ICD-10-CM

## 2019-05-22 DIAGNOSIS — J18.9 PNEUMONIA OF LEFT UPPER LOBE DUE TO INFECTIOUS ORGANISM: ICD-10-CM

## 2019-05-22 DIAGNOSIS — I95.1 ORTHOSTATIC HYPOTENSION: ICD-10-CM

## 2019-05-22 PROBLEM — I95.9 HYPOTENSION, UNSPECIFIED: Status: RESOLVED | Noted: 2019-05-22 | Resolved: 2019-05-22

## 2019-05-22 PROBLEM — I95.9 HYPOTENSION, UNSPECIFIED: Status: ACTIVE | Noted: 2019-05-22

## 2019-05-22 LAB
A/G RATIO: 0.8 (ref 0.8–2)
ALBUMIN SERPL-MCNC: 3.7 G/DL (ref 3.4–4.8)
ALP BLD-CCNC: 73 U/L (ref 25–100)
ALT SERPL-CCNC: 26 U/L (ref 4–36)
ANION GAP SERPL CALCULATED.3IONS-SCNC: 16 MMOL/L (ref 3–16)
AST SERPL-CCNC: 22 U/L (ref 8–33)
BASOPHILS ABSOLUTE: 0.1 K/UL (ref 0–0.1)
BASOPHILS RELATIVE PERCENT: 0.4 %
BILIRUB SERPL-MCNC: 1 MG/DL (ref 0.3–1.2)
BUN BLDV-MCNC: 14 MG/DL (ref 6–20)
CALCIUM SERPL-MCNC: 10 MG/DL (ref 8.5–10.5)
CHLORIDE BLD-SCNC: 91 MMOL/L (ref 98–107)
CO2: 26 MMOL/L (ref 20–30)
CREAT SERPL-MCNC: 1.4 MG/DL (ref 0.4–1.2)
EOSINOPHILS ABSOLUTE: 0.1 K/UL (ref 0–0.4)
EOSINOPHILS RELATIVE PERCENT: 0.7 %
GFR AFRICAN AMERICAN: >59
GFR NON-AFRICAN AMERICAN: 53
GLOBULIN: 4.6 G/DL
GLUCOSE BLD-MCNC: 128 MG/DL (ref 74–106)
HCT VFR BLD CALC: 43.2 % (ref 40–54)
HEMOGLOBIN: 14.4 G/DL (ref 13–18)
IMMATURE GRANULOCYTES #: 0.1 K/UL
IMMATURE GRANULOCYTES %: 0.7 % (ref 0–5)
LACTIC ACID: 1.8 MMOL/L (ref 0.4–2)
LYMPHOCYTES ABSOLUTE: 3.3 K/UL (ref 1.5–4)
LYMPHOCYTES RELATIVE PERCENT: 21.7 %
MCH RBC QN AUTO: 33.6 PG (ref 27–32)
MCHC RBC AUTO-ENTMCNC: 33.3 G/DL (ref 31–35)
MCV RBC AUTO: 100.9 FL (ref 80–100)
MONOCYTES ABSOLUTE: 1.2 K/UL (ref 0.2–0.8)
MONOCYTES RELATIVE PERCENT: 7.8 %
NEUTROPHILS ABSOLUTE: 10.5 K/UL (ref 2–7.5)
NEUTROPHILS RELATIVE PERCENT: 68.7 %
PDW BLD-RTO: 12 % (ref 11–16)
PLATELET # BLD: 275 K/UL (ref 150–400)
PMV BLD AUTO: 10.2 FL (ref 6–10)
POTASSIUM REFLEX MAGNESIUM: 4.1 MMOL/L (ref 3.4–5.1)
RBC # BLD: 4.28 M/UL (ref 4.5–6)
SODIUM BLD-SCNC: 133 MMOL/L (ref 136–145)
TOTAL PROTEIN: 8.3 G/DL (ref 6.4–8.3)
TROPONIN: <0.3 NG/ML
WBC # BLD: 15.3 K/UL (ref 4–11)

## 2019-05-22 PROCEDURE — 96365 THER/PROPH/DIAG IV INF INIT: CPT

## 2019-05-22 PROCEDURE — 82746 ASSAY OF FOLIC ACID SERUM: CPT

## 2019-05-22 PROCEDURE — 71250 CT THORAX DX C-: CPT

## 2019-05-22 PROCEDURE — 36415 COLL VENOUS BLD VENIPUNCTURE: CPT

## 2019-05-22 PROCEDURE — 85025 COMPLETE CBC W/AUTO DIFF WBC: CPT

## 2019-05-22 PROCEDURE — 6360000002 HC RX W HCPCS: Performed by: EMERGENCY MEDICINE

## 2019-05-22 PROCEDURE — 93005 ELECTROCARDIOGRAM TRACING: CPT

## 2019-05-22 PROCEDURE — 70450 CT HEAD/BRAIN W/O DYE: CPT

## 2019-05-22 PROCEDURE — 99285 EMERGENCY DEPT VISIT HI MDM: CPT

## 2019-05-22 PROCEDURE — 84484 ASSAY OF TROPONIN QUANT: CPT

## 2019-05-22 PROCEDURE — 80053 COMPREHEN METABOLIC PANEL: CPT

## 2019-05-22 PROCEDURE — 82607 VITAMIN B-12: CPT

## 2019-05-22 PROCEDURE — 82306 VITAMIN D 25 HYDROXY: CPT

## 2019-05-22 PROCEDURE — 83605 ASSAY OF LACTIC ACID: CPT

## 2019-05-22 PROCEDURE — 87040 BLOOD CULTURE FOR BACTERIA: CPT

## 2019-05-22 PROCEDURE — 71045 X-RAY EXAM CHEST 1 VIEW: CPT

## 2019-05-22 PROCEDURE — 84443 ASSAY THYROID STIM HORMONE: CPT

## 2019-05-22 PROCEDURE — 2580000003 HC RX 258: Performed by: EMERGENCY MEDICINE

## 2019-05-22 PROCEDURE — 1200000000 HC SEMI PRIVATE

## 2019-05-22 RX ORDER — LISINOPRIL 20 MG/1
20 TABLET ORAL DAILY
Status: ON HOLD | COMMUNITY
End: 2019-05-23 | Stop reason: HOSPADM

## 2019-05-22 RX ORDER — LORATADINE 10 MG/1
10 TABLET ORAL DAILY
COMMUNITY

## 2019-05-22 RX ORDER — CITALOPRAM 40 MG/1
40 TABLET ORAL DAILY
COMMUNITY

## 2019-05-22 RX ORDER — BACLOFEN 10 MG/1
10 TABLET ORAL 2 TIMES DAILY
COMMUNITY

## 2019-05-22 RX ORDER — SODIUM CHLORIDE 9 MG/ML
INJECTION, SOLUTION INTRAVENOUS ONCE
Status: COMPLETED | OUTPATIENT
Start: 2019-05-22 | End: 2019-05-22

## 2019-05-22 RX ADMIN — DEXTROSE MONOHYDRATE 1 G: 5 INJECTION INTRAVENOUS at 22:40

## 2019-05-22 RX ADMIN — SODIUM CHLORIDE: 9 INJECTION, SOLUTION INTRAVENOUS at 20:28

## 2019-05-22 ASSESSMENT — ENCOUNTER SYMPTOMS
EYE DISCHARGE: 0
EYE REDNESS: 0
RHINORRHEA: 0
ABDOMINAL PAIN: 0
EYE PAIN: 0
DIARRHEA: 0
CONSTIPATION: 0
COUGH: 0
BACK PAIN: 0
WHEEZING: 0
TROUBLE SWALLOWING: 0
NAUSEA: 0
SINUS PRESSURE: 0
VOMITING: 0
CHEST TIGHTNESS: 0
SHORTNESS OF BREATH: 0
SORE THROAT: 0

## 2019-05-23 VITALS
HEIGHT: 68 IN | RESPIRATION RATE: 16 BRPM | TEMPERATURE: 98.5 F | BODY MASS INDEX: 22.28 KG/M2 | OXYGEN SATURATION: 96 % | HEART RATE: 53 BPM | SYSTOLIC BLOOD PRESSURE: 108 MMHG | DIASTOLIC BLOOD PRESSURE: 60 MMHG | WEIGHT: 147 LBS

## 2019-05-23 PROBLEM — J43.9 BULLOUS EMPHYSEMA (HCC): Status: ACTIVE | Noted: 2019-05-23

## 2019-05-23 PROBLEM — E55.9 VITAMIN D DEFICIENCY: Status: ACTIVE | Noted: 2019-05-23

## 2019-05-23 PROBLEM — J18.9 PNEUMONIA: Status: ACTIVE | Noted: 2019-05-23

## 2019-05-23 LAB
BILIRUBIN URINE: NEGATIVE
BLOOD, URINE: ABNORMAL
CLARITY: CLEAR
COLOR: YELLOW
EPITHELIAL CELLS, UA: NORMAL /HPF
FOLATE: 8.91 NG/ML
GLUCOSE URINE: NEGATIVE MG/DL
KETONES, URINE: NEGATIVE MG/DL
LEUKOCYTE ESTERASE, URINE: NEGATIVE
MICROSCOPIC EXAMINATION: YES
NITRITE, URINE: NEGATIVE
PH UA: 6.5 (ref 5–8)
PROTEIN UA: NEGATIVE MG/DL
RBC UA: NORMAL /HPF (ref 0–2)
SPECIFIC GRAVITY UA: 1.01 (ref 1–1.03)
TSH SERPL DL<=0.05 MIU/L-ACNC: 3.54 UIU/ML (ref 0.35–5.5)
URINE REFLEX TO CULTURE: ABNORMAL
URINE TYPE: ABNORMAL
UROBILINOGEN, URINE: 0.2 E.U./DL
VITAMIN B-12: 600 PG/ML (ref 211–911)
VITAMIN D 25-HYDROXY: 21.9 (ref 32–100)
WBC UA: NORMAL /HPF (ref 0–5)

## 2019-05-23 PROCEDURE — 97165 OT EVAL LOW COMPLEX 30 MIN: CPT

## 2019-05-23 PROCEDURE — 81001 URINALYSIS AUTO W/SCOPE: CPT

## 2019-05-23 PROCEDURE — 94640 AIRWAY INHALATION TREATMENT: CPT

## 2019-05-23 PROCEDURE — 6360000002 HC RX W HCPCS: Performed by: PEDIATRICS

## 2019-05-23 PROCEDURE — G0378 HOSPITAL OBSERVATION PER HR: HCPCS

## 2019-05-23 PROCEDURE — 6370000000 HC RX 637 (ALT 250 FOR IP)

## 2019-05-23 PROCEDURE — 6370000000 HC RX 637 (ALT 250 FOR IP): Performed by: PHYSICIAN ASSISTANT

## 2019-05-23 PROCEDURE — 99235 HOSP IP/OBS SAME DATE MOD 70: CPT | Performed by: INTERNAL MEDICINE

## 2019-05-23 PROCEDURE — 96372 THER/PROPH/DIAG INJ SC/IM: CPT

## 2019-05-23 PROCEDURE — 6360000002 HC RX W HCPCS: Performed by: PHYSICIAN ASSISTANT

## 2019-05-23 PROCEDURE — 96376 TX/PRO/DX INJ SAME DRUG ADON: CPT

## 2019-05-23 PROCEDURE — 96375 TX/PRO/DX INJ NEW DRUG ADDON: CPT

## 2019-05-23 PROCEDURE — 2580000003 HC RX 258: Performed by: PHYSICIAN ASSISTANT

## 2019-05-23 PROCEDURE — 2580000003 HC RX 258: Performed by: PEDIATRICS

## 2019-05-23 PROCEDURE — 97161 PT EVAL LOW COMPLEX 20 MIN: CPT

## 2019-05-23 PROCEDURE — 6370000000 HC RX 637 (ALT 250 FOR IP): Performed by: PEDIATRICS

## 2019-05-23 RX ORDER — ACETAMINOPHEN 325 MG/1
650 TABLET ORAL EVERY 4 HOURS PRN
Status: DISCONTINUED | OUTPATIENT
Start: 2019-05-23 | End: 2019-05-23 | Stop reason: HOSPADM

## 2019-05-23 RX ORDER — ERGOCALCIFEROL (VITAMIN D2) 1250 MCG
50000 CAPSULE ORAL WEEKLY
Qty: 8 CAPSULE | Refills: 0 | Status: SHIPPED | OUTPATIENT
Start: 2019-05-30 | End: 2020-06-25

## 2019-05-23 RX ORDER — SODIUM CHLORIDE 9 MG/ML
INJECTION, SOLUTION INTRAVENOUS CONTINUOUS
Status: DISCONTINUED | OUTPATIENT
Start: 2019-05-23 | End: 2019-05-23 | Stop reason: HOSPADM

## 2019-05-23 RX ORDER — BUDESONIDE 0.5 MG/2ML
0.5 INHALANT ORAL 2 TIMES DAILY
Status: DISCONTINUED | OUTPATIENT
Start: 2019-05-23 | End: 2019-05-23 | Stop reason: HOSPADM

## 2019-05-23 RX ORDER — ALBUTEROL SULFATE 2.5 MG/3ML
2.5 SOLUTION RESPIRATORY (INHALATION) EVERY 6 HOURS
Status: DISCONTINUED | OUTPATIENT
Start: 2019-05-23 | End: 2019-05-23 | Stop reason: HOSPADM

## 2019-05-23 RX ORDER — LISINOPRIL 20 MG/1
20 TABLET ORAL DAILY
Status: DISCONTINUED | OUTPATIENT
Start: 2019-05-23 | End: 2019-05-23

## 2019-05-23 RX ORDER — BACLOFEN 10 MG/1
10 TABLET ORAL 2 TIMES DAILY
Status: DISCONTINUED | OUTPATIENT
Start: 2019-05-23 | End: 2019-05-23 | Stop reason: HOSPADM

## 2019-05-23 RX ORDER — SODIUM CHLORIDE 0.9 % (FLUSH) 0.9 %
10 SYRINGE (ML) INJECTION EVERY 12 HOURS SCHEDULED
Status: DISCONTINUED | OUTPATIENT
Start: 2019-05-23 | End: 2019-05-23 | Stop reason: HOSPADM

## 2019-05-23 RX ORDER — ERGOCALCIFEROL 1.25 MG/1
50000 CAPSULE ORAL WEEKLY
Status: DISCONTINUED | OUTPATIENT
Start: 2019-05-23 | End: 2019-05-23 | Stop reason: HOSPADM

## 2019-05-23 RX ORDER — CEFTRIAXONE 1 G/1
INJECTION, POWDER, FOR SOLUTION INTRAMUSCULAR; INTRAVENOUS
Status: DISCONTINUED
Start: 2019-05-23 | End: 2019-05-23 | Stop reason: HOSPADM

## 2019-05-23 RX ORDER — DOXYCYCLINE 100 MG/1
100 CAPSULE ORAL EVERY 12 HOURS SCHEDULED
Qty: 14 CAPSULE | Refills: 0 | Status: SHIPPED | OUTPATIENT
Start: 2019-05-23 | End: 2019-05-30

## 2019-05-23 RX ORDER — CEFDINIR 300 MG/1
300 CAPSULE ORAL 2 TIMES DAILY
Qty: 14 CAPSULE | Refills: 0 | Status: SHIPPED | OUTPATIENT
Start: 2019-05-23 | End: 2019-05-30

## 2019-05-23 RX ORDER — CITALOPRAM 20 MG/1
40 TABLET ORAL DAILY
Status: DISCONTINUED | OUTPATIENT
Start: 2019-05-23 | End: 2019-05-23 | Stop reason: HOSPADM

## 2019-05-23 RX ORDER — DOXYCYCLINE 100 MG/1
100 CAPSULE ORAL EVERY 12 HOURS SCHEDULED
Status: DISCONTINUED | OUTPATIENT
Start: 2019-05-23 | End: 2019-05-23 | Stop reason: HOSPADM

## 2019-05-23 RX ORDER — DOXYCYCLINE 100 MG/1
CAPSULE ORAL
Status: COMPLETED
Start: 2019-05-23 | End: 2019-05-23

## 2019-05-23 RX ORDER — SENNA PLUS 8.6 MG/1
2 TABLET ORAL DAILY PRN
COMMUNITY
End: 2020-06-25

## 2019-05-23 RX ORDER — BENZONATATE 100 MG/1
100 CAPSULE ORAL 3 TIMES DAILY PRN
COMMUNITY
End: 2020-06-25

## 2019-05-23 RX ORDER — METHYLPREDNISOLONE SODIUM SUCCINATE 40 MG/ML
40 INJECTION, POWDER, LYOPHILIZED, FOR SOLUTION INTRAMUSCULAR; INTRAVENOUS 2 TIMES DAILY
Status: DISCONTINUED | OUTPATIENT
Start: 2019-05-23 | End: 2019-05-23 | Stop reason: HOSPADM

## 2019-05-23 RX ORDER — FLUTICASONE PROPIONATE 50 MCG
2 SPRAY, SUSPENSION (ML) NASAL DAILY
COMMUNITY
End: 2020-06-25

## 2019-05-23 RX ORDER — CETIRIZINE HYDROCHLORIDE 10 MG/1
10 TABLET ORAL DAILY
Status: DISCONTINUED | OUTPATIENT
Start: 2019-05-23 | End: 2019-05-23 | Stop reason: HOSPADM

## 2019-05-23 RX ORDER — SODIUM CHLORIDE 0.9 % (FLUSH) 0.9 %
10 SYRINGE (ML) INJECTION PRN
Status: DISCONTINUED | OUTPATIENT
Start: 2019-05-23 | End: 2019-05-23 | Stop reason: HOSPADM

## 2019-05-23 RX ORDER — PREDNISONE 10 MG/1
TABLET ORAL
Qty: 18 TABLET | Refills: 0 | Status: SHIPPED | OUTPATIENT
Start: 2019-05-23 | End: 2020-06-25

## 2019-05-23 RX ADMIN — BACLOFEN 10 MG: 10 TABLET ORAL at 08:24

## 2019-05-23 RX ADMIN — LISINOPRIL 20 MG: 20 TABLET ORAL at 08:24

## 2019-05-23 RX ADMIN — DEXTROSE MONOHYDRATE 1 G: 5 INJECTION INTRAVENOUS at 16:32

## 2019-05-23 RX ADMIN — CETIRIZINE HYDROCHLORIDE 10 MG: 10 TABLET, FILM COATED ORAL at 08:24

## 2019-05-23 RX ADMIN — CITALOPRAM HYDROBROMIDE 40 MG: 20 TABLET ORAL at 08:24

## 2019-05-23 RX ADMIN — ALBUTEROL SULFATE 2.5 MG: 2.5 SOLUTION RESPIRATORY (INHALATION) at 14:46

## 2019-05-23 RX ADMIN — METHYLPREDNISOLONE SODIUM SUCCINATE 40 MG: 40 INJECTION, POWDER, FOR SOLUTION INTRAMUSCULAR; INTRAVENOUS at 09:38

## 2019-05-23 RX ADMIN — DOXYCYCLINE 100 MG: 100 CAPSULE ORAL at 16:36

## 2019-05-23 RX ADMIN — Medication 10 ML: at 08:25

## 2019-05-23 RX ADMIN — SODIUM CHLORIDE: 9 INJECTION, SOLUTION INTRAVENOUS at 12:38

## 2019-05-23 RX ADMIN — ENOXAPARIN SODIUM 40 MG: 40 INJECTION SUBCUTANEOUS at 08:24

## 2019-05-23 RX ADMIN — ERGOCALCIFEROL 50000 UNITS: 1.25 CAPSULE, LIQUID FILLED ORAL at 15:38

## 2019-05-23 NOTE — H&P
Short Stay Summary     Patient ID: Clara Kam      Patient's PCP: Edythe Bamberger, APRN - CNP    Admit Date: 5/22/2019     Discharge Date:   5/23/2019    Admitting Physician: Danny Soto DO    Discharge Physician: JOHN Glass     Reason for this admission:   Pneumonia   Syncopal episode     Discharge Diagnoses: Active Hospital Problems    Diagnosis Date Noted    Vitamin D deficiency [E55.9] 05/23/2019    Bullous emphysema ([address] [J43.9] 05/23/2019    Syncope and collapse [R55] 05/22/2019    Pneumonia of left upper lobe due to infectious organism ([address] [J18.1] 05/22/2019    Hypotension, unspecified [I95.9] 05/22/2019       Procedures:  CT Chest WO Contrast   Final Result      Severe bullous emphysema. Pneumonia left upper lobe. No findings of underlying neoplasm. Nonenlarged left superior mediastinal node in either infectious or chronic post inflammatory. XR CHEST PORTABLE   Final Result      Advanced bullous emphysema. Left upper chest pneumonia and atelectasis or neoplasm. CT Head WO Contrast   Final Result      Normal noncontrast CT head. XR CHEST STANDARD (2 VW)    (Results Pending)     EKG normal sinus rhythm 78 bpm     Consults:   None     HISTORY OF PRESENT ILLNESS:   The patient is a 47 y.o. male with PMH of COPD and HTN who presents due to cough, fever, and syncopal episode with fall. Patient reports for the last 1 week he hasn't been feeling well. He has had sweats and chills off and on. Family member at bedside reports temperature up to 101.9. He did have a dry cough. Had some abdominal cramping on Monday so he went to his PCP Negar Nolasco who ordered a CT scan which just revealed constipation. He was given a laxative but states he did not take any other medication regarding his fever or cough. Has been getting progressively more weak. Decreased appetite and oral intake last several days.  States yesterday he was in the bathroom and when he walked out he  Heart Attack Father        Allergies:  Patient has no known allergies. Medications Prior to Admission:    Prior to Admission medications    Medication Sig Start Date End Date Taking? Authorizing Provider   fluticasone (FLONASE) 50 MCG/ACT nasal spray 2 sprays by Each Nostril route daily   Yes Historical Provider, MD   senna (SENOKOT) 8.6 MG tablet Take 2 tablets by mouth daily as needed for Constipation   Yes Historical Provider, MD   ALBUTEROL SULFATE IN Inhale 2 puffs into the lungs every 6-8 hours as needed   Yes Historical Provider, MD   benzonatate (TESSALON) 100 MG capsule Take 100 mg by mouth 3 times daily as needed for Cough   Yes Historical Provider, MD   doxycycline monohydrate (MONODOX) 100 MG capsule Take 1 capsule by mouth every 12 hours for 7 days 5/23/19 5/30/19 Yes JOHN Joshi   cefdinir (OMNICEF) 300 MG capsule Take 1 capsule by mouth 2 times daily for 7 days 5/23/19 5/30/19 Yes JOHN Joshi   predniSONE (DELTASONE) 10 MG tablet Take 3 tablets PO daily x 3 days, 2 tablets PO daily x 3 days, 1 tablet PO daily x 3 days, then stop 5/23/19  Yes JOHN Joshi   mometasone-formoterol (DULERA) 200-5 MCG/ACT inhaler Inhale 2 puffs into the lungs 2 times daily    Yes Historical Provider, MD   citalopram (CELEXA) 40 MG tablet Take 40 mg by mouth daily   Yes Historical Provider, MD   baclofen (LIORESAL) 10 MG tablet Take 10 mg by mouth 2 times daily   Yes Historical Provider, MD   loratadine (CLARITIN) 10 MG tablet Take 10 mg by mouth daily   Yes Historical Provider, MD       Vital Signs  Temp: 98.5 °F (36.9 °C)  Pulse: 53  Resp: 16  BP: 108/60  SpO2: 96 %  O2 Device: None (Room air)       Vital signs reviewed in electronic chart. Physical exam  Constitutional:  Well developed, well nourished, no acute distress. Eyes:  PERRL, conjunctiva normal, EOMI. HENT:  Atraumatic, external ears normal, external nose/nares normal, oropharynx moist, no pharyngeal exudates.  Lower lip asymmetrical and left side drawn up (chronic). Neck:  Supple. No JVD or thyromegaly. Respiratory:  No respiratory distress on room air, decreased breath sounds throughout, no rhonchi, no wheezing. Cardiovascular:  Normal rate, normal rhythm, no murmurs, no gallops, no rubs. GI:  Soft, nondistended, normal bowel sounds, nontender, no organomegaly, no mass. :  No costovertebral angle tenderness. Musculoskeletal:  No edema, no tenderness, no obvious deformities. Patient is moving all extremities. Integument:  Well hydrated, no rash. Abrasion top of nose noted. Neurologic:  Alert & oriented x 3,  no focal deficits noted. Strength is equal throughout. Psychiatric:  Speech and behavior appropriate. Lab Results   Component Value Date    WBC 15.3 (H) 05/22/2019    HGB 14.4 05/22/2019    HCT 43.2 05/22/2019    .9 (H) 05/22/2019     05/22/2019       Lab Results   Component Value Date     (L) 05/22/2019    K 4.1 05/22/2019    CL 91 (L) 05/22/2019    CO2 26 05/22/2019    BUN 14 05/22/2019    CREATININE 1.4 (H) 05/22/2019    GLUCOSE 128 (H) 05/22/2019    CALCIUM 10.0 05/22/2019    PROT 8.3 05/22/2019    LABALBU 3.7 05/22/2019    BILITOT 1.0 05/22/2019    ALKPHOS 73 05/22/2019    AST 22 05/22/2019    ALT 26 05/22/2019    LABGLOM 53 (L) 05/22/2019    GFRAA >59 05/22/2019    AGRATIO 0.8 05/22/2019    GLOB 4.6 05/22/2019       Assessment and Plan/Hospital course: Active Hospital Problems    Diagnosis Date Noted    Vitamin D deficiency [E55.9]  Started on vitamin d supplementation. 05/23/2019    Bullous emphysema (HCC) [J43.9]  On CT chest patient with severe bullous emphysema. He will resume home inhaler regimen. Referred to pulmonology on discharge. 05/23/2019    Syncope and collapse [R55]  CT head negative. EKG NSR. Orthostatics negative. He was hypotensive at 85/59 on arrival to ED and received IVFs. Hypotension may have attributed as well as acute illness with pneumonia/copd exacerbation. However patient did have urinary incontinence according to his wife and possibly episode of vomiting as well. No history of seizure disorder or previous event like this. He has been stable and asymptomatic during hospital course. Will defer further workup to pcp if felt indicated. He is agreeable to close follow up with pcp. Did also check tsh, b12, folate which were wnl. Vitamin d low as above.  05/22/2019    Pneumonia of left upper lobe due to infectious organism ([address] [J18.1]  Started on rocephin and doxycycline, steroid, nebs. On room air today and requesting dc home. Will discharge with cefdinir, doxy, oral steroid taper. Resume home inhalers. Repeat labs next week. Repeat CXR in 2 weeks. F/u with pulmonology for severe bullous emphysema 05/22/2019    Hypotension, unspecified [I95.9]  BP stable at this time without anti hypertensive medication. He was hypotensive on arrival and remained low overnight. Will continue to hold. F/u with pcp.  05/22/2019         Disposition: home    Discharged Condition: Stable    Activity: activity as tolerated  Diet: regular diet  Follow Up: Primary Care Physician in one week. Follow up with pulmonology 3-4 weeks as scheduled. Patient to proceed with the following labs (cbc, bmp) on 5/27. CXR in 2 weeks.      Discharge Medications:      Current Discharge Medication List           Details   doxycycline monohydrate (MONODOX) 100 MG capsule Take 1 capsule by mouth every 12 hours for 7 days  Qty: 14 capsule, Refills: 0      cefdinir (OMNICEF) 300 MG capsule Take 1 capsule by mouth 2 times daily for 7 days  Qty: 14 capsule, Refills: 0      predniSONE (DELTASONE) 10 MG tablet Take 3 tablets PO daily x 3 days, 2 tablets PO daily x 3 days, 1 tablet PO daily x 3 days, then stop  Qty: 18 tablet, Refills: 0              Details   fluticasone (FLONASE) 50 MCG/ACT nasal spray 2 sprays by Each Nostril route daily      senna (SENOKOT) 8.6 MG tablet Take 2 tablets by mouth daily as needed for Constipation      ALBUTEROL SULFATE IN Inhale 2 puffs into the lungs every 6-8 hours as needed      benzonatate (TESSALON) 100 MG capsule Take 100 mg by mouth 3 times daily as needed for Cough      mometasone-formoterol (DULERA) 200-5 MCG/ACT inhaler Inhale 2 puffs into the lungs 2 times daily       citalopram (CELEXA) 40 MG tablet Take 40 mg by mouth daily      baclofen (LIORESAL) 10 MG tablet Take 10 mg by mouth 2 times daily      loratadine (CLARITIN) 10 MG tablet Take 10 mg by mouth daily            Patient was seen and examined by Dr. Christophe Sanabria and plan of care reviewed. Signed:  Electronically signed by JOHN Varela on 5/23/2019 at 4:50 PM       Thank you XENIA Koehler CNP for the opportunity to be involved in this patient's care. If you have any questions or concerns please feel free to contact me at [phone].

## 2019-05-23 NOTE — PLAN OF CARE
Problem: Falls - Risk of:  Goal: Will remain free from falls  Description  Will remain free from falls  5/23/2019 1454 by Adelaida Matthew RN  Outcome: Ongoing  5/23/2019 0305 by Javad Machado RN  Outcome: Ongoing     Problem: SAFETY  Goal: Free from intentional harm  5/23/2019 1454 by Adelaida Matthew RN  Outcome: Ongoing  5/23/2019 0305 by Javad Machado RN  Outcome: Ongoing     Problem: DAILY CARE  Goal: Daily care needs are met  5/23/2019 0305 by Javad Machado RN  Outcome: Ongoing (4) no limitation

## 2019-05-23 NOTE — PROGRESS NOTES
Physical Therapy    Facility/Department: [facility] MED SURG  Initial Assessment    NAME: Lucius Garcia  : 1965  MRN: [id number]    Date of Service: 2019    Assessment   Assessment: Pt appears to be at baseline mobility and ambulation. He does not require skilled PT services at this time. Prognosis: Excellent  Decision Making: Low Complexity  No Skilled PT: Safe to return home  REQUIRES PT FOLLOW UP: No  Activity Tolerance  Activity Tolerance: Patient Tolerated treatment well       Patient Diagnosis(es): The primary encounter diagnosis was Pneumonia due to organism. A diagnosis of Orthostatic hypotension was also pertinent to this visit. has a past medical history of Abnormality, congenital, COPD (chronic obstructive pulmonary disease) ([facility] [address] Hypertension, MRSA (methicillin resistant staph aureus) culture positive, and Staph infection. has a past surgical history that includes Cardiac surgery. Restrictions  Restrictions/Precautions  Restrictions/Precautions: General Precautions     Vision/Hearing  Vision: Within Functional Limits  Hearing: Within functional limits       Subjective  General  Chart Reviewed: Yes  Patient assessed for rehabilitation services?: Yes  Referring Practitioner: Kg Doll PA-C  Referral Date : 19  Diagnosis: Pneumonia    Subjective: Pt presents supine in bed, in NAD. Pt states he is feeling better except for some soreness and stiffness from his recent fall.          Orientation  Orientation  Overall Orientation Status: Within Normal Limits     Social/Functional History  Social/Functional History  Lives With: Family  Type of Home: Mobile home  Home Layout: One level  Home Access: Stairs to enter without rails  Entrance Stairs - Number of Steps: 3  Bathroom Shower/Tub: Tub/Shower unit  Bathroom Toilet: Standard  Bathroom Accessibility: Accessible  ADL Assistance: Independent  Homemaking Assistance: Independent  Homemaking Responsibilities: Yes  Ambulation

## 2019-05-23 NOTE — ED PROVIDER NOTES
medications on file       ALLERGIES     Patient has no known allergies. FAMILY HISTORY     History reviewed. No pertinent family history. SOCIAL HISTORY       Social History     Socioeconomic History    Marital status:      Spouse name: None    Number of children: None    Years of education: None    Highest education level: None   Occupational History    None   Social Needs    Financial resource strain: None    Food insecurity:     Worry: None     Inability: None    Transportation needs:     Medical: None     Non-medical: None   Tobacco Use    Smoking status: Unknown If Ever Smoked    Smokeless tobacco: Never Used   Substance and Sexual Activity    Alcohol use: None    Drug use: Never    Sexual activity: Not Currently   Lifestyle    Physical activity:     Days per week: None     Minutes per session: None    Stress: None   Relationships    Social connections:     Talks on phone: None     Gets together: None     Attends Cheondo[employer] service: None     Active member of club or organization: None     Attends meetings of clubs or organizations: None     Relationship status: None    Intimate partner violence:     Fear of current or ex partner: None     Emotionally abused: None     Physically abused: None     Forced sexual activity: None   Other Topics Concern    None   Social History Narrative    None       SCREENINGS    [place] Coma Scale  Eye Opening: Spontaneous  Best Verbal Response: Confused  Best Motor Response: Obeys commands  [place] Coma Scale Score: 14        PHYSICAL EXAM    (up to 7 for level 4, 8 or more for level 5)     ED Triage Vitals [05/22/19 2013]   BP Temp Temp Source Pulse Resp SpO2 Height Weight   (!) 85/59 96.8 °F (36 °C) Oral 70 -- 95 % -- --       Physical Exam   Constitutional: He is oriented to person, place, and time. He appears well-developed and well-nourished. HENT:   Head: Normocephalic and atraumatic.    Mouth/Throat: Oropharynx is clear and moist.   Eyes: Pupils are equal, round, and reactive to light. Conjunctivae and EOM are normal.   Neck: Neck supple. Cardiovascular: Normal rate, regular rhythm and normal heart sounds. Pulmonary/Chest: Effort normal and breath sounds normal. No respiratory distress. He has no wheezes. He has no rales. Abdominal: Soft. Bowel sounds are normal. He exhibits no distension. There is no tenderness. Musculoskeletal: Normal range of motion. Neurological: He is alert and oriented to person, place, and time. No cranial nerve deficit. He exhibits normal muscle tone. Coordination normal.   Skin: Skin is warm and dry. Psychiatric: He has a normal mood and affect. DIAGNOSTIC RESULTS     EKG: All EKG's are interpreted by the Emergency Department Physician who either signs or Co-signs this chart in the absence of a cardiologist.    EKG showed NSR at 77 bpm    RADIOLOGY:   Non-plain film images such as CT, Ultrasound and MRI are read by the radiologist. Plain radiographic images are visualized andpreliminarily interpreted by the emergency physician with the below findings:    CXR showed left upper lobe infiltrate, ? Neoplasm. CT chest- left upper lobe infiltrate, no neoplasm. Interpretationper the Radiologist below, if available at the time of this note:    CT Chest WO Contrast   Final Result      Severe bullous emphysema. Pneumonia left upper lobe. No findings of underlying neoplasm. Nonenlarged left superior mediastinal node in either infectious or chronic post inflammatory. XR CHEST PORTABLE   Final Result      Advanced bullous emphysema. Left upper chest pneumonia and atelectasis or neoplasm. CT Head WO Contrast   Final Result      Normal noncontrast CT head.             ED BEDSIDE ULTRASOUND:   Performed by ED Physician - none    LABS:  Labs Reviewed   CBC WITH AUTO DIFFERENTIAL - Abnormal; Notable for the following components:       Result Value    WBC 15.3 (*)     RBC 4.28 (*)     .9 (*)     MCH 33.6 (*)     MPV 10.2 (*)     Neutrophils # 10.5 (*)     Monocytes # 1.2 (*)     All other components within normal limits    Narrative:     Performed at:  [address] [place] [address] Laboratory  [address] [place] [address],  [place], [place] [address]   Phone [phone]   COMPREHENSIVE METABOLIC PANEL W/ REFLEX TO MG FOR LOW K - Abnormal; Notable for the following components:    Sodium 133 (*)     Chloride 91 (*)     Glucose 128 (*)     CREATININE 1.4 (*)     GFR Non- 53 (*)     All other components within normal limits    Narrative:     Performed at:  [address] [place] [address] Laboratory  [address] [place] [address],  [place], [place] [address]   Phone [phone]   CULTURE BLOOD #1   CULTURE BLOOD #2   TROPONIN    Narrative:     Performed at:  [address] [place] [address] Laboratory  [address] [place] [address],  [place], [place] [address]   Phone [phone]   LACTIC ACID, PLASMA    Narrative:     Performed at:  [address] [place] [address] Laboratory  [address] [place], [place] [address], [place] [address]   Phone [phone]       All other labs were within normal range or not returned as of this dictation. EMERGENCY DEPARTMENT COURSE and DIFFERENTIAL DIAGNOSIS/MDM:   Vitals:    Vitals:    05/22/19 2013 05/22/19 2100   BP: (!) 85/59 117/68   Pulse: 70 68   Temp: 96.8 °F (36 °C)    TempSrc: Oral    SpO2: 95% 98%           CRITICAL CARE TIME   Total Critical Care time was  minutes, excluding separatelyreportable procedures. There was a high probability ofclinically significant/life threatening deterioration in the patient's condition which required my urgent intervention. CONSULTS:  None    PROCEDURES:  None    PROGRESS NOTES:    Patient informed that he has pneumonia and will need admission, he agrees. Called and discussed patient with Dr Nelly Roberts and she's accepted patient for admission. FINAL IMPRESSION      1. Pneumonia due to organism    2. Orthostatic hypotension          Final diagnoses:   Pneumonia due to organism   Orthostatic hypotension       DISPOSITION/PLAN   DISPOSITION Decision To Admit 05/22/2019 10:45:47 PM      PATIENT REFERRED TO:  No follow-up provider specified.     DISCHARGE MEDICATIONS:  New Prescriptions    No medications on file         (Please note thatportions of this note may have been completed with a voice recognition program.  Efforts were [facility] edit the dictations but occasionally words are mis-transcribed.)    Candace Arreola MD (electronically signed)  Attending Emergency Physician        Claudy Arredondo MD  05/22/19 4442

## 2019-05-23 NOTE — ED TRIAGE NOTES
Pt presents to [facility] after a fall this evening. Pt fell onto pavement and hit his head. Pt has no memory or recollection of what happened. Pt is unable to answer questions at this time. Pt does not have any visible wounds or lacerations on his head. Pt does have a small amount of olga blood discharge from his right nostril.

## 2019-05-23 NOTE — FLOWSHEET NOTE
05/23/19 1254   Vital Signs   Blood Pressure Lying 109/64   Pulse Lying 62 PER MINUTE   Blood Pressure Sitting 110/70   Pulse Sitting 80 PER MINUTE   Blood Pressure Standing 109/67   Pulse Standing 82 PER MINUTE

## 2019-05-23 NOTE — PROGRESS NOTES
Occupational Therapy   Occupational Therapy Initial Assessment  Date: 2019   Patient Name: Jos Momin  MRN: [id number]     : 1965    Date of Service: 2019    Discharge Recommendations:   Home independently       Assessment   Assessment: This 47year old male was referred to OT services secondary to recent onset on pneumonia. Pt had a fall yesterday with hypotension. Pt completed transfers with [place]. Pt completed LB dressing with independence. Pt demo good safety awareness with transfers and mobility. Pt had no LOB during evaluation. Pt appears to be at baseline with no concerns for skilled OT at this time. Prognosis: Good  Decision Making: Low Complexity  No Skilled OT: Independent with ADL's;At baseline function; Independent with functional mobility; Safe to return home  REQUIRES OT FOLLOW UP: Yes  Activity Tolerance  Activity Tolerance: Patient Tolerated treatment well           Patient Diagnosis(es): The primary encounter diagnosis was Pneumonia due to organism. A diagnosis of Orthostatic hypotension was also pertinent to this visit. has a past medical history of Abnormality, congenital, COPD (chronic obstructive pulmonary disease) ([address] Hypertension, MRSA (methicillin resistant staph aureus) culture positive, and Staph infection. has a past surgical history that includes Cardiac surgery. Restrictions  Restrictions/Precautions  Restrictions/Precautions: General Precautions, Fall Risk    Subjective   General  Chart Reviewed: Yes  Patient assessed for rehabilitation services?: Yes  Family / Caregiver Present: Yes  Referring Practitioner: Juaquin Aleman PA-C  Diagnosis: Pneumonia  Subjective  Subjective: Pt states he has pneumonia. Pt fell yesterday at home. Pt agreeable to OT services.       Social/Functional History  Social/Functional History  Lives With: Family  Type of Home: Mobile home  Home Layout: One level  Home Access: Stairs to enter without rails  Entrance

## 2019-05-23 NOTE — DISCHARGE SUMMARY
Short Stay Summary     Patient ID: Be Alicea      Patient's PCP: Jose Enrique Renteria, XENIA - CNP    Admit Date: 5/22/2019     Discharge Date:   5/23/2019    Admitting Physician: Kayli Iqbal DO    Discharge Physician: JOHN Bianchi     Reason for this admission:   Pneumonia   Syncopal episode     Discharge Diagnoses: Active Hospital Problems    Diagnosis Date Noted    Vitamin D deficiency [E55.9] 05/23/2019    Bullous emphysema ([address] [J43.9] 05/23/2019    Syncope and collapse [R55] 05/22/2019    Pneumonia of left upper lobe due to infectious organism ([address] [J18.1] 05/22/2019    Hypotension, unspecified [I95.9] 05/22/2019       Procedures:  CT Chest WO Contrast   Final Result      Severe bullous emphysema. Pneumonia left upper lobe. No findings of underlying neoplasm. Nonenlarged left superior mediastinal node in either infectious or chronic post inflammatory. XR CHEST PORTABLE   Final Result      Advanced bullous emphysema. Left upper chest pneumonia and atelectasis or neoplasm. CT Head WO Contrast   Final Result      Normal noncontrast CT head. XR CHEST STANDARD (2 VW)    (Results Pending)     EKG normal sinus rhythm 78 bpm     Consults:   None     HISTORY OF PRESENT ILLNESS:   The patient is a 47 y.o. male with PMH of COPD and HTN who presents due to cough, fever, and syncopal episode with fall. Patient reports for the last 1 week he hasn't been feeling well. He has had sweats and chills off and on. Family member at bedside reports temperature up to 101.9. He did have a dry cough. Had some abdominal cramping on Monday so he went to his PCP Alyson Jimenez who ordered a CT scan which just revealed constipation. He was given a laxative but states he did not take any other medication regarding his fever or cough. Has been getting progressively more weak. Decreased appetite and oral intake last several days.  States yesterday he was in the bathroom and when he walked out he felt lightheaded and hot. Then fell to the ground and that is the last he remembers because he \"blacked out. \" Family member states she found him she believes very briefly after he fell. He had been incontinent of urine. He was answering her questions but \"seemed out of it for a little bit. \" this is when they brought him to  ED. At time of exam today he is sitting on the edge of the bed and has no complaints. States weakness feeling better. Denies shortness of breath today. Does have dry cough. Appetite better. No dizziness or light headedness. He is asking to go home. Review of system  Constitutional:  Admits to fever, sweats. Admits to generalized weakness and fatigue- improving. Eyes:  Denies change in visual acuity or discharge. HENT:  Denies nasal congestion or sore throat. Respiratory:  Admits to cough and shortness of breath. Cardiovascular:  Denies chest pain, palpitation or swelling in LEs. GI:  Denies abdominal pain, nausea, vomiting, bloody stools or diarrhea. Admits to constipation- improved  :  Denies dysuria or frequency. Musculoskeletal:  Denies back pain or joint pain. Integument:  Denies rash or itching. Neurologic:  Denies headache, focal weakness or sensory changes. Admits to dizziness and syncopal episode. Psychiatric:  Denies depression or anxiety. Past Medical History:      Diagnosis Date    Abnormality, congenital     COPD (chronic obstructive pulmonary disease) (HCC)     Hypertension     MRSA (methicillin resistant staph aureus) culture positive     Staph infection        Past Surgical History:      Procedure Laterality Date    CARDIAC SURGERY      as a child, hole in heart       Social History:   TOBACCO:   reports that he has quit smoking. He has never used smokeless tobacco.  ETOH:   reports that he drinks alcohol.   OCCUPATION:  None    Family History:       Problem Relation Age of Onset    Diabetes Mother     Cancer Mother     High Blood Pressure Mother  Heart Attack Father        Allergies:  Patient has no known allergies. Medications Prior to Admission:    Prior to Admission medications    Medication Sig Start Date End Date Taking? Authorizing Provider   fluticasone (FLONASE) 50 MCG/ACT nasal spray 2 sprays by Each Nostril route daily   Yes Historical Provider, MD   senna (SENOKOT) 8.6 MG tablet Take 2 tablets by mouth daily as needed for Constipation   Yes Historical Provider, MD   ALBUTEROL SULFATE IN Inhale 2 puffs into the lungs every 6-8 hours as needed   Yes Historical Provider, MD   benzonatate (TESSALON) 100 MG capsule Take 100 mg by mouth 3 times daily as needed for Cough   Yes Historical Provider, MD   doxycycline monohydrate (MONODOX) 100 MG capsule Take 1 capsule by mouth every 12 hours for 7 days 5/23/19 5/30/19 Yes JOHN Banks   cefdinir (OMNICEF) 300 MG capsule Take 1 capsule by mouth 2 times daily for 7 days 5/23/19 5/30/19 Yes JOHN Banks   predniSONE (DELTASONE) 10 MG tablet Take 3 tablets PO daily x 3 days, 2 tablets PO daily x 3 days, 1 tablet PO daily x 3 days, then stop 5/23/19  Yes JOHN Banks   mometasone-formoterol (DULERA) 200-5 MCG/ACT inhaler Inhale 2 puffs into the lungs 2 times daily    Yes Historical Provider, MD   citalopram (CELEXA) 40 MG tablet Take 40 mg by mouth daily   Yes Historical Provider, MD   baclofen (LIORESAL) 10 MG tablet Take 10 mg by mouth 2 times daily   Yes Historical Provider, MD   loratadine (CLARITIN) 10 MG tablet Take 10 mg by mouth daily   Yes Historical Provider, MD       Vital Signs  Temp: 98.5 °F (36.9 °C)  Pulse: 53  Resp: 16  BP: 108/60  SpO2: 96 %  O2 Device: None (Room air)       Vital signs reviewed in electronic chart. Physical exam  Constitutional:  Well developed, well nourished, no acute distress. Eyes:  PERRL, conjunctiva normal, EOMI. HENT:  Atraumatic, external ears normal, external nose/nares normal, oropharynx moist, no pharyngeal exudates.  Lower lip asymmetrical and left side drawn up (chronic). Neck:  Supple. No JVD or thyromegaly. Respiratory:  No respiratory distress on room air, decreased breath sounds throughout, no rhonchi, no wheezing. Cardiovascular:  Normal rate, normal rhythm, no murmurs, no gallops, no rubs. GI:  Soft, nondistended, normal bowel sounds, nontender, no organomegaly, no mass. :  No costovertebral angle tenderness. Musculoskeletal:  No edema, no tenderness, no obvious deformities. Patient is moving all extremities. Integument:  Well hydrated, no rash. Abrasion top of nose noted. Neurologic:  Alert & oriented x 3,  no focal deficits noted. Strength is equal throughout. Psychiatric:  Speech and behavior appropriate. Lab Results   Component Value Date    WBC 15.3 (H) 05/22/2019    HGB 14.4 05/22/2019    HCT 43.2 05/22/2019    .9 (H) 05/22/2019     05/22/2019       Lab Results   Component Value Date     (L) 05/22/2019    K 4.1 05/22/2019    CL 91 (L) 05/22/2019    CO2 26 05/22/2019    BUN 14 05/22/2019    CREATININE 1.4 (H) 05/22/2019    GLUCOSE 128 (H) 05/22/2019    CALCIUM 10.0 05/22/2019    PROT 8.3 05/22/2019    LABALBU 3.7 05/22/2019    BILITOT 1.0 05/22/2019    ALKPHOS 73 05/22/2019    AST 22 05/22/2019    ALT 26 05/22/2019    LABGLOM 53 (L) 05/22/2019    GFRAA >59 05/22/2019    AGRATIO 0.8 05/22/2019    GLOB 4.6 05/22/2019       Assessment and Plan/Hospital course: Active Hospital Problems    Diagnosis Date Noted    Vitamin D deficiency [E55.9]  Started on vitamin d supplementation. 05/23/2019    Bullous emphysema (HCC) [J43.9]  On CT chest patient with severe bullous emphysema. He will resume home inhaler regimen. Referred to pulmonology on discharge. 05/23/2019    Syncope and collapse [R55]  CT head negative. EKG NSR. Orthostatics negative. He was hypotensive at 85/59 on arrival to ED and received IVFs. Hypotension may have attributed as well as acute illness with pneumonia/copd exacerbation. However patient did have urinary incontinence according to his wife and possibly episode of vomiting as well. No history of seizure disorder or previous event like this. He has been stable and asymptomatic during hospital course. Will defer further workup to pcp if felt indicated. He is agreeable to close follow up with pcp. Did also check tsh, b12, folate which were wnl. Vitamin d low as above.  05/22/2019    Pneumonia of left upper lobe due to infectious organism ([address] [J18.1]  Started on rocephin and doxycycline, steroid, nebs. On room air today and requesting dc home. Will discharge with cefdinir, doxy, oral steroid taper. Resume home inhalers. Repeat labs next week. Repeat CXR in 2 weeks. F/u with pulmonology for severe bullous emphysema 05/22/2019    Hypotension, unspecified [I95.9]  BP stable at this time without anti hypertensive medication. He was hypotensive on arrival and remained low overnight. Will continue to hold. F/u with pcp.  05/22/2019         Disposition: home    Discharged Condition: Stable    Activity: activity as tolerated  Diet: regular diet  Follow Up: Primary Care Physician in one week. Follow up with pulmonology 3-4 weeks as scheduled. Patient to proceed with the following labs (cbc, bmp) on 5/27. CXR in 2 weeks.      Discharge Medications:      Current Discharge Medication List           Details   doxycycline monohydrate (MONODOX) 100 MG capsule Take 1 capsule by mouth every 12 hours for 7 days  Qty: 14 capsule, Refills: 0      cefdinir (OMNICEF) 300 MG capsule Take 1 capsule by mouth 2 times daily for 7 days  Qty: 14 capsule, Refills: 0      predniSONE (DELTASONE) 10 MG tablet Take 3 tablets PO daily x 3 days, 2 tablets PO daily x 3 days, 1 tablet PO daily x 3 days, then stop  Qty: 18 tablet, Refills: 0              Details   fluticasone (FLONASE) 50 MCG/ACT nasal spray 2 sprays by Each Nostril route daily      senna (SENOKOT) 8.6 MG tablet Take 2 tablets by mouth daily as needed for Constipation      ALBUTEROL SULFATE IN Inhale 2 puffs into the lungs every 6-8 hours as needed      benzonatate (TESSALON) 100 MG capsule Take 100 mg by mouth 3 times daily as needed for Cough      mometasone-formoterol (DULERA) 200-5 MCG/ACT inhaler Inhale 2 puffs into the lungs 2 times daily       citalopram (CELEXA) 40 MG tablet Take 40 mg by mouth daily      baclofen (LIORESAL) 10 MG tablet Take 10 mg by mouth 2 times daily      loratadine (CLARITIN) 10 MG tablet Take 10 mg by mouth daily            Patient was seen and examined by Dr. Melissa Rush and plan of care reviewed. Signed:  Electronically signed by JOHN Cunningham on 5/23/2019 at 4:50 PM       Thank you XENIA Gomez CNP for the opportunity to be involved in this patient's care. If you have any questions or concerns please feel free to contact me at [phone].

## 2019-05-28 LAB
BLOOD CULTURE, ROUTINE: NORMAL
CULTURE, BLOOD 2: NORMAL

## 2019-06-20 ENCOUNTER — HOSPITAL ENCOUNTER (OUTPATIENT)
Dept: GENERAL RADIOLOGY | Facility: HOSPITAL | Age: 54
Discharge: HOME | End: 2019-06-20
Payer: COMMERCIAL

## 2019-06-20 ENCOUNTER — HOSPITAL ENCOUNTER (OUTPATIENT)
Facility: HOSPITAL | Age: 54
Discharge: HOME | End: 2019-06-20
Payer: COMMERCIAL

## 2019-06-20 DIAGNOSIS — J18.9 PNEUMONIA OF LEFT UPPER LOBE DUE TO INFECTIOUS ORGANISM: ICD-10-CM

## 2019-06-20 PROCEDURE — 71046 X-RAY EXAM CHEST 2 VIEWS: CPT

## 2019-11-25 ENCOUNTER — LAB (OUTPATIENT)
Dept: LAB | Facility: HOSPITAL | Age: 54
End: 2019-11-25

## 2019-11-25 ENCOUNTER — OFFICE VISIT (OUTPATIENT)
Dept: PULMONOLOGY | Facility: CLINIC | Age: 54
End: 2019-11-25

## 2019-11-25 VITALS
DIASTOLIC BLOOD PRESSURE: 64 MMHG | SYSTOLIC BLOOD PRESSURE: 120 MMHG | WEIGHT: 162 LBS | OXYGEN SATURATION: 92 % | BODY MASS INDEX: 24.55 KG/M2 | HEIGHT: 68 IN | HEART RATE: 88 BPM | RESPIRATION RATE: 18 BRPM

## 2019-11-25 DIAGNOSIS — Z87.891 PERSONAL HISTORY OF TOBACCO USE, PRESENTING HAZARDS TO HEALTH: ICD-10-CM

## 2019-11-25 DIAGNOSIS — J30.89 OTHER ALLERGIC RHINITIS: ICD-10-CM

## 2019-11-25 DIAGNOSIS — R06.02 SHORTNESS OF BREATH: ICD-10-CM

## 2019-11-25 DIAGNOSIS — J41.0 CHRONIC BRONCHITIS, SIMPLE (HCC): ICD-10-CM

## 2019-11-25 DIAGNOSIS — R06.02 SHORTNESS OF BREATH: Primary | ICD-10-CM

## 2019-11-25 DIAGNOSIS — J44.9 CHRONIC OBSTRUCTIVE PULMONARY DISEASE, UNSPECIFIED COPD TYPE (HCC): ICD-10-CM

## 2019-11-25 PROCEDURE — 94618 PULMONARY STRESS TESTING: CPT | Performed by: INTERNAL MEDICINE

## 2019-11-25 PROCEDURE — 86003 ALLG SPEC IGE CRUDE XTRC EA: CPT

## 2019-11-25 PROCEDURE — 36415 COLL VENOUS BLD VENIPUNCTURE: CPT

## 2019-11-25 PROCEDURE — 90670 PCV13 VACCINE IM: CPT | Performed by: INTERNAL MEDICINE

## 2019-11-25 PROCEDURE — 82785 ASSAY OF IGE: CPT

## 2019-11-25 PROCEDURE — 90471 IMMUNIZATION ADMIN: CPT | Performed by: INTERNAL MEDICINE

## 2019-11-25 PROCEDURE — 82103 ALPHA-1-ANTITRYPSIN TOTAL: CPT

## 2019-11-25 PROCEDURE — 82104 ALPHA-1-ANTITRYPSIN PHENO: CPT

## 2019-11-25 PROCEDURE — 99214 OFFICE O/P EST MOD 30 MIN: CPT | Performed by: INTERNAL MEDICINE

## 2019-11-25 RX ORDER — DULOXETIN HYDROCHLORIDE 60 MG/1
60 CAPSULE, DELAYED RELEASE ORAL DAILY
COMMUNITY
Start: 2019-11-11

## 2019-11-25 RX ORDER — CETIRIZINE HYDROCHLORIDE 10 MG/1
10 TABLET ORAL DAILY
COMMUNITY
Start: 2019-11-21

## 2019-11-25 RX ORDER — TIOTROPIUM BROMIDE INHALATION SPRAY 3.12 UG/1
2 SPRAY, METERED RESPIRATORY (INHALATION) DAILY
COMMUNITY
Start: 2019-11-18

## 2019-11-25 RX ORDER — BUPROPION HYDROCHLORIDE 150 MG/1
150 TABLET ORAL DAILY
COMMUNITY
Start: 2019-11-11

## 2019-11-25 RX ORDER — PAROXETINE HYDROCHLORIDE 40 MG/1
TABLET, FILM COATED ORAL
Status: ON HOLD | COMMUNITY
Start: 2019-10-14 | End: 2020-06-26

## 2019-11-25 RX ORDER — BUSPIRONE HYDROCHLORIDE 10 MG/1
10 TABLET ORAL 3 TIMES DAILY
COMMUNITY
Start: 2019-11-11 | End: 2023-02-07

## 2019-11-25 RX ORDER — LEVOFLOXACIN 500 MG/1
TABLET, FILM COATED ORAL
Status: ON HOLD | COMMUNITY
Start: 2019-11-12 | End: 2020-06-26

## 2019-11-25 RX ORDER — BUDESONIDE AND FORMOTEROL FUMARATE DIHYDRATE 160; 4.5 UG/1; UG/1
2 AEROSOL RESPIRATORY (INHALATION) 2 TIMES DAILY
COMMUNITY
Start: 2019-11-21

## 2019-11-25 NOTE — PROGRESS NOTES
"Chief Complaint   Patient presents with   • Follow-up   • Shortness of Breath   • Cough   • Wheezing       Subjective   Niall Murguia is a 54 y.o. male.     History of Present Illness   Patient comes today for follow up of shortness of breath. Patient says that his symptoms have been worsening since the last clinic visit.     Patient does report 2-3 emergency room visits for recent exacerbations.  The patient says that he has had at least 2-3 pneumonias and a few bronchitis since Jan 2018.     Patient is using the inhalers and nebulizers, as prescribed. Exercise tolerance has also progressively worsened.     Quit smoking 1.5 years ago. He used to smoke 1 PPD for about 35 years or so.     Patient says that he has been using his nasal sprays on a regular basis and describes no significant ongoing issues other than occasional congestion.       The following portions of the patient's history were reviewed and updated as appropriate: allergies, current medications, past family history, past medical history, past social history and past surgical history.    Review of Systems   Constitutional: Negative for chills and fever.   HENT: Negative for rhinorrhea, sinus pressure and sore throat.    Respiratory: Positive for cough, shortness of breath and wheezing. Negative for chest tightness.    Psychiatric/Behavioral: Negative for sleep disturbance.       Objective   Visit Vitals  /64   Pulse 88   Resp 18   Ht 172.7 cm (68\")   Wt 73.5 kg (162 lb)   SpO2 92%   BMI 24.63 kg/m²       Physical Exam   Constitutional: He is oriented to person, place, and time. He appears well-developed and well-nourished.   Eyes: EOM are normal.   Neck: Neck supple.   Cardiovascular: Normal rate and regular rhythm.   Pulmonary/Chest: Effort normal. No respiratory distress.   Right sided mini-thoracotomy scar noted.   VATS scar noted as well.   Somewhat hyperresonant to percussion.  Somewhat decreased A/E with out wheezing noted. "   Musculoskeletal: He exhibits no edema.   Neurological: He is alert and oriented to person, place, and time.   Skin: Skin is warm and dry.   Psychiatric: He has a normal mood and affect.   Vitals reviewed.      Assessment/Plan   Niall was seen today for follow-up, shortness of breath, cough and wheezing.    Diagnoses and all orders for this visit:    Shortness of breath  -     Converted Six Minute Walk  -     Overnight Sleep Oximetry Study; Future  -     Pulmonary Function Test; Future  -     Nitric Oxide; Future  -     Alpha - 1 - Antitrypsin Phenotype; Future    Chronic obstructive pulmonary disease, unspecified COPD type (CMS/HCC)  -     Converted Six Minute Walk  -     Overnight Sleep Oximetry Study; Future  -     Pulmonary Function Test; Future  -     Nitric Oxide; Future  -     Alpha - 1 - Antitrypsin Phenotype; Future    Chronic bronchitis, simple (CMS/HCC)    Personal history of tobacco use, presenting hazards to health    Other allergic rhinitis  -     IgE; Future  -     Allergens, Zone 8; Future    Other orders  -     Pneumococcal Conjugate Vaccine 13-Valent All         Return in about 12 weeks (around 2/17/2020) for Recheck, PFT F/U, FeNO F/U, Labs, Overnight P Ox, For Jackie, ....Also 6 mths w/ Dr. Baptiste.    DISCUSSION (if any):  I reviewed the patient's discharge summary that mentioned hydropneumothorax, tobacco use and emphysema.    I have personally reviewed images of the last chest x-ray and available CT.  Results for orders placed during the hospital encounter of 01/14/18   XR Chest 1 View    Narrative    EXAMINATION: XR CHEST 1 VW - 01/21/2018     INDICATION:  Z74.09-Other reduced mobility. Follow-up pneumothorax.     COMPARISON: One-day prior.     FINDINGS: Overall aeration similar to prior with scattered postsurgical  changes and scarring within the right hemithorax noted. Increased  lucency within the right lung apex appears to be large bulla formation  as no distinct pleural line is evident.  Left hemithorax grossly  unchanged. Cardiac size unchanged.        Impression Increased lucency within the right lung apex without  distinct pleural line likely represents improved  visualization/enlargement of large bulla formation. No discrete  pneumothorax identified however attention on follow-up.     DICTATED:     01/21/2018  EDITED    :     01/21/2018      This report was finalized on 1/21/2018 12:21 PM by Dr. Myles Upton.          Results for orders placed during the hospital encounter of 01/14/18   XR Chest PA & Lateral    Narrative EXAMINATION: XR CHEST, PA AND LATERAL-01/23/2018:      INDICATION: F/U CT; Z74.09-Other reduced mobility.      COMPARISON: NONE.     FINDINGS:   1.  Bullous emphysema and centrilobular disease is seen in the mid and  upper lung zones.  2.  There is airspace consolidative opacities and ill-defined densities  in the mid and lower lung zones. There is a right base effusion with a  trace effusion left base.           Impression Compared to previous studies of 2 days ago, the right base  effusion may be slightly worse. The airspace opacity right mid and lower  lung is slightly worse. The airspace opacities in the left mid and lower  lung zone are stable. There is marked bullous disease in the upper lung  zones.     D:  01/23/2018  E:  01/23/2018     This report was finalized on 1/23/2018 1:07 PM by Dr. Harlan Fisher MD.        Last CT scan was reviewed in great detail with the patient. Images reviewed personally.   Results for orders placed during the hospital encounter of 01/07/18   CT Chest Pulmonary Embolism With Contrast    Narrative PROCEDURE: CT CHEST PULMONARY EMBOLISM W CONTRAST-     HISTORY: dyspnea, enlarged RV; J18.9-Pneumonia, unspecified organism;  A41.9-Sepsis, unspecified organism; N17.9-Acute kidney failure,  unspecified     COMPARISON: None .     TECHNIQUE: Multiple axial CT images were obtained from the thoracic  inlet through the upper abdomen following the  administration of Isovue  300 per the CT PE protocol. Coronal and oblique 3D MIP images were  reconstructed from the original axial data set.      FINDINGS: There are no filling defects within the pulmonary arteries to  suggest an embolus. The thoracic aorta is normal in caliber with no  evidence of dissection. Incidental note is made of direct origin of the  left vertebral artery from the aortic arch. The heart is normal in size.  There are no pleural or pericardial effusions. There is no adenopathy.  Lung windows reveal emphysematous changes. There is airspace disease in  the right lower lobe, right middle lobe and left lower lobe consistent  with a pneumonia. The visualized upper abdomen is unremarkable. Bone  windows reveal no acute osseous abnormalities.       Impression 1. No evidence of pulmonary embolus or dissection.   2. Bilateral airspace disease consistent with a pneumonia.             417.27 mGy.cm          This study was performed with techniques to keep radiation doses as low  as reasonably achievable (ALARA). Individualized dose reduction  techniques using automated exposure control or adjustment of mA and/or  kV according to the patient size were employed.      This report was finalized on 1/8/2018 12:11 PM by Larry Grewal M.D..         Results for orders placed during the hospital encounter of 01/14/18   CT Guided Chest Tube    Narrative EXAMINATION: CT GUIDED CHEST TUBE-      INDICATION: Right apical pneumothorax; CT-guided catheter thoracentesis  for therapeutic and diagnostic purposes. Complex pleural effusion right  chest.     TECHNIQUE: Patient has no known allergies.     The clotting profile is acceptable. The PTT is 27.8, PT 12.0, INR 1.10,  platelets 226,000.     The radiation dose reduction device was turned on for each scan per the  ALARA (As Low as Reasonably Achievable) protocol.     COMPARISON: None.     FINDINGS:   1. The procedure, risks, complications and side effects of  CT-guided  catheter placement in the right pleural space for therapeutic and  diagnostic drainage of the effusion in the right chest and for  decompression of the patient's right pneumothorax was discussed and  reviewed in detail with the patient including possible risk and  complications which include bleeding, infection, injury or laceration of  the intercostal artery with massive bleeding, puncture of the patient's  known high-grade bulla in the right lung with high-grade pleural leak,  air embolization, and other possible risk and complications.  Patient  understood and consented.     2. With the patient in the supine position, under sterile technique,  local anesthesia and meticulous CT guidance, from the right posterior  axillary line, a #12 Israeli multi-sidehole drainage catheter was  introduced into the right posterior inferior pleural space. The patient  has loculated pleural fluid and a complex airspace consolidative disease  extensively and therefore the catheter was placed in one of the  loculated compartments of fluid. Hopefully this catheter position will  communicate with other areas of fluid in the right hemithorax.     3. Fluid was extracted and immediately sent to the laboratory for all  diagnostic studies ordered by the attending physician.     A total of 180 mL of fluid was removed from the right pleural space for  diagnostic laboratory purposes.     4. The catheter was then sutured in place with 0 silk. A revolution  fixation device was applied and a sterile dressing was applied.     The catheter will be placed to a Pleur-evac atrium suction device for  decompression of the right chest and the right pneumothorax.       Impression 1. A #12 Israeli drainage catheter has been placed into the right  posterior inferior pleural space from the right posterior axillary line.  Fluid was obtained for all diagnostic studies ordered by the attending  physician.  2. The catheter was fixed, sutured and  dressed. Catheter will be placed  to a Pleur-evac atrium suction device for decompression of free fluid  from the right hemithorax and hopefully compression of the right  pneumothorax.  3. Patient tolerated the procedure well. There were no complications. He  was taken back to his room in satisfactory and stable condition.     D:  01/16/2018  E:  01/16/2018     This report was finalized on 1/16/2018 11:03 AM by Dr. Harlan Fisher MD.          I also reviewed his last echocardiogram and shared the results with him.   Results for orders placed during the hospital encounter of 01/07/18   Adult Transthoracic Echo Complete W/ Cont if Necessary Per Protocol    Narrative Technically very limited study   1) Normal LV systolic function ( EF is about 55%)  2) Normal left atrial size with normal LVedp   3) Trace MR   4) Mild TR with normal PA pressures ( within limitations of study)  5) Severe RV dilation with wall motion abnormality ? Acute on chronic   corpulmonale   6) Severe reduction in RV function        I have also reviewed laboratory data.   Lab Results   Component Value Date    CO2 30.0 02/12/2018       Lab Results   Component Value Date    PHART 7.436 01/08/2018    QBP3HMP 39.7 01/08/2018    PO2ART 61.8 (L) 01/08/2018    HGBBG 11.8 (L) 01/08/2018    B4WJCFRS 92.3 01/08/2018    CARBOXYHGB 0.8 01/07/2018     ===========================  ===========================    PFTs will be ordered to be done upon follow up.    6 minute walk test performed today.  Total distance walked: 324 meters    Oxygen saturation remained at >92 % during the walk, on room air.     Will do overnight pulse oximetry.     We have reviewed his pulmonary medications in great detail.    Any needed adjustments to his pulmonary medications, either for clinical or insurance coverage reasons, have been made and are reflected in the orders.    Compliance with medications stressed.     Side effects of prescribed medications discussed with the  patient    The patient belongs to the risk group for which lung cancer screening has been recommended. We will try to make arrangements for the same and this will be indicated after 10/24/2021. This will need to be ordered when he turns 56.    Patient was asked to continue nasal spray for symptoms which are definitely consistent with allergic rhinitis.     I have ordered IgE/RAST panel.    After discussion about the need for pneumococcal-13 vaccination, it was administered today.      Dictated utilizing Dragon dictation.    This document was electronically signed by Chelsea Baptiste MD on 11/25/19 at 12:16 PM

## 2019-11-26 LAB — SPECIMEN STATUS: NORMAL

## 2019-11-28 LAB
A ALTERNATA IGE QN: <0.1 KU/L
A FUMIGATUS IGE QN: <0.1 KU/L
AMER ROACH IGE QN: <0.1 KU/L
BAHIA GRASS IGE QN: <0.1 KU/L
BERMUDA GRASS IGE QN: <0.1 KU/L
BOXELDER IGE QN: <0.1 KU/L
C HERBARUM IGE QN: <0.1 KU/L
CAT DANDER IGG QN: <0.1 KU/L
CMN PIGWEED IGE QN: <0.1 KU/L
COMMON RAGWEED IGE QN: <0.1 KU/L
CONV CLASS DESCRIPTION: NORMAL
D FARINAE IGE QN: <0.1 KU/L
D PTERONYSS IGE QN: <0.1 KU/L
DOG DANDER IGE QN: <0.1 KU/L
ENGL PLANTAIN IGE QN: <0.1 KU/L
HAZELNUT POLN IGE QN: <0.1 KU/L
JOHNSON GRASS IGE QN: <0.1 KU/L
KENT BLUE GRASS IGE QN: <0.1 KU/L
M RACEMOSUS IGE QN: <0.1 KU/L
MT JUNIPER IGE QN: <0.1 KU/L
MUGWORT IGE QN: <0.1 KU/L
NETTLE IGE QN: <0.1 KU/L
P NOTATUM IGE QN: <0.1 KU/L
S BOTRYOSUM IGE QN: <0.1 KU/L
SHEEP SORREL IGE QN: <0.1 KU/L
SWEET GUM IGE QN: <0.1 KU/L
T011-IGE MAPLE LEAF SYCAMORE: <0.1 KU/L
TOTAL IGE SMQN RAST: 37 IU/ML (ref 6–495)
WHITE ELM IGE QN: <0.1 KU/L
WHITE HICKORY IGE QN: <0.1 KU/L
WHITE MULBERRY IGE QN: <0.1 KU/L
WHITE OAK IGE QN: <0.1 KU/L

## 2019-12-02 LAB
A1AT SERPL-MCNC: 186 MG/DL (ref 101–187)
PHENOTYPE: NORMAL

## 2020-06-25 ENCOUNTER — HOSPITAL ENCOUNTER (EMERGENCY)
Facility: HOSPITAL | Age: 55
Discharge: ANOTHER HOSPITAL | End: 2020-06-26
Attending: FAMILY MEDICINE
Payer: MEDICAID

## 2020-06-25 ENCOUNTER — APPOINTMENT (OUTPATIENT)
Dept: GENERAL RADIOLOGY | Facility: HOSPITAL | Age: 55
End: 2020-06-25
Payer: MEDICAID

## 2020-06-25 LAB
A/G RATIO: 1.3 (ref 0.8–2)
ALBUMIN SERPL-MCNC: 4.6 G/DL (ref 3.4–4.8)
ALP BLD-CCNC: 88 U/L (ref 25–100)
ALT SERPL-CCNC: 19 U/L (ref 4–36)
ANION GAP SERPL CALCULATED.3IONS-SCNC: 12 MMOL/L (ref 3–16)
AST SERPL-CCNC: 26 U/L (ref 8–33)
BASOPHILS ABSOLUTE: 0.1 K/UL (ref 0–0.1)
BASOPHILS RELATIVE PERCENT: 0.6 %
BILIRUB SERPL-MCNC: 0.5 MG/DL (ref 0.3–1.2)
BUN BLDV-MCNC: 17 MG/DL (ref 6–20)
CALCIUM SERPL-MCNC: 9.8 MG/DL (ref 8.5–10.5)
CHLORIDE BLD-SCNC: 99 MMOL/L (ref 98–107)
CO2: 29 MMOL/L (ref 20–30)
CREAT SERPL-MCNC: 1.3 MG/DL (ref 0.4–1.2)
EOSINOPHILS ABSOLUTE: 0.1 K/UL (ref 0–0.4)
EOSINOPHILS RELATIVE PERCENT: 1.1 %
GFR AFRICAN AMERICAN: >59
GFR NON-AFRICAN AMERICAN: 58
GLOBULIN: 3.5 G/DL
GLUCOSE BLD-MCNC: 120 MG/DL (ref 74–106)
HCT VFR BLD CALC: 46.3 % (ref 40–54)
HEMOGLOBIN: 16.1 G/DL (ref 13–18)
IMMATURE GRANULOCYTES #: 0 K/UL
IMMATURE GRANULOCYTES %: 0.4 % (ref 0–5)
LIPASE: 19 U/L (ref 5.6–51.3)
LYMPHOCYTES ABSOLUTE: 1.4 K/UL (ref 1.5–4)
LYMPHOCYTES RELATIVE PERCENT: 14 %
MCH RBC QN AUTO: 36.2 PG (ref 27–32)
MCHC RBC AUTO-ENTMCNC: 34.8 G/DL (ref 31–35)
MCV RBC AUTO: 104 FL (ref 80–100)
MONOCYTES ABSOLUTE: 0.7 K/UL (ref 0.2–0.8)
MONOCYTES RELATIVE PERCENT: 6.7 %
NEUTROPHILS ABSOLUTE: 7.9 K/UL (ref 2–7.5)
NEUTROPHILS RELATIVE PERCENT: 77.2 %
PDW BLD-RTO: 12.1 % (ref 11–16)
PLATELET # BLD: 182 K/UL (ref 150–400)
PMV BLD AUTO: 10.4 FL (ref 6–10)
POTASSIUM SERPL-SCNC: 3.7 MMOL/L (ref 3.4–5.1)
RBC # BLD: 4.45 M/UL (ref 4.5–6)
SODIUM BLD-SCNC: 140 MMOL/L (ref 136–145)
TOTAL PROTEIN: 8.1 G/DL (ref 6.4–8.3)
TROPONIN: <0.3 NG/ML
WBC # BLD: 10.3 K/UL (ref 4–11)

## 2020-06-25 PROCEDURE — 85025 COMPLETE CBC W/AUTO DIFF WBC: CPT

## 2020-06-25 PROCEDURE — 36415 COLL VENOUS BLD VENIPUNCTURE: CPT

## 2020-06-25 PROCEDURE — 6370000000 HC RX 637 (ALT 250 FOR IP): Performed by: FAMILY MEDICINE

## 2020-06-25 PROCEDURE — 71045 X-RAY EXAM CHEST 1 VIEW: CPT

## 2020-06-25 PROCEDURE — 84484 ASSAY OF TROPONIN QUANT: CPT

## 2020-06-25 PROCEDURE — 99285 EMERGENCY DEPT VISIT HI MDM: CPT

## 2020-06-25 PROCEDURE — 93005 ELECTROCARDIOGRAM TRACING: CPT

## 2020-06-25 PROCEDURE — 83690 ASSAY OF LIPASE: CPT

## 2020-06-25 PROCEDURE — 80053 COMPREHEN METABOLIC PANEL: CPT

## 2020-06-25 RX ORDER — ASPIRIN 81 MG/1
243 TABLET, CHEWABLE ORAL ONCE
Status: COMPLETED | OUTPATIENT
Start: 2020-06-25 | End: 2020-06-25

## 2020-06-25 RX ADMIN — ASPIRIN 81 MG 243 MG: 81 TABLET ORAL at 23:54

## 2020-06-25 ASSESSMENT — PAIN SCALES - GENERAL: PAINLEVEL_OUTOF10: 5

## 2020-06-25 ASSESSMENT — PAIN DESCRIPTION - ONSET: ONSET: GRADUAL

## 2020-06-25 ASSESSMENT — PAIN DESCRIPTION - DESCRIPTORS: DESCRIPTORS: TIGHTNESS;HEAVINESS

## 2020-06-25 ASSESSMENT — PAIN DESCRIPTION - PAIN TYPE: TYPE: ACUTE PAIN

## 2020-06-25 ASSESSMENT — PAIN DESCRIPTION - PROGRESSION: CLINICAL_PROGRESSION: GRADUALLY IMPROVING

## 2020-06-26 ENCOUNTER — APPOINTMENT (OUTPATIENT)
Dept: CARDIOLOGY | Facility: HOSPITAL | Age: 55
End: 2020-06-26

## 2020-06-26 ENCOUNTER — HOSPITAL ENCOUNTER (INPATIENT)
Facility: HOSPITAL | Age: 55
LOS: 1 days | Discharge: HOME | End: 2020-06-27
Attending: FAMILY MEDICINE | Admitting: INTERNAL MEDICINE

## 2020-06-26 VITALS
RESPIRATION RATE: 19 BRPM | TEMPERATURE: 97.8 F | OXYGEN SATURATION: 94 % | BODY MASS INDEX: 22.73 KG/M2 | DIASTOLIC BLOOD PRESSURE: 95 MMHG | WEIGHT: 150 LBS | HEART RATE: 80 BPM | HEIGHT: 68 IN | SYSTOLIC BLOOD PRESSURE: 150 MMHG

## 2020-06-26 DIAGNOSIS — I21.4 NSTEMI (NON-ST ELEVATED MYOCARDIAL INFARCTION) (HCC): Primary | ICD-10-CM

## 2020-06-26 PROBLEM — F41.9 ANXIETY DISORDER: Chronic | Status: ACTIVE | Noted: 2020-06-26

## 2020-06-26 LAB
ANION GAP SERPL CALCULATED.3IONS-SCNC: 9.7 MMOL/L (ref 5–15)
BUN BLD-MCNC: 15 MG/DL (ref 6–20)
BUN/CREAT SERPL: 15.3 (ref 7–25)
CALCIUM SPEC-SCNC: 9.3 MG/DL (ref 8.6–10.5)
CHLORIDE SERPL-SCNC: 103 MMOL/L (ref 98–107)
CHOLEST SERPL-MCNC: 183 MG/DL (ref 0–200)
CO2 SERPL-SCNC: 25.3 MMOL/L (ref 22–29)
CREAT BLD-MCNC: 0.98 MG/DL (ref 0.76–1.27)
DEPRECATED RDW RBC AUTO: 47.3 FL (ref 37–54)
ERYTHROCYTE [DISTWIDTH] IN BLOOD BY AUTOMATED COUNT: 12 % (ref 12.3–15.4)
GFR SERPL CREATININE-BSD FRML MDRD: 80 ML/MIN/1.73
GLUCOSE BLD-MCNC: 110 MG/DL (ref 65–99)
HBA1C MFR BLD: 5.1 % (ref 4.8–5.6)
HCT VFR BLD AUTO: 44.7 % (ref 37.5–51)
HDLC SERPL-MCNC: 45 MG/DL (ref 40–60)
HGB BLD-MCNC: 15 G/DL (ref 13–17.7)
LDLC SERPL CALC-MCNC: 112 MG/DL (ref 0–100)
LDLC/HDLC SERPL: 2.49 {RATIO}
MCH RBC QN AUTO: 35.8 PG (ref 26.6–33)
MCHC RBC AUTO-ENTMCNC: 33.6 G/DL (ref 31.5–35.7)
MCV RBC AUTO: 106.7 FL (ref 79–97)
PLATELET # BLD AUTO: 181 10*3/MM3 (ref 140–450)
PMV BLD AUTO: 10.3 FL (ref 6–12)
POTASSIUM BLD-SCNC: 4.1 MMOL/L (ref 3.5–5.2)
RBC # BLD AUTO: 4.19 10*6/MM3 (ref 4.14–5.8)
SARS-COV-2 RNA PNL SPEC NAA+PROBE: NOT DETECTED
SODIUM BLD-SCNC: 138 MMOL/L (ref 136–145)
TRIGL SERPL-MCNC: 130 MG/DL (ref 0–150)
TROPONIN T SERPL-MCNC: 1.62 NG/ML (ref 0–0.03)
TROPONIN: 1.44 NG/ML
TSH SERPL DL<=0.05 MIU/L-ACNC: 1.83 UIU/ML (ref 0.27–4.2)
VLDLC SERPL-MCNC: 26 MG/DL
WBC NRBC COR # BLD: 6.98 10*3/MM3 (ref 3.4–10.8)

## 2020-06-26 PROCEDURE — 027237Z DILATION OF CORONARY ARTERY, THREE ARTERIES WITH FOUR OR MORE DRUG-ELUTING INTRALUMINAL DEVICES, PERCUTANEOUS APPROACH: ICD-10-PCS | Performed by: INTERNAL MEDICINE

## 2020-06-26 PROCEDURE — C1769 GUIDE WIRE: HCPCS | Performed by: INTERNAL MEDICINE

## 2020-06-26 PROCEDURE — 36415 COLL VENOUS BLD VENIPUNCTURE: CPT

## 2020-06-26 PROCEDURE — 4A023N7 MEASUREMENT OF CARDIAC SAMPLING AND PRESSURE, LEFT HEART, PERCUTANEOUS APPROACH: ICD-10-PCS | Performed by: INTERNAL MEDICINE

## 2020-06-26 PROCEDURE — 6360000002 HC RX W HCPCS: Performed by: FAMILY MEDICINE

## 2020-06-26 PROCEDURE — C1874 STENT, COATED/COV W/DEL SYS: HCPCS | Performed by: INTERNAL MEDICINE

## 2020-06-26 PROCEDURE — 25010000002 FENTANYL CITRATE (PF) 100 MCG/2ML SOLUTION: Performed by: INTERNAL MEDICINE

## 2020-06-26 PROCEDURE — 85027 COMPLETE CBC AUTOMATED: CPT | Performed by: FAMILY MEDICINE

## 2020-06-26 PROCEDURE — 93458 L HRT ARTERY/VENTRICLE ANGIO: CPT | Performed by: INTERNAL MEDICINE

## 2020-06-26 PROCEDURE — 93306 TTE W/DOPPLER COMPLETE: CPT | Performed by: INTERNAL MEDICINE

## 2020-06-26 PROCEDURE — C1725 CATH, TRANSLUMIN NON-LASER: HCPCS | Performed by: INTERNAL MEDICINE

## 2020-06-26 PROCEDURE — 25010000002 MIDAZOLAM PER 1MG: Performed by: INTERNAL MEDICINE

## 2020-06-26 PROCEDURE — C1887 CATHETER, GUIDING: HCPCS | Performed by: INTERNAL MEDICINE

## 2020-06-26 PROCEDURE — 99254 IP/OBS CNSLTJ NEW/EST MOD 60: CPT | Performed by: INTERNAL MEDICINE

## 2020-06-26 PROCEDURE — 80061 LIPID PANEL: CPT | Performed by: FAMILY MEDICINE

## 2020-06-26 PROCEDURE — 0 IOPAMIDOL PER 1 ML: Performed by: INTERNAL MEDICINE

## 2020-06-26 PROCEDURE — 83036 HEMOGLOBIN GLYCOSYLATED A1C: CPT | Performed by: FAMILY MEDICINE

## 2020-06-26 PROCEDURE — 84484 ASSAY OF TROPONIN QUANT: CPT | Performed by: FAMILY MEDICINE

## 2020-06-26 PROCEDURE — 6370000000 HC RX 637 (ALT 250 FOR IP): Performed by: FAMILY MEDICINE

## 2020-06-26 PROCEDURE — 93306 TTE W/DOPPLER COMPLETE: CPT

## 2020-06-26 PROCEDURE — 99223 1ST HOSP IP/OBS HIGH 75: CPT | Performed by: FAMILY MEDICINE

## 2020-06-26 PROCEDURE — C1894 INTRO/SHEATH, NON-LASER: HCPCS | Performed by: INTERNAL MEDICINE

## 2020-06-26 PROCEDURE — 92928 PRQ TCAT PLMT NTRAC ST 1 LES: CPT | Performed by: INTERNAL MEDICINE

## 2020-06-26 PROCEDURE — 96372 THER/PROPH/DIAG INJ SC/IM: CPT

## 2020-06-26 PROCEDURE — 84484 ASSAY OF TROPONIN QUANT: CPT

## 2020-06-26 PROCEDURE — 84443 ASSAY THYROID STIM HORMONE: CPT | Performed by: FAMILY MEDICINE

## 2020-06-26 PROCEDURE — B2111ZZ FLUOROSCOPY OF MULTIPLE CORONARY ARTERIES USING LOW OSMOLAR CONTRAST: ICD-10-PCS | Performed by: INTERNAL MEDICINE

## 2020-06-26 PROCEDURE — 25010000003 LIDOCAINE 1 % SOLUTION: Performed by: INTERNAL MEDICINE

## 2020-06-26 PROCEDURE — 94799 UNLISTED PULMONARY SVC/PX: CPT

## 2020-06-26 PROCEDURE — 80048 BASIC METABOLIC PNL TOTAL CA: CPT | Performed by: FAMILY MEDICINE

## 2020-06-26 PROCEDURE — 94640 AIRWAY INHALATION TREATMENT: CPT

## 2020-06-26 PROCEDURE — 25010000002 HEPARIN (PORCINE) PER 1000 UNITS: Performed by: INTERNAL MEDICINE

## 2020-06-26 PROCEDURE — C9600 PERC DRUG-EL COR STENT SING: HCPCS | Performed by: INTERNAL MEDICINE

## 2020-06-26 PROCEDURE — 85347 COAGULATION TIME ACTIVATED: CPT

## 2020-06-26 PROCEDURE — 93005 ELECTROCARDIOGRAM TRACING: CPT | Performed by: INTERNAL MEDICINE

## 2020-06-26 PROCEDURE — 87635 SARS-COV-2 COVID-19 AMP PRB: CPT | Performed by: FAMILY MEDICINE

## 2020-06-26 PROCEDURE — 93005 ELECTROCARDIOGRAM TRACING: CPT | Performed by: FAMILY MEDICINE

## 2020-06-26 DEVICE — XIENCE SIERRA™ EVEROLIMUS ELUTING CORONARY STENT SYSTEM 3.00 MM X 38 MM / RAPID-EXCHANGE
Type: IMPLANTABLE DEVICE | Status: FUNCTIONAL
Brand: XIENCE SIERRA™

## 2020-06-26 DEVICE — XIENCE SIERRA™ EVEROLIMUS ELUTING CORONARY STENT SYSTEM 3.50 MM X 08 MM / RAPID-EXCHANGE
Type: IMPLANTABLE DEVICE | Status: FUNCTIONAL
Brand: XIENCE SIERRA™

## 2020-06-26 DEVICE — XIENCE SIERRA™ EVEROLIMUS ELUTING CORONARY STENT SYSTEM 2.75 MM X 18 MM / RAPID-EXCHANGE
Type: IMPLANTABLE DEVICE | Status: FUNCTIONAL
Brand: XIENCE SIERRA™

## 2020-06-26 DEVICE — XIENCE SIERRA™ EVEROLIMUS ELUTING CORONARY STENT SYSTEM 3.50 MM X 15 MM / RAPID-EXCHANGE
Type: IMPLANTABLE DEVICE | Status: FUNCTIONAL
Brand: XIENCE SIERRA™

## 2020-06-26 RX ORDER — ASPIRIN 325 MG
TABLET ORAL AS NEEDED
Status: DISCONTINUED | OUTPATIENT
Start: 2020-06-26 | End: 2020-06-26 | Stop reason: HOSPADM

## 2020-06-26 RX ORDER — ATORVASTATIN CALCIUM 80 MG/1
80 TABLET, FILM COATED ORAL NIGHTLY
Status: DISCONTINUED | OUTPATIENT
Start: 2020-06-26 | End: 2020-06-27 | Stop reason: HOSPADM

## 2020-06-26 RX ORDER — DIPHENHYDRAMINE HYDROCHLORIDE 50 MG/ML
25 INJECTION INTRAMUSCULAR; INTRAVENOUS EVERY 6 HOURS PRN
Status: DISCONTINUED | OUTPATIENT
Start: 2020-06-26 | End: 2020-06-27 | Stop reason: HOSPADM

## 2020-06-26 RX ORDER — ATORVASTATIN CALCIUM 80 MG/1
80 TABLET, FILM COATED ORAL NIGHTLY
Status: DISCONTINUED | OUTPATIENT
Start: 2020-06-26 | End: 2020-06-26 | Stop reason: SDUPTHER

## 2020-06-26 RX ORDER — SENNA PLUS 8.6 MG/1
1 TABLET ORAL 2 TIMES DAILY
Status: DISCONTINUED | OUTPATIENT
Start: 2020-06-26 | End: 2020-06-27 | Stop reason: HOSPADM

## 2020-06-26 RX ORDER — ASPIRIN 325 MG
325 TABLET, DELAYED RELEASE (ENTERIC COATED) ORAL DAILY
Qty: 30 TABLET | Refills: 0 | Status: SHIPPED | OUTPATIENT
Start: 2020-06-26 | End: 2020-07-26

## 2020-06-26 RX ORDER — QUETIAPINE FUMARATE 25 MG/1
25 TABLET, FILM COATED ORAL NIGHTLY
COMMUNITY

## 2020-06-26 RX ORDER — HEPARIN SODIUM 1000 [USP'U]/ML
INJECTION, SOLUTION INTRAVENOUS; SUBCUTANEOUS AS NEEDED
Status: DISCONTINUED | OUTPATIENT
Start: 2020-06-26 | End: 2020-06-26 | Stop reason: HOSPADM

## 2020-06-26 RX ORDER — LIDOCAINE HYDROCHLORIDE 10 MG/ML
INJECTION, SOLUTION INFILTRATION; PERINEURAL AS NEEDED
Status: DISCONTINUED | OUTPATIENT
Start: 2020-06-26 | End: 2020-06-26 | Stop reason: HOSPADM

## 2020-06-26 RX ORDER — ALBUTEROL SULFATE 2.5 MG/3ML
2.5 SOLUTION RESPIRATORY (INHALATION) EVERY 6 HOURS PRN
Status: DISCONTINUED | OUTPATIENT
Start: 2020-06-26 | End: 2020-06-27 | Stop reason: HOSPADM

## 2020-06-26 RX ORDER — BUDESONIDE AND FORMOTEROL FUMARATE DIHYDRATE 160; 4.5 UG/1; UG/1
2 AEROSOL RESPIRATORY (INHALATION)
Status: DISCONTINUED | OUTPATIENT
Start: 2020-06-26 | End: 2020-06-27 | Stop reason: HOSPADM

## 2020-06-26 RX ORDER — CLOPIDOGREL BISULFATE 75 MG/1
75 TABLET ORAL DAILY
Qty: 30 TABLET | Refills: 0 | Status: SHIPPED | OUTPATIENT
Start: 2020-06-26

## 2020-06-26 RX ORDER — HYDROCODONE BITARTRATE AND ACETAMINOPHEN 5; 325 MG/1; MG/1
1 TABLET ORAL EVERY 4 HOURS PRN
Status: DISCONTINUED | OUTPATIENT
Start: 2020-06-26 | End: 2020-06-27 | Stop reason: HOSPADM

## 2020-06-26 RX ORDER — ONDANSETRON 2 MG/ML
4 INJECTION INTRAMUSCULAR; INTRAVENOUS EVERY 6 HOURS PRN
Status: DISCONTINUED | OUTPATIENT
Start: 2020-06-26 | End: 2020-06-27 | Stop reason: HOSPADM

## 2020-06-26 RX ORDER — NALOXONE HCL 0.4 MG/ML
0.4 VIAL (ML) INJECTION
Status: DISCONTINUED | OUTPATIENT
Start: 2020-06-26 | End: 2020-06-27 | Stop reason: HOSPADM

## 2020-06-26 RX ORDER — MORPHINE SULFATE 2 MG/ML
1 INJECTION, SOLUTION INTRAMUSCULAR; INTRAVENOUS EVERY 4 HOURS PRN
Status: DISCONTINUED | OUTPATIENT
Start: 2020-06-26 | End: 2020-06-27 | Stop reason: HOSPADM

## 2020-06-26 RX ORDER — MORPHINE SULFATE 4 MG/ML
4 INJECTION, SOLUTION INTRAMUSCULAR; INTRAVENOUS
Status: DISCONTINUED | OUTPATIENT
Start: 2020-06-26 | End: 2020-06-27 | Stop reason: HOSPADM

## 2020-06-26 RX ORDER — ALPRAZOLAM 0.25 MG/1
0.25 TABLET ORAL 3 TIMES DAILY PRN
Status: DISCONTINUED | OUTPATIENT
Start: 2020-06-26 | End: 2020-06-27 | Stop reason: HOSPADM

## 2020-06-26 RX ORDER — MIDAZOLAM HYDROCHLORIDE 2 MG/2ML
INJECTION, SOLUTION INTRAMUSCULAR; INTRAVENOUS AS NEEDED
Status: DISCONTINUED | OUTPATIENT
Start: 2020-06-26 | End: 2020-06-26 | Stop reason: HOSPADM

## 2020-06-26 RX ORDER — TEMAZEPAM 15 MG/1
15 CAPSULE ORAL NIGHTLY PRN
Status: DISCONTINUED | OUTPATIENT
Start: 2020-06-26 | End: 2020-06-27 | Stop reason: HOSPADM

## 2020-06-26 RX ORDER — ONDANSETRON 4 MG/1
4 TABLET, FILM COATED ORAL EVERY 6 HOURS PRN
Status: DISCONTINUED | OUTPATIENT
Start: 2020-06-26 | End: 2020-06-27 | Stop reason: HOSPADM

## 2020-06-26 RX ORDER — FENTANYL CITRATE 50 UG/ML
INJECTION, SOLUTION INTRAMUSCULAR; INTRAVENOUS AS NEEDED
Status: DISCONTINUED | OUTPATIENT
Start: 2020-06-26 | End: 2020-06-26 | Stop reason: HOSPADM

## 2020-06-26 RX ORDER — SODIUM CHLORIDE 0.9 % (FLUSH) 0.9 %
10 SYRINGE (ML) INJECTION AS NEEDED
Status: DISCONTINUED | OUTPATIENT
Start: 2020-06-26 | End: 2020-06-27 | Stop reason: HOSPADM

## 2020-06-26 RX ORDER — LISINOPRIL 20 MG/1
20 TABLET ORAL
Status: DISCONTINUED | OUTPATIENT
Start: 2020-06-26 | End: 2020-06-27 | Stop reason: HOSPADM

## 2020-06-26 RX ORDER — LISINOPRIL AND HYDROCHLOROTHIAZIDE 12.5; 1 MG/1; MG/1
1 TABLET ORAL DAILY
Status: ON HOLD | COMMUNITY
End: 2020-06-26

## 2020-06-26 RX ORDER — QUETIAPINE FUMARATE 25 MG/1
25 TABLET, FILM COATED ORAL NIGHTLY
Status: DISCONTINUED | OUTPATIENT
Start: 2020-06-26 | End: 2020-06-27 | Stop reason: HOSPADM

## 2020-06-26 RX ORDER — ASPIRIN 325 MG
325 TABLET, DELAYED RELEASE (ENTERIC COATED) ORAL DAILY
Status: DISCONTINUED | OUTPATIENT
Start: 2020-06-27 | End: 2020-06-27 | Stop reason: HOSPADM

## 2020-06-26 RX ORDER — SODIUM CHLORIDE 9 MG/ML
100 INJECTION, SOLUTION INTRAVENOUS CONTINUOUS
Status: ACTIVE | OUTPATIENT
Start: 2020-06-26 | End: 2020-06-26

## 2020-06-26 RX ORDER — BUSPIRONE HYDROCHLORIDE 10 MG/1
10 TABLET ORAL 3 TIMES DAILY
Status: DISCONTINUED | OUTPATIENT
Start: 2020-06-26 | End: 2020-06-27 | Stop reason: HOSPADM

## 2020-06-26 RX ORDER — PRASUGREL 10 MG/1
10 TABLET, FILM COATED ORAL DAILY
Status: DISCONTINUED | OUTPATIENT
Start: 2020-06-27 | End: 2020-06-27 | Stop reason: HOSPADM

## 2020-06-26 RX ORDER — SODIUM CHLORIDE 0.9 % (FLUSH) 0.9 %
10 SYRINGE (ML) INJECTION EVERY 12 HOURS SCHEDULED
Status: DISCONTINUED | OUTPATIENT
Start: 2020-06-26 | End: 2020-06-27 | Stop reason: HOSPADM

## 2020-06-26 RX ORDER — DULOXETIN HYDROCHLORIDE 30 MG/1
60 CAPSULE, DELAYED RELEASE ORAL DAILY
Status: DISCONTINUED | OUTPATIENT
Start: 2020-06-26 | End: 2020-06-27 | Stop reason: HOSPADM

## 2020-06-26 RX ORDER — FERROUS SULFATE TAB EC 324 MG (65 MG FE EQUIVALENT) 324 (65 FE) MG
324 TABLET DELAYED RESPONSE ORAL
Status: DISCONTINUED | OUTPATIENT
Start: 2020-06-26 | End: 2020-06-27 | Stop reason: HOSPADM

## 2020-06-26 RX ORDER — BUPROPION HYDROCHLORIDE 150 MG/1
150 TABLET ORAL DAILY
Status: DISCONTINUED | OUTPATIENT
Start: 2020-06-26 | End: 2020-06-27 | Stop reason: HOSPADM

## 2020-06-26 RX ORDER — CLOPIDOGREL BISULFATE 75 MG/1
TABLET ORAL AS NEEDED
Status: DISCONTINUED | OUTPATIENT
Start: 2020-06-26 | End: 2020-06-26 | Stop reason: HOSPADM

## 2020-06-26 RX ORDER — ROPINIROLE 2 MG/1
2 TABLET, FILM COATED ORAL NIGHTLY
COMMUNITY
End: 2023-02-07

## 2020-06-26 RX ORDER — CETIRIZINE HYDROCHLORIDE 10 MG/1
10 TABLET ORAL DAILY
Status: DISCONTINUED | OUTPATIENT
Start: 2020-06-26 | End: 2020-06-27 | Stop reason: HOSPADM

## 2020-06-26 RX ORDER — SENNOSIDES 8.6 MG
CAPSULE ORAL AS NEEDED
COMMUNITY

## 2020-06-26 RX ORDER — BISACODYL 5 MG/1
10 TABLET, DELAYED RELEASE ORAL DAILY
Status: DISCONTINUED | OUTPATIENT
Start: 2020-06-26 | End: 2020-06-26

## 2020-06-26 RX ORDER — BISOPROLOL FUMARATE 5 MG/1
5 TABLET, FILM COATED ORAL
Status: DISCONTINUED | OUTPATIENT
Start: 2020-06-26 | End: 2020-06-27

## 2020-06-26 RX ORDER — NITROGLYCERIN 0.4 MG/1
TABLET SUBLINGUAL
Qty: 25 TABLET | Refills: 0 | Status: SHIPPED | OUTPATIENT
Start: 2020-06-26

## 2020-06-26 RX ORDER — ACETAMINOPHEN 325 MG/1
650 TABLET ORAL EVERY 4 HOURS PRN
Status: DISCONTINUED | OUTPATIENT
Start: 2020-06-26 | End: 2020-06-27 | Stop reason: HOSPADM

## 2020-06-26 RX ORDER — NITROGLYCERIN 0.4 MG/1
0.4 TABLET SUBLINGUAL
Status: DISCONTINUED | OUTPATIENT
Start: 2020-06-26 | End: 2020-06-27 | Stop reason: HOSPADM

## 2020-06-26 RX ORDER — ROPINIROLE 1 MG/1
2 TABLET, FILM COATED ORAL NIGHTLY
Status: DISCONTINUED | OUTPATIENT
Start: 2020-06-26 | End: 2020-06-27 | Stop reason: HOSPADM

## 2020-06-26 RX ADMIN — TICAGRELOR 180 MG: 90 TABLET ORAL at 01:29

## 2020-06-26 RX ADMIN — SENNOSIDES 1 TABLET: 8.6 TABLET, FILM COATED ORAL at 22:57

## 2020-06-26 RX ADMIN — BISOPROLOL FUMARATE 5 MG: 5 TABLET ORAL at 17:27

## 2020-06-26 RX ADMIN — ENOXAPARIN SODIUM 70 MG: 80 INJECTION SUBCUTANEOUS at 01:30

## 2020-06-26 RX ADMIN — SODIUM CHLORIDE, PRESERVATIVE FREE 10 ML: 5 INJECTION INTRAVENOUS at 09:27

## 2020-06-26 RX ADMIN — ATORVASTATIN CALCIUM 80 MG: 80 TABLET, FILM COATED ORAL at 22:57

## 2020-06-26 RX ADMIN — SODIUM CHLORIDE 100 ML/HR: 9 INJECTION, SOLUTION INTRAVENOUS at 17:27

## 2020-06-26 RX ADMIN — BUPROPION HYDROCHLORIDE 150 MG: 150 TABLET, EXTENDED RELEASE ORAL at 17:26

## 2020-06-26 RX ADMIN — BUDESONIDE AND FORMOTEROL FUMARATE DIHYDRATE 2 PUFF: 160; 4.5 AEROSOL RESPIRATORY (INHALATION) at 19:24

## 2020-06-26 RX ADMIN — ROPINIROLE HYDROCHLORIDE 2 MG: 1 TABLET, FILM COATED ORAL at 22:58

## 2020-06-26 RX ADMIN — BUDESONIDE AND FORMOTEROL FUMARATE DIHYDRATE 2 PUFF: 160; 4.5 AEROSOL RESPIRATORY (INHALATION) at 07:38

## 2020-06-26 RX ADMIN — CETIRIZINE HYDROCHLORIDE 10 MG: 10 TABLET, FILM COATED ORAL at 17:26

## 2020-06-26 RX ADMIN — DULOXETINE HYDROCHLORIDE 60 MG: 30 CAPSULE, DELAYED RELEASE ORAL at 17:27

## 2020-06-26 RX ADMIN — LISINOPRIL 20 MG: 20 TABLET ORAL at 09:27

## 2020-06-26 RX ADMIN — BUSPIRONE HYDROCHLORIDE 10 MG: 10 TABLET ORAL at 22:58

## 2020-06-26 RX ADMIN — BUSPIRONE HYDROCHLORIDE 10 MG: 10 TABLET ORAL at 17:26

## 2020-06-26 RX ADMIN — FERROUS SULFATE TAB EC 324 MG (65 MG FE EQUIVALENT) 324 MG: 324 (65 FE) TABLET DELAYED RESPONSE at 17:27

## 2020-06-26 ASSESSMENT — ENCOUNTER SYMPTOMS: NAUSEA: 1

## 2020-06-26 NOTE — ED NOTES
Spoke with the girlfriend, with permission from the pt., to up date her on condition.        Merary Payne RN  06/26/20 9037

## 2020-06-26 NOTE — ED NOTES
Dr. Mane Jobs on the phone with Dr Merno Archibald accepting hospitalist.      Steph Ahmadi RN  06/26/20 0974       Steph Ahmadi RN  06/26/20 8312

## 2020-06-26 NOTE — ED PROVIDER NOTES
hours as needed    BACLOFEN (LIORESAL) 10 MG TABLET    Take 10 mg by mouth 2 times daily    CITALOPRAM (CELEXA) 40 MG TABLET    Take 40 mg by mouth daily    LORATADINE (CLARITIN) 10 MG TABLET    Take 10 mg by mouth daily    MOMETASONE-FORMOTEROL (DULERA) 200-5 MCG/ACT INHALER    Inhale 2 puffs into the lungs 2 times daily     VITAMIN D (ERGOCALCIFEROL) 15878 UNITS CAPSULE    Take 1 capsule by mouth once a week for 8 doses       ALLERGIES     Patient has no known allergies.     FAMILY HISTORY       Family History   Problem Relation Age of Onset    Diabetes Mother     Cancer Mother     High Blood Pressure Mother     Heart Attack Father           SOCIAL HISTORY       Social History     Socioeconomic History    Marital status:      Spouse name: None    Number of children: None    Years of education: None    Highest education level: None   Occupational History    None   Social Needs    Financial resource strain: None    Food insecurity     Worry: None     Inability: None    Transportation needs     Medical: None     Non-medical: None   Tobacco Use    Smoking status: Former Smoker    Smokeless tobacco: Never Used   Substance and Sexual Activity    Alcohol use: Yes     Comment: occationally    Drug use: Not Currently    Sexual activity: Yes     Partners: Female   Lifestyle    Physical activity     Days per week: None     Minutes per session: None    Stress: None   Relationships    Social connections     Talks on phone: None     Gets together: None     Attends Rastafari service: None     Active member of club or organization: None     Attends meetings of clubs or organizations: None     Relationship status: None    Intimate partner violence     Fear of current or ex partner: None     Emotionally abused: None     Physically abused: None     Forced sexual activity: None   Other Topics Concern    None   Social History Narrative    None       SCREENINGS             PHYSICAL EXAM    (up to 7 for mis-transcribed.)    Fritz Thomas DO (electronically signed)  Attending Emergency Physician         Fritz Thomas,   06/26/20 [address],   06/26/20 6734

## 2020-06-26 NOTE — ED NOTES
Report given to Kingston Vilchis at [facility].      Jazmyn Rasmussen RN  06/26/20 8257

## 2020-06-27 VITALS
HEIGHT: 68 IN | WEIGHT: 152.34 LBS | OXYGEN SATURATION: 95 % | TEMPERATURE: 98.6 F | HEART RATE: 56 BPM | DIASTOLIC BLOOD PRESSURE: 62 MMHG | RESPIRATION RATE: 18 BRPM | BODY MASS INDEX: 23.09 KG/M2 | SYSTOLIC BLOOD PRESSURE: 116 MMHG

## 2020-06-27 LAB
ANION GAP SERPL CALCULATED.3IONS-SCNC: 6.9 MMOL/L (ref 5–15)
BUN BLD-MCNC: 14 MG/DL (ref 6–20)
BUN/CREAT SERPL: 14.1 (ref 7–25)
CALCIUM SPEC-SCNC: 8.8 MG/DL (ref 8.6–10.5)
CHLORIDE SERPL-SCNC: 107 MMOL/L (ref 98–107)
CHOLEST SERPL-MCNC: 166 MG/DL (ref 0–200)
CO2 SERPL-SCNC: 26.1 MMOL/L (ref 22–29)
CREAT BLD-MCNC: 0.99 MG/DL (ref 0.76–1.27)
DEPRECATED RDW RBC AUTO: 47.4 FL (ref 37–54)
ERYTHROCYTE [DISTWIDTH] IN BLOOD BY AUTOMATED COUNT: 12.2 % (ref 12.3–15.4)
GFR SERPL CREATININE-BSD FRML MDRD: 79 ML/MIN/1.73
GLUCOSE BLD-MCNC: 96 MG/DL (ref 65–99)
HCT VFR BLD AUTO: 41.8 % (ref 37.5–51)
HDLC SERPL-MCNC: 40 MG/DL (ref 40–60)
HGB BLD-MCNC: 14.3 G/DL (ref 13–17.7)
LDLC SERPL CALC-MCNC: 110 MG/DL (ref 0–100)
LDLC/HDLC SERPL: 2.74 {RATIO}
MCH RBC QN AUTO: 36 PG (ref 26.6–33)
MCHC RBC AUTO-ENTMCNC: 34.2 G/DL (ref 31.5–35.7)
MCV RBC AUTO: 105.3 FL (ref 79–97)
PLATELET # BLD AUTO: 167 10*3/MM3 (ref 140–450)
PMV BLD AUTO: 10.2 FL (ref 6–12)
POTASSIUM BLD-SCNC: 4.4 MMOL/L (ref 3.5–5.2)
RBC # BLD AUTO: 3.97 10*6/MM3 (ref 4.14–5.8)
SODIUM BLD-SCNC: 140 MMOL/L (ref 136–145)
TRIGL SERPL-MCNC: 82 MG/DL (ref 0–150)
VLDLC SERPL-MCNC: 16.4 MG/DL
WBC NRBC COR # BLD: 7.66 10*3/MM3 (ref 3.4–10.8)

## 2020-06-27 PROCEDURE — 94799 UNLISTED PULMONARY SVC/PX: CPT

## 2020-06-27 PROCEDURE — 93010 ELECTROCARDIOGRAM REPORT: CPT | Performed by: INTERNAL MEDICINE

## 2020-06-27 PROCEDURE — 80048 BASIC METABOLIC PNL TOTAL CA: CPT | Performed by: INTERNAL MEDICINE

## 2020-06-27 PROCEDURE — 99231 SBSQ HOSP IP/OBS SF/LOW 25: CPT | Performed by: INTERNAL MEDICINE

## 2020-06-27 PROCEDURE — 99238 HOSP IP/OBS DSCHRG MGMT 30/<: CPT | Performed by: INTERNAL MEDICINE

## 2020-06-27 PROCEDURE — 85027 COMPLETE CBC AUTOMATED: CPT | Performed by: INTERNAL MEDICINE

## 2020-06-27 PROCEDURE — 80061 LIPID PANEL: CPT | Performed by: INTERNAL MEDICINE

## 2020-06-27 PROCEDURE — 93005 ELECTROCARDIOGRAM TRACING: CPT | Performed by: INTERNAL MEDICINE

## 2020-06-27 RX ORDER — PRASUGREL 10 MG/1
10 TABLET, FILM COATED ORAL DAILY
Qty: 30 TABLET | Refills: 0 | Status: SHIPPED | OUTPATIENT
Start: 2020-06-28

## 2020-06-27 RX ORDER — ASPIRIN 81 MG/1
81 TABLET ORAL DAILY
Qty: 30 TABLET | Refills: 0 | Status: SHIPPED | OUTPATIENT
Start: 2020-06-27

## 2020-06-27 RX ORDER — LISINOPRIL 20 MG/1
20 TABLET ORAL
Qty: 30 TABLET | Refills: 0 | Status: SHIPPED | OUTPATIENT
Start: 2020-06-28 | End: 2023-02-07 | Stop reason: DRUGHIGH

## 2020-06-27 RX ORDER — ATORVASTATIN CALCIUM 80 MG/1
80 TABLET, FILM COATED ORAL NIGHTLY
Qty: 30 TABLET | Refills: 0 | Status: SHIPPED | OUTPATIENT
Start: 2020-06-27

## 2020-06-27 RX ADMIN — ASPIRIN 325 MG: 325 TABLET, COATED ORAL at 09:45

## 2020-06-27 RX ADMIN — BUSPIRONE HYDROCHLORIDE 10 MG: 10 TABLET ORAL at 09:45

## 2020-06-27 RX ADMIN — DULOXETINE HYDROCHLORIDE 60 MG: 30 CAPSULE, DELAYED RELEASE ORAL at 09:44

## 2020-06-27 RX ADMIN — FERROUS SULFATE TAB EC 324 MG (65 MG FE EQUIVALENT) 324 MG: 324 (65 FE) TABLET DELAYED RESPONSE at 09:44

## 2020-06-27 RX ADMIN — TIOTROPIUM BROMIDE INHALATION SPRAY 2 PUFF: 3.12 SPRAY, METERED RESPIRATORY (INHALATION) at 09:08

## 2020-06-27 RX ADMIN — BUDESONIDE AND FORMOTEROL FUMARATE DIHYDRATE 2 PUFF: 160; 4.5 AEROSOL RESPIRATORY (INHALATION) at 09:09

## 2020-06-27 RX ADMIN — PRASUGREL 10 MG: 10 TABLET, FILM COATED ORAL at 09:45

## 2020-06-27 RX ADMIN — SENNOSIDES 1 TABLET: 8.6 TABLET, FILM COATED ORAL at 09:43

## 2020-06-27 RX ADMIN — CETIRIZINE HYDROCHLORIDE 10 MG: 10 TABLET, FILM COATED ORAL at 09:45

## 2020-06-27 RX ADMIN — SODIUM CHLORIDE, PRESERVATIVE FREE 10 ML: 5 INJECTION INTRAVENOUS at 09:45

## 2020-06-27 RX ADMIN — BUPROPION HYDROCHLORIDE 150 MG: 150 TABLET, EXTENDED RELEASE ORAL at 09:43

## 2020-06-27 RX ADMIN — LISINOPRIL 20 MG: 20 TABLET ORAL at 09:44

## 2020-06-28 ENCOUNTER — READMISSION MANAGEMENT (OUTPATIENT)
Dept: CALL CENTER | Facility: HOSPITAL | Age: 55
End: 2020-06-28

## 2020-06-28 LAB
BH CV ECHO MEAS - IVSD: 1 CM
BH CV ECHO MEAS - LA DIMENSION: 2.8 CM
BH CV ECHO MEAS - LVPWD: 1 CM
BH CV ECHO MEAS - TAPSE (>1.6): 1.5 CM2
LEFT ATRIUM VOLUME INDEX: 20 ML/M2
LV EF 2D ECHO EST: 48 %
MAXIMAL PREDICTED HEART RATE: 167 BPM
STRESS TARGET HR: 142 BPM

## 2020-06-28 NOTE — OUTREACH NOTE
Prep Survey      Responses   [facility] facility patient discharged from?  [facility]   Is LACE score < 7 ?  No   Eligibility  Readm Mgmt   Discharge diagnosis   Non-ST elevation MI, status post drug-eluting stents to OM 2, LAD, RCA   COVID-19 Test Status  Negative   Does the patient have one of the following disease processes/diagnoses(primary or secondary)?  Acute MI (STEMI,NSTEMI)   Does the patient have Home health ordered?  No   Is there a DME ordered?  No   Prep survey completed?  Yes          Dianna Mark RN

## 2020-06-30 ENCOUNTER — READMISSION MANAGEMENT (OUTPATIENT)
Dept: CALL CENTER | Facility: HOSPITAL | Age: 55
End: 2020-06-30

## 2020-06-30 NOTE — OUTREACH NOTE
AMI Week 1 Survey      Responses   [facility] patient discharged from?  [facility]   Does the patient have one of the following disease processes/diagnoses(primary or secondary)?  Acute MI (STEMI,NSTEMI)   Is there a successful TCM telephone encounter documented?  No   Week 1 attempt successful?  No   Unsuccessful attempts  Attempt 1          Glendy Brock RN

## 2020-07-01 LAB — ACT BLD: 259 SECONDS (ref 82–152)

## 2020-07-03 ENCOUNTER — READMISSION MANAGEMENT (OUTPATIENT)
Dept: CALL CENTER | Facility: HOSPITAL | Age: 55
End: 2020-07-03

## 2020-07-03 NOTE — OUTREACH NOTE
AMI Week 1 Survey      Responses   [facility] patient discharged from?  [facility]   Does the patient have one of the following disease processes/diagnoses(primary or secondary)?  Acute MI (STEMI,NSTEMI)   Is there a successful TCM telephone encounter documented?  No   Week 1 attempt successful?  No   Unsuccessful attempts  Attempt 2          Dianna aMrk RN

## 2020-07-06 ENCOUNTER — READMISSION MANAGEMENT (OUTPATIENT)
Dept: CALL CENTER | Facility: HOSPITAL | Age: 55
End: 2020-07-06

## 2020-07-06 NOTE — OUTREACH NOTE
AMI Week 1 Survey      Responses   [facility] patient discharged from?  [facility]   Does the patient have one of the following disease processes/diagnoses(primary or secondary)?  Acute MI (STEMI,NSTEMI)   Is there a successful TCM telephone encounter documented?  No   Week 1 attempt successful?  Yes [patient callback]   Call start time  1328   Call end time  1333   General alerts for this patient  Call sig other, Lakeisha Rehman, for patient follow up calls.    Discharge diagnosis   Non-ST elevation MI, status post drug-eluting stents to OM 2, LAD, RCA   Is patient permission given to speak with other caregiver?  Yes   List who call center can speak with  Lakeisha Rehman, sig other   Meds reviewed with patient/caregiver?  Yes   Is the patient having any side effects they believe may be caused by any medication additions or changes?  No   Does the patient have all prescriptions related to this admission filled (includes statins,anticoagulants,HTN meds,anti-arrhythmia meds)  Yes   Is the patient taking all medications as directed (includes completed medication regime)?  Yes   Does the patient have a primary care provider?   Yes   Does the patient have an appointment with their PCP,cardiologist,or clinic within 7 days of discharge?  Yes   Comments regarding PCP  PCP Rebecca Barnes tomorrow 7/7/20   Has the patient kept scheduled appointments due by today?  N/A   Comments  Cardiology f/u 7/15/20   Has home health visited the patient within 72 hours of discharge?  N/A   Psychosocial issues?  No   Did the patient receive a copy of their discharge instructions?  Yes   Nursing interventions  Reviewed instructions with patient   What is the patient's perception of their health status since discharge?  Improving   Nursing interventions  Nurse provided patient education   Is the patient/caregiver able to teach back signs and symptoms of when to call for help immediately:  Sudden chest discomfort, Sudden discomfort  in arms, back, neck or jaw, Shortness of breath at any time, Sudden sweating or clammy skin, Nausea or vomiting, Dizziness or lightheadedness, Irregular or rapid heart rate   Nursing interventions  Nurse provided patient education   Is the pateint /caregiver able to teach back the importance of cardiac rehab?  Yes   Nursing interventions  Provided education on importance of cardiac rehab   Is the patient/caregiver able to teach back ways to prevent a second heart attack:  Take medications, Follow up with MD, Participate in Cardiac Rehab   Is the patient/caregiver able to teach back the hierarchy of who to call/visit for symptoms/problems? PCP, Specialist, Home health nurse, Urgent Care, ED, 911  Yes   Week 1 call completed?  Yes          Krissy Feliz RN

## 2020-07-06 NOTE — OUTREACH NOTE
AMI Week 1 Survey      Responses   [facility] patient discharged from?  [facility]   Does the patient have one of the following disease processes/diagnoses(primary or secondary)?  Acute MI (STEMI,NSTEMI)   Is there a successful TCM telephone encounter documented?  No   Week 1 attempt successful?  Yes   Call start time  1301   Rescheduled  Rescheduled-pt requested [SonCaleb, requests patient f/u calls to Lakeisha Rehman, patients girlfriend. He states that she takes care of meds and everything. No answer for her today. ]   Call end time  1304   General alerts for this patient  Call sig other, Lakeisha Rehman, for patient follow up calls.           Krissy Feliz RN

## 2020-07-14 ENCOUNTER — READMISSION MANAGEMENT (OUTPATIENT)
Dept: CALL CENTER | Facility: HOSPITAL | Age: 55
End: 2020-07-14

## 2020-07-14 NOTE — OUTREACH NOTE
AMI Week 2 Survey      Responses   [facility] patient discharged from?  [facility]   Does the patient have one of the following disease processes/diagnoses(primary or secondary)?  Acute MI (STEMI,NSTEMI)   Week 2 attempt successful?  Yes   Call start time  1426   Call end time  1429   Discharge diagnosis   Non-ST elevation MI, status post drug-eluting stents to OM 2, LAD, RCA   Meds reviewed with patient/caregiver?  Yes   Is the patient having any side effects they believe may be caused by any medication additions or changes?  No   Does the patient have all prescriptions related to this admission filled (includes statins,anticoagulants,HTN meds,anti-arrhythmia meds)  Yes   Is the patient taking all medications as directed (includes completed medication regime)?  Yes   Does the patient have a primary care provider?   Yes   Does the patient have an appointment with their PCP,cardiologist,or clinic within 7 days of discharge?  Yes   Has the patient kept scheduled appointments due by today?  N/A   Has home health visited the patient within 72 hours of discharge?  N/A   Psychosocial issues?  No   Did the patient receive a copy of their discharge instructions?  Yes   Nursing interventions  Reviewed instructions with patient   What is the patient's perception of their health status since discharge?  Improving   Nursing interventions  Nurse provided patient education   Is the patient/caregiver able to teach back signs and symptoms of when to call for help immediately:  Sudden chest discomfort, Shortness of breath at any time, Nausea or vomiting, Irregular or rapid heart rate, Sudden discomfort in arms, back, neck or jaw, Sudden sweating or clammy skin, Dizziness or lightheadedness   Nursing interventions  Nurse provided patient education   Is the pateint /caregiver able to teach back the importance of cardiac rehab?  Yes   Is the patient/caregiver able to teach back lifestyle changes to help prevent MIs  Heart healthy  diet, Regular exercise as approved by provider, Maintaining a healthy weight, Reducing stress   Is the patient/caregiver able to teach back ways to prevent a second heart attack:  Follow up with MD, Take medications, Participate in Cardiac Rehab, Manage risk factors   Is the patient/caregiver able to teach back the hierarchy of who to call/visit for symptoms/problems? PCP, Specialist, Home health nurse, Urgent Care, ED, 911  Yes   Week 2 call completed?  Yes          Brandi Fisher RN

## 2022-08-29 ENCOUNTER — APPOINTMENT (OUTPATIENT)
Dept: CT IMAGING | Facility: HOSPITAL | Age: 57
End: 2022-08-29
Payer: MEDICAID

## 2022-08-29 ENCOUNTER — HOSPITAL ENCOUNTER (EMERGENCY)
Facility: HOSPITAL | Age: 57
Discharge: HOME | End: 2022-08-29
Attending: EMERGENCY MEDICINE
Payer: MEDICAID

## 2022-08-29 VITALS
HEIGHT: 69 IN | SYSTOLIC BLOOD PRESSURE: 140 MMHG | BODY MASS INDEX: 22.96 KG/M2 | OXYGEN SATURATION: 97 % | RESPIRATION RATE: 19 BRPM | DIASTOLIC BLOOD PRESSURE: 90 MMHG | HEART RATE: 54 BPM | WEIGHT: 155 LBS | TEMPERATURE: 97.5 F

## 2022-08-29 DIAGNOSIS — R11.2 NAUSEA AND VOMITING, INTRACTABILITY OF VOMITING NOT SPECIFIED, UNSPECIFIED VOMITING TYPE: Primary | ICD-10-CM

## 2022-08-29 DIAGNOSIS — R53.1 GENERAL WEAKNESS: ICD-10-CM

## 2022-08-29 DIAGNOSIS — I71.43 ANEURYSM OF INFRARENAL ABDOMINAL AORTA: ICD-10-CM

## 2022-08-29 LAB
A/G RATIO: 1.7 (ref 0.8–2)
ALBUMIN SERPL-MCNC: 4.3 G/DL (ref 3.4–4.8)
ALP BLD-CCNC: 77 U/L (ref 25–100)
ALT SERPL-CCNC: 16 U/L (ref 4–36)
ANION GAP SERPL CALCULATED.3IONS-SCNC: 12 MMOL/L (ref 3–16)
AST SERPL-CCNC: 19 U/L (ref 8–33)
BASOPHILS ABSOLUTE: 0.1 K/UL (ref 0–0.1)
BASOPHILS RELATIVE PERCENT: 0.8 %
BILIRUB SERPL-MCNC: 0.8 MG/DL (ref 0.3–1.2)
BILIRUBIN URINE: NEGATIVE
BLOOD, URINE: NEGATIVE
BUN BLDV-MCNC: 9 MG/DL (ref 6–20)
CALCIUM SERPL-MCNC: 9 MG/DL (ref 8.5–10.5)
CHLORIDE BLD-SCNC: 97 MMOL/L (ref 98–107)
CLARITY: CLEAR
CO2: 24 MMOL/L (ref 20–30)
COLOR: YELLOW
CREAT SERPL-MCNC: 1 MG/DL (ref 0.4–1.2)
EOSINOPHILS ABSOLUTE: 0.2 K/UL (ref 0–0.4)
EOSINOPHILS RELATIVE PERCENT: 2 %
ETHANOL: NORMAL MG/DL (ref 0–0.08)
GFR AFRICAN AMERICAN: >59
GFR NON-AFRICAN AMERICAN: >59
GLOBULIN: 2.6 G/DL
GLUCOSE BLD-MCNC: 130 MG/DL (ref 74–106)
GLUCOSE BLD-MCNC: 132 MG/DL (ref 74–106)
GLUCOSE URINE: NEGATIVE MG/DL
HCT VFR BLD CALC: 41.6 % (ref 40–54)
HEMOGLOBIN: 14.4 G/DL (ref 13–18)
IMMATURE GRANULOCYTES #: 0 K/UL
IMMATURE GRANULOCYTES %: 0.4 % (ref 0–5)
KETONES, URINE: NEGATIVE MG/DL
LACTIC ACID: 1.9 MMOL/L (ref 0.4–2)
LEUKOCYTE ESTERASE, URINE: NEGATIVE
LIPASE: 15 U/L (ref 5.6–51.3)
LYMPHOCYTES ABSOLUTE: 2.3 K/UL (ref 1.5–4)
LYMPHOCYTES RELATIVE PERCENT: 28.3 %
MAGNESIUM: 1.8 MG/DL (ref 1.7–2.4)
MCH RBC QN AUTO: 35.3 PG (ref 27–32)
MCHC RBC AUTO-ENTMCNC: 34.6 G/DL (ref 31–35)
MCV RBC AUTO: 102 FL (ref 80–100)
MICROSCOPIC EXAMINATION: NORMAL
MONOCYTES ABSOLUTE: 0.7 K/UL (ref 0.2–0.8)
MONOCYTES RELATIVE PERCENT: 9 %
NEUTROPHILS ABSOLUTE: 4.7 K/UL (ref 2–7.5)
NEUTROPHILS RELATIVE PERCENT: 59.5 %
NITRITE, URINE: NEGATIVE
PDW BLD-RTO: 12.6 % (ref 11–16)
PERFORMED ON: ABNORMAL
PH UA: 5 (ref 5–8)
PLATELET # BLD: 197 K/UL (ref 150–400)
PMV BLD AUTO: 9.4 FL (ref 6–10)
POTASSIUM SERPL-SCNC: 3.3 MMOL/L (ref 3.4–5.1)
PROTEIN UA: NEGATIVE MG/DL
RBC # BLD: 4.08 M/UL (ref 4.5–6)
SARS-COV-2, NAAT: NOT DETECTED
SODIUM BLD-SCNC: 133 MMOL/L (ref 136–145)
SPECIFIC GRAVITY UA: 1.01 (ref 1–1.03)
TOTAL CK: 189 U/L (ref 26–174)
TOTAL PROTEIN: 6.9 G/DL (ref 6.4–8.3)
TROPONIN: <0.3 NG/ML
URINE TYPE: NORMAL
UROBILINOGEN, URINE: 0.2 E.U./DL
WBC # BLD: 8 K/UL (ref 4–11)

## 2022-08-29 PROCEDURE — 87635 SARS-COV-2 COVID-19 AMP PRB: CPT

## 2022-08-29 PROCEDURE — 83690 ASSAY OF LIPASE: CPT

## 2022-08-29 PROCEDURE — 6360000004 HC RX CONTRAST MEDICATION: Performed by: EMERGENCY MEDICINE

## 2022-08-29 PROCEDURE — 96374 THER/PROPH/DIAG INJ IV PUSH: CPT

## 2022-08-29 PROCEDURE — 82077 ASSAY SPEC XCP UR&BREATH IA: CPT

## 2022-08-29 PROCEDURE — 83735 ASSAY OF MAGNESIUM: CPT

## 2022-08-29 PROCEDURE — 81003 URINALYSIS AUTO W/O SCOPE: CPT

## 2022-08-29 PROCEDURE — 71275 CT ANGIOGRAPHY CHEST: CPT

## 2022-08-29 PROCEDURE — 85025 COMPLETE CBC W/AUTO DIFF WBC: CPT

## 2022-08-29 PROCEDURE — 80053 COMPREHEN METABOLIC PANEL: CPT

## 2022-08-29 PROCEDURE — 6360000002 HC RX W HCPCS: Performed by: HOSPITALIST

## 2022-08-29 PROCEDURE — 36415 COLL VENOUS BLD VENIPUNCTURE: CPT

## 2022-08-29 PROCEDURE — 99285 EMERGENCY DEPT VISIT HI MDM: CPT

## 2022-08-29 PROCEDURE — 83605 ASSAY OF LACTIC ACID: CPT

## 2022-08-29 PROCEDURE — 84484 ASSAY OF TROPONIN QUANT: CPT

## 2022-08-29 PROCEDURE — 70450 CT HEAD/BRAIN W/O DYE: CPT

## 2022-08-29 PROCEDURE — 82550 ASSAY OF CK (CPK): CPT

## 2022-08-29 PROCEDURE — 74174 CTA ABD&PLVS W/CONTRAST: CPT

## 2022-08-29 PROCEDURE — 6370000000 HC RX 637 (ALT 250 FOR IP): Performed by: HOSPITALIST

## 2022-08-29 PROCEDURE — 93005 ELECTROCARDIOGRAM TRACING: CPT

## 2022-08-29 RX ORDER — ONDANSETRON 2 MG/ML
4 INJECTION INTRAMUSCULAR; INTRAVENOUS ONCE
Status: COMPLETED | OUTPATIENT
Start: 2022-08-29 | End: 2022-08-29

## 2022-08-29 RX ORDER — ONDANSETRON 4 MG/1
4 TABLET, ORALLY DISINTEGRATING ORAL EVERY 4 HOURS PRN
Qty: 10 TABLET | Refills: 0 | Status: SHIPPED | OUTPATIENT
Start: 2022-08-29

## 2022-08-29 RX ORDER — POTASSIUM CHLORIDE 20 MEQ/1
40 TABLET, EXTENDED RELEASE ORAL ONCE
Status: COMPLETED | OUTPATIENT
Start: 2022-08-29 | End: 2022-08-29

## 2022-08-29 RX ADMIN — IOPAMIDOL 100 ML: 755 INJECTION, SOLUTION INTRAVENOUS at 19:36

## 2022-08-29 RX ADMIN — ONDANSETRON 4 MG: 2 INJECTION INTRAMUSCULAR; INTRAVENOUS at 20:09

## 2022-08-29 RX ADMIN — POTASSIUM CHLORIDE 40 MEQ: 1500 TABLET, EXTENDED RELEASE ORAL at 20:09

## 2022-08-29 ASSESSMENT — PAIN - FUNCTIONAL ASSESSMENT: PAIN_FUNCTIONAL_ASSESSMENT: 0-10

## 2022-08-29 ASSESSMENT — PAIN SCALES - GENERAL: PAINLEVEL_OUTOF10: 0

## 2022-08-29 NOTE — ED NOTES
Pt to ct scan. Urinal provided. Pt is aware of need for urine sample when he returns from ct scan.      Maikel Melendez RN  08/29/22 [id number]

## 2022-08-29 NOTE — ED TRIAGE NOTES
Patient arrives to ED with family for AMS. Family states patient had called her at work and told her to come home, when she arrives she found him at the table with his head down, hard to arouse and was cold / clammy. Patient answering questions appropriately. Patient denies pain during triage.

## 2022-08-29 NOTE — ED PROVIDER NOTES
Triage Chief Complaint:   Altered Mental Status      [place]:  Shaheen Domínguez is a 55 y.o. male that presents to the emergency department with altered mental status. Patient is a very poor historian. He is able to tell me that he had some nausea and vomiting earlier today. He denies chest pain or shortness of breath. Girlfriend is at bedside. She states that he called her from work to say he was not feeling well and she found him laying his head on the table and sweating. Patient does have a left-sided facial droop but both patient and girlfriend state that this is chronic. Monse Cortez     Past Medical History:   Diagnosis Date    Abnormality, congenital     COPD (chronic obstructive pulmonary disease) (HCC)     Emphysema/COPD (HCC)     Hypertension     MRSA (methicillin resistant staph aureus) culture positive     Staph infection      Past Surgical History:   Procedure Laterality Date    CARDIAC SURGERY      as a child, hole in heart     Family History   Problem Relation Age of Onset    Diabetes Mother     Cancer Mother     High Blood Pressure Mother     Heart Attack Father      Social History     Socioeconomic History    Marital status:      Spouse name: Not on file    Number of children: Not on file    Years of education: Not on file    Highest education level: Not on file   Occupational History    Not on file   Tobacco Use    Smoking status: Former    Smokeless tobacco: Never   Vaping Use    Vaping Use: Never used   Substance and Sexual Activity    Alcohol use: Yes     Comment: occationally    Drug use: Not Currently    Sexual activity: Yes     Partners: Female   Other Topics Concern    Not on file   Social History Narrative    Not on file     Social Determinants of Health     Financial Resource Strain: Not on file   Food Insecurity: Not on file   Transportation Needs: Not on file   Physical Activity: Not on file   Stress: Not on file   Social Connections: Not on file   Intimate Partner Violence: Not on file Housing Stability: Not on file     No current facility-administered medications for this encounter. Current Outpatient Medications   Medication Sig Dispense Refill    clopidogrel (PLAVIX) 75 MG tablet Take 1 tablet by mouth daily 30 tablet 0    nitroGLYCERIN (NITROSTAT) 0.4 MG SL tablet Place one tab under tongue every 5 min with chest pain and may repeat every 5 min up to 3 doses, pt to call 911 or go to nearest hospital if symptoms persist or chest pain continues or takes more than one dose of nitro 25 tablet 0    aspirin 325 MG EC tablet Take 1 tablet by mouth daily 30 tablet 0    ALBUTEROL SULFATE IN Inhale 2 puffs into the lungs every 6-8 hours as needed      vitamin D (ERGOCALCIFEROL) 91955 units capsule Take 1 capsule by mouth once a week for 8 doses 8 capsule 0    mometasone-formoterol (DULERA) 200-5 MCG/ACT inhaler Inhale 2 puffs into the lungs 2 times daily       citalopram (CELEXA) 40 MG tablet Take 40 mg by mouth daily      baclofen (LIORESAL) 10 MG tablet Take 10 mg by mouth 2 times daily      loratadine (CLARITIN) 10 MG tablet Take 10 mg by mouth daily       No Known Allergies  Nursing Notes Reviewed    ROS:  At least 10 systems reviewed and otherwise negative except as in the [place]. Physical Exam:  ED Triage Vitals [08/29/22 1831]   Enc Vitals Group      BP (!) 160/95      Heart Rate 71      Resp 18      Temp 97.5 °F (36.4 °C)      Temp Source Oral      SpO2 94 %      Weight 155 lb (70.3 kg)      Height 5' 9\" (1.753 m)      Head Circumference       Peak Flow       Pain Score       Pain Loc       Pain Edu? Excl. in [address]? My pulse oximetry interpretation is which is within the normal range    GENERAL APPEARANCE: Awake and alert. Cooperative. No acute distress. HEAD:  Atraumatic. EYES: EOM's grossly intact. ENT: Mucous membranes are moist.  No trismus. NECK:  Trachea midline. HEART: RRR. Radial pulses 2+. LUNGS: Respirations unlabored. CTAB  ABDOMEN: Soft. Non-tender.  No guarding or rebound. EXTREMITIES: No acute deformities. SKIN: Warm and dry. NEUROLOGICAL: No gross facial drooping. Moves all 4 extremities spontaneously. NIH stroke scale 0  PSYCHIATRIC: Normal mood.     I have reviewed and interpreted all of the currently available lab results from this visit (if applicable):  Results for orders placed or performed during the hospital encounter of 08/29/22   COVID-19, Rapid    Specimen: Nasopharyngeal Swab   Result Value Ref Range    SARS-CoV-2, NAAT Not Detected Not Detected   CBC with Auto Differential   Result Value Ref Range    WBC 8.0 4.0 - 11.0 K/uL    RBC 4.08 (L) 4.50 - 6.00 M/uL    Hemoglobin 14.4 13.0 - 18.0 g/dL    Hematocrit 41.6 40.0 - 54.0 %    .0 (H) 80.0 - 100.0 fL    MCH 35.3 (H) 27.0 - 32.0 pg    MCHC 34.6 31.0 - 35.0 g/dL    RDW 12.6 11.0 - 16.0 %    Platelets 719 389 - 798 K/uL    MPV 9.4 6.0 - 10.0 fL    Neutrophils % 59.5 %    Immature Granulocytes % 0.4 0.0 - 5.0 %    Lymphocytes % 28.3 %    Monocytes % 9.0 %    Eosinophils % 2.0 %    Basophils % 0.8 %    Neutrophils Absolute 4.7 2.0 - 7.5 K/uL    Immature Granulocytes # 0.0 K/uL    Lymphocytes Absolute 2.3 1.5 - 4.0 K/uL    Monocytes Absolute 0.7 0.2 - 0.8 K/uL    Eosinophils Absolute 0.2 0.0 - 0.4 K/uL    Basophils Absolute 0.1 0.0 - 0.1 K/uL   CMP   Result Value Ref Range    Sodium 133 (L) 136 - 145 mmol/L    Potassium 3.3 (L) 3.4 - 5.1 mmol/L    Chloride 97 (L) 98 - 107 mmol/L    CO2 24 20 - 30 mmol/L    Anion Gap 12 3 - 16    Glucose 130 (H) 74 - 106 mg/dL    BUN 9 6 - 20 mg/dL    Creatinine 1.0 0.4 - 1.2 mg/dL    GFR Non-African American >59 >59    GFR African American >59 >59    Calcium 9.0 8.5 - 10.5 mg/dL    Total Protein 6.9 6.4 - 8.3 g/dL    Albumin 4.3 3.4 - 4.8 g/dL    Albumin/Globulin Ratio 1.7 0.8 - 2.0    Total Bilirubin 0.8 0.3 - 1.2 mg/dL    Alkaline Phosphatase 77 25 - 100 U/L    ALT 16 4 - 36 U/L    AST 19 8 - 33 U/L    Globulin 2.6 Not Established g/dL   Lactic Acid   Result Value Ref Range    Lactic Acid 1.9 0.4 - 2.0 mmol/L   Lipase   Result Value Ref Range    Lipase 15.0 5.6 - 51.3 U/L   Magnesium   Result Value Ref Range    Magnesium 1.8 1.7 - 2.4 mg/dL   ETOH   Result Value Ref Range    Ethanol Lvl None Detected mg/dL   Troponin   Result Value Ref Range    Troponin <0.30 <0.30 ng/mL   CK   Result Value Ref Range    Total  (H) 26 - 174 U/L   POCT Glucose   Result Value Ref Range    POC Glucose 132 (H) 74 - 106 mg/dl    Performed on [facility]K           EKG: (All EKG's are interpreted by myself in the absence of a cardiologist)  12 lead EKG:  Normal Sinus Rhythm 68  Axis is   Normal  QTc is  normal  There is no specific ST-T wave changes appreciated. There is no clear evidence of acute ischemia or infarction. It was compared against an EKG from none. MDM:  EKG is unremarkable at this time. .  CBC and chemistry, troponin, COVID test are negative lactic acid is normal.  Patient signed out to the oncoming provider.   Please refer to his note for disposition      (Please note that portions of this note may have been completed with a voice recognition program. Efforts were made to edit the dictations but occasionally words are mis-transcribed.)    MD Allison Claudio MD  08/29/22 1921

## 2022-08-30 NOTE — DISCHARGE INSTRUCTIONS
Will need a yearly CT abdomen pelvis with contrast for evaluation of the 2.4 cm infrarenal abdominal aortic aneurysm

## 2022-08-30 NOTE — ED NOTES
Pt verbalized understanding of DC and FU instructions. No questions at DC. Pt left ambulating well to POV w/o incident.       Jessica Wright RN  08/29/22 1996

## 2023-01-28 NOTE — ED PROVIDER NOTES
Emergency Department Encounter  Location: [address] [place] [address] Court    Patient: Lashanda Barakat  MRN: [id number]  : 1965  Date of evaluation: 2022  ED Provider: Bhakti Palma DO    19:00p.m. Lashanda Barakat was checked out to me by Dr. Nico Mercado. Please see his/her initial documentation for details of the patient's initial ED presentation, physical exam and completed studies. In brief, Lashanda Barakat is a 55 y.o. male that presented to the emergency department patient presented to the emergency department for complaints of possible altered mental status, weakness and then had some nausea and vomiting earlier today. Patient does have a chronic left facial droop which was verified by the initial physician. Patient was just sort of diffuse complaints all over. Is able to move all of extremities but would not move his lower extremities because he states he just felt too weak was not that he could not he was just not willing to. Patient checked out at shift change for completion of evaluation work-up. He is pending radiological studies. Otherwise he is resting comfortably stretcher no acute distress nontoxic-appearing.     I have reviewed and interpreted all of the currently available lab results and diagnostics from this visit:  Results for orders placed or performed during the hospital encounter of 22   COVID-19, Rapid    Specimen: Nasopharyngeal Swab   Result Value Ref Range    SARS-CoV-2, NAAT Not Detected Not Detected   CBC with Auto Differential   Result Value Ref Range    WBC 8.0 4.0 - 11.0 K/uL    RBC 4.08 (L) 4.50 - 6.00 M/uL    Hemoglobin 14.4 13.0 - 18.0 g/dL    Hematocrit 41.6 40.0 - 54.0 %    .0 (H) 80.0 - 100.0 fL    MCH 35.3 (H) 27.0 - 32.0 pg    MCHC 34.6 31.0 - 35.0 g/dL    RDW 12.6 11.0 - 16.0 %    Platelets 120 978 - 135 K/uL    MPV 9.4 6.0 - 10.0 fL    Neutrophils % 59.5 %    Immature Granulocytes % 0.4 0.0 - 5.0 %    Lymphocytes % 28.3 %    Monocytes % Right chest dressing, clean dry, and intact. Tunnel cath in place to left chest wall. Line site clean,intact, minimal blood present to Biopatch in place.    9.0 %    Eosinophils % 2.0 %    Basophils % 0.8 %    Neutrophils Absolute 4.7 2.0 - 7.5 K/uL    Immature Granulocytes # 0.0 K/uL    Lymphocytes Absolute 2.3 1.5 - 4.0 K/uL    Monocytes Absolute 0.7 0.2 - 0.8 K/uL    Eosinophils Absolute 0.2 0.0 - 0.4 K/uL    Basophils Absolute 0.1 0.0 - 0.1 K/uL   CMP   Result Value Ref Range    Sodium 133 (L) 136 - 145 mmol/L    Potassium 3.3 (L) 3.4 - 5.1 mmol/L    Chloride 97 (L) 98 - 107 mmol/L    CO2 24 20 - 30 mmol/L    Anion Gap 12 3 - 16    Glucose 130 (H) 74 - 106 mg/dL    BUN 9 6 - 20 mg/dL    Creatinine 1.0 0.4 - 1.2 mg/dL    GFR Non-African American >59 >59    GFR African American >59 >59    Calcium 9.0 8.5 - 10.5 mg/dL    Total Protein 6.9 6.4 - 8.3 g/dL    Albumin 4.3 3.4 - 4.8 g/dL    Albumin/Globulin Ratio 1.7 0.8 - 2.0    Total Bilirubin 0.8 0.3 - 1.2 mg/dL    Alkaline Phosphatase 77 25 - 100 U/L    ALT 16 4 - 36 U/L    AST 19 8 - 33 U/L    Globulin 2.6 Not Established g/dL   Lactic Acid   Result Value Ref Range    Lactic Acid 1.9 0.4 - 2.0 mmol/L   Lipase   Result Value Ref Range    Lipase 15.0 5.6 - 51.3 U/L   Magnesium   Result Value Ref Range    Magnesium 1.8 1.7 - 2.4 mg/dL   ETOH   Result Value Ref Range    Ethanol Lvl None Detected mg/dL   Urinalysis   Result Value Ref Range    Color, UA Yellow Straw/Yellow    Clarity, UA Clear Clear    Glucose, Ur Negative Negative mg/dL    Bilirubin Urine Negative Negative    Ketones, Urine Negative Negative mg/dL    Specific Gravity, UA 1.015 1.005 - 1.030    Blood, Urine Negative Negative    pH, UA 5.0 5.0 - 8.0    Protein, UA Negative Negative mg/dL    Urobilinogen, Urine 0.2 <2.0 E.U./dL    Nitrite, Urine Negative Negative    Leukocyte Esterase, Urine Negative Negative    Microscopic Examination Not Indicated     Urine Type Voided    Troponin   Result Value Ref Range    Troponin <0.30 <0.30 ng/mL   CK   Result Value Ref Range    Total  (H) 26 - 174 U/L   POCT Glucose   Result Value Ref Range    POC Glucose 132 (H) 74 - 106 mg/dl    Performed on [id number]      CT Head W/O Contrast    Result Date: 8/29/2022  PROCEDURE: CT head without contrast INDICATION: ams; COMPARISON: 5/22/2019 TECHNIQUE: CT head was performed without contrast according to standard protocol. Axial images and multiplanar reformatted images reviewed. Up-to-date CT equipment and radiation dose reduction techniques were employed. FINDINGS: No acute intra- or extra-axial fluid collections are identified. The ventricles are of normal size, shape, and morphology. The basilar cisterns are patent. No mass effect or midline shift is seen. The gray-white matter differentiation is normal. No cerebral density abnormality is seen. Visualized portions of the orbits, paranasal sinuses, and mastoids appear normal. No acute fracture is identified. 1.  No acute intracranial process. CTA CHEST W CONTRAST    Result Date: 8/29/2022  EXAMINATION: Computed tomography of the chest, abdomen and pelvis without and with contrast HISTORY: chest and abd pain TECHNIQUE: Computed tomography of the chest, abdomen, and pelvis was performed without contrast and following the uneventful administration of 100 ml Isovue-370 intravenous contrast according to a dissection protocol. 3-D reconstructions were performed. Up-to-date CT equipment and radiation dose reduction techniques were employed. COMPARISON: 5/22/2019 FINDINGS: Vascular: No evidence of acute aortic dissection. Thoracic aorta appears normal. No pulmonary artery filling defect is seen to suggest an acute pulmonary embolism. The abdominal aorta is imaged in the portal venous phase and demonstrates 2.4 cm fusiform ectasia of the infrarenal abdominal aorta. There is mild atherosclerosis of the abdominal aorta and major branches. The celiac axis, superior mesenteric artery, single right and two left renal arteries, and inferior mesenteric artery are patent.  Chest: There is severe bullous emphysema with biapical bullae measuring up to 12 cm in size on the right. The lungs are free of focal consolidations. No suspicious pulmonary nodules are visible. There is no pleural effusion or pneumothorax. The heart size is normal. There is no pericardial effusion. There is no supraclavicular, axillary, mediastinal or hilar lymphadenopathy. Abdomen and Pelvis: The liver appears normal.  No focal intrahepatic lesions are seen. The gallbladder is normal without evidence of gallstones or gallbladder wall thickening. There is no intrahepatic or extrahepatic biliary ductal dilatation. The spleen, pancreas, and adrenal glands appear normal. 1.5 cm right renal cyst is noted. No stones are identified in either kidney or along the course of either ureter and no hydronephrosis or hydroureter is seen. Prostate gland contains calcifications. Urinary bladder appears normal. The stomach is normal.  The small bowel and large bowel are normal in course and caliber. There is no evidence of bowel wall thickening or bowel obstruction. There is no evidence of retroperitoneal lymphadenopathy. No free intraperitoneal air is seen. Bone windows demonstrate no suspicious lytic or blastic lesions. 1.  No evidence of acute aortic dissection. Mild to 2.4 cm fusiform ectasia of the infrarenal abdominal aorta. 2.  Bullous emphysema. No acute pulmonary consolidation. 3.  No acute abnormality in the abdomen and pelvis. CTA ABDOMEN PELVIS W CONTRAST    Result Date: 8/29/2022  EXAMINATION: Computed tomography of the chest, abdomen and pelvis without and with contrast HISTORY: chest and abd pain TECHNIQUE: Computed tomography of the chest, abdomen, and pelvis was performed without contrast and following the uneventful administration of 100 ml Isovue-370 intravenous contrast according to a dissection protocol. 3-D reconstructions were performed. Up-to-date CT equipment and radiation dose reduction techniques were employed.  COMPARISON: 5/22/2019 FINDINGS: Vascular: No evidence Course and MDM: In brief, Altaf Soler is a 55 y.o. male whose care was signed out to me by the outgoing provider. In brief, patient checked at shift change for finishing evaluation waiting on radiological studies. He had a CT scan of the head, CTA of the chest and abdomen pelvis pending at time of shift change for further evaluation of his complaints. Otherwise his rapid COVID was negative. Ethanol level was negative. Patient white count is normal 8000, hemoglobin is 14.4, hematocrit 41.6, platelet count is normal at 197 so no acute phase reactants. CMP was benign except for sodium of 133 which is slightly low and a potassium of 3.3 which is also just slightly low. Chloride is also slightly low at 97. Glucose was 130 mildly elevated. Remaining portion of the CMP was benign. Lactic acid was normal at 1.9. Lipase normal at 15. Magnesium is normal at 1.8. Troponin was nondetectable less than 0.30. CK was barely elevated at 189 no concern for acute rhabdo. CT scan of the head was negative per radiology. UA was negative no microscopy indicated. Discussed patient's findings with him. He is resting comfortably stretcher no acute distress states he feels much better. He was offered something to eat and drink here but states that he is really not hungry. After discussion with the patient with negative findings cannot find an exact reason why he had a possible nausea vomiting this morning and then the increased fatigue/weakness that has improved at this time. Did tell the patient about the fusiform aneurysm that was found on CT scan that does need to be monitored he stated his understanding of this. Otherwise patient will be discharged home in stable condition per his request.  Patient was offered nausea medication but declined. Patient did have potassium replacement here for very mild hypokalemia at 3.3 with baseline being 3.4. Patient tolerated this well.   Patient discharged home in stable condition. Offered work excuse but declined. Advised that he does need to follow-up with his regular family physician within the next 1 to 2 days reevaluation. Patient was also given instructions of if symptoms worsens or new symptoms arise he should return back to emergency department for further evaluation work-up. ED Medication Orders (From admission, onward)      Start Ordered     Status Ordering Provider    08/29/22 1935 08/29/22 1935  iopamidol (ISOVUE-370) 76 % injection 100 mL  IMG ONCE PRN         Last MAR action: Given - by Devonte Farnsworth on 08/29/22 at 1936 Carol Patel B    08/29/22 1930 08/29/22 1927  potassium chloride (KLOR-CON M) extended release tablet 40 mEq  ONCE         Last MAR action: Given - by Yoel Joseph on 08/29/22 at 2009 Lyric GREEN    08/29/22 1930 08/29/22 1927  ondansetron (ZOFRAN) injection 4 mg  ONCE         Last MAR action: Given - by Yoel Joseph on 08/29/22 at 2009 [facility], [address]            Shared Critical Care: I personally saw the patient and independently provided 0 minutes of non-concurrent critical care out of the total shared critical time provided    Final Impression      1. Nausea and vomiting, intractability of vomiting not specified, unspecified vomiting type    2. General weakness    3.  Aneurysm of infrarenal abdominal aorta [facility])        DISPOSITION Decision To Discharge 08/29/2022 08:31:34 PM     (Please note that portions of this note may have been completed with a voice recognition program. Efforts were made to edit the dictations but occasionally words are mis-transcribed.)    Bj Dunne DO  08/29/22 2033       Antoinette Foreman DO  08/30/22 1712

## 2023-02-07 ENCOUNTER — OFFICE VISIT (OUTPATIENT)
Dept: CARDIOLOGY | Facility: CLINIC | Age: 58
End: 2023-02-07
Payer: MEDICAID

## 2023-02-07 VITALS
SYSTOLIC BLOOD PRESSURE: 110 MMHG | HEIGHT: 68 IN | WEIGHT: 151 LBS | BODY MASS INDEX: 22.88 KG/M2 | DIASTOLIC BLOOD PRESSURE: 72 MMHG | OXYGEN SATURATION: 93 % | HEART RATE: 101 BPM

## 2023-02-07 DIAGNOSIS — J43.9 CHRONIC BULLOUS EMPHYSEMA: Chronic | ICD-10-CM

## 2023-02-07 DIAGNOSIS — I10 PRIMARY HYPERTENSION: ICD-10-CM

## 2023-02-07 DIAGNOSIS — Z72.0 TOBACCO ABUSE: ICD-10-CM

## 2023-02-07 DIAGNOSIS — E78.5 DYSLIPIDEMIA: ICD-10-CM

## 2023-02-07 DIAGNOSIS — I25.10 CORONARY ARTERY DISEASE INVOLVING NATIVE CORONARY ARTERY OF NATIVE HEART WITHOUT ANGINA PECTORIS: Primary | ICD-10-CM

## 2023-02-07 PROCEDURE — 99214 OFFICE O/P EST MOD 30 MIN: CPT | Performed by: INTERNAL MEDICINE

## 2023-02-07 PROCEDURE — 93000 ELECTROCARDIOGRAM COMPLETE: CPT | Performed by: INTERNAL MEDICINE

## 2023-02-07 RX ORDER — LORATADINE 10 MG/1
1 TABLET ORAL DAILY
COMMUNITY

## 2023-02-07 RX ORDER — FLUTICASONE PROPIONATE 50 MCG
SPRAY, SUSPENSION (ML) NASAL
COMMUNITY
Start: 2023-01-18

## 2023-02-07 RX ORDER — ONDANSETRON 4 MG/1
4 TABLET, ORALLY DISINTEGRATING ORAL
COMMUNITY
Start: 2022-08-29

## 2023-02-07 RX ORDER — ROPINIROLE 3 MG/1
TABLET, FILM COATED ORAL
COMMUNITY
Start: 2022-11-30

## 2023-02-07 RX ORDER — LISINOPRIL 40 MG/1
TABLET ORAL
COMMUNITY
Start: 2023-01-23

## 2023-02-07 RX ORDER — BUSPIRONE HYDROCHLORIDE 30 MG/1
TABLET ORAL
COMMUNITY
Start: 2023-01-23

## 2023-02-07 NOTE — PROGRESS NOTES
Subjective:     Encounter Date:02/07/2023      Patient ID: Niall Murguia is a 57 y.o. male.    Chief Complaint: Coronary artery disease  HPI  Is a 57-year-old male patient who presents for routine follow-up.  The patient previously underwent multivessel stenting.  He indicates he has done well from a cardiovascular standpoint since his last visit with no active symptoms, issues or hospitalizations.  He reports compliance with his medications with no perceived side effects.  Unfortunately he has started smoking again.  The following portions of the patient's history were reviewed and updated as appropriate: allergies, current medications, past family history, past medical history, past social history, past surgical history and problem  Review of Systems   Constitutional: Negative for chills, diaphoresis, fever, malaise/fatigue, weight gain and weight loss.   HENT: Negative for ear discharge, hearing loss, hoarse voice and nosebleeds.    Eyes: Negative for discharge, double vision, pain and photophobia.   Cardiovascular: Negative for chest pain, claudication, cyanosis, dyspnea on exertion, irregular heartbeat, leg swelling, near-syncope, orthopnea, palpitations, paroxysmal nocturnal dyspnea and syncope.   Respiratory: Negative for cough, hemoptysis, shortness of breath, sputum production and wheezing.    Endocrine: Negative for cold intolerance, heat intolerance, polydipsia, polyphagia and polyuria.   Hematologic/Lymphatic: Negative for adenopathy and bleeding problem. Does not bruise/bleed easily.   Skin: Negative for color change, flushing, itching and rash.   Musculoskeletal: Negative for muscle cramps, muscle weakness, myalgias and stiffness.   Gastrointestinal: Negative for abdominal pain, diarrhea, hematemesis, hematochezia, nausea and vomiting.   Genitourinary: Negative for dysuria, frequency and nocturia.   Neurological: Negative for focal weakness, loss of balance, numbness, paresthesias and seizures.    Psychiatric/Behavioral: Negative for altered mental status, hallucinations and suicidal ideas.   Allergic/Immunologic: Negative for HIV exposure, hives and persistent infections.           Current Outpatient Medications:   •  albuterol (PROVENTIL) (2.5 MG/3ML) 0.083% nebulizer solution, Take 2.5 mg by nebulization Every 4 (Four) Hours As Needed., Disp: , Rfl:   •  aspirin (aspirin) 81 MG EC tablet, Take 1 tablet by mouth Daily., Disp: 30 tablet, Rfl: 0  •  atorvastatin (LIPITOR) 80 MG tablet, Take 1 tablet by mouth Every Night., Disp: 30 tablet, Rfl: 0  •  buPROPion XL (WELLBUTRIN XL) 150 MG 24 hr tablet, Take 150 mg by mouth Daily., Disp: , Rfl:   •  busPIRone (BUSPAR) 30 MG tablet, , Disp: , Rfl:   •  cetirizine (zyrTEC) 10 MG tablet, Take 10 mg by mouth Daily., Disp: , Rfl:   •  DULoxetine (CYMBALTA) 60 MG capsule, Take 60 mg by mouth Daily., Disp: , Rfl:   •  fluticasone (FLONASE) 50 MCG/ACT nasal spray, , Disp: , Rfl:   •  lisinopril (PRINIVIL,ZESTRIL) 40 MG tablet, , Disp: , Rfl:   •  loratadine (CLARITIN) 10 MG tablet, Take 1 tablet by mouth Daily., Disp: , Rfl:   •  ondansetron ODT (ZOFRAN-ODT) 4 MG disintegrating tablet, Take 4 mg by mouth., Disp: , Rfl:   •  prasugrel (EFFIENT) 10 MG tablet, Take 1 tablet by mouth Daily., Disp: 30 tablet, Rfl: 0  •  QUEtiapine (SEROquel) 25 MG tablet, Take 25 mg by mouth Every Night., Disp: , Rfl:   •  rOPINIRole (REQUIP) 3 MG tablet, , Disp: , Rfl:   •  Sennosides (SENNA) 8.6 MG capsule, Take  by mouth As Needed (for constipation)., Disp: , Rfl:   •  SPIRIVA RESPIMAT 2.5 MCG/ACT aerosol solution inhaler, Inhale 2 puffs Daily., Disp: , Rfl:   •  SYMBICORT 160-4.5 MCG/ACT inhaler, Inhale 2 puffs 2 (Two) Times a Day., Disp: , Rfl:   •  VENTOLIN  (90 Base) MCG/ACT inhaler, Inhale 2 puffs Every 4 (Four) Hours As Needed., Disp: , Rfl:     Objective:   Vitals and nursing note reviewed.   Constitutional:       Appearance: Healthy appearance. Not in distress.   Neck:       "Vascular: No JVR. JVD normal.   Pulmonary:      Effort: Pulmonary effort is normal.      Breath sounds: Normal breath sounds. No wheezing. No rhonchi. No rales.   Chest:      Chest wall: Not tender to palpatation.   Cardiovascular:      PMI at left midclavicular line. Normal rate. Regular rhythm. Normal S1. Normal S2.      Murmurs: There is no murmur.      No gallop. No click. No rub.   Pulses:     Intact distal pulses.   Edema:     Peripheral edema absent.   Abdominal:      General: Bowel sounds are normal.      Palpations: Abdomen is soft.      Tenderness: There is no abdominal tenderness.   Musculoskeletal: Normal range of motion.         General: No tenderness. Skin:     General: Skin is warm and dry.   Neurological:      General: No focal deficit present.      Mental Status: Alert and oriented to person, place and time.       Blood pressure 110/72, pulse 101, height 172.7 cm (68\"), weight 68.5 kg (151 lb), SpO2 93 %.   Lab Review:     Assessment:       1. Coronary artery disease involving native coronary artery of native heart without angina pectoris      2. Tobacco abuse  Currently smoking up to 1 pack cigarettes per day.    3. Primary hypertension  Acceptable blood pressure control.    4. Dyslipidemia  Tolerating high intensity statin based cholesterol-lowering therapy without side effects    5. Chronic bullous emphysema (HCC)  Typical tobacco related lung disease.      ECG 12 Lead    Date/Time: 2/7/2023 2:24 PM  Performed by: Gurjit Mc MD  Authorized by: Gurjit Mc MD   Comparison: compared with previous ECG   Similar to previous ECG  Rhythm: sinus rhythm  Ectopy: atrial premature contractions  Rate: normal  QRS axis: normal    Clinical impression: normal ECG            Plan:     Unfortunately the epic based algorithm to detect tobacco abuse and interventions failed to identify that the patient is continuing to smoke.  This is concerning now that this is a \"tracked metric\" used for quality " measures.  The patient has been counseled extensively regarding the essential need to discontinue cigarette smoking.    No changes in medications have been made at today's visit.    No cardiovascular testing is warranted at this time.

## 2023-04-10 ENCOUNTER — TELEPHONE (OUTPATIENT)
Dept: SURGERY | Facility: CLINIC | Age: 58
End: 2023-04-10
Payer: MEDICAID

## 2024-03-09 ENCOUNTER — APPOINTMENT (OUTPATIENT)
Dept: GENERAL RADIOLOGY | Facility: HOSPITAL | Age: 59
End: 2024-03-09
Payer: MEDICAID

## 2024-03-09 ENCOUNTER — HOSPITAL ENCOUNTER (EMERGENCY)
Facility: HOSPITAL | Age: 59
Discharge: HOME | End: 2024-03-09
Attending: STUDENT IN AN ORGANIZED HEALTH CARE EDUCATION/TRAINING PROGRAM
Payer: MEDICAID

## 2024-03-09 ENCOUNTER — APPOINTMENT (OUTPATIENT)
Dept: CT IMAGING | Facility: HOSPITAL | Age: 59
End: 2024-03-09
Payer: MEDICAID

## 2024-03-09 VITALS
BODY MASS INDEX: 22.43 KG/M2 | OXYGEN SATURATION: 95 % | DIASTOLIC BLOOD PRESSURE: 86 MMHG | RESPIRATION RATE: 16 BRPM | WEIGHT: 148 LBS | HEART RATE: 51 BPM | SYSTOLIC BLOOD PRESSURE: 134 MMHG | HEIGHT: 68 IN | TEMPERATURE: 97.5 F

## 2024-03-09 DIAGNOSIS — T50.905A ADVERSE EFFECT OF DRUG, INITIAL ENCOUNTER: Primary | ICD-10-CM

## 2024-03-09 LAB
ALBUMIN SERPL-MCNC: 4.4 G/DL (ref 3.5–5.2)
ALBUMIN/GLOB SERPL: 1.7 G/DL
ALP SERPL-CCNC: 71 U/L (ref 39–117)
ALT SERPL W P-5'-P-CCNC: 15 U/L (ref 1–41)
ANION GAP SERPL CALCULATED.3IONS-SCNC: 10.2 MMOL/L (ref 5–15)
AST SERPL-CCNC: 14 U/L (ref 1–40)
ATMOSPHERIC PRESS: 727 MMHG
BASE EXCESS BLDV CALC-SCNC: 3.1 MMOL/L (ref 0–2)
BASOPHILS # BLD AUTO: 0.04 10*3/MM3 (ref 0–0.2)
BASOPHILS NFR BLD AUTO: 0.9 % (ref 0–1.5)
BDY SITE: ABNORMAL
BILIRUB SERPL-MCNC: 0.9 MG/DL (ref 0–1.2)
BILIRUB UR QL STRIP: NEGATIVE
BUN SERPL-MCNC: 15 MG/DL (ref 6–20)
BUN/CREAT SERPL: 15.3 (ref 7–25)
CALCIUM SPEC-SCNC: 9.6 MG/DL (ref 8.6–10.5)
CHLORIDE SERPL-SCNC: 101 MMOL/L (ref 98–107)
CLARITY UR: CLEAR
CO2 SERPL-SCNC: 25.8 MMOL/L (ref 22–29)
COLOR UR: YELLOW
CREAT SERPL-MCNC: 0.98 MG/DL (ref 0.76–1.27)
CRP SERPL-MCNC: <0.3 MG/DL (ref 0–0.5)
D-LACTATE SERPL-SCNC: 1 MMOL/L (ref 0.5–2)
DEPRECATED RDW RBC AUTO: 44 FL (ref 37–54)
EGFRCR SERPLBLD CKD-EPI 2021: 89.9 ML/MIN/1.73
EOSINOPHIL # BLD AUTO: 0.07 10*3/MM3 (ref 0–0.4)
EOSINOPHIL NFR BLD AUTO: 1.5 % (ref 0.3–6.2)
ERYTHROCYTE [DISTWIDTH] IN BLOOD BY AUTOMATED COUNT: 11.9 % (ref 12.3–15.4)
FLUAV SUBTYP SPEC NAA+PROBE: NOT DETECTED
FLUBV RNA ISLT QL NAA+PROBE: NOT DETECTED
GEN 5 2HR TROPONIN T REFLEX: 18 NG/L
GLOBULIN UR ELPH-MCNC: 2.6 GM/DL
GLUCOSE SERPL-MCNC: 94 MG/DL (ref 65–99)
GLUCOSE UR STRIP-MCNC: NEGATIVE MG/DL
HCO3 BLDV-SCNC: 29.4 MMOL/L (ref 22–28)
HCT VFR BLD AUTO: 41.5 % (ref 37.5–51)
HGB BLD-MCNC: 15 G/DL (ref 13–17.7)
HGB UR QL STRIP.AUTO: NEGATIVE
IMM GRANULOCYTES # BLD AUTO: 0.02 10*3/MM3 (ref 0–0.05)
IMM GRANULOCYTES NFR BLD AUTO: 0.4 % (ref 0–0.5)
KETONES UR QL STRIP: NEGATIVE
LEUKOCYTE ESTERASE UR QL STRIP.AUTO: NEGATIVE
LYMPHOCYTES # BLD AUTO: 1.22 10*3/MM3 (ref 0.7–3.1)
LYMPHOCYTES NFR BLD AUTO: 26.2 % (ref 19.6–45.3)
MAGNESIUM SERPL-MCNC: 2.2 MG/DL (ref 1.6–2.6)
MCH RBC QN AUTO: 36.2 PG (ref 26.6–33)
MCHC RBC AUTO-ENTMCNC: 36.1 G/DL (ref 31.5–35.7)
MCV RBC AUTO: 100.2 FL (ref 79–97)
MODALITY: ABNORMAL
MONOCYTES # BLD AUTO: 0.42 10*3/MM3 (ref 0.1–0.9)
MONOCYTES NFR BLD AUTO: 9 % (ref 5–12)
NEUTROPHILS NFR BLD AUTO: 2.88 10*3/MM3 (ref 1.7–7)
NEUTROPHILS NFR BLD AUTO: 62 % (ref 42.7–76)
NITRITE UR QL STRIP: NEGATIVE
NRBC BLD AUTO-RTO: 0 /100 WBC (ref 0–0.2)
NT-PROBNP SERPL-MCNC: 193.2 PG/ML (ref 0–900)
PCO2 BLDV: 49.8 MM HG (ref 40–50)
PH BLDV: 7.38 PH UNITS (ref 7.32–7.42)
PH UR STRIP.AUTO: 7 [PH] (ref 5–8)
PLATELET # BLD AUTO: 180 10*3/MM3 (ref 140–450)
PMV BLD AUTO: 9.4 FL (ref 6–12)
PO2 BLDV: 29.5 MM HG (ref 30–50)
POTASSIUM SERPL-SCNC: 4.2 MMOL/L (ref 3.5–5.2)
PROCALCITONIN SERPL-MCNC: 0.05 NG/ML (ref 0–0.25)
PROT SERPL-MCNC: 7 G/DL (ref 6–8.5)
PROT UR QL STRIP: NEGATIVE
RBC # BLD AUTO: 4.14 10*6/MM3 (ref 4.14–5.8)
SAO2 % BLDCOV: 54.9 % (ref 45–75)
SARS-COV-2 RNA RESP QL NAA+PROBE: NOT DETECTED
SODIUM SERPL-SCNC: 137 MMOL/L (ref 136–145)
SP GR UR STRIP: 1.01 (ref 1–1.03)
TROPONIN T DELTA: -12 NG/L
TROPONIN T SERPL HS-MCNC: 30 NG/L
UROBILINOGEN UR QL STRIP: NORMAL
VENTILATOR MODE: ABNORMAL
WBC NRBC COR # BLD AUTO: 4.65 10*3/MM3 (ref 3.4–10.8)

## 2024-03-09 PROCEDURE — 81003 URINALYSIS AUTO W/O SCOPE: CPT | Performed by: STUDENT IN AN ORGANIZED HEALTH CARE EDUCATION/TRAINING PROGRAM

## 2024-03-09 PROCEDURE — 87636 SARSCOV2 & INF A&B AMP PRB: CPT | Performed by: STUDENT IN AN ORGANIZED HEALTH CARE EDUCATION/TRAINING PROGRAM

## 2024-03-09 PROCEDURE — 70450 CT HEAD/BRAIN W/O DYE: CPT

## 2024-03-09 PROCEDURE — 85025 COMPLETE CBC W/AUTO DIFF WBC: CPT | Performed by: STUDENT IN AN ORGANIZED HEALTH CARE EDUCATION/TRAINING PROGRAM

## 2024-03-09 PROCEDURE — 71045 X-RAY EXAM CHEST 1 VIEW: CPT

## 2024-03-09 PROCEDURE — 82805 BLOOD GASES W/O2 SATURATION: CPT

## 2024-03-09 PROCEDURE — 93005 ELECTROCARDIOGRAM TRACING: CPT | Performed by: STUDENT IN AN ORGANIZED HEALTH CARE EDUCATION/TRAINING PROGRAM

## 2024-03-09 PROCEDURE — 99284 EMERGENCY DEPT VISIT MOD MDM: CPT

## 2024-03-09 PROCEDURE — 86140 C-REACTIVE PROTEIN: CPT | Performed by: STUDENT IN AN ORGANIZED HEALTH CARE EDUCATION/TRAINING PROGRAM

## 2024-03-09 PROCEDURE — 36415 COLL VENOUS BLD VENIPUNCTURE: CPT

## 2024-03-09 PROCEDURE — 80053 COMPREHEN METABOLIC PANEL: CPT | Performed by: STUDENT IN AN ORGANIZED HEALTH CARE EDUCATION/TRAINING PROGRAM

## 2024-03-09 PROCEDURE — 83880 ASSAY OF NATRIURETIC PEPTIDE: CPT | Performed by: STUDENT IN AN ORGANIZED HEALTH CARE EDUCATION/TRAINING PROGRAM

## 2024-03-09 PROCEDURE — 83735 ASSAY OF MAGNESIUM: CPT | Performed by: STUDENT IN AN ORGANIZED HEALTH CARE EDUCATION/TRAINING PROGRAM

## 2024-03-09 PROCEDURE — 84484 ASSAY OF TROPONIN QUANT: CPT | Performed by: STUDENT IN AN ORGANIZED HEALTH CARE EDUCATION/TRAINING PROGRAM

## 2024-03-09 PROCEDURE — 83605 ASSAY OF LACTIC ACID: CPT | Performed by: STUDENT IN AN ORGANIZED HEALTH CARE EDUCATION/TRAINING PROGRAM

## 2024-03-09 PROCEDURE — 84145 PROCALCITONIN (PCT): CPT | Performed by: STUDENT IN AN ORGANIZED HEALTH CARE EDUCATION/TRAINING PROGRAM

## 2024-03-09 NOTE — Clinical Note
[facility] EMERGENCY DEPARTMENT  [address] [address]  Phone: [phone]    hilary accompanied Niall Murguia to the emergency department on 3/9/2024. They may return to work on 03/12/2024.        Thank you for choosing [facility].    Dao Phan MD

## 2024-03-09 NOTE — DISCHARGE INSTRUCTIONS
Niall was evaluated due to concerns for altered mental status.  We got labs and a CT scan of his head does it is urine that all showed no concern for acute process.  He is now stable for discharge.  As we discussed, we believe that his symptoms are likely due to his restarting his anxiety medication suddenly.  We recommend that he continues to follow with his prescribing provider to ensure that the symptoms improve appropriately.  If he has worsening of his mental status, please connect emergency department further evaluation.  He is now stable for discharge.
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
## 2024-08-16 NOTE — ED NOTES
Received call from Mary Godinez, house supervisor at [facility]. She states that they have to call a nurse in and they will call us for report once she arrives. Given bed assignment of 412.      Lazaro Dahl RN  06/26/20 3106 10 (severe pain)

## 2025-04-09 NOTE — ED TRIAGE NOTES
Patient c/o chest pain x 1.5 hours PTA. Patient reports he was mowing with a riding mower when chest pain began. He reports he experienced some nausea and also diaphoresis. Reports episode lasted for about 30-40 minutes and then got better. He states the pain returned, and prompted him to come to the ED for evaluation. Reports pain has improved somewhat at time of triage.
What Type Of Note Output Would You Prefer (Optional)?: Standard Output
How Severe Are Your Spot(S)?: mild
Have Your Spot(S) Been Treated In The Past?: has not been treated
Hpi Title: Evaluation of Skin Lesions

## 2025-07-01 ENCOUNTER — HOSPITAL ENCOUNTER (EMERGENCY)
Facility: HOSPITAL | Age: 60
Discharge: HOME | End: 2025-07-01
Attending: INTERNAL MEDICINE
Payer: MEDICAID

## 2025-07-01 ENCOUNTER — APPOINTMENT (OUTPATIENT)
Dept: GENERAL RADIOLOGY | Facility: HOSPITAL | Age: 60
End: 2025-07-01
Attending: INTERNAL MEDICINE
Payer: MEDICAID

## 2025-07-01 VITALS
SYSTOLIC BLOOD PRESSURE: 110 MMHG | OXYGEN SATURATION: 96 % | HEIGHT: 68 IN | WEIGHT: 160 LBS | TEMPERATURE: 97.9 F | RESPIRATION RATE: 19 BRPM | HEART RATE: 73 BPM | BODY MASS INDEX: 24.25 KG/M2 | DIASTOLIC BLOOD PRESSURE: 79 MMHG

## 2025-07-01 DIAGNOSIS — I48.0 PAF (PAROXYSMAL ATRIAL FIBRILLATION) (HCC): Primary | ICD-10-CM

## 2025-07-01 LAB
ALBUMIN SERPL-MCNC: 4.2 G/DL (ref 3.4–4.8)
ALBUMIN/GLOB SERPL: 1.3 {RATIO} (ref 0.8–2)
ALP SERPL-CCNC: 116 U/L (ref 25–100)
ALT SERPL-CCNC: 23 U/L (ref 4–36)
ANION GAP SERPL CALCULATED.3IONS-SCNC: 12 MMOL/L (ref 3–16)
AST SERPL-CCNC: 24 U/L (ref 8–33)
BASE EXCESS BLDA CALC-SCNC: 0.4 MMOL/L (ref -3–3)
BASOPHILS # BLD: 0 K/UL (ref 0–0.1)
BASOPHILS NFR BLD: 0.7 %
BILIRUB SERPL-MCNC: 1 MG/DL (ref 0.3–1.2)
BUN SERPL-MCNC: 8 MG/DL (ref 6–20)
CALCIUM SERPL-MCNC: 9.4 MG/DL (ref 8.3–10.6)
CHLORIDE SERPL-SCNC: 97 MMOL/L (ref 98–107)
CO2 BLDA-SCNC: 24.6 MMOL/L (ref 24–30)
CO2 SERPL-SCNC: 25 MMOL/L (ref 20–30)
CREAT SERPL-MCNC: 1 MG/DL (ref 0.9–1.3)
EOSINOPHIL # BLD: 0.2 K/UL (ref 0–0.4)
EOSINOPHIL NFR BLD: 3.4 %
ERYTHROCYTE [DISTWIDTH] IN BLOOD BY AUTOMATED COUNT: 12.1 % (ref 11–16)
FLUBV AG NPH QL: NEGATIVE
GFR SERPLBLD CREATININE-BSD FMLA CKD-EPI: 87 ML/MIN/{1.73_M2}
GLOBULIN SER CALC-MCNC: 3.2 G/DL
GLUCOSE SERPL-MCNC: 110 MG/DL (ref 74–106)
HCO3 BLDA-SCNC: 23.5 MMOL/L (ref 22–26)
HCT VFR BLD AUTO: 48.4 % (ref 40–54)
HGB BLD-MCNC: 16.6 G/DL (ref 13–18)
IMM GRANULOCYTES # BLD: 0 K/UL
LACTATE BLDV-SCNC: 1.8 MMOL/L (ref 0.4–2)
LYMPHOCYTES # BLD: 2 K/UL (ref 1.5–4)
LYMPHOCYTES NFR BLD: 37.8 %
MCH RBC QN AUTO: 34.6 PG (ref 27–32)
MCHC RBC AUTO-ENTMCNC: 34.3 G/DL (ref 31–35)
MONOCYTES # BLD: 0.5 K/UL (ref 0.2–0.8)
MONOCYTES NFR BLD: 9.9 %
NEUTROPHILS # BLD: 2.6 K/UL (ref 2–7.5)
NEUTS SEG NFR BLD: 47.8 %
NT-PROBNP SERPL-MCNC: 784 PG/ML (ref 0–1800)
O2 THERAPY: ABNORMAL
PCO2 BLDA: 34.1 MMHG (ref 35–45)
PH BLDA: 7.46 [PH] (ref 7.35–7.45)
PLATELET # BLD AUTO: 186 K/UL (ref 150–400)
PMV BLD AUTO: 10 FL (ref 6–10)
PO2 BLDA: 93.1 MMHG (ref 80–100)
POTASSIUM SERPL-SCNC: 4.3 MMOL/L (ref 3.4–5.1)
PROCALCITONIN SERPL IA-MCNC: 0.06 NG/ML (ref 0–0.15)
PROT SERPL-MCNC: 7.4 G/DL (ref 6.4–8.3)
RBC # BLD AUTO: 4.8 M/UL (ref 4.5–6)
SARS-COV-2 RDRP RESP QL NAA+PROBE: NOT DETECTED
SODIUM SERPL-SCNC: 134 MMOL/L (ref 136–145)
TROPONIN, HIGH SENSITIVITY: 22 NG/L (ref 0–22)
TROPONIN, HIGH SENSITIVITY: 25 NG/L (ref 0–22)
WBC # BLD AUTO: 5.4 K/UL (ref 4–11)

## 2025-07-01 PROCEDURE — 36415 COLL VENOUS BLD VENIPUNCTURE: CPT

## 2025-07-01 PROCEDURE — 84145 PROCALCITONIN (PCT): CPT

## 2025-07-01 PROCEDURE — 82803 BLOOD GASES ANY COMBINATION: CPT

## 2025-07-01 PROCEDURE — 6370000000 HC RX 637 (ALT 250 FOR IP): Performed by: INTERNAL MEDICINE

## 2025-07-01 PROCEDURE — 87635 SARS-COV-2 COVID-19 AMP PRB: CPT

## 2025-07-01 PROCEDURE — 83605 ASSAY OF LACTIC ACID: CPT

## 2025-07-01 PROCEDURE — 87804 INFLUENZA ASSAY W/OPTIC: CPT

## 2025-07-01 PROCEDURE — 99285 EMERGENCY DEPT VISIT HI MDM: CPT

## 2025-07-01 PROCEDURE — 87040 BLOOD CULTURE FOR BACTERIA: CPT

## 2025-07-01 PROCEDURE — 84484 ASSAY OF TROPONIN QUANT: CPT

## 2025-07-01 PROCEDURE — 94640 AIRWAY INHALATION TREATMENT: CPT

## 2025-07-01 PROCEDURE — 80053 COMPREHEN METABOLIC PANEL: CPT

## 2025-07-01 PROCEDURE — 85025 COMPLETE CBC W/AUTO DIFF WBC: CPT

## 2025-07-01 PROCEDURE — 71045 X-RAY EXAM CHEST 1 VIEW: CPT

## 2025-07-01 PROCEDURE — 36600 WITHDRAWAL OF ARTERIAL BLOOD: CPT

## 2025-07-01 PROCEDURE — 6360000002 HC RX W HCPCS: Performed by: INTERNAL MEDICINE

## 2025-07-01 PROCEDURE — 83880 ASSAY OF NATRIURETIC PEPTIDE: CPT

## 2025-07-01 RX ORDER — PRASUGREL 10 MG/1
10 TABLET, FILM COATED ORAL DAILY
COMMUNITY

## 2025-07-01 RX ORDER — BUDESONIDE AND FORMOTEROL FUMARATE DIHYDRATE 160; 4.5 UG/1; UG/1
2 AEROSOL RESPIRATORY (INHALATION) 2 TIMES DAILY
COMMUNITY

## 2025-07-01 RX ORDER — CETIRIZINE HYDROCHLORIDE 10 MG/1
10 TABLET ORAL DAILY
COMMUNITY

## 2025-07-01 RX ORDER — METOPROLOL TARTRATE 25 MG/1
12.5 TABLET, FILM COATED ORAL 2 TIMES DAILY
Qty: 30 TABLET | Refills: 5 | Status: SHIPPED | OUTPATIENT
Start: 2025-07-01

## 2025-07-01 RX ORDER — LISINOPRIL 40 MG/1
40 TABLET ORAL DAILY
COMMUNITY

## 2025-07-01 RX ORDER — ATORVASTATIN CALCIUM 80 MG/1
80 TABLET, FILM COATED ORAL DAILY
COMMUNITY

## 2025-07-01 RX ORDER — BUSPIRONE HYDROCHLORIDE 30 MG/1
30 TABLET ORAL DAILY
COMMUNITY

## 2025-07-01 RX ORDER — BUDESONIDE 0.5 MG/2ML
0.5 INHALANT ORAL ONCE
Status: COMPLETED | OUTPATIENT
Start: 2025-07-01 | End: 2025-07-01

## 2025-07-01 RX ORDER — QUETIAPINE FUMARATE 50 MG/1
50 TABLET, FILM COATED ORAL NIGHTLY
COMMUNITY

## 2025-07-01 RX ORDER — PRAMIPEXOLE DIHYDROCHLORIDE 0.75 MG/1
0.75 TABLET ORAL 2 TIMES DAILY
COMMUNITY

## 2025-07-01 RX ORDER — IPRATROPIUM BROMIDE AND ALBUTEROL SULFATE 2.5; .5 MG/3ML; MG/3ML
1 SOLUTION RESPIRATORY (INHALATION) ONCE
Status: COMPLETED | OUTPATIENT
Start: 2025-07-01 | End: 2025-07-01

## 2025-07-01 RX ORDER — ASPIRIN 81 MG/1
81 TABLET ORAL DAILY
COMMUNITY

## 2025-07-01 RX ORDER — METOPROLOL TARTRATE 25 MG/1
12.5 TABLET, FILM COATED ORAL ONCE
Status: COMPLETED | OUTPATIENT
Start: 2025-07-01 | End: 2025-07-01

## 2025-07-01 RX ORDER — DULOXETIN HYDROCHLORIDE 60 MG/1
60 CAPSULE, DELAYED RELEASE ORAL DAILY
COMMUNITY

## 2025-07-01 RX ORDER — METOPROLOL TARTRATE 25 MG/1
TABLET, FILM COATED ORAL
Status: DISCONTINUED
Start: 2025-07-01 | End: 2025-07-01 | Stop reason: HOSPADM

## 2025-07-01 RX ADMIN — BUDESONIDE 500 MCG: 0.5 INHALANT RESPIRATORY (INHALATION) at 12:18

## 2025-07-01 RX ADMIN — METOPROLOL TARTRATE 12.5 MG: 25 TABLET, FILM COATED ORAL at 14:04

## 2025-07-01 RX ADMIN — IPRATROPIUM BROMIDE AND ALBUTEROL SULFATE 1 DOSE: .5; 3 SOLUTION RESPIRATORY (INHALATION) at 12:17

## 2025-07-01 ASSESSMENT — LIFESTYLE VARIABLES
HOW MANY STANDARD DRINKS CONTAINING ALCOHOL DO YOU HAVE ON A TYPICAL DAY: PATIENT DOES NOT DRINK
HOW OFTEN DO YOU HAVE A DRINK CONTAINING ALCOHOL: NEVER

## 2025-07-01 ASSESSMENT — PAIN SCALES - GENERAL: PAINLEVEL_OUTOF10: 0

## 2025-07-01 NOTE — ED NOTES
Reviewed AVS with patient and patient's family. Discussed medication and follow-up needs, answered questions, removed IV, patient discharged.

## 2025-07-01 NOTE — ED PROVIDER NOTES
[facility]  EMERGENCY DEPARTMENT ENCOUNTER        Pt Name: Mariia Acharya  MRN: [id number]  Birthdate 1965  Date of evaluation: 7/1/2025  Provider: Leonard Mccann DO  PCP: Arminda Meier APRN - NP  Note Started: 11:41 AM EDT 7/1/25    CHIEF COMPLAINT       Chief Complaint   Patient presents with    Shortness of Breath       HISTORY OF PRESENT ILLNESS: 1 or more Elements     History from : Patient    Limitations to history : None    Mariia Acharya is a 59 y.o. male who presents with \"not feeling right\".  Patient reports he is more short of breath than usual as well as having chills and shaking.  Symptoms again this morning.  He denies any vomiting or diarrhea.  He also denies chest pain.  Patient cannot qualify his symptoms otherwise.  He reports symptoms are moderate intensity without modifying factors.     Nursing Notes were all reviewed and agreed with or any disagreements were addressed in the HPI.    REVIEW OF SYSTEMS :      Review of Systems     systems reviewed and negative except as in HPI/MDM    PAST MEDICAL HISTORY     Past Medical History:   Diagnosis Date    Abnormality, congenital     Anxiety and depression     COPD (chronic obstructive pulmonary disease) ([facility])     Emphysema/COPD ([facility])     Hypertension     MI (myocardial infarction) ([facility])     MRSA (methicillin resistant staph aureus) culture positive     Staph infection        SURGICAL HISTORY     Past Surgical History:   Procedure Laterality Date    CARDIAC SURGERY      as a child, hole in heart    CORONARY ANGIOPLASTY WITH STENT PLACEMENT         CURRENTMEDICATIONS       Previous Medications    ALBUTEROL SULFATE IN    Inhale 2 puffs into the lungs every 6-8 hours as needed    ASPIRIN 81 MG EC TABLET    Take 1 tablet by mouth daily    ATORVASTATIN (LIPITOR) 80 MG TABLET    Take 1 tablet by mouth daily    BUDESONIDE-FORMOTEROL (SYMBICORT) 160-4.5 MCG/ACT AERO    Inhale 2 puffs into the lungs 2 times

## 2025-07-01 NOTE — ED TRIAGE NOTES
Pt reports that \"fells back\" \"I just dont feel right\", states \"it felt like this when I had a heart attach\". Denies any CP or discomfort.   Woke up in a cold sweat, chilling, worsening SOA.

## 2025-07-05 LAB
BACTERIA BLD CULT ORG #2: NORMAL
BACTERIA BLD CULT: NORMAL

## 2025-08-08 ENCOUNTER — HOSPITAL ENCOUNTER (EMERGENCY)
Facility: HOSPITAL | Age: 60
Discharge: HOME | End: 2025-08-08
Attending: STUDENT IN AN ORGANIZED HEALTH CARE EDUCATION/TRAINING PROGRAM
Payer: MEDICAID

## 2025-08-08 ENCOUNTER — APPOINTMENT (OUTPATIENT)
Dept: GENERAL RADIOLOGY | Facility: HOSPITAL | Age: 60
End: 2025-08-08
Payer: MEDICAID

## 2025-08-08 VITALS
RESPIRATION RATE: 16 BRPM | WEIGHT: 150 LBS | HEART RATE: 63 BPM | HEIGHT: 68 IN | DIASTOLIC BLOOD PRESSURE: 81 MMHG | OXYGEN SATURATION: 95 % | SYSTOLIC BLOOD PRESSURE: 123 MMHG | BODY MASS INDEX: 22.73 KG/M2 | TEMPERATURE: 98 F

## 2025-08-08 DIAGNOSIS — I48.92 ATRIAL FLUTTER BY ELECTROCARDIOGRAM: Primary | ICD-10-CM

## 2025-08-08 LAB
ALBUMIN SERPL-MCNC: 4.3 G/DL (ref 3.5–5.2)
ALBUMIN/GLOB SERPL: 1.4 G/DL
ALP SERPL-CCNC: 112 U/L (ref 39–117)
ALT SERPL W P-5'-P-CCNC: 17 U/L (ref 1–41)
ANION GAP SERPL CALCULATED.3IONS-SCNC: 10 MMOL/L (ref 5–15)
AST SERPL-CCNC: 21 U/L (ref 1–40)
BASOPHILS # BLD AUTO: 0.04 10*3/MM3 (ref 0–0.2)
BASOPHILS NFR BLD AUTO: 0.7 % (ref 0–1.5)
BILIRUB SERPL-MCNC: 1.3 MG/DL (ref 0–1.2)
BUN SERPL-MCNC: 8 MG/DL (ref 6–20)
BUN/CREAT SERPL: 6.7 (ref 7–25)
CALCIUM SPEC-SCNC: 9.6 MG/DL (ref 8.6–10.5)
CHLORIDE SERPL-SCNC: 99 MMOL/L (ref 98–107)
CO2 SERPL-SCNC: 26 MMOL/L (ref 22–29)
CREAT SERPL-MCNC: 1.19 MG/DL (ref 0.76–1.27)
D DIMER PPP FEU-MCNC: 0.4 MCGFEU/ML (ref 0–0.58)
DEPRECATED RDW RBC AUTO: 46.4 FL (ref 37–54)
EGFRCR SERPLBLD CKD-EPI 2021: 70.8 ML/MIN/1.73
EOSINOPHIL # BLD AUTO: 0.03 10*3/MM3 (ref 0–0.4)
EOSINOPHIL NFR BLD AUTO: 0.6 % (ref 0.3–6.2)
ERYTHROCYTE [DISTWIDTH] IN BLOOD BY AUTOMATED COUNT: 12.4 % (ref 12.3–15.4)
GEN 5 1HR TROPONIN T REFLEX: 28 NG/L
GLOBULIN UR ELPH-MCNC: 3.1 GM/DL
GLUCOSE SERPL-MCNC: 130 MG/DL (ref 65–99)
HCT VFR BLD AUTO: 48.3 % (ref 37.5–51)
HGB BLD-MCNC: 16.9 G/DL (ref 13–17.7)
IMM GRANULOCYTES # BLD AUTO: 0.01 10*3/MM3 (ref 0–0.05)
IMM GRANULOCYTES NFR BLD AUTO: 0.2 % (ref 0–0.5)
LYMPHOCYTES # BLD AUTO: 1.34 10*3/MM3 (ref 0.7–3.1)
LYMPHOCYTES NFR BLD AUTO: 24.7 % (ref 19.6–45.3)
MAGNESIUM SERPL-MCNC: 2.3 MG/DL (ref 1.6–2.6)
MCH RBC QN AUTO: 35.4 PG (ref 26.6–33)
MCHC RBC AUTO-ENTMCNC: 35 G/DL (ref 31.5–35.7)
MCV RBC AUTO: 101 FL (ref 79–97)
MONOCYTES # BLD AUTO: 0.45 10*3/MM3 (ref 0.1–0.9)
MONOCYTES NFR BLD AUTO: 8.3 % (ref 5–12)
NEUTROPHILS NFR BLD AUTO: 3.56 10*3/MM3 (ref 1.7–7)
NEUTROPHILS NFR BLD AUTO: 65.5 % (ref 42.7–76)
NRBC BLD AUTO-RTO: 0 /100 WBC (ref 0–0.2)
NT-PROBNP SERPL-MCNC: 919 PG/ML (ref 0–900)
PLATELET # BLD AUTO: 198 10*3/MM3 (ref 140–450)
PMV BLD AUTO: 10.1 FL (ref 6–12)
POTASSIUM SERPL-SCNC: 4 MMOL/L (ref 3.5–5.2)
PROT SERPL-MCNC: 7.4 G/DL (ref 6–8.5)
RBC # BLD AUTO: 4.78 10*6/MM3 (ref 4.14–5.8)
SODIUM SERPL-SCNC: 135 MMOL/L (ref 136–145)
TROPONIN T % DELTA: -13
TROPONIN T NUMERIC DELTA: -4 NG/L
TROPONIN T SERPL HS-MCNC: 32 NG/L
WBC NRBC COR # BLD AUTO: 5.43 10*3/MM3 (ref 3.4–10.8)

## 2025-08-08 PROCEDURE — 36415 COLL VENOUS BLD VENIPUNCTURE: CPT

## 2025-08-08 PROCEDURE — 93005 ELECTROCARDIOGRAM TRACING: CPT | Performed by: STUDENT IN AN ORGANIZED HEALTH CARE EDUCATION/TRAINING PROGRAM

## 2025-08-08 PROCEDURE — 80053 COMPREHEN METABOLIC PANEL: CPT | Performed by: STUDENT IN AN ORGANIZED HEALTH CARE EDUCATION/TRAINING PROGRAM

## 2025-08-08 PROCEDURE — 83880 ASSAY OF NATRIURETIC PEPTIDE: CPT | Performed by: STUDENT IN AN ORGANIZED HEALTH CARE EDUCATION/TRAINING PROGRAM

## 2025-08-08 PROCEDURE — 99284 EMERGENCY DEPT VISIT MOD MDM: CPT | Performed by: STUDENT IN AN ORGANIZED HEALTH CARE EDUCATION/TRAINING PROGRAM

## 2025-08-08 PROCEDURE — 84484 ASSAY OF TROPONIN QUANT: CPT | Performed by: STUDENT IN AN ORGANIZED HEALTH CARE EDUCATION/TRAINING PROGRAM

## 2025-08-08 PROCEDURE — 85379 FIBRIN DEGRADATION QUANT: CPT | Performed by: STUDENT IN AN ORGANIZED HEALTH CARE EDUCATION/TRAINING PROGRAM

## 2025-08-08 PROCEDURE — 71045 X-RAY EXAM CHEST 1 VIEW: CPT

## 2025-08-08 PROCEDURE — 83735 ASSAY OF MAGNESIUM: CPT | Performed by: STUDENT IN AN ORGANIZED HEALTH CARE EDUCATION/TRAINING PROGRAM

## 2025-08-08 PROCEDURE — 85025 COMPLETE CBC W/AUTO DIFF WBC: CPT | Performed by: STUDENT IN AN ORGANIZED HEALTH CARE EDUCATION/TRAINING PROGRAM

## 2025-08-08 RX ORDER — CLOPIDOGREL BISULFATE 75 MG/1
75 TABLET ORAL DAILY
Qty: 30 TABLET | Refills: 0 | Status: SHIPPED | OUTPATIENT
Start: 2025-08-08 | End: 2025-08-14

## 2025-08-14 ENCOUNTER — OFFICE VISIT (OUTPATIENT)
Dept: CARDIOLOGY | Facility: CLINIC | Age: 60
End: 2025-08-14
Payer: MEDICAID

## 2025-08-14 VITALS
RESPIRATION RATE: 19 BRPM | HEIGHT: 68 IN | OXYGEN SATURATION: 99 % | DIASTOLIC BLOOD PRESSURE: 62 MMHG | SYSTOLIC BLOOD PRESSURE: 100 MMHG | HEART RATE: 82 BPM | WEIGHT: 151.6 LBS | BODY MASS INDEX: 22.97 KG/M2

## 2025-08-14 DIAGNOSIS — Z09 HOSPITAL DISCHARGE FOLLOW-UP: Primary | ICD-10-CM

## 2025-08-14 DIAGNOSIS — Z72.0 TOBACCO ABUSE: ICD-10-CM

## 2025-08-14 DIAGNOSIS — Z91.89 AT RISK FOR SLEEP APNEA: ICD-10-CM

## 2025-08-14 DIAGNOSIS — I10 PRIMARY HYPERTENSION: ICD-10-CM

## 2025-08-14 DIAGNOSIS — I25.10 CORONARY ARTERY DISEASE INVOLVING NATIVE CORONARY ARTERY OF NATIVE HEART WITHOUT ANGINA PECTORIS: ICD-10-CM

## 2025-08-14 DIAGNOSIS — I48.92 NEW ONSET ATRIAL FLUTTER: ICD-10-CM

## 2025-08-14 RX ORDER — METOPROLOL TARTRATE 25 MG/1
12.5 TABLET, FILM COATED ORAL 2 TIMES DAILY
COMMUNITY
Start: 2025-07-01

## 2025-08-14 RX ORDER — UMECLIDINIUM BROMIDE AND VILANTEROL TRIFENATATE 62.5; 25 UG/1; UG/1
POWDER RESPIRATORY (INHALATION)
COMMUNITY
Start: 2025-08-08

## 2025-08-14 RX ORDER — LISINOPRIL 20 MG/1
20 TABLET ORAL DAILY
Qty: 30 TABLET | Refills: 3 | Status: SHIPPED | OUTPATIENT
Start: 2025-08-14

## 2025-08-14 RX ORDER — CLOPIDOGREL BISULFATE 75 MG/1
75 TABLET ORAL DAILY
Qty: 30 TABLET | Refills: 5 | Status: SHIPPED | OUTPATIENT
Start: 2025-08-14

## 2025-08-14 RX ORDER — PRAMIPEXOLE DIHYDROCHLORIDE 0.75 MG/1
0.75 TABLET ORAL 3 TIMES DAILY
COMMUNITY

## (undated) DEVICE — TREK CORONARY DILATATION CATHETER 2.50 MM X 20 MM / RAPID-EXCHANGE: Brand: TREK

## (undated) DEVICE — SYR LUER SLPTP 50ML

## (undated) DEVICE — INFLATION DEVICE KIT: Brand: ENCORE™ 26 ADVANTAGE KIT

## (undated) DEVICE — ENDOGATOR AUXILIARY WATER JET CONNECTOR: Brand: ENDOGATOR

## (undated) DEVICE — CATH F6 ST JR 4 100CM: Brand: SUPERTORQUE

## (undated) DEVICE — RADIFOCUS OPTITORQUE ANGIOGRAPHIC CATHETER: Brand: OPTITORQUE

## (undated) DEVICE — JELLY,LUBE,STERILE,FLIP TOP,TUBE,2-OZ: Brand: MEDLINE

## (undated) DEVICE — GUIDE CATHETER: Brand: MACH1™

## (undated) DEVICE — 12CC CONTROL SYRINGE – FR/TR/RA W/RESERVOIR: Brand: CONTROL SYRINGE

## (undated) DEVICE — GW INQWIRE FC PTFE STD J/1.5 .035 260

## (undated) DEVICE — NC TREK CORONARY DILATATION CATHETER 3.5 MM X 20 MM / RAPID-EXCHANGE: Brand: NC TREK

## (undated) DEVICE — GLIDESHEATH SLENDER STAINLESS STEEL KIT: Brand: GLIDESHEATH SLENDER

## (undated) DEVICE — TR BAND RADIAL ARTERY COMPRESSION DEVICE: Brand: TR BAND

## (undated) DEVICE — Device

## (undated) DEVICE — CVR PROB ULTRASND GLS STRL

## (undated) DEVICE — GLV SURG SENSICARE W/ALOE PF LF 7.5 STRL

## (undated) DEVICE — MEDI-VAC NON-CONDUCTIVE SUCTION TUBING: Brand: CARDINAL HEALTH

## (undated) DEVICE — FRCP BIOP RADLJAW4 HOT 2.2X240 BX40

## (undated) DEVICE — RUNTHROUGH NS EXTRA FLOPPY PTCA GUIDEWIRE: Brand: RUNTHROUGH

## (undated) DEVICE — SKIN PREP TRAY W/CHG: Brand: MEDLINE INDUSTRIES, INC.

## (undated) DEVICE — PAD GRND REM POLYHESIVE A/ DISP